# Patient Record
Sex: FEMALE | Race: WHITE | NOT HISPANIC OR LATINO | Employment: UNEMPLOYED | URBAN - METROPOLITAN AREA
[De-identification: names, ages, dates, MRNs, and addresses within clinical notes are randomized per-mention and may not be internally consistent; named-entity substitution may affect disease eponyms.]

---

## 2018-09-15 ENCOUNTER — HOSPITAL ENCOUNTER (OUTPATIENT)
Dept: URGENT CARE | Facility: CLINIC | Age: 63
Setting detail: SPECIMEN
Discharge: HOME | End: 2018-09-15
Attending: FAMILY MEDICINE | Admitting: FAMILY MEDICINE

## 2018-09-15 LAB
BACTERIA SPEC AEROBE CULT: NORMAL
GRAM STN SPEC: NORMAL
Lab: NORMAL
MICRO REPORT STATUS: NORMAL
SPECIMEN SOURCE: NORMAL

## 2019-01-31 ENCOUNTER — HOSPITAL ENCOUNTER (OUTPATIENT)
Dept: FAMILY MEDICINE CLINIC | Facility: CLINIC | Age: 64
Setting detail: SPECIMEN
Discharge: HOME | End: 2019-01-31
Attending: FAMILY MEDICINE | Admitting: FAMILY MEDICINE

## 2019-01-31 LAB
ALBUMIN SERPL-MCNC: 3.6 G/DL (ref 3.5–4.8)
ALBUMIN/GLOB SERPL: 1.3 {RATIO} (ref 1–1.7)
ALP SERPL-CCNC: 57 IU/L (ref 32–91)
ALT SERPL-CCNC: 24 IU/L (ref 14–54)
ANION GAP SERPL CALC-SCNC: 18.3 MMOL/L (ref 10–20)
AST SERPL-CCNC: 31 IU/L (ref 15–41)
BILIRUB SERPL-MCNC: 0.3 MG/DL (ref 0.3–1.2)
BUN SERPL-MCNC: 22 MG/DL (ref 8–20)
BUN/CREAT SERPL: 20 (ref 5.4–26.2)
CALCIUM SERPL-MCNC: 9.3 MG/DL (ref 8.9–10.3)
CHLORIDE SERPL-SCNC: 94 MMOL/L (ref 101–111)
CHOLEST SERPL-MCNC: 112 MG/DL
CHOLEST/HDLC SERPL: 3 {RATIO}
CONV CO2: 25 MMOL/L (ref 22–32)
CONV LDL CHOLESTEROL DIRECT: 35 MG/DL (ref 0–100)
CONV TOTAL PROTEIN: 6.4 G/DL (ref 6.1–7.9)
CREAT UR-MCNC: 1.1 MG/DL (ref 0.4–1)
GLOBULIN UR ELPH-MCNC: 2.8 G/DL (ref 2.5–3.8)
GLUCOSE SERPL-MCNC: 240 MG/DL (ref 65–99)
HDLC SERPL-MCNC: 37 MG/DL
LDLC/HDLC SERPL: 0.9 {RATIO}
LIPID INTERPRETATION: ABNORMAL
POTASSIUM SERPL-SCNC: 4.3 MMOL/L (ref 3.6–5.1)
SODIUM SERPL-SCNC: 133 MMOL/L (ref 136–144)
TRIGL SERPL-MCNC: 268 MG/DL
VLDLC SERPL CALC-MCNC: 39.5 MG/DL

## 2019-05-02 ENCOUNTER — HOSPITAL ENCOUNTER (OUTPATIENT)
Dept: FAMILY MEDICINE CLINIC | Facility: CLINIC | Age: 64
Setting detail: SPECIMEN
Discharge: HOME | End: 2019-05-02
Attending: FAMILY MEDICINE | Admitting: FAMILY MEDICINE

## 2019-05-03 ENCOUNTER — HOSPITAL ENCOUNTER (OUTPATIENT)
Dept: FAMILY MEDICINE CLINIC | Facility: CLINIC | Age: 64
Setting detail: SPECIMEN
Discharge: HOME | End: 2019-05-03
Attending: FAMILY MEDICINE | Admitting: FAMILY MEDICINE

## 2019-05-24 ENCOUNTER — CONVERSION ENCOUNTER (OUTPATIENT)
Dept: OTHER | Facility: HOSPITAL | Age: 64
End: 2019-05-24

## 2019-06-04 VITALS
HEIGHT: 65 IN | OXYGEN SATURATION: 97 % | HEART RATE: 91 BPM | WEIGHT: 142 LBS | SYSTOLIC BLOOD PRESSURE: 108 MMHG | DIASTOLIC BLOOD PRESSURE: 60 MMHG | BODY MASS INDEX: 23.66 KG/M2

## 2019-06-06 NOTE — PROGRESS NOTES
Referring Provider:  Kade  Primary Provider:  Raf Mason MD    CC:  diabetes Type 2 follow-up.    History of Present Illness:  Patient is a 63-year-old female who presents today as a new patient to establish care.  She has hx of diabetes mellitus II, hypertension, hyperlipidemia, insomnia, arthritis, and COPD.  Patient had recent labs showing uncontrolled diabetes and was started   on Jardiance and given endo referral. She does not check blood sugars.   The patient denies polyuria, polydipsia, polyphagia, nocturia, hypoglycemia requiring assistance, self managed hypoglycemia, nocturnal hypoglycemia, hypoglycemic unawareness, weight   loss, weight gain and blurred vision. She refuses injectable medication.      The patient notes hypertension.  The patient denies headache, visual disturbance, epistaxis, dizziness, syncope, fatigue, tremor, diaphoresis, dyspnea, palpitations, vomiting, abdominal pain, tinnitus, nocturia and chest pain.  Stable.    Stable insomnia.  Stable COPD without inhalers.           Risk Factors:     Smoked Tobacco Use:  Former smoker     Cigarettes:  Yes -- 4ppd pack(s) per day,      Year quit:  1992        Years Since Last Quit:  27  Smokeless Tobacco Use:  Never  Passive smoke exposure:  yes  Drug use:  no  Caffeine use:  0 drinks per day  Alcohol use:  yes     Type:  OCC  Seatbelt use:  100 %  Sun Exposure:  rarely      Review of Systems     General       Denies fever, chills, sweats and weakness.    Eyes       Denies vision loss - both eyes, blurring, eye pain, discharge and Decreased night vision.    ENT       Denies ear discharge, earache, decreased hearing, nasal congestion, nosebleeds, difficulty swallowing, hoarseness, sore throat, Post-nasal drainage and sneezing.    CV       Denies chest pain or discomfort, lightheadedness, shortness of breath with exertion, palpitations, swelling of hands or feet, difficulty breathing while lying down, fainting and leg cramps with  exertion.    Resp       Denies cough, shortness of breath, coughing up blood, chest discomfort, wheezing, excessive sputum and excessive snoring.    GI       Denies nausea, vomiting, abdominal pain, hemorrhoids, diarrhea, change in bowel habits, constipation and dark tarry stools.           Denies urinary frequency, urinary urgency, kidney pain, painful urination and night time urination.    Derm       Denies suspicious lesions.    Neuro       Denies headaches.    Psych       Denies anxiety, thoughts of suicide, depression and thoughts of violence.      Vital Signs:    Patient Profile:    63 Years Old Female  Height:     65 inches (165.10 cm)  Weight:     142 pounds  BMI:        23.63     O2 Sat:     97 %  Pulse rate: 91 / minute  BP Sittin / 60  (left arm)    Cuff size:  regular      Problems: Active problems were reviewed with the patient during this visit.  Medications: Medications were reviewed with the patient during this visit.  Allergies: Allergies were reviewed with the patient during this visit.        Vitals Entered By: Sondra Denis [facility] (May 24, 2019 1:14 PM)      Vital Signs:    Patient Profile:    63 Years Old Female  Height:     65 inches (165.10 cm)  Weight:     142 pounds  (Measured Weight:   lbs.  oz.)  BMI:        23.63  Pulse rate: 91 / minute  O2 Sat:     97 %    BP sittin / 60  (left arm)  Cuff size:  regular   Vitals Entered By: Sondra Denis [facility] (May 24, 2019 1:18 PM)    Medications:  Medications were reviewed with the patient during this visit.    Allergies:   * ETHER (Critical)  * WASP STINGS (Critical)    Allergies were reviewed with the patient during this visit.      Physical Exam    General:      well developed, well nourished, in no acute distress.    Head:      normocephalic and atraumatic.    Eyes:      PERRL/EOM intact, conjunctiva and sclera clear with out nystagmus.    Ears:      TM's intact and clear with normal canals with grossly normal hearing.    Nose:       no deformity, discharge, inflammation, or lesions.    Mouth:      no deformity or lesions with good dentition.    Neck:      no masses, thyromegaly, or abnormal cervical nodes.    Lungs:      clear bilaterally to auscultation.    Heart:      non-displaced PMI, chest non-tender; regular rate and rhythm, S1, S2 without murmurs, rubs, or gallops  Abdomen:       normal bowel sounds; no hepatosplenomegaly no ventral,umbilical hernias or masses noted.    Msk:      no deformity or scoliosis noted of thoracic or lumbar spine.    Pulses:      pulses normal in all 4 extremities.    Extremities:      no clubbing, cyanosis, edema, or deformity noted with normal full range of motion of joints of all four extremities   Neurologic:      no focal deficits, cranial nerves II-XII grossly intact with normal sensation, reflexes, coordination, muscle strength and tone.    Psych:      alert and cooperative; normal mood and affect; normal attention span and concentration.      Diabetes Management Exam:      Foot Exam (with socks and/or shoes not present):        Pulses:           pulses normal in all 4 extremities.        Blood Pressure:  Today's BP: 108/60 mm Hg    Labwork:   Most Recent Lab Results:   LDL: 38 mg/dL 05/03/2019  HbA1c: : 9.9 % 05/03/2019          Impression & Recommendations:    Problem # 1:  Preventive Health Care (VOU85-D48.8)  Colonoscopy-2 years ago  Mammogram- order today  DEXA- order today  Hysterectomy  Pneumovax completed several years ago.  Prevnar- today  Shingrix discussed    Problem # 2:  Diabetes mellitus, type II, uncontrolled (ICD-250.02) (QIP46-I54.65)  Patient had labs completed recently with elevated A1C. She has referral to endo and will call to reschedule.  She was started on Jardiance. She refuses to check blood sugars. I will send [employer] system for blood sugar monitoring.  Follow up in 2 weeks.    Her updated medication list for this problem includes:     Jardiance 25 Mg Oral Tablet (Empagliflozin)  ..... One by mouth daily     Janumet Xr  Mg Oral Tablet Extended Release 24 Hour (Sitagliptin-metformin hcl) ..... 2 tablets at evening meal     Glimepiride 4 Mg Oral Tablet (Glimepiride) ..... Take 1 tablet by mouth daily     Lisinopril 20 Mg Oral Tablet (Lisinopril) ..... Take 1 tablet by mouth daily      Problem # 3:  Hypertension (ZOF08-J30)  Stable  Her updated medication list for this problem includes:     Lisinopril 20 Mg Oral Tablet (Lisinopril) ..... Take 1 tablet by mouth daily      Problem # 4:  Hyperlipidemia (ICD-272.4) (CWV64-D07.5)  Recent lipid panel.    Her updated medication list for this problem includes:     Rosuvastatin Calcium 20 Mg Oral Tablet (Rosuvastatin calcium) ..... Take 1 tablet by mouth daily      Problem # 5:  COPD (PLV06-Z52.9)  Hx of smoking.  Patient is stable off of medication.   The following medications were removed from the medication list:     Stiolto Respimat 2.5-2.5 Mcg/act Inhalation Aerosol Solution (Tiotropium bromide-olodaterol)    Her updated medication list for this problem includes:     Proair Hfa 108 (90 Base) Mcg/act Inhalation Aerosol Solution (Albuterol sulfate) ..... 2 puffs q 4h      Problem # 6:  Insomnia, chronic (ICD-307.42) (IIW06-P69.04)  Stable.     Problem # 7:  Arthritis, generalized (ICD-716.99) (IYI86-L49.9)  Patient on Meloxicam.     Medications Added to Medication List This Visit:  1)  Meloxicam 15 Mg Oral Tablet (Meloxicam) .... Take 1 tablet by mouth every day  2)  Methocarbamol 500 Mg Oral Tablet (Methocarbamol) .... Take 1 po tid  3)  Freestyle Maki 14 Day Sensor (Continuous blood gluc sensor) .... Apply one sensor every 14 days e11.9  4)  Freestyle Maki Browns Device (Continuous blood gluc ) .... Use to check bs at least once a day e11.9  5)  Jardiance 25 Mg Oral Tablet (Empagliflozin) .... One by mouth daily    Other Orders:  Immun. Admin. (50527)  Prevnar-13 (CPT-84698)  BILATERAL SCREENING MAMMOGRAM (CPT-35299)  DEXA SCAN  (CPT-49759)      Patient Instructions:  1)  Follow up in 2 weeks.           Prescriptions:  MELOXICAM 15 MG ORAL TABLET (MELOXICAM) take 1 tablet by mouth every day  #30[Tablet] x 0      Entered and Authorized by:  Wendy Monsivais      Electronically signed by:   Wendy Monsivais on 05/24/2019      Method used:    Electronically to               [facility] (retail)              [address] [place] [address], [place]  [address]              Ph: [phone]              Fax: [phone]      Note to Pharmacy: Route: ORAL;       RxID:   [id number]  METHOCARBAMOL 500 MG ORAL TABLET (METHOCARBAMOL) Take 1 PO TID  #90[Tablet] x 0      Entered and Authorized by:  Wendy Monsivais      Electronically signed by:   Wendy Monsivais on 05/24/2019      Method used:    Electronically to               [facility] (retail)              [address] [place] [address], [place]  [address]              Ph: [phone]              Fax: [phone]      Note to Pharmacy: Route: ORAL;       RxID:   [id number]  FREESTYLE MELANIA 14 DAY SENSOR (CONTINUOUS BLOOD GLUC SENSOR) apply one sensor every 14 days E11.9  #2[Unspecified] x 2      Entered and Authorized by:  Wendy Monsivais      Electronically signed by:   Wendy Monsivais on 05/24/2019      Method used:    Electronically to               [facility] (retail)              [address] [place] [address], [place]  [address]              Ph: [phone]              Fax: [phone]      RxID:   [id number]  FREESTYLE MELANIA READER DEVICE (CONTINUOUS BLOOD GLUC ) use to check bs at least once a day E11.9  #1[Device] x 0      Entered and Authorized by:  Wedny Monsivais      Electronically signed by:   Wendy Monsivais on 05/24/2019      Method used:    Electronically to               [facility] (retail)              [address] [place] [address], [place]  [address]              Ph: [phone]               Fax: [phone]      RxID:   [id number]  JARDIANCE 25 MG ORAL TABLET (EMPAGLIFLOZIN) one by mouth daily  #30[Tablet] x 0      Entered and Authorized by:  Wendy Monsivais      Electronically signed by:   Wendy Monsivais on 05/24/2019      Method used:    Electronically to               [facility] (retail)              [address] [place] [address]              Ph: [phone]              Fax: [phone]      Note to Pharmacy: Route: ORAL;       RxID:   [id number]                Medication Administration    Orders Added:  1)  Immun. Admin. [29785]  2)  Prevnar-13 [CPT-30214]  3)  BILATERAL SCREENING MAMMOGRAM [CPT-06778]  4)  DEXA SCAN [CPT-52130]  5)  Ofc Vst, Est Level IV [83442]      The patient was counseled by the physician on immunizations due, and on the risks and benefits of immunizations.    Vaccines Administered/Entered:  Vaccination Group: PneumoPCV  Series: 1  Vaccination: Prevnar 13 Intramuscular Suspension  Administered Date: 5/24/2019 1:54 PM  [facility] Eligibility: 317  Dose/Route/Site : 0.5 mL / ID / Left Deltoid  Mfr/Lot#/Exp.Date: [employer] / [id number] / 10/31/2020  NDC/CVX: [id number] / 133  VIS Date : 11/5/2015  Administered by : Sondra Denis CMA   Comments :         Electronically signed by Wendy Monsivais on 05/27/2019 at 3:24 PM  ________________________________________________________________________       Disclaimer: Converted Note message may not contain all data elements that existed in the legacy source system. Please see [employer] Legacy System for the original note details.

## 2019-06-19 RX ORDER — MELOXICAM 15 MG/1
TABLET ORAL
Qty: 30 TABLET | Refills: 0 | Status: SHIPPED | OUTPATIENT
Start: 2019-06-19 | End: 2019-07-17 | Stop reason: SDUPTHER

## 2019-07-01 ENCOUNTER — OFFICE VISIT (OUTPATIENT)
Dept: PAIN MEDICINE | Facility: CLINIC | Age: 64
End: 2019-07-01

## 2019-07-01 VITALS
RESPIRATION RATE: 16 BRPM | WEIGHT: 145 LBS | HEIGHT: 65 IN | BODY MASS INDEX: 24.16 KG/M2 | OXYGEN SATURATION: 99 % | DIASTOLIC BLOOD PRESSURE: 68 MMHG | TEMPERATURE: 98 F | SYSTOLIC BLOOD PRESSURE: 114 MMHG | HEART RATE: 80 BPM

## 2019-07-01 DIAGNOSIS — M54.2 CERVICALGIA: Primary | ICD-10-CM

## 2019-07-01 DIAGNOSIS — M25.511 CHRONIC RIGHT SHOULDER PAIN: ICD-10-CM

## 2019-07-01 DIAGNOSIS — F19.90 CURRENT DRUG USE: Primary | ICD-10-CM

## 2019-07-01 DIAGNOSIS — M67.911 ROTATOR CUFF DISORDER, RIGHT: ICD-10-CM

## 2019-07-01 DIAGNOSIS — M79.621 ARM PAIN, SUPERIOR, RIGHT: ICD-10-CM

## 2019-07-01 DIAGNOSIS — G89.29 CHRONIC RIGHT SHOULDER PAIN: ICD-10-CM

## 2019-07-01 PROCEDURE — 99204 OFFICE O/P NEW MOD 45 MIN: CPT | Performed by: PHYSICAL MEDICINE & REHABILITATION

## 2019-07-01 PROCEDURE — G0463 HOSPITAL OUTPT CLINIC VISIT: HCPCS | Performed by: PHYSICAL MEDICINE & REHABILITATION

## 2019-07-01 RX ORDER — MELOXICAM 15 MG/1
TABLET ORAL EVERY 24 HOURS
COMMUNITY
Start: 2019-05-24 | End: 2019-07-17 | Stop reason: SDUPTHER

## 2019-07-01 RX ORDER — TRAMADOL HYDROCHLORIDE 50 MG/1
50 TABLET ORAL EVERY 6 HOURS PRN
Qty: 28 TABLET | Refills: 0 | Status: SHIPPED | OUTPATIENT
Start: 2019-07-01 | End: 2019-07-23 | Stop reason: SDUPTHER

## 2019-07-01 RX ORDER — GABAPENTIN 100 MG/1
100 CAPSULE ORAL
Refills: 3 | COMMUNITY
Start: 2019-06-09 | End: 2019-08-08 | Stop reason: SDUPTHER

## 2019-07-01 RX ORDER — GLIMEPIRIDE 4 MG/1
TABLET ORAL
Refills: 1 | COMMUNITY
Start: 2019-04-19 | End: 2019-10-20 | Stop reason: SDUPTHER

## 2019-07-01 RX ORDER — ZOLPIDEM TARTRATE 10 MG/1
TABLET ORAL
Refills: 0 | COMMUNITY
Start: 2019-06-13 | End: 2019-07-10 | Stop reason: SDUPTHER

## 2019-07-01 RX ORDER — EMPAGLIFLOZIN 25 MG/1
1 TABLET, FILM COATED ORAL DAILY
Refills: 0 | COMMUNITY
Start: 2019-05-24 | End: 2019-07-11 | Stop reason: SDUPTHER

## 2019-07-01 RX ORDER — ROSUVASTATIN CALCIUM 20 MG/1
TABLET, COATED ORAL
Refills: 3 | COMMUNITY
Start: 2019-04-26 | End: 2019-08-29 | Stop reason: SDUPTHER

## 2019-07-01 RX ORDER — METHOCARBAMOL 500 MG/1
500 TABLET, FILM COATED ORAL 3 TIMES DAILY
Refills: 0 | COMMUNITY
Start: 2019-05-28 | End: 2019-07-03 | Stop reason: SDUPTHER

## 2019-07-01 RX ORDER — FLASH GLUCOSE SCANNING READER
EACH MISCELLANEOUS
Refills: 0 | COMMUNITY
Start: 2019-05-24 | End: 2019-09-11 | Stop reason: SDUPTHER

## 2019-07-01 RX ORDER — FLASH GLUCOSE SENSOR
KIT MISCELLANEOUS
COMMUNITY
Start: 2019-05-24 | End: 2020-07-06

## 2019-07-01 RX ORDER — ESTRADIOL 1 MG/1
1 TABLET ORAL DAILY
Refills: 3 | COMMUNITY
Start: 2019-06-12 | End: 2019-08-19 | Stop reason: SDUPTHER

## 2019-07-01 RX ORDER — SITAGLIPTIN AND METFORMIN HYDROCHLORIDE 1000; 50 MG/1; MG/1
TABLET, FILM COATED, EXTENDED RELEASE ORAL
Refills: 1 | COMMUNITY
Start: 2019-04-22 | End: 2019-10-20 | Stop reason: SDUPTHER

## 2019-07-01 RX ORDER — LISINOPRIL 20 MG/1
TABLET ORAL
Refills: 1 | COMMUNITY
Start: 2019-05-07 | End: 2019-08-09 | Stop reason: SDUPTHER

## 2019-07-01 NOTE — PROGRESS NOTES
Subjective   Divya Lomeli is a 63 y.o. female.     Right shoulder pain, began around 2017, aggravated with overhead reaching, 9/10 at worst, 0/10 at best, intermittent, varies, aching, stabbing, worse with lifting interferes with ADLs, work. Had PT with HEP. Subluxates, improves with reduction. X-ray C-spine with DDD and DJD. Notes reviewed, as above, also DM2, COPD. Has used Tramadol with some relief.         The following portions of the patient's history were reviewed and updated as appropriate: allergies, current medications, past family history, past medical history, past social history, past surgical history and problem list.    Review of Systems   Constitutional: Negative for chills, fatigue and fever.   HENT: Negative for hearing loss and trouble swallowing.    Eyes: Negative for visual disturbance.   Respiratory: Negative for shortness of breath.    Cardiovascular: Negative for chest pain.   Gastrointestinal: Negative for abdominal pain, constipation, diarrhea, nausea and vomiting.   Genitourinary: Negative for urinary incontinence.   Musculoskeletal: Positive for arthralgias and neck pain. Negative for back pain, joint swelling and myalgias.   Neurological: Negative for dizziness, weakness, numbness and headache.       Objective   Physical Exam   Constitutional: She is oriented to person, place, and time. She appears well-developed and well-nourished.   HENT:   Head: Normocephalic and atraumatic.   Eyes: EOM are normal. Pupils are equal, round, and reactive to light.   Neck: Normal range of motion.   Cardiovascular: Normal rate, regular rhythm, normal heart sounds and intact distal pulses.   Pulmonary/Chest: Breath sounds normal.   Abdominal: Soft. Bowel sounds are normal. She exhibits no distension. There is no tenderness.   Musculoskeletal:   R: shoulder limited ROM   Neurological: She is alert and oriented to person, place, and time. She has normal strength and normal reflexes. She displays normal  reflexes. No sensory deficit.   Psychiatric: She has a normal mood and affect. Her behavior is normal. Thought content normal.         Assessment/Plan   Divya was seen today for other and pain.    Diagnoses and all orders for this visit:    Cervicalgia    Chronic right shoulder pain    Rotator cuff disorder, right    Arm pain, superior, right        Discussed risks and benefits of opioid treatment for chonic pain with patient, including expectations related to prescription requests, alternative modalities to opioids for managing pain, her treatment plan, risks of dependency and addiction, and safe storage practices for prescribed opioids, as well as proper and improper disposal of all medications.  Will obtain UDS today, pain contract today.  Inspect report reviewed, prior notes reviewed, consistent with patient's stated history.  Treatment plan will consist of continuing current medication as long as it remains effective and is necessary, while evaluating patient at each visit and determining if the medication can be lowered or discontinued, while also using nonopioid therapies to reduce reliance on opioids.  Begin Tramadol 50mg QID prn.  Order RxAlt #1 cream.  Schedule R subacromial injection.  RTC for injection.

## 2019-07-03 RX ORDER — METHOCARBAMOL 500 MG/1
TABLET, FILM COATED ORAL
Qty: 90 TABLET | Refills: 0 | Status: SHIPPED | OUTPATIENT
Start: 2019-07-03 | End: 2020-04-14

## 2019-07-10 RX ORDER — ZOLPIDEM TARTRATE 10 MG/1
TABLET ORAL
Qty: 30 TABLET | Refills: 0 | Status: SHIPPED | OUTPATIENT
Start: 2019-07-10 | End: 2019-08-08 | Stop reason: SDUPTHER

## 2019-07-11 RX ORDER — EMPAGLIFLOZIN 25 MG/1
TABLET, FILM COATED ORAL
Qty: 30 TABLET | Refills: 0 | Status: SHIPPED | OUTPATIENT
Start: 2019-07-11 | End: 2019-08-09 | Stop reason: SDUPTHER

## 2019-07-17 RX ORDER — MELOXICAM 15 MG/1
TABLET ORAL
Qty: 30 TABLET | Refills: 0 | Status: SHIPPED | OUTPATIENT
Start: 2019-07-17 | End: 2019-12-19 | Stop reason: SDUPTHER

## 2019-07-19 ENCOUNTER — OFFICE VISIT (OUTPATIENT)
Dept: FAMILY MEDICINE CLINIC | Facility: CLINIC | Age: 64
End: 2019-07-19

## 2019-07-19 VITALS
SYSTOLIC BLOOD PRESSURE: 116 MMHG | HEIGHT: 65 IN | WEIGHT: 143 LBS | BODY MASS INDEX: 23.82 KG/M2 | DIASTOLIC BLOOD PRESSURE: 74 MMHG | HEART RATE: 83 BPM | OXYGEN SATURATION: 100 %

## 2019-07-19 DIAGNOSIS — Z78.0 POSTMENOPAUSAL: ICD-10-CM

## 2019-07-19 DIAGNOSIS — Z12.31 SCREENING MAMMOGRAM, ENCOUNTER FOR: Primary | ICD-10-CM

## 2019-07-19 PROCEDURE — G0402 INITIAL PREVENTIVE EXAM: HCPCS | Performed by: NURSE PRACTITIONER

## 2019-07-19 NOTE — PROGRESS NOTES
The ABCs of the Annual Wellness Visit  Subsequent Medicare Wellness Visit    Chief Complaint   Patient presents with   • Medicare Wellness-subsequent       Subjective   History of Present Illness:  Divya Lomeli is a 63 y.o. female who presents for a Subsequent Medicare Wellness Visit.    HEALTH RISK ASSESSMENT    Recent Hospitalizations:  No hospitalization(s) within the last year.    Current Medical Providers:  Patient Care Team:  Wendy Monsivais, NP as PCP - General      Smoking Status:  Social History     Tobacco Use   Smoking Status Former Smoker       Alcohol Consumption:  Social History     Substance and Sexual Activity   Alcohol Use Yes    Comment: occ   twice weekly alcohol    Depression Screen:   PHQ-2/PHQ-9 Depression Screening 7/19/2019   Little interest or pleasure in doing things 0   Feeling down, depressed, or hopeless 0   Trouble falling or staying asleep, or sleeping too much -   Feeling tired or having little energy -   Poor appetite or overeating -   Feeling bad about yourself - or that you are a failure or have let yourself or your family down -   Trouble concentrating on things, such as reading the newspaper or watching television -   Moving or speaking so slowly that other people could have noticed. Or the opposite - being so fidgety or restless that you have been moving around a lot more than usual -   Thoughts that you would be better off dead, or of hurting yourself in some way -   Total Score 0       Fall Risk Screen:  STEADI Fall Risk Assessment has not been completed.    Health Habits and Functional and Cognitive Screening:  Functional & Cognitive Status 7/19/2019   Do you have difficulty preparing food and eating? No   Do you have difficulty bathing yourself, getting dressed or grooming yourself? No   Do you have difficulty using the toilet? No   Do you have difficulty moving around from place to place? No   Do you have trouble with steps or getting out of a bed or a chair? No   Current  Diet Well Balanced Diet   Dental Exam Up to date   Eye Exam Up to date   Exercise (times per week) 3 times per week   Current Exercise Activities Include Walking   Do you need help using the phone?  No   Are you deaf or do you have serious difficulty hearing?  No   Do you need help with transportation? No   Do you need help shopping? No   Do you need help preparing meals?  No   Do you need help with housework?  No   Do you need help with laundry? No   Do you need help taking your medications? No   Do you need help managing money? No   Do you ever drive or ride in a car without wearing a seat belt? No   Have you felt unusual stress, anger or loneliness in the last month? No   Who do you live with? Other   If you need help, do you have trouble finding someone available to you? No   Have you been bothered in the last four weeks by sexual problems? No   Do you have difficulty concentrating, remembering or making decisions? No         Does the patient have evidence of cognitive impairment? No    Asprin use counseling:Start ASA 81 mg daily     Age-appropriate Screening Schedule:  Refer to the list below for future screening recommendations based on patient's age, sex and/or medical conditions. Orders for these recommended tests are listed in the plan section. The patient has been provided with a written plan.    Health Maintenance   Topic Date Due   • URINE MICROALBUMIN  1955   • TDAP/TD VACCINES (1 - Tdap) 08/19/1972   • ZOSTER VACCINE (1 of 2) 08/19/2003   • DIABETIC FOOT EXAM  06/19/2019   • DIABETIC EYE EXAM  06/19/2019   • COLONOSCOPY  06/19/2019   • HEMOGLOBIN A1C  07/31/2019   • INFLUENZA VACCINE  08/01/2019   • LIPID PANEL  01/31/2020   • PNEUMOCOCCAL VACCINES (65+ LOW/MEDIUM RISK) (2 of 2 - PPSV23) 05/24/2020          The following portions of the patient's history were reviewed and updated as appropriate: allergies, current medications, past family history, past medical history, past social history, past  surgical history and problem list.    Outpatient Medications Prior to Visit   Medication Sig Dispense Refill   • Continuous Blood Gluc  (FREESTYLE MELANIA 14 DAY READER) device USE TO CHECK BLOOD SUGAR AT LEAST ONCE DAILY  0   • Continuous Blood Gluc  (FREESTYLE MELAINA READER) device FREESTYLE MELANIA READER GINA     • estradiol (ESTRACE) 1 MG tablet Take 1 mg by mouth Daily.  3   • gabapentin (NEURONTIN) 100 MG capsule Take 100 mg by mouth every night at bedtime.  3   • glimepiride (AMARYL) 4 MG tablet   1   • JANUMET XR  MG tablet   1   • JARDIANCE 25 MG tablet TAKE 1 TABLET BY MOUTH ONCE DAILY 30 tablet 0   • lisinopril (PRINIVIL,ZESTRIL) 20 MG tablet   1   • meloxicam (MOBIC) 15 MG tablet TAKE 1 TABLET BY MOUTH ONCE DAILY 30 tablet 0   • methocarbamol (ROBAXIN) 500 MG tablet TAKE 1 TABLET BY MOUTH THREE TIMES DAILY 90 tablet 0   • rosuvastatin (CRESTOR) 20 MG tablet   3   • traMADol (ULTRAM) 50 MG tablet Take 1 tablet by mouth Every 6 (Six) Hours As Needed for Moderate Pain . 28 tablet 0   • zolpidem (AMBIEN) 10 MG tablet TAKE 1 TABLET BY MOUTH ONCE DAILY IF  NEEDED  FOR  SLEEP 30 tablet 0     No facility-administered medications prior to visit.        There is no problem list on file for this patient.      Advanced Care Planning:  Patient does not have an advance directive - not interested in additional information  Did advise patient.    Review of Systems    Compared to one year ago, the patient feels her physical health is the same.  Compared to one year ago, the patient feels her mental health is the same.    Reviewed chart for potential of high risk medication in the elderly: yes  Reviewed chart for potential of harmful drug interactions in the elderly:yes  I have discussed medications, Ambien, Tramadol, gabapentin, and methocarbamol as high risk medications and patient reports she has tolerated these for a long time and unable to go without  I have discussed risk for hypoglycemia with  "glimepiride, monitor blood sugars closely  Patient on estradiol, risk for CVA discussed. Regular mammograms recommended.  Discussed risk for GI bleed with Meloxicam.    Objective         Vitals:    07/19/19 1137   BP: 116/74   BP Location: Left arm   Patient Position: Sitting   Cuff Size: Adult   Pulse: 83   SpO2: 100%   Weight: 64.9 kg (143 lb)   Height: 165.1 cm (65\")       Body mass index is 23.8 kg/m².  Discussed the patient's BMI with her. The BMI is in the acceptable range.    Physical Exam          Assessment/Plan   Medicare Risks and Personalized Health Plan  CMS Preventative Services Quick Reference  Advance Directive Discussion  Alcohol Misuse- patient only drinks alcohol twice weekly but do not advise with her other medications  Breast Cancer/Mammogram Screening- mammogram ordered  Chronic Pain - she is followed by pain management  Colon Cancer Screening- patient reports recent screening  Dementia/Memory - screening completed  Depression/Dysphoria- screening completed  Immunizations Discussed/Encouraged (specific immunizations; adacel Tdap, Hepatitis A Vaccine/Series, Influenza and Shingrix )  Lung Cancer Risk- advised low dose CT screening  Osteoprorosis Risk- order dexa scan    The above risks/problems have been discussed with the patient.  Pertinent information has been shared with the patient in the After Visit Summary.  Follow up plans and orders are seen below in the Assessment/Plan Section.    Diagnoses and all orders for this visit:    1. Screening mammogram, encounter for (Primary)  -     Mammo Screening Digital Tomosynthesis Bilateral With CAD    2. Postmenopausal  -     DEXA Bone Density Axial      Follow Up:  Return in about 6 months (around 1/19/2020).      An After Visit Summary and PPPS were given to the patient.             "

## 2019-07-23 ENCOUNTER — HOSPITAL ENCOUNTER (OUTPATIENT)
Dept: PAIN MEDICINE | Facility: HOSPITAL | Age: 64
Discharge: HOME | End: 2019-07-23
Admitting: PHYSICAL MEDICINE & REHABILITATION

## 2019-07-23 VITALS
HEIGHT: 65 IN | RESPIRATION RATE: 16 BRPM | DIASTOLIC BLOOD PRESSURE: 67 MMHG | TEMPERATURE: 98.3 F | WEIGHT: 143 LBS | HEART RATE: 70 BPM | BODY MASS INDEX: 23.82 KG/M2 | SYSTOLIC BLOOD PRESSURE: 102 MMHG | OXYGEN SATURATION: 99 %

## 2019-07-23 DIAGNOSIS — M75.51 SUBACROMIAL BURSITIS OF RIGHT SHOULDER JOINT: ICD-10-CM

## 2019-07-23 DIAGNOSIS — M54.2 CERVICALGIA: Primary | ICD-10-CM

## 2019-07-23 DIAGNOSIS — M25.511 CHRONIC RIGHT SHOULDER PAIN: ICD-10-CM

## 2019-07-23 DIAGNOSIS — G89.29 CHRONIC RIGHT SHOULDER PAIN: ICD-10-CM

## 2019-07-23 PROCEDURE — 25010000002 METHYLPREDNISOLONE PER 40 MG

## 2019-07-23 PROCEDURE — 20610 DRAIN/INJ JOINT/BURSA W/O US: CPT | Performed by: PHYSICAL MEDICINE & REHABILITATION

## 2019-07-23 RX ORDER — BUPIVACAINE HYDROCHLORIDE 5 MG/ML
INJECTION, SOLUTION EPIDURAL; INTRACAUDAL
Status: DISPENSED
Start: 2019-07-23 | End: 2019-07-24

## 2019-07-23 RX ORDER — TRAMADOL HYDROCHLORIDE 50 MG/1
50 TABLET ORAL EVERY 12 HOURS PRN
Qty: 60 TABLET | Refills: 2 | Status: SHIPPED | OUTPATIENT
Start: 2019-07-23 | End: 2019-10-23 | Stop reason: SDUPTHER

## 2019-07-23 RX ORDER — METHYLPREDNISOLONE ACETATE 40 MG/ML
INJECTION, SUSPENSION INTRA-ARTICULAR; INTRALESIONAL; INTRAMUSCULAR; SOFT TISSUE
Status: DISPENSED
Start: 2019-07-23 | End: 2019-07-24

## 2019-07-23 NOTE — H&P
Patient Care Team:  Wendy Monsivais NP as PCP - General    Chief complaint Right shoulder pain    Subjective     Right shoulder pain, began around 2017, aggravated with overhead reaching, 9/10 at worst, 0/10 at best, intermittent, varies, aching, stabbing, worse with lifting interferes with ADLs, work. Had PT with HEP. Subluxates, improves with reduction. X-ray C-spine with DDD and DJD. Notes reviewed, as above, also DM2, COPD. Has used Tramadol with some relief.        Review of Systems   Respiratory: Positive for shortness of breath.    Cardiovascular: Negative for chest pain.   Musculoskeletal: Positive for arthralgias and neck pain.        Past Medical History:   Diagnosis Date   • Anxiety    • COPD (chronic obstructive pulmonary disease) (CMS/[facility])    • Diabetes mellitus (CMS/[facility])     Type 2   • Hyperlipidemia    • Hypertension      Past Surgical History:   Procedure Laterality Date   • APPENDECTOMY  1958   • BACK SURGERY     • LUMBAR SPINE SURGERY  1959    L5 removed-Abstracted from [facility]   • TONSILLECTOMY     • TOTAL ABDOMINAL HYSTERECTOMY       Family History   Problem Relation Age of Onset   • Diabetes Mother    • Diabetes Father      Social History     Tobacco Use   • Smoking status: Former Smoker   Substance Use Topics   • Alcohol use: Yes     Comment: occ   • Drug use: Not on file       (Not in a hospital admission)  Allergies:  Ether and Other    Objective      Vital Signs  Temp:  [98.3 °F (36.8 °C)] 98.3 °F (36.8 °C)  Heart Rate:  [71] 71  Resp:  [16] 16  BP: (100)/(59) 100/59    Physical Exam   Cardiovascular: Normal rate, regular rhythm and normal heart sounds.   Pulmonary/Chest: Effort normal and breath sounds normal.       Results Review:   None      Assessment/Plan       * No active hospital problems. *      Assessment & Plan    I discussed the patients findings and my recommendations with patient     Inspect reviewed, in order. UDS in order 7/1/19.  Reduce to Tramadol 50mg BID prn.  Ordered  "RxAlt #1 cream.  Perform R subacromial injection.  RTC 3 months for f/u.        Shoulder Subacromial Injection    PREOPERATIVE DIAGNOSIS: Shoulder pain    POSTOPERATIVE DIAGNOSIS: Shoulder pain    PROCEDURE PERFORMED: RIGHT SUBACROMIAL SHOULDER INJECTION    The patient understands the risks and benefits of the procedure and wishes to proceed. The patient was seen in the preoperative area. Patient's consent was obtained and updated. Vitals were taken. Patient was then placed in a seated position for the injection. Site marked for a lateral approach and prepped with alcohol swabs x 4. A 25 gauge 1.5\"  needle was introduced into the joint space and 3mL of steroid solution containing 2mL 0.25% bupivacaine, and 1mL 40mg Depomedrol was injected after negative aspiration without resistance. The patient tolerated with no parth-procedural complications.  A sterile dressing was placed over the puncture site.        Steve Shields MD  07/23/19  1:41 PM    Time: Discharge 15 min  "

## 2019-07-23 NOTE — PATIENT INSTRUCTIONS
Shoulder Injection, Care After  Refer to this sheet in the next few weeks. These instructions provide you with information about caring for yourself after your procedure. Your health care provider may also give you more specific instructions. Your treatment has been planned according to current medical practices, but problems sometimes occur. Call your health care provider if you have any problems or questions after your procedure.  What can I expect after the procedure?  After the procedure, it is common to have:  · Soreness.  · Warmth.  · Swelling.    You may have more pain, swelling, and warmth than you did before the injection. This reaction may last for about one day.  Follow these instructions at home:  Bathing  · If you were given a bandage (dressing), keep it dry until your health care provider says it can be removed. Ask your health care provider when you can start showering or taking a bath.  Managing pain, stiffness, and swelling  · If directed, apply ice to the injection area:  ? Put ice in a plastic bag.  ? Place a towel between your skin and the bag.  ? Leave the ice on for 20 minutes, 2-3 times per day.  · Do not apply heat to your knee.  · Raise the injection area above the level of your heart while you are sitting or lying down.  Activity  · Avoid strenuous activities for as long as directed by your health care provider. Ask your health care provider when you can return to your normal activities.  General instructions  · Take medicines only as directed by your health care provider.  · Do not take aspirin or other over-the-counter medicines unless your health care provider says you can.  · Check your injection site every day for signs of infection. Watch for:  ? Redness, swelling, or pain.  ? Fluid, blood, or pus.  · Follow your health care provider’s instructions about dressing changes and removal.  Contact a health care provider if:  · You have symptoms at your injection site that last longer than  two days after your procedure.  · You have redness, swelling, or pain in your injection area.  · You have fluid, blood, or pus coming from your injection site.  · You have warmth in your injection area.  · You have a fever.  · Your pain is not controlled with medicine.  Get help right away if:  · Your knee turns very red.  · Your knee becomes very swollen.  · Your knee pain is severe.  This information is not intended to replace advice given to you by your health care provider. Make sure you discuss any questions you have with your health care provider.  Document Released: 01/08/2016 Document Revised: 08/23/2017 Document Reviewed: 10/28/2015  [employer] Interactive Patient Education © 2018 [employer]

## 2019-08-08 RX ORDER — ZOLPIDEM TARTRATE 10 MG/1
TABLET ORAL
Qty: 30 TABLET | Refills: 0 | Status: SHIPPED | OUTPATIENT
Start: 2019-08-08 | End: 2019-08-29 | Stop reason: SDUPTHER

## 2019-08-08 RX ORDER — GABAPENTIN 100 MG/1
CAPSULE ORAL
Qty: 30 CAPSULE | Refills: 3 | Status: SHIPPED | OUTPATIENT
Start: 2019-08-08 | End: 2020-01-06 | Stop reason: SDUPTHER

## 2019-08-09 RX ORDER — EMPAGLIFLOZIN 25 MG/1
TABLET, FILM COATED ORAL
Qty: 30 TABLET | Refills: 0 | Status: SHIPPED | OUTPATIENT
Start: 2019-08-09 | End: 2019-09-11 | Stop reason: SDUPTHER

## 2019-08-09 RX ORDER — LISINOPRIL 20 MG/1
TABLET ORAL
Qty: 90 TABLET | Refills: 1 | Status: SHIPPED | OUTPATIENT
Start: 2019-08-09 | End: 2020-01-16 | Stop reason: SDUPTHER

## 2019-08-19 RX ORDER — ESTRADIOL 1 MG/1
1 TABLET ORAL DAILY
Qty: 30 TABLET | Refills: 5 | Status: SHIPPED | OUTPATIENT
Start: 2019-08-19 | End: 2020-01-16 | Stop reason: SDUPTHER

## 2019-08-30 RX ORDER — ZOLPIDEM TARTRATE 10 MG/1
TABLET ORAL
Qty: 30 TABLET | Refills: 0 | Status: SHIPPED | OUTPATIENT
Start: 2019-08-30 | End: 2019-10-31 | Stop reason: SDUPTHER

## 2019-08-30 RX ORDER — ZOLPIDEM TARTRATE 10 MG/1
10 TABLET ORAL NIGHTLY PRN
Qty: 30 TABLET | Refills: 0 | Status: SHIPPED | OUTPATIENT
Start: 2019-08-30 | End: 2019-08-30 | Stop reason: SDUPTHER

## 2019-08-30 RX ORDER — ROSUVASTATIN CALCIUM 20 MG/1
20 TABLET, COATED ORAL DAILY
Qty: 90 TABLET | Refills: 0 | Status: SHIPPED | OUTPATIENT
Start: 2019-08-30 | End: 2019-11-24 | Stop reason: SDUPTHER

## 2019-09-03 ENCOUNTER — HOSPITAL ENCOUNTER (OUTPATIENT)
Dept: MAMMOGRAPHY | Facility: HOSPITAL | Age: 64
Discharge: HOME | End: 2019-09-03
Admitting: NURSE PRACTITIONER

## 2019-09-03 ENCOUNTER — HOSPITAL ENCOUNTER (OUTPATIENT)
Dept: BONE DENSITY | Facility: HOSPITAL | Age: 64
Discharge: HOME | End: 2019-09-03

## 2019-09-03 PROCEDURE — 77063 BREAST TOMOSYNTHESIS BI: CPT

## 2019-09-03 PROCEDURE — 77067 SCR MAMMO BI INCL CAD: CPT

## 2019-09-03 PROCEDURE — 77080 DXA BONE DENSITY AXIAL: CPT

## 2019-09-11 RX ORDER — FLASH GLUCOSE SCANNING READER
EACH MISCELLANEOUS
Qty: 1 DEVICE | Refills: 0 | Status: SHIPPED | OUTPATIENT
Start: 2019-09-11 | End: 2020-07-06

## 2019-09-11 RX ORDER — EMPAGLIFLOZIN 25 MG/1
TABLET, FILM COATED ORAL
Qty: 30 TABLET | Refills: 0 | Status: SHIPPED | OUTPATIENT
Start: 2019-09-11 | End: 2019-10-13 | Stop reason: SDUPTHER

## 2019-09-18 ENCOUNTER — TELEPHONE (OUTPATIENT)
Dept: PAIN MEDICINE | Facility: CLINIC | Age: 64
End: 2019-09-18

## 2019-09-18 NOTE — TELEPHONE ENCOUNTER
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  Her call back # for any questions is [phone]

## 2019-10-14 RX ORDER — EMPAGLIFLOZIN 25 MG/1
TABLET, FILM COATED ORAL
Qty: 30 TABLET | Refills: 0 | Status: SHIPPED | OUTPATIENT
Start: 2019-10-14 | End: 2019-11-10 | Stop reason: SDUPTHER

## 2019-10-14 RX ORDER — GLIMEPIRIDE 4 MG/1
TABLET ORAL
Qty: 90 TABLET | Refills: 1 | OUTPATIENT
Start: 2019-10-14

## 2019-10-16 ENCOUNTER — TELEPHONE (OUTPATIENT)
Dept: FAMILY MEDICINE CLINIC | Facility: CLINIC | Age: 64
End: 2019-10-16

## 2019-10-16 NOTE — TELEPHONE ENCOUNTER
I cannot give functional disability rating. You would need to be certified for that.  If these are permanent restrictions then she should see [facility] med.

## 2019-10-16 NOTE — TELEPHONE ENCOUNTER
Patient states she is not seeing occupational med. She is not asking for disability or time off just restrictions to prevent her from working in a certain area where she was previously hurt.

## 2019-10-16 NOTE — TELEPHONE ENCOUNTER
Patient said that it isn't joint related.  She has the form ready, she just needs someone to sign it.  Should she still see occupational med or ortho?

## 2019-10-20 RX ORDER — SITAGLIPTIN AND METFORMIN HYDROCHLORIDE 1000; 50 MG/1; MG/1
TABLET, FILM COATED, EXTENDED RELEASE ORAL
Qty: 180 TABLET | Refills: 1 | Status: SHIPPED | OUTPATIENT
Start: 2019-10-20 | End: 2020-01-16 | Stop reason: SDUPTHER

## 2019-10-20 RX ORDER — GLIMEPIRIDE 4 MG/1
TABLET ORAL
Qty: 90 TABLET | Refills: 1 | Status: SHIPPED | OUTPATIENT
Start: 2019-10-20 | End: 2019-10-24 | Stop reason: SDUPTHER

## 2019-10-22 ENCOUNTER — APPOINTMENT (OUTPATIENT)
Dept: PAIN MEDICINE | Facility: CLINIC | Age: 64
End: 2019-10-22

## 2019-10-23 ENCOUNTER — OFFICE VISIT (OUTPATIENT)
Dept: PAIN MEDICINE | Facility: CLINIC | Age: 64
End: 2019-10-23

## 2019-10-23 VITALS
SYSTOLIC BLOOD PRESSURE: 106 MMHG | BODY MASS INDEX: 22.5 KG/M2 | WEIGHT: 140 LBS | DIASTOLIC BLOOD PRESSURE: 69 MMHG | RESPIRATION RATE: 16 BRPM | TEMPERATURE: 98.3 F | HEIGHT: 66 IN | OXYGEN SATURATION: 96 % | HEART RATE: 80 BPM

## 2019-10-23 DIAGNOSIS — M47.812 CERVICAL SPONDYLOSIS WITHOUT MYELOPATHY: ICD-10-CM

## 2019-10-23 DIAGNOSIS — G89.29 CHRONIC RIGHT SHOULDER PAIN: Primary | ICD-10-CM

## 2019-10-23 DIAGNOSIS — M25.50 POLYARTHRALGIA: ICD-10-CM

## 2019-10-23 DIAGNOSIS — M25.511 CHRONIC RIGHT SHOULDER PAIN: Primary | ICD-10-CM

## 2019-10-23 DIAGNOSIS — G89.4 CHRONIC PAIN SYNDROME: ICD-10-CM

## 2019-10-23 DIAGNOSIS — Z79.899 HIGH RISK MEDICATION USE: ICD-10-CM

## 2019-10-23 PROCEDURE — G0463 HOSPITAL OUTPT CLINIC VISIT: HCPCS | Performed by: ANESTHESIOLOGY

## 2019-10-23 PROCEDURE — 99214 OFFICE O/P EST MOD 30 MIN: CPT | Performed by: ANESTHESIOLOGY

## 2019-10-23 RX ORDER — TRAMADOL HYDROCHLORIDE 50 MG/1
50 TABLET ORAL EVERY 12 HOURS PRN
Qty: 14 TABLET | Refills: 0 | Status: SHIPPED | OUTPATIENT
Start: 2019-10-23 | End: 2019-10-23 | Stop reason: SDUPTHER

## 2019-10-23 RX ORDER — CYCLOBENZAPRINE HCL 10 MG
TABLET ORAL
COMMUNITY
End: 2020-01-16

## 2019-10-23 RX ORDER — TRAMADOL HYDROCHLORIDE 50 MG/1
50 TABLET ORAL EVERY 12 HOURS PRN
Qty: 60 TABLET | Refills: 2 | Status: SHIPPED | OUTPATIENT
Start: 2019-10-23 | End: 2020-01-21 | Stop reason: SDUPTHER

## 2019-10-23 NOTE — PROGRESS NOTES
Subjective    CC right shoulder, arm pain  Divya Lomeli is a 64 y.o. female with chronic neck pain, polyarthralgia, right humerus/biceps, shoulder pain here for follow-up.  New to me.  Sees .  Right subacromial bursa injection last visit reports good relief.  Continued chronic right arm/humeral pain.  Chronic polyarthralgia neck pain from DDD spondylosis.    Pain interfere with daily activity and with sleep.  Good relief functional benefit with tramadol utilizing as needed.    Pain Assessment   Location of Pain: Right arm, joint  Description of Pain: Dull/Aching, Throbbing, Stabbing  Previous Pain Rating :5  Current Pain Ratin  Aggravating Factors: Activity  Alleviating Factors: Rest, Medication    The following portions of the patient's history were reviewed and updated as appropriate: allergies, current medications, past family history, past medical history, past social history, past surgical history and problem list.     has a past medical history of Anxiety, Arm pain, COPD (chronic obstructive pulmonary disease) (CMS/[facility]), Diabetes mellitus (CMS/[facility]), Hyperlipidemia, and Hypertension.     has a past surgical history that includes Back surgery; Appendectomy (); Tonsillectomy; Lumbar spine surgery (); Total abdominal hysterectomy; and Oophorectomy.  Social History     Tobacco Use   • Smoking status: Former Smoker   • Smokeless tobacco: Never Used   Substance Use Topics   • Alcohol use: Yes     Comment: occ       Review of Systems   Constitutional: Negative for chills, fatigue and fever.   HENT: Negative for swollen glands and trouble swallowing.    Respiratory: Negative.  Negative for shortness of breath.    Cardiovascular: Negative for chest pain, palpitations and leg swelling.   Gastrointestinal: Negative for nausea and vomiting.   Musculoskeletal: Positive for arthralgias. Negative for back pain, gait problem, joint swelling, myalgias, neck pain and neck stiffness.   Skin: Negative for  "color change, dry skin, rash and skin lesions.   Neurological: Negative for dizziness, weakness, light-headedness and headache.   Hematological: Negative for adenopathy. Does not bruise/bleed easily.   Psychiatric/Behavioral: Negative for behavioral problems, suicidal ideas and depressed mood.   All other systems reviewed and are negative.      Objective   Physical Exam   Constitutional: She is oriented to person, place, and time. She appears well-developed and well-nourished. No distress.   Eyes: Conjunctivae are normal. Pupils are equal, round, and reactive to light.   Neck: Normal range of motion and full passive range of motion without pain. No Kernig's sign noted.   Cardiovascular: Normal heart sounds and intact distal pulses.   Pulmonary/Chest: Effort normal and breath sounds normal.   Musculoskeletal:        Right shoulder: She exhibits tenderness.   Back: Paraspinal tenderness, negative leg raise.  Pain with extension/side rotation.  Lumbar loading positive, pain on extension of low back past 5 degrees.  TTP on the lumbar facets noted.  Right biceps tenderness, no swelling or erythema     Vascular Status -  Her right foot exhibits no edema. Her left foot exhibits no edema.  Lymphadenopathy:     She has no cervical adenopathy.   Neurological: She is alert and oriented to person, place, and time. She has normal strength and normal reflexes. No sensory deficit. Coordination and gait normal.   Skin: Skin is warm and dry. Capillary refill takes less than 2 seconds.   Psychiatric: She has a normal mood and affect. Her behavior is normal.   Vitals reviewed.    /69   Pulse 80   Temp 98.3 °F (36.8 °C)   Resp 16   Ht 166.4 cm (65.5\")   Wt 63.5 kg (140 lb)   SpO2 96%   BMI 22.94 kg/m²     Global pain scale and PHQ 9 on chart  Opioid risk tool low risk    Assessment/Plan   Divya was seen today for arm pain, joint pain and follow-up.    Diagnoses and all orders for this visit:    Chronic right shoulder " pain    Chronic pain syndrome    Cervical spondylosis without myelopathy    Polyarthralgia    High risk medication use    Other orders  -     Discontinue: traMADol (ULTRAM) 50 MG tablet; Take 1 tablet by mouth Every 12 (Twelve) Hours As Needed for Moderate Pain .  -     traMADol (ULTRAM) 50 MG tablet; Take 1 tablet by mouth Every 12 (Twelve) Hours As Needed for Moderate Pain .      Summary  Divya Lomeli is a 64 y.o. female with chronic neck pain, polyarthralgia, right humerus/biceps, shoulder pain here for follow-up.  New to me.  Sees .  Continued chronic polyarthralgia, myofascial pain and neck pain from DDD spondylosis.  Functional benefit with hydrocodone as needed.  Good relief with right subacromial bursa injection.  Repeat as needed    Continue tramadol 50 mg twice daily as needed for severe pain.  UDS and inspect reviewed.  Discussed risk of tolerance, dependence, respiratory depression, coma and death associated with use of oral opioids for treatment of chronic nonmalignant pain.     RTC 3 months

## 2019-10-24 RX ORDER — GLIMEPIRIDE 4 MG/1
4 TABLET ORAL DAILY
Qty: 90 TABLET | Refills: 1 | Status: SHIPPED | OUTPATIENT
Start: 2019-10-24 | End: 2020-01-16 | Stop reason: SDUPTHER

## 2019-10-31 RX ORDER — ZOLPIDEM TARTRATE 10 MG/1
10 TABLET ORAL NIGHTLY PRN
Qty: 30 TABLET | Refills: 0 | Status: SHIPPED | OUTPATIENT
Start: 2019-10-31 | End: 2019-11-27 | Stop reason: SDUPTHER

## 2019-11-13 RX ORDER — EMPAGLIFLOZIN 25 MG/1
TABLET, FILM COATED ORAL
Qty: 90 TABLET | Refills: 0 | Status: SHIPPED | OUTPATIENT
Start: 2019-11-13 | End: 2020-01-16 | Stop reason: SDUPTHER

## 2019-11-25 RX ORDER — ROSUVASTATIN CALCIUM 20 MG/1
TABLET, COATED ORAL
Qty: 90 TABLET | Refills: 0 | Status: SHIPPED | OUTPATIENT
Start: 2019-11-25 | End: 2020-01-16 | Stop reason: SDUPTHER

## 2019-12-02 RX ORDER — ZOLPIDEM TARTRATE 10 MG/1
10 TABLET ORAL NIGHTLY PRN
Qty: 30 TABLET | Refills: 0 | Status: SHIPPED | OUTPATIENT
Start: 2019-12-02 | End: 2019-12-31

## 2019-12-19 RX ORDER — MELOXICAM 15 MG/1
15 TABLET ORAL DAILY
Qty: 30 TABLET | Refills: 0 | Status: SHIPPED | OUTPATIENT
Start: 2019-12-19 | End: 2020-01-16 | Stop reason: SDUPTHER

## 2019-12-31 DIAGNOSIS — G47.09 OTHER INSOMNIA: Primary | ICD-10-CM

## 2019-12-31 RX ORDER — ZOLPIDEM TARTRATE 10 MG/1
10 TABLET ORAL NIGHTLY PRN
Qty: 30 TABLET | Refills: 0 | Status: SHIPPED | OUTPATIENT
Start: 2019-12-31 | End: 2020-01-16

## 2020-01-06 ENCOUNTER — OFFICE VISIT (OUTPATIENT)
Dept: FAMILY MEDICINE CLINIC | Facility: CLINIC | Age: 65
End: 2020-01-06

## 2020-01-06 VITALS
DIASTOLIC BLOOD PRESSURE: 68 MMHG | OXYGEN SATURATION: 99 % | SYSTOLIC BLOOD PRESSURE: 101 MMHG | HEART RATE: 81 BPM | BODY MASS INDEX: 22.73 KG/M2 | WEIGHT: 141.4 LBS | HEIGHT: 66 IN

## 2020-01-06 DIAGNOSIS — M51.26 LUMBAGO DUE TO DISPLACEMENT OF INTERVERTEBRAL DISC: ICD-10-CM

## 2020-01-06 DIAGNOSIS — E11.65 TYPE 2 DIABETES MELLITUS WITH HYPERGLYCEMIA, WITHOUT LONG-TERM CURRENT USE OF INSULIN (HCC): ICD-10-CM

## 2020-01-06 DIAGNOSIS — M79.601 RIGHT ARM PAIN: Primary | ICD-10-CM

## 2020-01-06 DIAGNOSIS — G47.09 OTHER INSOMNIA: ICD-10-CM

## 2020-01-06 DIAGNOSIS — I10 ESSENTIAL HYPERTENSION: ICD-10-CM

## 2020-01-06 LAB — HBA1C MFR BLD: 7.1 % (ref 3.5–5.6)

## 2020-01-06 PROCEDURE — 99214 OFFICE O/P EST MOD 30 MIN: CPT | Performed by: NURSE PRACTITIONER

## 2020-01-06 PROCEDURE — 80053 COMPREHEN METABOLIC PANEL: CPT | Performed by: NURSE PRACTITIONER

## 2020-01-06 PROCEDURE — 83036 HEMOGLOBIN GLYCOSYLATED A1C: CPT | Performed by: NURSE PRACTITIONER

## 2020-01-06 PROCEDURE — 80061 LIPID PANEL: CPT | Performed by: NURSE PRACTITIONER

## 2020-01-06 PROCEDURE — 36415 COLL VENOUS BLD VENIPUNCTURE: CPT | Performed by: NURSE PRACTITIONER

## 2020-01-06 PROCEDURE — 85027 COMPLETE CBC AUTOMATED: CPT | Performed by: NURSE PRACTITIONER

## 2020-01-06 RX ORDER — GABAPENTIN 100 MG/1
200 CAPSULE ORAL
Qty: 60 CAPSULE | Refills: 2 | Status: SHIPPED | OUTPATIENT
Start: 2020-01-06 | End: 2020-01-16 | Stop reason: SDUPTHER

## 2020-01-06 NOTE — PROGRESS NOTES
"  Divya Lomeli is a 64 y.o. female.     Chief Complaint   Patient presents with   • Arm Injury     Needs a letter for return to work for arm       History of Present Illness   Pt here to discuss a chronic right arm/shoulder pain following a fall on stairs 2 years ago, went back to work at [employer] and pulled another muscle in the arm and has been off for 3mo, she occasionally has a random pain in the arm that moves around from the elbow to the neck, the pain is stable and she uses a \"super blue\" pain cream that helps. She would like to go back to work and needs work note to work in \"[employer]\" area only    Has severe insomnia, sometimes takes 1 and 1/2 ambien, tried melatonin in past and still not sleeping, she is also on CBD w/o THC at bedtime as well for neuropathy d/t previous back surgery. Has tramadol and robaxin that she does not use on a regular basis.      Would also like bruising of Left eye checked, she accidentally ran into a door frame, denies headache and vision changes.     Subjective     Visit Vitals  /68 (BP Location: Left arm, Patient Position: Sitting, Cuff Size: Adult)   Pulse 81   Ht 166.4 cm (65.51\")   Wt 64.1 kg (141 lb 6.4 oz)   SpO2 99%   BMI 23.16 kg/m²       The following portions of the patient's history were reviewed and updated as appropriate: allergies, current medications, past family history, past medical history, past social history, past surgical history and problem list.    Review of Systems   Constitutional: Negative for chills, fatigue and fever.   HENT: Negative for dental problem, ear pain, sinus pressure and sore throat.    Eyes: Negative for visual disturbance.        Ecchymosis of the left orbit d/t bumping head on door.    Respiratory: Negative for cough, shortness of breath and wheezing.    Gastrointestinal: Negative for abdominal pain, blood in stool, constipation, diarrhea, nausea, vomiting and GERD.   Genitourinary: Negative for difficulty urinating, frequency, urgency " and urinary incontinence.   Musculoskeletal: Positive for arthralgias and back pain. Negative for gait problem, joint swelling, myalgias and neck pain.        Right arm pain intermittent   Skin: Negative for dry skin, pallor and rash.   Neurological: Negative for dizziness, seizures, speech difficulty and weakness.   Hematological: Negative for adenopathy.   Psychiatric/Behavioral: Negative for sleep disturbance, depressed mood and stress. The patient is not nervous/anxious.        Objective     Physical Exam   Constitutional: She is oriented to person, place, and time. She appears well-developed and well-nourished. No distress.   HENT:   Head: Normocephalic.   Eyes: Pupils are equal, round, and reactive to light. Conjunctivae are normal.   Left upper orbital/lid ecchymosis and mild edema with small hematoma at the mid brow   Neck: Normal range of motion. Neck supple. No JVD present. No thyromegaly present.   Cardiovascular: Normal rate, regular rhythm and normal heart sounds.   No murmur heard.  Pulmonary/Chest: Effort normal and breath sounds normal.   Abdominal: Soft. Bowel sounds are normal. She exhibits no distension. There is no tenderness.   Musculoskeletal: Normal range of motion. She exhibits no edema or tenderness.   Mild nelson rom of the right arm, no tenderness at this time.    Neurological: She is alert and oriented to person, place, and time. No sensory deficit.   Skin: Skin is warm and dry. No rash noted. She is not diaphoretic. No erythema.   Psychiatric: She has a normal mood and affect. Her behavior is normal. Judgment normal.   Nursing note and vitals reviewed.        Assessment/Plan   Divya was seen today for arm injury.    Diagnoses and all orders for this visit:    Right arm pain  Letter given to patient today to only work in specific area requested at [employer] due to right arm injury and pain, trial gabapentin at bedtime    Other insomnia  Trial melatonin at bedtime, stop using Ambien    Type 2  diabetes mellitus with hyperglycemia, without long-term current use of insulin (CMS/[facility])  -     CBC (No Diff)  -     Hemoglobin A1c  -     Lipid Panel  -     Comprehensive Metabolic Panel    Lumbago due to displacement of intervertebral disc  Trial gabapentin    Essential hypertension  BP is stable, continue lisinopril    Other orders  -     gabapentin (NEURONTIN) 100 MG capsule; Take 2 capsules by mouth every night at bedtime.        Labs today, pt ate 6 hours ago, d/w pt on next visit in 2 weeks               Glucose   Date Value Ref Range Status   01/31/2019 240 (H) 65 - 99 mg/dL Final     BUN   Date Value Ref Range Status   01/31/2019 22 (H) 8 - 20 mg/dL Final     Creatinine   Date Value Ref Range Status   01/31/2019 1.1 (H) 0.4 - 1.0 mg/dl Final     Sodium   Date Value Ref Range Status   01/31/2019 133 (L) 136 - 144 mmol/L Final     Potassium   Date Value Ref Range Status   01/31/2019 4.3 3.6 - 5.1 mmol/L Final     Chloride   Date Value Ref Range Status   01/31/2019 94 (L) 101 - 111 mmol/L Final     CO2   Date Value Ref Range Status   01/31/2019 25 22 - 32 mmol/L Final     Calcium   Date Value Ref Range Status   01/31/2019 9.3 8.9 - 10.3 mg/dL Final     Total Protein   Date Value Ref Range Status   01/31/2019 6.4 6.1 - 7.9 g/dL Final     Albumin   Date Value Ref Range Status   01/31/2019 3.6 3.5 - 4.8 g/dL Final     ALT (SGPT)   Date Value Ref Range Status   01/31/2019 24 14 - 54 IU/L Final     AST (SGOT)   Date Value Ref Range Status   01/31/2019 31 15 - 41 IU/L Final     Alkaline Phosphatase   Date Value Ref Range Status   01/31/2019 57 32 - 91 IU/L Final     Total Bilirubin   Date Value Ref Range Status   01/31/2019 0.3 0.3 - 1.2 mg/dL Final     A/G Ratio   Date Value Ref Range Status   01/31/2019 1.3 1.0 - 1.7 Final     BUN/Creatinine Ratio   Date Value Ref Range Status   01/31/2019 20.0 5.4 - 26.2 Final     Anion Gap   Date Value Ref Range Status   01/31/2019 18.3 10 - 20 Final

## 2020-01-06 NOTE — PATIENT INSTRUCTIONS
Gabapentin 100mg for 1 week, then increase to 200mg  Start melatonin again 10mg  Stop ambien, only use for severe insomnia  Ok to return to work.

## 2020-01-07 ENCOUNTER — TELEPHONE (OUTPATIENT)
Dept: FAMILY MEDICINE CLINIC | Facility: CLINIC | Age: 65
End: 2020-01-07

## 2020-01-07 LAB
ALBUMIN SERPL-MCNC: 4.3 G/DL (ref 3.5–5.2)
ALBUMIN/GLOB SERPL: 1.3 G/DL
ALP SERPL-CCNC: 50 U/L (ref 39–117)
ALT SERPL W P-5'-P-CCNC: 17 U/L (ref 1–33)
ANION GAP SERPL CALCULATED.3IONS-SCNC: 15.3 MMOL/L (ref 5–15)
AST SERPL-CCNC: 19 U/L (ref 1–32)
BILIRUB SERPL-MCNC: <0.2 MG/DL (ref 0.2–1.2)
BUN BLD-MCNC: 24 MG/DL (ref 8–23)
BUN/CREAT SERPL: 27 (ref 7–25)
CALCIUM SPEC-SCNC: 10 MG/DL (ref 8.6–10.5)
CHLORIDE SERPL-SCNC: 103 MMOL/L (ref 98–107)
CHOLEST SERPL-MCNC: 126 MG/DL (ref 0–200)
CO2 SERPL-SCNC: 22.7 MMOL/L (ref 22–29)
CREAT BLD-MCNC: 0.89 MG/DL (ref 0.57–1)
DEPRECATED RDW RBC AUTO: 39.8 FL (ref 37–54)
ERYTHROCYTE [DISTWIDTH] IN BLOOD BY AUTOMATED COUNT: 12.9 % (ref 12.3–15.4)
GFR SERPL CREATININE-BSD FRML MDRD: 63 ML/MIN/1.73
GLOBULIN UR ELPH-MCNC: 3.4 GM/DL
GLUCOSE BLD-MCNC: 142 MG/DL (ref 65–99)
HCT VFR BLD AUTO: 39 % (ref 34–46.6)
HDLC SERPL-MCNC: 48 MG/DL (ref 40–60)
HGB BLD-MCNC: 12.9 G/DL (ref 12–15.9)
LDLC SERPL CALC-MCNC: 26 MG/DL (ref 0–100)
LDLC/HDLC SERPL: 0.54 {RATIO}
MCH RBC QN AUTO: 28.7 PG (ref 26.6–33)
MCHC RBC AUTO-ENTMCNC: 33.1 G/DL (ref 31.5–35.7)
MCV RBC AUTO: 86.7 FL (ref 79–97)
PLATELET # BLD AUTO: 284 10*3/MM3 (ref 140–450)
PMV BLD AUTO: 12.5 FL (ref 6–12)
POTASSIUM BLD-SCNC: 4.4 MMOL/L (ref 3.5–5.2)
PROT SERPL-MCNC: 7.7 G/DL (ref 6–8.5)
RBC # BLD AUTO: 4.5 10*6/MM3 (ref 3.77–5.28)
SODIUM BLD-SCNC: 141 MMOL/L (ref 136–145)
TRIGL SERPL-MCNC: 261 MG/DL (ref 0–150)
VLDLC SERPL-MCNC: 52.2 MG/DL (ref 5–40)
WBC NRBC COR # BLD: 9.67 10*3/MM3 (ref 3.4–10.8)

## 2020-01-07 NOTE — TELEPHONE ENCOUNTER
Patient called and asked that I fax the return to work note from yesterday to her employer. Fax [phone]. Attn: arielle Lund.

## 2020-01-16 ENCOUNTER — TELEPHONE (OUTPATIENT)
Dept: FAMILY MEDICINE CLINIC | Facility: CLINIC | Age: 65
End: 2020-01-16

## 2020-01-16 ENCOUNTER — OFFICE VISIT (OUTPATIENT)
Dept: FAMILY MEDICINE CLINIC | Facility: CLINIC | Age: 65
End: 2020-01-16

## 2020-01-16 VITALS
DIASTOLIC BLOOD PRESSURE: 64 MMHG | HEIGHT: 66 IN | OXYGEN SATURATION: 98 % | HEART RATE: 75 BPM | SYSTOLIC BLOOD PRESSURE: 93 MMHG | BODY MASS INDEX: 23.14 KG/M2 | WEIGHT: 144 LBS

## 2020-01-16 DIAGNOSIS — M51.26 LUMBAGO DUE TO DISPLACEMENT OF INTERVERTEBRAL DISC: ICD-10-CM

## 2020-01-16 DIAGNOSIS — E11.9 TYPE 2 DIABETES MELLITUS WITHOUT COMPLICATION, WITHOUT LONG-TERM CURRENT USE OF INSULIN (HCC): ICD-10-CM

## 2020-01-16 DIAGNOSIS — M79.601 RIGHT ARM PAIN: ICD-10-CM

## 2020-01-16 DIAGNOSIS — G47.09 OTHER INSOMNIA: Primary | ICD-10-CM

## 2020-01-16 DIAGNOSIS — M54.2 CERVICALGIA: ICD-10-CM

## 2020-01-16 PROCEDURE — 99214 OFFICE O/P EST MOD 30 MIN: CPT | Performed by: NURSE PRACTITIONER

## 2020-01-16 RX ORDER — ROSUVASTATIN CALCIUM 20 MG/1
20 TABLET, COATED ORAL NIGHTLY
Qty: 90 TABLET | Refills: 1 | Status: SHIPPED | OUTPATIENT
Start: 2020-01-16 | End: 2020-07-06 | Stop reason: SDUPTHER

## 2020-01-16 RX ORDER — GLIMEPIRIDE 4 MG/1
4 TABLET ORAL DAILY
Qty: 90 TABLET | Refills: 1 | Status: SHIPPED | OUTPATIENT
Start: 2020-01-16 | End: 2020-07-06 | Stop reason: SDUPTHER

## 2020-01-16 RX ORDER — MELOXICAM 15 MG/1
15 TABLET ORAL DAILY
Qty: 90 TABLET | Refills: 1 | Status: SHIPPED | OUTPATIENT
Start: 2020-01-16 | End: 2020-04-14

## 2020-01-16 RX ORDER — LISINOPRIL 20 MG/1
20 TABLET ORAL DAILY
Qty: 90 TABLET | Refills: 1 | Status: SHIPPED | OUTPATIENT
Start: 2020-01-16 | End: 2020-07-06 | Stop reason: SDUPTHER

## 2020-01-16 RX ORDER — TEMAZEPAM 7.5 MG/1
7.5 CAPSULE ORAL NIGHTLY PRN
Qty: 30 CAPSULE | Refills: 2 | Status: SHIPPED | OUTPATIENT
Start: 2020-01-16 | End: 2020-01-17

## 2020-01-16 RX ORDER — ESTRADIOL 1 MG/1
0.5 TABLET ORAL DAILY
Qty: 45 TABLET | Refills: 1 | Status: SHIPPED | OUTPATIENT
Start: 2020-01-16 | End: 2020-04-15

## 2020-01-16 RX ORDER — GABAPENTIN 100 MG/1
200 CAPSULE ORAL
Qty: 180 CAPSULE | Refills: 1 | Status: SHIPPED | OUTPATIENT
Start: 2020-01-16 | End: 2020-04-15

## 2020-01-16 NOTE — PROGRESS NOTES
"  Divya Lomeli is a 64 y.o. female.     Chief Complaint   Patient presents with   • Insomnia     2 week f/u from medication.   • Arm Injury     Needs letter faxed, [employer].   • Results     f/u from lab work       History of Present Illness     1. Tried melatonin last 2 weeks, noneffective. Last night took 2 gabapentin, mobic, melatonin and tylenol pm, 2 hours later took 1/2 an ambien, and finally slept however she wakes up frequently sleepwalking when she takes Ambien.    2. Arm pain improving with gabapentin, nearly resolved. Waxed the kitchen cabinets and then a bathroom and now has muscular pain. She is doing activity, and would like to go back to work, letter written last time, [employer] needs to know if permanent or temporary restriction.     3.  She would also like to discuss weaning off of estradiol, She currently takes 1 mg daily is postmenopausal has no hot flashes or mood swings as she did initially.        Subjective     Visit Vitals  BP 93/64 (BP Location: Left arm, Patient Position: Sitting, Cuff Size: Adult)   Pulse 75   Ht 166.4 cm (65.51\")   Wt 65.3 kg (144 lb)   SpO2 98%   BMI 23.59 kg/m²       The following portions of the patient's history were reviewed and updated as appropriate: allergies, current medications, past family history, past medical history, past social history, past surgical history and problem list.    Review of Systems   Constitutional: Negative for chills, fatigue and fever.   HENT: Negative for dental problem, ear pain, sinus pressure and sore throat.    Eyes: Negative for visual disturbance.        Ecchymosis of the left orbit resolved   Respiratory: Negative for cough, shortness of breath and wheezing.    Gastrointestinal: Negative for abdominal pain, blood in stool, constipation, diarrhea, nausea, vomiting and GERD.   Genitourinary: Negative for difficulty urinating, frequency, urgency and urinary incontinence.   Musculoskeletal: Positive for arthralgias and back pain. Negative " for gait problem, joint swelling, myalgias and neck pain.        Right arm pain intermittent   Skin: Negative for dry skin, pallor and rash.   Neurological: Negative for dizziness, seizures, speech difficulty and weakness.   Hematological: Negative for adenopathy.   Psychiatric/Behavioral: Negative for sleep disturbance, depressed mood and stress. The patient is not nervous/anxious.        Objective     Physical Exam   Constitutional: She is oriented to person, place, and time. She appears well-developed and well-nourished. No distress.   HENT:   Head: Normocephalic.   Eyes: Pupils are equal, round, and reactive to light. Conjunctivae are normal.   Neck: Normal range of motion. Neck supple. No JVD present. No thyromegaly present.   Cardiovascular: Normal rate, regular rhythm and normal heart sounds.   No murmur heard.  Pulmonary/Chest: Effort normal and breath sounds normal.   Abdominal: Soft. Bowel sounds are normal. She exhibits no distension. There is no tenderness.   Musculoskeletal: Normal range of motion. She exhibits no edema or tenderness.   Mild nelson rom of the right arm, no tenderness at this time.    Neurological: She is alert and oriented to person, place, and time. No sensory deficit.   Skin: Skin is warm and dry. No rash noted. She is not diaphoretic. No erythema.   Psychiatric: She has a normal mood and affect. Her behavior is normal. Judgment normal.   Nursing note and vitals reviewed.        Assessment/Plan   Divya was seen today for insomnia, arm injury and results.    Diagnoses and all orders for this visit:    Other insomnia  -     temazepam (RESTORIL) 7.5 MG capsule; Take 1 capsule by mouth At Night As Needed for Sleep.    Right arm pain    Lumbago due to displacement of intervertebral disc    Type 2 diabetes mellitus without complication, without long-term current use of insulin (CMS/[facility])    Cervicalgia    Other orders  -     estradiol (ESTRACE) 1 MG tablet; Take 0.5 tablets by mouth Daily for  90 days.  -     glimepiride (AMARYL) 4 MG tablet; Take 1 tablet by mouth Daily.  -     SITagliptin-metFORMIN HCl ER (JANUMET XR)  MG tablet; Take 2 tablets every day with a meal  -     Empagliflozin (JARDIANCE) 25 MG tablet; Take 25 mg by mouth Daily.  -     lisinopril (PRINIVIL,ZESTRIL) 20 MG tablet; Take 1 tablet by mouth Daily.  -     meloxicam (MOBIC) 15 MG tablet; Take 1 tablet by mouth Daily.  -     rosuvastatin (CRESTOR) 20 MG tablet; Take 1 tablet by mouth Every Night.  -     gabapentin (NEURONTIN) 100 MG capsule; Take 2 capsules by mouth every night at bedtime for 90 days.        Reviewed labs and all stable, a1c down to 7.1 from 9.4  Stop ambien d/t sleep walking, trial restoril prn only, continue melatonin. Call if tolerates restoril, and will rf. Work note given to return as this is temporary condition and will plan to re-evaluate R arm pain, shoulder again in 6mo.   Decrease estradiol 1/2 tab and hope to wean off in future.   Refill all other medications above  6mo for all other conditions with labs 1 week prior, or earlier as needed.          Glucose   Date Value Ref Range Status   01/06/2020 142 (H) 65 - 99 mg/dL Final     BUN   Date Value Ref Range Status   01/06/2020 24 (H) 8 - 23 mg/dL Final     Creatinine   Date Value Ref Range Status   01/06/2020 0.89 0.57 - 1.00 mg/dL Final     Sodium   Date Value Ref Range Status   01/06/2020 141 136 - 145 mmol/L Final     Potassium   Date Value Ref Range Status   01/06/2020 4.4 3.5 - 5.2 mmol/L Final     Chloride   Date Value Ref Range Status   01/06/2020 103 98 - 107 mmol/L Final     CO2   Date Value Ref Range Status   01/06/2020 22.7 22.0 - 29.0 mmol/L Final     Calcium   Date Value Ref Range Status   01/06/2020 10.0 8.6 - 10.5 mg/dL Final     Total Protein   Date Value Ref Range Status   01/06/2020 7.7 6.0 - 8.5 g/dL Final     Albumin   Date Value Ref Range Status   01/06/2020 4.30 3.50 - 5.20 g/dL Final     ALT (SGPT)   Date Value Ref Range Status    01/06/2020 17 1 - 33 U/L Final     AST (SGOT)   Date Value Ref Range Status   01/06/2020 19 1 - 32 U/L Final     Alkaline Phosphatase   Date Value Ref Range Status   01/06/2020 50 39 - 117 U/L Final     Total Bilirubin   Date Value Ref Range Status   01/06/2020 <0.2 (L) 0.2 - 1.2 mg/dL Final     eGFR Non  Amer   Date Value Ref Range Status   01/06/2020 63 >60 mL/min/1.73 Final     A/G Ratio   Date Value Ref Range Status   01/31/2019 1.3 1.0 - 1.7 Final     BUN/Creatinine Ratio   Date Value Ref Range Status   01/06/2020 27.0 (H) 7.0 - 25.0 Final     Anion Gap   Date Value Ref Range Status   01/06/2020 15.3 (H) 5.0 - 15.0 mmol/L Final

## 2020-01-17 ENCOUNTER — TELEPHONE (OUTPATIENT)
Dept: FAMILY MEDICINE CLINIC | Facility: CLINIC | Age: 65
End: 2020-01-17

## 2020-01-17 DIAGNOSIS — G47.09 OTHER INSOMNIA: Primary | ICD-10-CM

## 2020-01-17 RX ORDER — TEMAZEPAM 15 MG/1
15 CAPSULE ORAL NIGHTLY PRN
Qty: 30 CAPSULE | Refills: 0 | Status: SHIPPED | OUTPATIENT
Start: 2020-01-17 | End: 2020-02-17

## 2020-01-17 NOTE — TELEPHONE ENCOUNTER
Ok, but I also got a message from malena to disregard the request to change medication...that she got it with good rx? I don't mind to change it if needed...can you also check with malena and let me know. Thanks.

## 2020-01-17 NOTE — TELEPHONE ENCOUNTER
Patient said that her pharmacy told her that the restoril 7.5 mg is $30 but the 15 mg is $9.  Patient asked if you could prescribe the 15 mg instead and she can split it in half?  Thanks.

## 2020-01-21 ENCOUNTER — OFFICE VISIT (OUTPATIENT)
Dept: PAIN MEDICINE | Facility: CLINIC | Age: 65
End: 2020-01-21

## 2020-01-21 VITALS
RESPIRATION RATE: 16 BRPM | DIASTOLIC BLOOD PRESSURE: 70 MMHG | HEART RATE: 74 BPM | OXYGEN SATURATION: 96 % | HEIGHT: 65 IN | SYSTOLIC BLOOD PRESSURE: 107 MMHG | WEIGHT: 143 LBS | TEMPERATURE: 98.4 F | BODY MASS INDEX: 23.82 KG/M2

## 2020-01-21 DIAGNOSIS — G89.29 CHRONIC RIGHT SHOULDER PAIN: ICD-10-CM

## 2020-01-21 DIAGNOSIS — M54.2 CERVICALGIA: Primary | ICD-10-CM

## 2020-01-21 DIAGNOSIS — M25.511 CHRONIC RIGHT SHOULDER PAIN: ICD-10-CM

## 2020-01-21 DIAGNOSIS — M75.51 SUBACROMIAL BURSITIS OF RIGHT SHOULDER JOINT: ICD-10-CM

## 2020-01-21 DIAGNOSIS — M19.90 GENERALIZED ARTHRITIS: ICD-10-CM

## 2020-01-21 PROCEDURE — 99214 OFFICE O/P EST MOD 30 MIN: CPT | Performed by: PHYSICAL MEDICINE & REHABILITATION

## 2020-01-21 PROCEDURE — G0463 HOSPITAL OUTPT CLINIC VISIT: HCPCS | Performed by: PHYSICAL MEDICINE & REHABILITATION

## 2020-01-21 RX ORDER — TRAMADOL HYDROCHLORIDE 50 MG/1
50 TABLET ORAL 4 TIMES DAILY PRN
Qty: 120 TABLET | Refills: 2 | Status: SHIPPED | OUTPATIENT
Start: 2020-01-21 | End: 2020-08-25 | Stop reason: SDUPTHER

## 2020-01-21 RX ORDER — TIZANIDINE 4 MG/1
4 TABLET ORAL NIGHTLY PRN
Qty: 30 TABLET | Refills: 2 | Status: SHIPPED | OUTPATIENT
Start: 2020-01-21 | End: 2020-04-27

## 2020-01-21 NOTE — PROGRESS NOTES
Subjective   Divya Lomeli is a 64 y.o. female.     Right shoulder pain, began around 2017, aggravated with overhead reaching, 9/10 at worst, 0/10 at best, intermittent, varies, aching, stabbing, worse with lifting interferes with ADLs, work. Had PT with HEP. Subluxates, improves with reduction. X-ray C-spine with DDD and DJD. Notes reviewed, as above, also DM2, COPD. Has used Tramadol with some relief, insufficient at BID prn. Trouble getting to sleep.       The following portions of the patient's history were reviewed and updated as appropriate: allergies, current medications, past family history, past medical history, past social history, past surgical history and problem list.    Review of Systems   Constitutional: Negative for chills, fatigue and fever.   HENT: Negative for hearing loss and trouble swallowing.    Eyes: Negative for visual disturbance.   Respiratory: Negative for shortness of breath.    Cardiovascular: Negative for chest pain.   Gastrointestinal: Negative for abdominal pain, constipation, diarrhea, nausea and vomiting.   Genitourinary: Negative for urinary incontinence.   Musculoskeletal: Positive for arthralgias and neck pain. Negative for back pain, joint swelling and myalgias.   Neurological: Negative for dizziness, weakness, numbness and headache.       Objective   Physical Exam   Constitutional: She is oriented to person, place, and time. She appears well-developed and well-nourished.   HENT:   Head: Normocephalic and atraumatic.   Eyes: Pupils are equal, round, and reactive to light. EOM are normal.   Neck: Normal range of motion.   Cardiovascular: Normal rate, regular rhythm, normal heart sounds and intact distal pulses.   Pulmonary/Chest: Breath sounds normal.   Abdominal: Soft. Bowel sounds are normal. She exhibits no distension. There is no tenderness.   Musculoskeletal:   R: shoulder limited ROM   Neurological: She is alert and oriented to person, place, and time. She has normal  strength and normal reflexes. She displays normal reflexes. No sensory deficit.   Psychiatric: She has a normal mood and affect. Her behavior is normal. Thought content normal.         Assessment/Plan   Divya was seen today for back pain.    Diagnoses and all orders for this visit:    Cervicalgia    Chronic right shoulder pain    Subacromial bursitis of right shoulder joint    Generalized arthritis        Inspect reviewed, in order. UDS in order 7/1/19.  Increase to Tramadol 50mg QID prn.  Begin Tizanidine 4mg qHS prn instead of Flexeril.  Ordered RxAlt #1 cream.  Performed R subacromial injection with excellent relief.  RTC 3 months for f/u.

## 2020-01-24 ENCOUNTER — TELEPHONE (OUTPATIENT)
Dept: PAIN MEDICINE | Facility: HOSPITAL | Age: 65
End: 2020-01-24

## 2020-02-07 ENCOUNTER — TELEPHONE (OUTPATIENT)
Dept: FAMILY MEDICINE CLINIC | Facility: CLINIC | Age: 65
End: 2020-02-07

## 2020-02-07 NOTE — TELEPHONE ENCOUNTER
Patient requested a letter stating her limitations for work.  She would like for it to say that she can push and pull 30 lbs, lift 25 lbs and is ready to return to work.  Would you like for me to prepare the letter?  Thank you.

## 2020-02-07 NOTE — TELEPHONE ENCOUNTER
Patient called in today  inquiring to t fact was this faxed yet.  Not sure it this has been or not but patient would like to be advised.  She said she called into work today Friday Feb 7 th and they said they didn't have it.  Thanks bm

## 2020-02-15 DIAGNOSIS — G47.09 OTHER INSOMNIA: ICD-10-CM

## 2020-02-17 RX ORDER — TEMAZEPAM 15 MG/1
15 CAPSULE ORAL NIGHTLY PRN
Qty: 30 CAPSULE | Refills: 0 | Status: SHIPPED | OUTPATIENT
Start: 2020-02-17 | End: 2020-03-20

## 2020-03-19 DIAGNOSIS — G47.09 OTHER INSOMNIA: ICD-10-CM

## 2020-03-20 RX ORDER — TEMAZEPAM 15 MG/1
CAPSULE ORAL
Qty: 30 CAPSULE | Refills: 2 | Status: SHIPPED | OUTPATIENT
Start: 2020-03-20 | End: 2020-06-22

## 2020-04-03 ENCOUNTER — TELEPHONE (OUTPATIENT)
Dept: FAMILY MEDICINE CLINIC | Facility: CLINIC | Age: 65
End: 2020-04-03

## 2020-04-03 NOTE — TELEPHONE ENCOUNTER
Pt canceled coming into office for labs really does not want to come in right now.  Changing office visit to phone visit but still wanted medication, wants to know if she can still get meds since lab was canceled. I told her you can talk with her and go over all this at her phone appointment and advise about the labs. Thanks bm

## 2020-04-06 NOTE — TELEPHONE ENCOUNTER
Yes, I will continue to refill her medications.  I am not sure which she is talking about or needs currently.  Please let me know if she needs a medication refill prior to her telephone visit appointment.  Thank you

## 2020-04-14 ENCOUNTER — OFFICE VISIT (OUTPATIENT)
Dept: FAMILY MEDICINE CLINIC | Facility: CLINIC | Age: 65
End: 2020-04-14

## 2020-04-14 VITALS — WEIGHT: 143 LBS | HEIGHT: 65 IN | BODY MASS INDEX: 23.82 KG/M2

## 2020-04-14 DIAGNOSIS — I10 ESSENTIAL HYPERTENSION: ICD-10-CM

## 2020-04-14 DIAGNOSIS — M79.601 RIGHT ARM PAIN: ICD-10-CM

## 2020-04-14 DIAGNOSIS — G47.09 OTHER INSOMNIA: Primary | ICD-10-CM

## 2020-04-14 DIAGNOSIS — E11.9 TYPE 2 DIABETES MELLITUS WITHOUT COMPLICATION, WITHOUT LONG-TERM CURRENT USE OF INSULIN (HCC): ICD-10-CM

## 2020-04-14 PROCEDURE — 99214 OFFICE O/P EST MOD 30 MIN: CPT | Performed by: NURSE PRACTITIONER

## 2020-04-14 NOTE — PROGRESS NOTES
Chief Complaint   Patient presents with   • Follow-up     3 MONTH F/U     You have chosen to receive care through a telephone visit. Do you consent to use a telephone visit for your medical care today? Yes    HPI     1. Insomnia, still taking melatonin also on temazepam and sleep improved but also taking gabapentin and tizanidine now for arm pain. Goes to bed at [place], wakes up around 10-11 am, does stairs, walks, tries to exercise during the daytime as able, weight #140.      2. Arm pain, following with Dr. Shields, resolved with 1 gabapentin nightly, and now on tizanidine at bedtime as well. She went back to work and cleared from temporary restrictions, but now off work due to pandemic.     3.  Diabetes mellitus type II, feels stable on meds, denies any signs/symptoms of hyper/hypoglycemia, blurry vision, polydipsia, polyuria, nocturia, and unintentional weight loss      Past Medical History:   Diagnosis Date   • Anxiety    • Arm pain    • COPD (chronic obstructive pulmonary disease) (CMS/[facility])    • Diabetes mellitus (CMS/[facility])    • Hyperlipidemia    • Hypertension      Past Surgical History:   Procedure Laterality Date   • APPENDECTOMY  1958   • BACK SURGERY     • LUMBAR SPINE SURGERY  1959    L5 removed-Abstracted from [facility]   • OOPHORECTOMY     • TONSILLECTOMY     • TOTAL ABDOMINAL HYSTERECTOMY       Family History   Problem Relation Age of Onset   • Diabetes Mother    • Diabetes Father      Social History     Tobacco Use   • Smoking status: Former Smoker   • Smokeless tobacco: Never Used   Substance Use Topics   • Alcohol use: Yes     Comment: occ         Current Outpatient Medications:   •  Continuous Blood Gluc  (FREESTYLE MELANIA 14 DAY READER) device, USE TO CHECK BLOOD SUGAR AT LEAST ONCE DAILY, Disp: 1 Device, Rfl: 0  •  Continuous Blood Gluc  (FREESTYLE MELANIA READER) device, FREESTYLE MELANIA READER GINA, Disp: , Rfl:   •  Empagliflozin (JARDIANCE) 25 MG tablet, Take 25 mg by mouth Daily., Disp: 90  tablet, Rfl: 1  •  estradiol (ESTRACE) 1 MG tablet, Take 0.5 tablets by mouth Daily for 90 days., Disp: 45 tablet, Rfl: 1  •  gabapentin (NEURONTIN) 100 MG capsule, Take 2 capsules by mouth every night at bedtime for 90 days., Disp: 180 capsule, Rfl: 1  •  glimepiride (AMARYL) 4 MG tablet, Take 1 tablet by mouth Daily., Disp: 90 tablet, Rfl: 1  •  lisinopril (PRINIVIL,ZESTRIL) 20 MG tablet, Take 1 tablet by mouth Daily., Disp: 90 tablet, Rfl: 1  •  Multiple Vitamins-Minerals (PRESERVISION AREDS 2 PO), , Disp: , Rfl:   •  rosuvastatin (CRESTOR) 20 MG tablet, Take 1 tablet by mouth Every Night., Disp: 90 tablet, Rfl: 1  •  SITagliptin-metFORMIN HCl ER (JANUMET XR)  MG tablet, Take 2 tablets every day with a meal, Disp: 180 tablet, Rfl: 1  •  temazepam (RESTORIL) 15 MG capsule, TAKE 1 CAPSULE BY MOUTH ONCE DAILY AS NEEDED FOR SLEEP, Disp: 30 capsule, Rfl: 2  •  tiZANidine (ZANAFLEX) 4 MG tablet, Take 1 tablet by mouth At Night As Needed for Muscle Spasms., Disp: 30 tablet, Rfl: 2  •  traMADol (ULTRAM) 50 MG tablet, Take 1 tablet by mouth 4 (Four) Times a Day As Needed for Moderate Pain ., Disp: 120 tablet, Rfl: 2      The following portions of the patient's history were reviewed and updated as appropriate: allergies, current medications, past family history, past medical history, past social history, past surgical history and problem list.    Review of Systems   Constitutional: Negative for chills, fatigue and fever.   HENT: Negative for dental problem, ear pain, sinus pressure and sore throat.    Eyes: Negative for visual disturbance.   Respiratory: Negative for cough, shortness of breath and wheezing.    Cardiovascular: Negative for chest pain, palpitations and leg swelling.   Gastrointestinal: Negative for abdominal pain, blood in stool, constipation, diarrhea, nausea, vomiting and GERD.   Genitourinary: Negative for difficulty urinating, frequency, urgency and urinary incontinence.   Musculoskeletal:  "Positive for arthralgias. Negative for back pain, gait problem, joint swelling, myalgias and neck pain.   Skin: Negative for dry skin, pallor and rash.   Neurological: Negative for dizziness, seizures, speech difficulty and weakness.   Hematological: Negative for adenopathy.   Psychiatric/Behavioral: Positive for sleep disturbance. Negative for depressed mood and stress. The patient is not nervous/anxious.         Vitals:    04/14/20 1039   Weight: 64.9 kg (143 lb)   Height: 165.1 cm (65\")     Body mass index is 23.8 kg/m².     Physical Exam   Constitutional: She is oriented to person, place, and time. No distress.   Exam otherwise deferred through video or audio visit   Pulmonary/Chest: Effort normal.   Neurological: She is alert and oriented to person, place, and time.   Psychiatric: She has a normal mood and affect. Her behavior is normal. Judgment and thought content normal.        No visits with results within 7 Day(s) from this visit.   Latest known visit with results is:   Office Visit on 01/06/2020   Component Date Value Ref Range Status   • WBC 01/06/2020 9.67  3.40 - 10.80 10*3/mm3 Final   • RBC 01/06/2020 4.50  3.77 - 5.28 10*6/mm3 Final   • Hemoglobin 01/06/2020 12.9  12.0 - 15.9 g/dL Final   • Hematocrit 01/06/2020 39.0  34.0 - 46.6 % Final   • MCV 01/06/2020 86.7  79.0 - 97.0 fL Final   • MCH 01/06/2020 28.7  26.6 - 33.0 pg Final   • MCHC 01/06/2020 33.1  31.5 - 35.7 g/dL Final   • RDW 01/06/2020 12.9  12.3 - 15.4 % Final   • RDW-SD 01/06/2020 39.8  37.0 - 54.0 fl Final   • MPV 01/06/2020 12.5* 6.0 - 12.0 fL Final   • Platelets 01/06/2020 284  140 - 450 10*3/mm3 Final   • Hemoglobin A1C 01/06/2020 7.1* 3.5 - 5.6 % Final   • Total Cholesterol 01/06/2020 126  0 - 200 mg/dL Final   • Triglycerides 01/06/2020 261* 0 - 150 mg/dL Final   • HDL Cholesterol 01/06/2020 48  40 - 60 mg/dL Final   • LDL Cholesterol  01/06/2020 26  0 - 100 mg/dL Final   • VLDL Cholesterol 01/06/2020 52.2* 5 - 40 mg/dL Final   • " LDL/HDL Ratio 01/06/2020 0.54   Final   • Glucose 01/06/2020 142* 65 - 99 mg/dL Final   • BUN 01/06/2020 24* 8 - 23 mg/dL Final   • Creatinine 01/06/2020 0.89  0.57 - 1.00 mg/dL Final   • Sodium 01/06/2020 141  136 - 145 mmol/L Final   • Potassium 01/06/2020 4.4  3.5 - 5.2 mmol/L Final   • Chloride 01/06/2020 103  98 - 107 mmol/L Final   • CO2 01/06/2020 22.7  22.0 - 29.0 mmol/L Final   • Calcium 01/06/2020 10.0  8.6 - 10.5 mg/dL Final   • Total Protein 01/06/2020 7.7  6.0 - 8.5 g/dL Final   • Albumin 01/06/2020 4.30  3.50 - 5.20 g/dL Final   • ALT (SGPT) 01/06/2020 17  1 - 33 U/L Final   • AST (SGOT) 01/06/2020 19  1 - 32 U/L Final   • Alkaline Phosphatase 01/06/2020 50  39 - 117 U/L Final   • Total Bilirubin 01/06/2020 <0.2* 0.2 - 1.2 mg/dL Final   • eGFR Non African Amer 01/06/2020 63  >60 mL/min/1.73 Final   • Globulin 01/06/2020 3.4  gm/dL Final   • A/G Ratio 01/06/2020 1.3  g/dL Final   • BUN/Creatinine Ratio 01/06/2020 27.0* 7.0 - 25.0 Final   • Anion Gap 01/06/2020 15.3* 5.0 - 15.0 mmol/L Final       Diagnoses and all orders for this visit:    1. Other insomnia (Primary)    2. Right arm pain    3. Type 2 diabetes mellitus without complication, without long-term current use of insulin (CMS/[facility])  -     Comprehensive Metabolic Panel; Future  -     CBC (No Diff); Future  -     Hemoglobin A1c; Future  -     Lipid Panel; Future    4. Essential hypertension  -     TSH; Future    -Patient is on multiple medications at bedtime for sleep, discussed with patient.gabapentin and attempt to eliminate melatonin and/or tizanidine as able.  -Patient does not need medication refills at this time   -keep follow-ups with Dr. Shields as directed  -Diabetes/hypertension stable currently  -We will plan to see patient back in the office in 3 months with diabetic panel 1 week prior to the visit   -Call or return to office earlier if needed    This was an audio enabled telemedicine encounter.

## 2020-04-27 RX ORDER — TIZANIDINE 4 MG/1
TABLET ORAL
Qty: 30 TABLET | Refills: 0 | Status: SHIPPED | OUTPATIENT
Start: 2020-04-27 | End: 2020-05-19

## 2020-05-07 ENCOUNTER — TELEPHONE (OUTPATIENT)
Dept: FAMILY MEDICINE CLINIC | Facility: CLINIC | Age: 65
End: 2020-05-07

## 2020-05-07 NOTE — TELEPHONE ENCOUNTER
Patient reports that [employer] needs a letter stating she can't go back to work d/t [employer] orders that elderly people with pre-existing conditions should continue to quarantine.  Ok to write letter?

## 2020-05-19 RX ORDER — TIZANIDINE 4 MG/1
TABLET ORAL
Qty: 30 TABLET | Refills: 0 | Status: SHIPPED | OUTPATIENT
Start: 2020-05-19 | End: 2020-06-18

## 2020-06-21 DIAGNOSIS — G47.09 OTHER INSOMNIA: ICD-10-CM

## 2020-06-22 RX ORDER — TEMAZEPAM 15 MG/1
CAPSULE ORAL
Qty: 30 CAPSULE | Refills: 0 | Status: SHIPPED | OUTPATIENT
Start: 2020-06-22 | End: 2020-07-06 | Stop reason: SDUPTHER

## 2020-06-22 RX ORDER — TIZANIDINE 4 MG/1
TABLET ORAL
Qty: 30 TABLET | Refills: 0 | OUTPATIENT
Start: 2020-06-22 | End: 2020-07-22

## 2020-06-24 ENCOUNTER — TELEPHONE (OUTPATIENT)
Dept: PAIN MEDICINE | Facility: CLINIC | Age: 65
End: 2020-06-24

## 2020-06-24 RX ORDER — TIZANIDINE 4 MG/1
4 TABLET ORAL NIGHTLY PRN
Qty: 30 TABLET | Refills: 11 | Status: SHIPPED | OUTPATIENT
Start: 2020-06-24 | End: 2021-09-27

## 2020-06-29 ENCOUNTER — LAB (OUTPATIENT)
Dept: FAMILY MEDICINE CLINIC | Facility: CLINIC | Age: 65
End: 2020-06-29

## 2020-06-29 DIAGNOSIS — I10 ESSENTIAL HYPERTENSION: ICD-10-CM

## 2020-06-29 DIAGNOSIS — E11.9 TYPE 2 DIABETES MELLITUS WITHOUT COMPLICATION, WITHOUT LONG-TERM CURRENT USE OF INSULIN (HCC): ICD-10-CM

## 2020-06-29 PROCEDURE — 84443 ASSAY THYROID STIM HORMONE: CPT | Performed by: NURSE PRACTITIONER

## 2020-06-29 PROCEDURE — 80053 COMPREHEN METABOLIC PANEL: CPT | Performed by: NURSE PRACTITIONER

## 2020-06-29 PROCEDURE — 36415 COLL VENOUS BLD VENIPUNCTURE: CPT | Performed by: NURSE PRACTITIONER

## 2020-06-29 PROCEDURE — 85027 COMPLETE CBC AUTOMATED: CPT | Performed by: NURSE PRACTITIONER

## 2020-06-29 PROCEDURE — 80061 LIPID PANEL: CPT | Performed by: NURSE PRACTITIONER

## 2020-06-29 PROCEDURE — 83036 HEMOGLOBIN GLYCOSYLATED A1C: CPT | Performed by: NURSE PRACTITIONER

## 2020-06-30 LAB
ALBUMIN SERPL-MCNC: 4.1 G/DL (ref 3.5–5.2)
ALBUMIN/GLOB SERPL: 1.5 G/DL
ALP SERPL-CCNC: 49 U/L (ref 39–117)
ALT SERPL W P-5'-P-CCNC: 14 U/L (ref 1–33)
ANION GAP SERPL CALCULATED.3IONS-SCNC: 10.8 MMOL/L (ref 5–15)
AST SERPL-CCNC: 14 U/L (ref 1–32)
BILIRUB SERPL-MCNC: 0.2 MG/DL (ref 0.2–1.2)
BUN SERPL-MCNC: 18 MG/DL (ref 8–23)
BUN/CREAT SERPL: 20.7 (ref 7–25)
CALCIUM SPEC-SCNC: 9.7 MG/DL (ref 8.6–10.5)
CHLORIDE SERPL-SCNC: 104 MMOL/L (ref 98–107)
CHOLEST SERPL-MCNC: 98 MG/DL (ref 0–200)
CO2 SERPL-SCNC: 25.2 MMOL/L (ref 22–29)
CREAT SERPL-MCNC: 0.87 MG/DL (ref 0.57–1)
DEPRECATED RDW RBC AUTO: 42 FL (ref 37–54)
ERYTHROCYTE [DISTWIDTH] IN BLOOD BY AUTOMATED COUNT: 12.7 % (ref 12.3–15.4)
GFR SERPL CREATININE-BSD FRML MDRD: 65 ML/MIN/1.73
GLOBULIN UR ELPH-MCNC: 2.8 GM/DL
GLUCOSE SERPL-MCNC: 121 MG/DL (ref 65–99)
HBA1C MFR BLD: 7.6 % (ref 3.5–5.6)
HCT VFR BLD AUTO: 36.1 % (ref 34–46.6)
HDLC SERPL-MCNC: 39 MG/DL (ref 40–60)
HGB BLD-MCNC: 11.9 G/DL (ref 12–15.9)
LDLC SERPL CALC-MCNC: 17 MG/DL (ref 0–100)
LDLC/HDLC SERPL: 0.43 {RATIO}
MCH RBC QN AUTO: 29.8 PG (ref 26.6–33)
MCHC RBC AUTO-ENTMCNC: 33 G/DL (ref 31.5–35.7)
MCV RBC AUTO: 90.3 FL (ref 79–97)
PLATELET # BLD AUTO: 259 10*3/MM3 (ref 140–450)
PMV BLD AUTO: 12.5 FL (ref 6–12)
POTASSIUM SERPL-SCNC: 4.5 MMOL/L (ref 3.5–5.2)
PROT SERPL-MCNC: 6.9 G/DL (ref 6–8.5)
RBC # BLD AUTO: 4 10*6/MM3 (ref 3.77–5.28)
SODIUM SERPL-SCNC: 140 MMOL/L (ref 136–145)
TRIGL SERPL-MCNC: 212 MG/DL (ref 0–150)
TSH SERPL DL<=0.05 MIU/L-ACNC: 2.64 UIU/ML (ref 0.27–4.2)
VLDLC SERPL-MCNC: 42.4 MG/DL
WBC # BLD AUTO: 8.19 10*3/MM3 (ref 3.4–10.8)

## 2020-07-06 ENCOUNTER — OFFICE VISIT (OUTPATIENT)
Dept: FAMILY MEDICINE CLINIC | Facility: CLINIC | Age: 65
End: 2020-07-06

## 2020-07-06 VITALS
HEART RATE: 77 BPM | SYSTOLIC BLOOD PRESSURE: 112 MMHG | WEIGHT: 139 LBS | TEMPERATURE: 96.8 F | BODY MASS INDEX: 23.16 KG/M2 | OXYGEN SATURATION: 97 % | DIASTOLIC BLOOD PRESSURE: 72 MMHG | HEIGHT: 65 IN

## 2020-07-06 DIAGNOSIS — I10 ESSENTIAL HYPERTENSION: Primary | ICD-10-CM

## 2020-07-06 DIAGNOSIS — D50.9 IRON DEFICIENCY ANEMIA, UNSPECIFIED IRON DEFICIENCY ANEMIA TYPE: ICD-10-CM

## 2020-07-06 DIAGNOSIS — Z00.00 PREVENTATIVE HEALTH CARE: ICD-10-CM

## 2020-07-06 DIAGNOSIS — E11.9 TYPE 2 DIABETES MELLITUS WITHOUT COMPLICATION, WITHOUT LONG-TERM CURRENT USE OF INSULIN (HCC): ICD-10-CM

## 2020-07-06 DIAGNOSIS — G47.09 OTHER INSOMNIA: ICD-10-CM

## 2020-07-06 DIAGNOSIS — M79.601 RIGHT ARM PAIN: ICD-10-CM

## 2020-07-06 DIAGNOSIS — E78.2 MIXED HYPERLIPIDEMIA: ICD-10-CM

## 2020-07-06 PROCEDURE — 99214 OFFICE O/P EST MOD 30 MIN: CPT | Performed by: NURSE PRACTITIONER

## 2020-07-06 RX ORDER — ESTRADIOL 1 MG/1
0.5 TABLET ORAL DAILY
Qty: 90 TABLET | Refills: 1 | Status: SHIPPED | OUTPATIENT
Start: 2020-07-06 | End: 2021-07-28

## 2020-07-06 RX ORDER — ROSUVASTATIN CALCIUM 20 MG/1
20 TABLET, COATED ORAL NIGHTLY
Qty: 90 TABLET | Refills: 1 | Status: SHIPPED | OUTPATIENT
Start: 2020-07-06 | End: 2021-01-19 | Stop reason: SDUPTHER

## 2020-07-06 RX ORDER — TEMAZEPAM 15 MG/1
15 CAPSULE ORAL NIGHTLY PRN
Qty: 30 CAPSULE | Refills: 2 | Status: SHIPPED | OUTPATIENT
Start: 2020-07-06 | End: 2020-10-19 | Stop reason: SDUPTHER

## 2020-07-06 RX ORDER — GLIMEPIRIDE 4 MG/1
4 TABLET ORAL DAILY
Qty: 90 TABLET | Refills: 1 | Status: SHIPPED | OUTPATIENT
Start: 2020-07-06 | End: 2021-01-19 | Stop reason: SDUPTHER

## 2020-07-06 RX ORDER — LISINOPRIL 20 MG/1
20 TABLET ORAL DAILY
Qty: 90 TABLET | Refills: 1 | Status: SHIPPED | OUTPATIENT
Start: 2020-07-06 | End: 2021-01-19 | Stop reason: SDUPTHER

## 2020-07-06 RX ORDER — ESTRADIOL 1 MG/1
0.5 TABLET ORAL DAILY
COMMUNITY
Start: 2020-04-19 | End: 2020-07-06 | Stop reason: SDUPTHER

## 2020-07-06 NOTE — PROGRESS NOTES
Chief Complaint   Patient presents with   • Annual Exam   • Results       HPI     1. Insomnia, still taking melatonin and temazepam, sleep improved/stable. Chronic problem. The current episode started more than 1 year ago. Pertinent negatives include no abdominal pain, arthralgias, chills, coughing, fever, joint swelling, myalgias, nausea, neck pain, rash, sore throat, vomiting or weakness, pt reports daytime fatigue improving. Nothing aggravates the symptoms.     2. Chronic R arm pain, following with Dr. Shields, on tramadol prn, off gabapentin, and now on tizanidine only at bedtime. She went back to work in march and cleared from temporary restrictions in [employer] dept, but now off work due to pandemic. would to return to [facility] only d/t arm pain.      3.  Diabetes mellitus type II, feels stable on meds, denies any signs/symptoms of hyper/hypoglycemia, blurry vision, polydipsia, polyuria, nocturia, and unintentional weight loss, hgab1c up to 7.6 from 7.1. Has been eating more sweets, cookies, ice cream.     4. Hyperlipidemia, The patient denies muscle aches, constipation, diarrhea, GI upset, fatigue, chest pain/pressure, exercise intolerance, dyspnea, palpitations, syncope and pedal edema.      5. HTN, stable on meds and takes as directed, denies chest pain, headache, shortness of air, palpitations and swelling of extremities.       The following portions of the patient's history were reviewed and updated as appropriate: allergies, current medications, past family history, past medical history, past social history, past surgical history and problem list.    Past Medical History:   Diagnosis Date   • Anxiety    • Arm pain    • COPD (chronic obstructive pulmonary disease) (CMS/[facility])    • Diabetes mellitus (CMS/[facility])    • Hyperlipidemia    • Hypertension      Past Surgical History:   Procedure Laterality Date   • APPENDECTOMY  1958   • BACK SURGERY     • LUMBAR SPINE SURGERY  1959    L5 removed-[facility]   •  "OOPHORECTOMY     • TONSILLECTOMY     • TOTAL ABDOMINAL HYSTERECTOMY       Family History   Problem Relation Age of Onset   • Diabetes Mother    • Diabetes Father      Social History     Tobacco Use   • Smoking status: Former Smoker   • Smokeless tobacco: Never Used   Substance Use Topics   • Alcohol use: Yes     Comment: occ         Current Outpatient Medications:   •  Empagliflozin (Jardiance) 25 MG tablet, Take 25 mg by mouth Daily., Disp: 90 tablet, Rfl: 1  •  estradiol (ESTRACE) 1 MG tablet, Take 0.5 tablets by mouth Daily., Disp: 90 tablet, Rfl: 1  •  glimepiride (AMARYL) 4 MG tablet, Take 1 tablet by mouth Daily., Disp: 90 tablet, Rfl: 1  •  lisinopril (PRINIVIL,ZESTRIL) 20 MG tablet, Take 1 tablet by mouth Daily., Disp: 90 tablet, Rfl: 1  •  Multiple Vitamins-Minerals (PRESERVISION AREDS 2 PO), , Disp: , Rfl:   •  rosuvastatin (CRESTOR) 20 MG tablet, Take 1 tablet by mouth Every Night., Disp: 90 tablet, Rfl: 1  •  SITagliptin-metFORMIN HCl ER (Janumet XR)  MG tablet, Take 2 tablets every day with a meal, Disp: 180 tablet, Rfl: 1  •  temazepam (RESTORIL) 15 MG capsule, Take 1 capsule by mouth At Night As Needed for Sleep., Disp: 30 capsule, Rfl: 2  •  tiZANidine (ZANAFLEX) 4 MG tablet, Take 1 tablet by mouth At Night As Needed for Muscle Spasms., Disp: 30 tablet, Rfl: 11  •  traMADol (ULTRAM) 50 MG tablet, Take 1 tablet by mouth 4 (Four) Times a Day As Needed for Moderate Pain ., Disp: 120 tablet, Rfl: 2      Review of Systems       A full 12 point review of systems has been obtained as mentioned in HPI, otherwise negative      Vitals:    07/06/20 1356   BP: 112/72   BP Location: Left arm   Patient Position: Sitting   Cuff Size: Adult   Pulse: 77   Temp: 96.8 °F (36 °C)   TempSrc: Skin   SpO2: 97%   Weight: 67.6 kg (149 lb)   Height: 165.1 cm (65\")     Body mass index is 24.79 kg/m².    Physical Exam   Constitutional: She is oriented to person, place, and time. She appears well-developed and " well-nourished. No distress.   Eyes: Pupils are equal, round, and reactive to light.   Neck: Normal range of motion. Neck supple. No JVD present. No thyromegaly present.   Cardiovascular: Normal rate, regular rhythm, normal heart sounds and intact distal pulses.   No murmur heard.  Pulmonary/Chest: Effort normal and breath sounds normal. No respiratory distress.   Abdominal: Soft. Bowel sounds are normal. She exhibits no distension. There is no tenderness.   Musculoskeletal: Normal range of motion. She exhibits tenderness (chronic R>L arm pain).   Neurological: She is alert and oriented to person, place, and time. No sensory deficit.   Skin: Skin is warm and dry. She is not diaphoretic.   Psychiatric: She has a normal mood and affect. Her behavior is normal. Judgment and thought content normal.   Nursing note and vitals reviewed.      No visits with results within 7 Day(s) from this visit.   Latest known visit with results is:   Lab on 06/29/2020   Component Date Value Ref Range Status   • TSH 06/29/2020 2.640  0.270 - 4.200 uIU/mL Final   • Total Cholesterol 06/29/2020 98  0 - 200 mg/dL Final   • Triglycerides 06/29/2020 212* 0 - 150 mg/dL Final   • HDL Cholesterol 06/29/2020 39* 40 - 60 mg/dL Final   • LDL Cholesterol  06/29/2020 17  0 - 100 mg/dL Final   • VLDL Cholesterol 06/29/2020 42.4  mg/dL Final   • LDL/HDL Ratio 06/29/2020 0.43   Final   • Hemoglobin A1C 06/29/2020 7.6* 3.5 - 5.6 % Final   • WBC 06/29/2020 8.19  3.40 - 10.80 10*3/mm3 Final   • RBC 06/29/2020 4.00  3.77 - 5.28 10*6/mm3 Final   • Hemoglobin 06/29/2020 11.9* 12.0 - 15.9 g/dL Final   • Hematocrit 06/29/2020 36.1  34.0 - 46.6 % Final   • MCV 06/29/2020 90.3  79.0 - 97.0 fL Final   • MCH 06/29/2020 29.8  26.6 - 33.0 pg Final   • MCHC 06/29/2020 33.0  31.5 - 35.7 g/dL Final   • RDW 06/29/2020 12.7  12.3 - 15.4 % Final   • RDW-SD 06/29/2020 42.0  37.0 - 54.0 fl Final   • MPV 06/29/2020 12.5* 6.0 - 12.0 fL Final   • Platelets 06/29/2020 259  140 -  450 10*3/mm3 Final   • Glucose 06/29/2020 121* 65 - 99 mg/dL Final   • BUN 06/29/2020 18  8 - 23 mg/dL Final   • Creatinine 06/29/2020 0.87  0.57 - 1.00 mg/dL Final   • Sodium 06/29/2020 140  136 - 145 mmol/L Final   • Potassium 06/29/2020 4.5  3.5 - 5.2 mmol/L Final   • Chloride 06/29/2020 104  98 - 107 mmol/L Final   • CO2 06/29/2020 25.2  22.0 - 29.0 mmol/L Final   • Calcium 06/29/2020 9.7  8.6 - 10.5 mg/dL Final   • Total Protein 06/29/2020 6.9  6.0 - 8.5 g/dL Final   • Albumin 06/29/2020 4.10  3.50 - 5.20 g/dL Final   • ALT (SGPT) 06/29/2020 14  1 - 33 U/L Final   • AST (SGOT) 06/29/2020 14  1 - 32 U/L Final   • Alkaline Phosphatase 06/29/2020 49  39 - 117 U/L Final   • Total Bilirubin 06/29/2020 0.2  0.2 - 1.2 mg/dL Final   • eGFR Non African Amer 06/29/2020 65  >60 mL/min/1.73 Final   • Globulin 06/29/2020 2.8  gm/dL Final   • A/G Ratio 06/29/2020 1.5  g/dL Final   • BUN/Creatinine Ratio 06/29/2020 20.7  7.0 - 25.0 Final   • Anion Gap 06/29/2020 10.8  5.0 - 15.0 mmol/L Final       Diagnoses and all orders for this visit:    1. Essential hypertension (Primary)    2. Other insomnia  -     temazepam (RESTORIL) 15 MG capsule; Take 1 capsule by mouth At Night As Needed for Sleep.  Dispense: 30 capsule; Refill: 2    3. Type 2 diabetes mellitus without complication, without long-term current use of insulin (CMS/[facility])    4. Mixed hyperlipidemia    5. Right arm pain    6. Iron deficiency anemia, unspecified iron deficiency anemia type    7. Preventative health care    Other orders  -     Empagliflozin (Jardiance) 25 MG tablet; Take 25 mg by mouth Daily.  Dispense: 90 tablet; Refill: 1  -     estradiol (ESTRACE) 1 MG tablet; Take 0.5 tablets by mouth Daily.  Dispense: 90 tablet; Refill: 1  -     glimepiride (AMARYL) 4 MG tablet; Take 1 tablet by mouth Daily.  Dispense: 90 tablet; Refill: 1  -     lisinopril (PRINIVIL,ZESTRIL) 20 MG tablet; Take 1 tablet by mouth Daily.  Dispense: 90 tablet; Refill: 1  -     rosuvastatin  (CRESTOR) 20 MG tablet; Take 1 tablet by mouth Every Night.  Dispense: 90 tablet; Refill: 1  -     SITagliptin-metFORMIN HCl ER (Janumet XR)  MG tablet; Take 2 tablets every day with a meal  Dispense: 180 tablet; Refill: 1        1.  Diabetes worse, hga1c 7.6, d/w pt diet, rf cont meds, will add med if no improvement in 3mo prn  2.  Conditions mostly stable, rf meds as above  3.  Continue and refill temazepam, sleep is stable-we will need to see every 3 Months for refills  4.  Return to work and recommend her stay in [employer] due to inability to lift arms/shoulders all day d/t previous injury and pain, provided letter  5.  Slight anemia, increase iron in diet.   6. Reviewed labs, discussed healthy heart diet, limiting fatty, fried, greasy foods and increasing exercise habits, limit salt and increase water      Mammogram due: 9/2021   Colonoscopy 2017   rec DM eye/foot exam  Flu vaccine: update later this year  Pt reports shingles and Pneumonia vaccine completed at [facility]-patient to provide documentation to update chart  tdap up to date    Return to office in  3mo or earlier as needed, repeat DM panel, hep c ab, and microalbumin urine 1 week prior.

## 2020-07-22 ENCOUNTER — OFFICE VISIT (OUTPATIENT)
Dept: FAMILY MEDICINE CLINIC | Facility: CLINIC | Age: 65
End: 2020-07-22

## 2020-07-22 VITALS
BODY MASS INDEX: 23.22 KG/M2 | HEART RATE: 79 BPM | SYSTOLIC BLOOD PRESSURE: 107 MMHG | WEIGHT: 139.4 LBS | DIASTOLIC BLOOD PRESSURE: 67 MMHG | TEMPERATURE: 96.6 F | OXYGEN SATURATION: 98 % | HEIGHT: 65 IN

## 2020-07-22 DIAGNOSIS — R60.0 LOCALIZED EDEMA: ICD-10-CM

## 2020-07-22 DIAGNOSIS — R25.1 INVOLUNTARY TREMBLING: Primary | ICD-10-CM

## 2020-07-22 PROCEDURE — 99213 OFFICE O/P EST LOW 20 MIN: CPT | Performed by: NURSE PRACTITIONER

## 2020-07-22 RX ORDER — CARBIDOPA AND LEVODOPA 25; 100 MG/1; MG/1
1 TABLET, EXTENDED RELEASE ORAL 2 TIMES DAILY
Qty: 60 TABLET | Refills: 0 | Status: SHIPPED | OUTPATIENT
Start: 2020-07-22 | End: 2020-08-19 | Stop reason: SDUPTHER

## 2020-07-22 NOTE — PROGRESS NOTES
"  Divya Lomeli is a 64 y.o. female.     Chief Complaint   Patient presents with   • Edema     bilateral feet   • Shaking     patient believes it might have been related to a prescription sleeping pill that she started taking around the same time, but she doesn't remember the name of the pill.  She also was told that it could be Parkinson's.   • Med Refill     patient takes tramadol.  does she need UDS?       Shaking   This is a chronic problem. The current episode started more than 1 year ago. The problem occurs intermittently. The problem has been gradually worsening. Associated symptoms include arthralgias. Pertinent negatives include no abdominal pain, chest pain, chills, coughing, fatigue, fever, headaches, joint swelling, myalgias, nausea, neck pain, rash, sore throat, urinary symptoms, visual change, vomiting or weakness.   pt reports this is involuntary, also having memory decline, was on melatonin 3mo ago when s/s started, difficulty with eating, food falling off fork, has to use a spoon.     Subjective     Visit Vitals  /67 (BP Location: Left arm, Patient Position: Sitting, Cuff Size: Adult)   Pulse 79   Temp 96.6 °F (35.9 °C) (Skin)   Ht 165.1 cm (65\")   Wt 63.2 kg (139 lb 6.4 oz)   SpO2 98%   BMI 23.20 kg/m²       The following portions of the patient's history were reviewed and updated as appropriate: allergies, current medications, past family history, past medical history, past social history, past surgical history and problem list.    Review of Systems   Constitutional: Negative for chills, fatigue and fever.   HENT: Negative for dental problem, ear pain, sinus pressure and sore throat.    Eyes: Negative for visual disturbance.   Respiratory: Negative for cough, shortness of breath and wheezing.    Cardiovascular: Positive for leg swelling. Negative for chest pain and palpitations.   Gastrointestinal: Negative for abdominal pain, blood in stool, constipation, diarrhea, nausea, vomiting and " GERD.   Genitourinary: Negative for difficulty urinating, frequency, urgency and urinary incontinence.   Musculoskeletal: Positive for arthralgias. Negative for back pain, gait problem, joint swelling, myalgias and neck pain.   Skin: Negative for dry skin, pallor and rash.   Neurological: Positive for tremors. Negative for dizziness, seizures, speech difficulty and weakness.   Hematological: Negative for adenopathy.   Psychiatric/Behavioral: Negative for sleep disturbance, depressed mood and stress. The patient is not nervous/anxious.        Objective     Physical Exam   Constitutional: She is oriented to person, place, and time. She appears well-developed and well-nourished. No distress.   HENT:   Head: Normocephalic.   Eyes: Pupils are equal, round, and reactive to light. Conjunctivae are normal.   Neck: Normal range of motion. Neck supple. No JVD present. No thyromegaly present.   Cardiovascular: Normal rate, regular rhythm and normal heart sounds.   No murmur heard.  Pulmonary/Chest: Effort normal and breath sounds normal.   Abdominal: Soft. Bowel sounds are normal. She exhibits no distension. There is no tenderness.   Musculoskeletal: Normal range of motion. She exhibits edema (trace pitting BLE). She exhibits no tenderness.   Neurological: She is alert and oriented to person, place, and time. No sensory deficit.   Involuntary tremor, mild, BUE   Skin: Skin is warm and dry. No rash noted. She is not diaphoretic. No erythema.   Psychiatric: She has a normal mood and affect. Her behavior is normal. Judgment normal.         Assessment/Plan   Divya was seen today for edema, shaking and med refill.    Diagnoses and all orders for this visit:    Involuntary trembling    Localized edema    Other orders  -     carbidopa-levodopa ER (SINEMET CR)  MG per tablet; Take 1 tablet by mouth 2 (Two) Times a Day.    Trial sinemet, will adjust prn  Consider neuro referral  Keep sodium to <2000mg, elevate BLE, consider  diuretic prn if wonb  rtc in 4 weeks or prn   UDS at next appt, pt on tramadol per pain mgmt, and temaz for sleep               Glucose   Date Value Ref Range Status   06/29/2020 121 (H) 65 - 99 mg/dL Final     BUN   Date Value Ref Range Status   06/29/2020 18 8 - 23 mg/dL Final     Creatinine   Date Value Ref Range Status   06/29/2020 0.87 0.57 - 1.00 mg/dL Final     Sodium   Date Value Ref Range Status   06/29/2020 140 136 - 145 mmol/L Final     Potassium   Date Value Ref Range Status   06/29/2020 4.5 3.5 - 5.2 mmol/L Final     Chloride   Date Value Ref Range Status   06/29/2020 104 98 - 107 mmol/L Final     CO2   Date Value Ref Range Status   06/29/2020 25.2 22.0 - 29.0 mmol/L Final     Calcium   Date Value Ref Range Status   06/29/2020 9.7 8.6 - 10.5 mg/dL Final     Total Protein   Date Value Ref Range Status   06/29/2020 6.9 6.0 - 8.5 g/dL Final     Albumin   Date Value Ref Range Status   06/29/2020 4.10 3.50 - 5.20 g/dL Final     ALT (SGPT)   Date Value Ref Range Status   06/29/2020 14 1 - 33 U/L Final     AST (SGOT)   Date Value Ref Range Status   06/29/2020 14 1 - 32 U/L Final     Alkaline Phosphatase   Date Value Ref Range Status   06/29/2020 49 39 - 117 U/L Final     Total Bilirubin   Date Value Ref Range Status   06/29/2020 0.2 0.2 - 1.2 mg/dL Final     eGFR Non  Amer   Date Value Ref Range Status   06/29/2020 65 >60 mL/min/1.73 Final     A/G Ratio   Date Value Ref Range Status   01/31/2019 1.3 1.0 - 1.7 Final     BUN/Creatinine Ratio   Date Value Ref Range Status   06/29/2020 20.7 7.0 - 25.0 Final     Anion Gap   Date Value Ref Range Status   06/29/2020 10.8 5.0 - 15.0 mmol/L Final

## 2020-07-28 ENCOUNTER — TELEPHONE (OUTPATIENT)
Dept: FAMILY MEDICINE CLINIC | Facility: CLINIC | Age: 65
End: 2020-07-28

## 2020-07-28 RX ORDER — HYDROCHLOROTHIAZIDE 12.5 MG/1
12.5 TABLET ORAL DAILY PRN
Qty: 30 TABLET | Refills: 2 | Status: SHIPPED | OUTPATIENT
Start: 2020-07-28 | End: 2020-11-23

## 2020-07-28 NOTE — TELEPHONE ENCOUNTER
"Pt reports that she is still experiencing edema in both feet.  She says that it feels \"like they're going to bust.\"  She said drinking more water has not helped and asked if there was anything else you can recommend?  "

## 2020-08-18 DIAGNOSIS — M19.90 GENERALIZED ARTHRITIS: ICD-10-CM

## 2020-08-18 RX ORDER — TRAMADOL HYDROCHLORIDE 50 MG/1
TABLET ORAL
Qty: 120 TABLET | Refills: 0 | OUTPATIENT
Start: 2020-08-18

## 2020-08-19 ENCOUNTER — OFFICE VISIT (OUTPATIENT)
Dept: FAMILY MEDICINE CLINIC | Facility: CLINIC | Age: 65
End: 2020-08-19

## 2020-08-19 VITALS
TEMPERATURE: 96.8 F | WEIGHT: 139 LBS | HEART RATE: 80 BPM | BODY MASS INDEX: 23.16 KG/M2 | SYSTOLIC BLOOD PRESSURE: 97 MMHG | HEIGHT: 65 IN | DIASTOLIC BLOOD PRESSURE: 59 MMHG | OXYGEN SATURATION: 99 %

## 2020-08-19 DIAGNOSIS — R25.1 INVOLUNTARY TREMBLING: Primary | ICD-10-CM

## 2020-08-19 DIAGNOSIS — Z00.00 PREVENTATIVE HEALTH CARE: ICD-10-CM

## 2020-08-19 DIAGNOSIS — R60.0 LOCALIZED EDEMA: ICD-10-CM

## 2020-08-19 DIAGNOSIS — E11.9 TYPE 2 DIABETES MELLITUS WITHOUT COMPLICATION, WITHOUT LONG-TERM CURRENT USE OF INSULIN (HCC): ICD-10-CM

## 2020-08-19 DIAGNOSIS — E78.2 MIXED HYPERLIPIDEMIA: ICD-10-CM

## 2020-08-19 DIAGNOSIS — I10 ESSENTIAL HYPERTENSION: ICD-10-CM

## 2020-08-19 PROCEDURE — 99213 OFFICE O/P EST LOW 20 MIN: CPT | Performed by: NURSE PRACTITIONER

## 2020-08-19 RX ORDER — CARBIDOPA AND LEVODOPA 25; 100 MG/1; MG/1
1 TABLET, EXTENDED RELEASE ORAL 2 TIMES DAILY
Qty: 180 TABLET | Refills: 0 | Status: SHIPPED | OUTPATIENT
Start: 2020-08-19 | End: 2020-10-19 | Stop reason: SDUPTHER

## 2020-08-19 NOTE — PROGRESS NOTES
Chief Complaint   Patient presents with   • Tremors   • Follow-up       HPI     Involuntary tremor, improved on sinemet, activities of daily life and eating are now much easier.  Patient would like to continue same dose, needs refill    Lower extremity edema resolved, patient used Hctz for 5 days, has also been limiting sodium and has only used HCTZ a few other times since last visit.       The following portions of the patient's history were reviewed and updated as appropriate: allergies, current medications, past family history, past medical history, past social history, past surgical history and problem list.    Past Medical History:   Diagnosis Date   • Anxiety    • Arm pain    • COPD (chronic obstructive pulmonary disease) (CMS/[facility])    • Diabetes mellitus (CMS/[facility])    • Hyperlipidemia    • Hypertension      Past Surgical History:   Procedure Laterality Date   • APPENDECTOMY  1958   • BACK SURGERY     • LUMBAR SPINE SURGERY  1959    L5 removed-Abstracted from [facility]   • OOPHORECTOMY     • TONSILLECTOMY     • TOTAL ABDOMINAL HYSTERECTOMY       Family History   Problem Relation Age of Onset   • Diabetes Mother    • Diabetes Father      Social History     Tobacco Use   • Smoking status: Former Smoker   • Smokeless tobacco: Never Used   Substance Use Topics   • Alcohol use: Yes     Comment: occ         Current Outpatient Medications:   •  Acetaminophen (TYLENOL GO TABS EXTRA STRENGTH PO), Take  by mouth., Disp: , Rfl:   •  carbidopa-levodopa ER (SINEMET CR)  MG per tablet, Take 1 tablet by mouth 2 (Two) Times a Day., Disp: 180 tablet, Rfl: 0  •  CBD (cannabidiol) oral oil, Take  by mouth., Disp: , Rfl:   •  Empagliflozin (Jardiance) 25 MG tablet, Take 25 mg by mouth Daily., Disp: 90 tablet, Rfl: 1  •  estradiol (ESTRACE) 1 MG tablet, Take 0.5 tablets by mouth Daily., Disp: 90 tablet, Rfl: 1  •  glimepiride (AMARYL) 4 MG tablet, Take 1 tablet by mouth Daily., Disp: 90 tablet, Rfl: 1  •  hydroCHLOROthiazide  "(HYDRODIURIL) 12.5 MG tablet, Take 1 tablet by mouth Daily As Needed (swelling)., Disp: 30 tablet, Rfl: 2  •  lisinopril (PRINIVIL,ZESTRIL) 20 MG tablet, Take 1 tablet by mouth Daily., Disp: 90 tablet, Rfl: 1  •  Multiple Vitamins-Minerals (PRESERVISION AREDS 2 PO), , Disp: , Rfl:   •  rosuvastatin (CRESTOR) 20 MG tablet, Take 1 tablet by mouth Every Night., Disp: 90 tablet, Rfl: 1  •  SITagliptin-metFORMIN HCl ER (Janumet XR)  MG tablet, Take 2 tablets every day with a meal, Disp: 180 tablet, Rfl: 1  •  temazepam (RESTORIL) 15 MG capsule, Take 1 capsule by mouth At Night As Needed for Sleep., Disp: 30 capsule, Rfl: 2  •  tiZANidine (ZANAFLEX) 4 MG tablet, Take 1 tablet by mouth At Night As Needed for Muscle Spasms., Disp: 30 tablet, Rfl: 11  •  traMADol (ULTRAM) 50 MG tablet, Take 1 tablet by mouth 4 (Four) Times a Day As Needed for Moderate Pain ., Disp: 120 tablet, Rfl: 2      Review of Systems       Obtained as mentioned in HPI, otherwise negative.       Vitals:    08/19/20 1148   BP: 97/59   BP Location: Left arm   Patient Position: Sitting   Cuff Size: Adult   Pulse: 80   Temp: 96.8 °F (36 °C)   TempSrc: Skin   SpO2: 99%   Weight: 63 kg (139 lb)   Height: 165.1 cm (65\")     Body mass index is 23.13 kg/m².    Physical Exam   Constitutional: She is oriented to person, place, and time. She appears well-developed and well-nourished. No distress.   Eyes: Pupils are equal, round, and reactive to light.   Neck: Normal range of motion. Neck supple. No JVD present. No thyromegaly present.   Cardiovascular: Normal rate, regular rhythm, normal heart sounds and intact distal pulses.   No murmur heard.  Pulmonary/Chest: Effort normal and breath sounds normal. No respiratory distress.   Abdominal: Soft. Bowel sounds are normal. She exhibits no distension. There is no tenderness.   Musculoskeletal: Normal range of motion. She exhibits no tenderness.   Neurological: She is alert and oriented to person, place, and time. " No sensory deficit.   No tremor noted   Skin: Skin is warm and dry. She is not diaphoretic.   Psychiatric: She has a normal mood and affect. Her behavior is normal. Judgment and thought content normal.   Nursing note and vitals reviewed.      No visits with results within 7 Day(s) from this visit.   Latest known visit with results is:   Lab on 06/29/2020   Component Date Value Ref Range Status   • TSH 06/29/2020 2.640  0.270 - 4.200 uIU/mL Final   • Total Cholesterol 06/29/2020 98  0 - 200 mg/dL Final   • Triglycerides 06/29/2020 212* 0 - 150 mg/dL Final   • HDL Cholesterol 06/29/2020 39* 40 - 60 mg/dL Final   • LDL Cholesterol  06/29/2020 17  0 - 100 mg/dL Final   • VLDL Cholesterol 06/29/2020 42.4  mg/dL Final   • LDL/HDL Ratio 06/29/2020 0.43   Final   • Hemoglobin A1C 06/29/2020 7.6* 3.5 - 5.6 % Final   • WBC 06/29/2020 8.19  3.40 - 10.80 10*3/mm3 Final   • RBC 06/29/2020 4.00  3.77 - 5.28 10*6/mm3 Final   • Hemoglobin 06/29/2020 11.9* 12.0 - 15.9 g/dL Final   • Hematocrit 06/29/2020 36.1  34.0 - 46.6 % Final   • MCV 06/29/2020 90.3  79.0 - 97.0 fL Final   • MCH 06/29/2020 29.8  26.6 - 33.0 pg Final   • MCHC 06/29/2020 33.0  31.5 - 35.7 g/dL Final   • RDW 06/29/2020 12.7  12.3 - 15.4 % Final   • RDW-SD 06/29/2020 42.0  37.0 - 54.0 fl Final   • MPV 06/29/2020 12.5* 6.0 - 12.0 fL Final   • Platelets 06/29/2020 259  140 - 450 10*3/mm3 Final   • Glucose 06/29/2020 121* 65 - 99 mg/dL Final   • BUN 06/29/2020 18  8 - 23 mg/dL Final   • Creatinine 06/29/2020 0.87  0.57 - 1.00 mg/dL Final   • Sodium 06/29/2020 140  136 - 145 mmol/L Final   • Potassium 06/29/2020 4.5  3.5 - 5.2 mmol/L Final   • Chloride 06/29/2020 104  98 - 107 mmol/L Final   • CO2 06/29/2020 25.2  22.0 - 29.0 mmol/L Final   • Calcium 06/29/2020 9.7  8.6 - 10.5 mg/dL Final   • Total Protein 06/29/2020 6.9  6.0 - 8.5 g/dL Final   • Albumin 06/29/2020 4.10  3.50 - 5.20 g/dL Final   • ALT (SGPT) 06/29/2020 14  1 - 33 U/L Final   • AST (SGOT) 06/29/2020 14   1 - 32 U/L Final   • Alkaline Phosphatase 06/29/2020 49  39 - 117 U/L Final   • Total Bilirubin 06/29/2020 0.2  0.2 - 1.2 mg/dL Final   • eGFR Non African Amer 06/29/2020 65  >60 mL/min/1.73 Final   • Globulin 06/29/2020 2.8  gm/dL Final   • A/G Ratio 06/29/2020 1.5  g/dL Final   • BUN/Creatinine Ratio 06/29/2020 20.7  7.0 - 25.0 Final   • Anion Gap 06/29/2020 10.8  5.0 - 15.0 mmol/L Final       Diagnoses and all orders for this visit:    1. Involuntary trembling (Primary)    2. Localized edema    3. Mixed hyperlipidemia  -     Lipid Panel; Future  -     Comprehensive Metabolic Panel; Future  -     CBC (No Diff); Future    4. Type 2 diabetes mellitus without complication, without long-term current use of insulin (CMS/HCC)  -     Hemoglobin A1c; Future  -     MicroAlbumin, Urine, Random - Urine, Clean Catch; Future    5. Preventative health care  -     Hepatitis C Antibody; Future    6. Essential hypertension  -     TSH; Future    Other orders  -     carbidopa-levodopa ER (SINEMET CR)  MG per tablet; Take 1 tablet by mouth 2 (Two) Times a Day.  Dispense: 180 tablet; Refill: 0         Tremor improved, rf/continue Sinemet.   Hypertension panel, hemoglobin A1c, hepatitis C antibody, urine microalbumin 1 week prior to appt in October  Flu shot when available-August/September  Pt to check pneumonia vaccine dates with previous PCP  Tdap, shingles utd 2019 at [facility]  Return to office at scheduled appointment in October or call earlier PRN

## 2020-08-20 DIAGNOSIS — M19.90 GENERALIZED ARTHRITIS: ICD-10-CM

## 2020-08-20 RX ORDER — TRAMADOL HYDROCHLORIDE 50 MG/1
TABLET ORAL
Qty: 120 TABLET | Refills: 0 | OUTPATIENT
Start: 2020-08-20

## 2020-08-21 RX ORDER — CARBIDOPA AND LEVODOPA 25; 100 MG/1; MG/1
TABLET, EXTENDED RELEASE ORAL
Qty: 60 TABLET | Refills: 0 | OUTPATIENT
Start: 2020-08-21

## 2020-08-25 ENCOUNTER — OFFICE VISIT (OUTPATIENT)
Dept: PAIN MEDICINE | Facility: CLINIC | Age: 65
End: 2020-08-25

## 2020-08-25 ENCOUNTER — TELEPHONE (OUTPATIENT)
Dept: FAMILY MEDICINE CLINIC | Facility: CLINIC | Age: 65
End: 2020-08-25

## 2020-08-25 VITALS
TEMPERATURE: 96.8 F | OXYGEN SATURATION: 98 % | DIASTOLIC BLOOD PRESSURE: 67 MMHG | RESPIRATION RATE: 16 BRPM | SYSTOLIC BLOOD PRESSURE: 105 MMHG | HEIGHT: 65 IN | BODY MASS INDEX: 23.16 KG/M2 | HEART RATE: 80 BPM | WEIGHT: 139 LBS

## 2020-08-25 DIAGNOSIS — G89.29 CHRONIC RIGHT SHOULDER PAIN: ICD-10-CM

## 2020-08-25 DIAGNOSIS — M25.511 CHRONIC RIGHT SHOULDER PAIN: ICD-10-CM

## 2020-08-25 DIAGNOSIS — M19.90 GENERALIZED ARTHRITIS: ICD-10-CM

## 2020-08-25 DIAGNOSIS — M54.2 CERVICALGIA: Primary | ICD-10-CM

## 2020-08-25 DIAGNOSIS — M75.51 SUBACROMIAL BURSITIS OF RIGHT SHOULDER JOINT: ICD-10-CM

## 2020-08-25 PROCEDURE — G0463 HOSPITAL OUTPT CLINIC VISIT: HCPCS | Performed by: PHYSICAL MEDICINE & REHABILITATION

## 2020-08-25 PROCEDURE — 99213 OFFICE O/P EST LOW 20 MIN: CPT | Performed by: PHYSICAL MEDICINE & REHABILITATION

## 2020-08-25 RX ORDER — TRAMADOL HYDROCHLORIDE 50 MG/1
50 TABLET ORAL 4 TIMES DAILY PRN
Qty: 120 TABLET | Refills: 3 | Status: SHIPPED | OUTPATIENT
Start: 2020-08-25 | End: 2020-12-22 | Stop reason: SDUPTHER

## 2020-08-25 NOTE — PROGRESS NOTES
Subjective   Divya Lomeli is a 65 y.o. female.     Right shoulder pain, began around 2017, aggravated with overhead reaching, 9/10 at worst, 0/10 at best, intermittent, varies, aching, stabbing, worse with lifting interferes with ADLs, work. Had PT with HEP. Subluxates, improves with reduction. X-ray C-spine with DDD and DJD. Notes reviewed, as above, also DM2, COPD. Has used Tramadol with some relief, insufficient at BID prn. Trouble getting to sleep.       The following portions of the patient's history were reviewed and updated as appropriate: allergies, current medications, past family history, past medical history, past social history, past surgical history and problem list.    Review of Systems   Constitutional: Negative for chills, fatigue and fever.   HENT: Negative for hearing loss and trouble swallowing.    Eyes: Negative for visual disturbance.   Respiratory: Negative for shortness of breath.    Cardiovascular: Negative for chest pain.   Gastrointestinal: Negative for abdominal pain, constipation, diarrhea, nausea and vomiting.   Genitourinary: Negative for urinary incontinence.   Musculoskeletal: Positive for arthralgias and neck pain. Negative for back pain, joint swelling and myalgias.   Neurological: Negative for dizziness, weakness, numbness and headache.       Objective   Physical Exam   Constitutional: She is oriented to person, place, and time. She appears well-developed and well-nourished.   HENT:   Head: Normocephalic and atraumatic.   Eyes: Pupils are equal, round, and reactive to light. EOM are normal.   Neck: Normal range of motion.   Cardiovascular: Normal rate, regular rhythm, normal heart sounds and intact distal pulses.   Pulmonary/Chest: Breath sounds normal.   Abdominal: Soft. Bowel sounds are normal. She exhibits no distension. There is no tenderness.   Musculoskeletal:   R: shoulder limited ROM   Neurological: She is alert and oriented to person, place, and time. She has normal  strength and normal reflexes. She displays normal reflexes. No sensory deficit.   Psychiatric: She has a normal mood and affect. Her behavior is normal. Thought content normal.         Assessment/Plan   Divya was seen today for back pain.    Diagnoses and all orders for this visit:    Cervicalgia    Chronic right shoulder pain    Generalized arthritis    Subacromial bursitis of right shoulder joint        Inspect reviewed, in order. UDS in order 7/1/19.  Increased to Tramadol 50mg QID prn.  Began Tizanidine 4mg qHS prn instead of Flexeril.  Ordered RxAlt #1 cream.  Performed R subacromial injection with excellent relief.  RTC 4 months for f/u.

## 2020-10-12 ENCOUNTER — CLINICAL SUPPORT (OUTPATIENT)
Dept: FAMILY MEDICINE CLINIC | Facility: CLINIC | Age: 65
End: 2020-10-12

## 2020-10-12 DIAGNOSIS — I10 ESSENTIAL HYPERTENSION: ICD-10-CM

## 2020-10-12 DIAGNOSIS — E78.2 MIXED HYPERLIPIDEMIA: ICD-10-CM

## 2020-10-12 DIAGNOSIS — Z00.00 PREVENTATIVE HEALTH CARE: ICD-10-CM

## 2020-10-12 DIAGNOSIS — E11.9 TYPE 2 DIABETES MELLITUS WITHOUT COMPLICATION, WITHOUT LONG-TERM CURRENT USE OF INSULIN (HCC): ICD-10-CM

## 2020-10-12 PROCEDURE — 83036 HEMOGLOBIN GLYCOSYLATED A1C: CPT | Performed by: NURSE PRACTITIONER

## 2020-10-12 PROCEDURE — 80061 LIPID PANEL: CPT | Performed by: NURSE PRACTITIONER

## 2020-10-12 PROCEDURE — 84443 ASSAY THYROID STIM HORMONE: CPT | Performed by: NURSE PRACTITIONER

## 2020-10-12 PROCEDURE — 36415 COLL VENOUS BLD VENIPUNCTURE: CPT | Performed by: NURSE PRACTITIONER

## 2020-10-12 PROCEDURE — 85027 COMPLETE CBC AUTOMATED: CPT | Performed by: NURSE PRACTITIONER

## 2020-10-12 PROCEDURE — 86803 HEPATITIS C AB TEST: CPT | Performed by: NURSE PRACTITIONER

## 2020-10-12 PROCEDURE — 80053 COMPREHEN METABOLIC PANEL: CPT | Performed by: NURSE PRACTITIONER

## 2020-10-13 LAB
ALBUMIN SERPL-MCNC: 4.5 G/DL (ref 3.5–5.2)
ALBUMIN/GLOB SERPL: 1.6 G/DL
ALP SERPL-CCNC: 63 U/L (ref 39–117)
ALT SERPL W P-5'-P-CCNC: 17 U/L (ref 1–33)
ANION GAP SERPL CALCULATED.3IONS-SCNC: 12.8 MMOL/L (ref 5–15)
AST SERPL-CCNC: 18 U/L (ref 1–32)
BILIRUB SERPL-MCNC: 0.2 MG/DL (ref 0–1.2)
BUN SERPL-MCNC: 34 MG/DL (ref 8–23)
BUN/CREAT SERPL: 37 (ref 7–25)
CALCIUM SPEC-SCNC: 9.4 MG/DL (ref 8.6–10.5)
CHLORIDE SERPL-SCNC: 98 MMOL/L (ref 98–107)
CHOLEST SERPL-MCNC: 123 MG/DL (ref 0–200)
CO2 SERPL-SCNC: 25.2 MMOL/L (ref 22–29)
CREAT SERPL-MCNC: 0.92 MG/DL (ref 0.57–1)
DEPRECATED RDW RBC AUTO: 42.5 FL (ref 37–54)
ERYTHROCYTE [DISTWIDTH] IN BLOOD BY AUTOMATED COUNT: 12.9 % (ref 12.3–15.4)
GFR SERPL CREATININE-BSD FRML MDRD: 61 ML/MIN/1.73
GLOBULIN UR ELPH-MCNC: 2.8 GM/DL
GLUCOSE SERPL-MCNC: 128 MG/DL (ref 65–99)
HBA1C MFR BLD: 8.4 % (ref 3.5–5.6)
HCT VFR BLD AUTO: 40.2 % (ref 34–46.6)
HCV AB SER DONR QL: NORMAL
HDLC SERPL-MCNC: 41 MG/DL (ref 40–60)
HGB BLD-MCNC: 12.7 G/DL (ref 12–15.9)
LDLC SERPL CALC-MCNC: 16 MG/DL (ref 0–100)
LDLC/HDLC SERPL: -0.46 {RATIO}
MCH RBC QN AUTO: 28.6 PG (ref 26.6–33)
MCHC RBC AUTO-ENTMCNC: 31.6 G/DL (ref 31.5–35.7)
MCV RBC AUTO: 90.5 FL (ref 79–97)
PLATELET # BLD AUTO: 294 10*3/MM3 (ref 140–450)
PMV BLD AUTO: 12.4 FL (ref 6–12)
POTASSIUM SERPL-SCNC: 4.1 MMOL/L (ref 3.5–5.2)
PROT SERPL-MCNC: 7.3 G/DL (ref 6–8.5)
RBC # BLD AUTO: 4.44 10*6/MM3 (ref 3.77–5.28)
SODIUM SERPL-SCNC: 136 MMOL/L (ref 136–145)
TRIGL SERPL-MCNC: 505 MG/DL (ref 0–150)
TSH SERPL DL<=0.05 MIU/L-ACNC: 5.11 UIU/ML (ref 0.27–4.2)
VLDLC SERPL-MCNC: 66 MG/DL (ref 5–40)
WBC # BLD AUTO: 9.61 10*3/MM3 (ref 3.4–10.8)

## 2020-10-19 ENCOUNTER — OFFICE VISIT (OUTPATIENT)
Dept: FAMILY MEDICINE CLINIC | Facility: CLINIC | Age: 65
End: 2020-10-19

## 2020-10-19 VITALS
TEMPERATURE: 97.3 F | HEART RATE: 84 BPM | HEIGHT: 65 IN | OXYGEN SATURATION: 97 % | SYSTOLIC BLOOD PRESSURE: 102 MMHG | WEIGHT: 137.6 LBS | DIASTOLIC BLOOD PRESSURE: 67 MMHG | BODY MASS INDEX: 22.92 KG/M2

## 2020-10-19 DIAGNOSIS — R25.1 INVOLUNTARY TREMBLING: ICD-10-CM

## 2020-10-19 DIAGNOSIS — I10 ESSENTIAL HYPERTENSION: ICD-10-CM

## 2020-10-19 DIAGNOSIS — E78.2 MIXED HYPERLIPIDEMIA: ICD-10-CM

## 2020-10-19 DIAGNOSIS — G47.09 OTHER INSOMNIA: ICD-10-CM

## 2020-10-19 DIAGNOSIS — E11.65 TYPE 2 DIABETES MELLITUS WITH HYPERGLYCEMIA, WITHOUT LONG-TERM CURRENT USE OF INSULIN (HCC): Primary | ICD-10-CM

## 2020-10-19 DIAGNOSIS — R79.89 ABNORMAL TSH: ICD-10-CM

## 2020-10-19 DIAGNOSIS — Z00.00 PREVENTATIVE HEALTH CARE: ICD-10-CM

## 2020-10-19 DIAGNOSIS — F41.9 ANXIETY: ICD-10-CM

## 2020-10-19 PROCEDURE — G0009 ADMIN PNEUMOCOCCAL VACCINE: HCPCS | Performed by: NURSE PRACTITIONER

## 2020-10-19 PROCEDURE — 90732 PPSV23 VACC 2 YRS+ SUBQ/IM: CPT | Performed by: NURSE PRACTITIONER

## 2020-10-19 PROCEDURE — 99214 OFFICE O/P EST MOD 30 MIN: CPT | Performed by: NURSE PRACTITIONER

## 2020-10-19 PROCEDURE — 82043 UR ALBUMIN QUANTITATIVE: CPT | Performed by: NURSE PRACTITIONER

## 2020-10-19 RX ORDER — OMEGA-3S/DHA/EPA/FISH OIL/D3 300MG-1000
1 CAPSULE ORAL DAILY
Qty: 90 CAPSULE | Refills: 3 | Status: SHIPPED | OUTPATIENT
Start: 2020-10-19

## 2020-10-19 RX ORDER — TEMAZEPAM 15 MG/1
15 CAPSULE ORAL NIGHTLY PRN
Qty: 30 CAPSULE | Refills: 2 | Status: SHIPPED | OUTPATIENT
Start: 2020-10-19 | End: 2021-01-19 | Stop reason: SDUPTHER

## 2020-10-19 RX ORDER — HYDROXYZINE HYDROCHLORIDE 10 MG/1
TABLET, FILM COATED ORAL
Qty: 40 TABLET | Refills: 0 | Status: SHIPPED | OUTPATIENT
Start: 2020-10-19 | End: 2021-01-07

## 2020-10-19 RX ORDER — CARBIDOPA AND LEVODOPA 25; 100 MG/1; MG/1
1 TABLET, EXTENDED RELEASE ORAL 2 TIMES DAILY
Qty: 180 TABLET | Refills: 1 | Status: SHIPPED | OUTPATIENT
Start: 2020-10-19 | End: 2021-05-17

## 2020-10-19 NOTE — PROGRESS NOTES
Chief Complaint   Patient presents with   • Tremors     tremors medication isn't helping much.  at first it helped, but now it is not.  she said it is worse when she is upset.   • Hypertension   • Results     blood work from last week.   • Nail Problem     has white substance in nails of hands and one foot.   • Stress     reports that some time ago, she was having too much stress that her body wasn't producing enough of a chemical to help process it.  she can't remember the name of the medication but she would like to be put on something similar.  would like something to take prn when the stress is a little higher than normal.   • Immunizations     says she needs pneumo booster       HPI     Diabetes mellitus type II, feels stable on meds, denies any signs/symptoms of hyper/hypoglycemia, blurry vision, polydipsia, polyuria, nocturia, and unintentional weight loss    HTN, stable on meds and takes as directed, denies chest pain, headache, shortness of air, palpitations and swelling of extremities.     Hyperlipidemia, The patient denies muscle aches, constipation, diarrhea, GI upset, fatigue, chest pain/pressure, exercise intolerance, dyspnea, palpitations, syncope and pedal edema.      Anxiety, associated with stress as mentioned in CC, intermittent and does occur daily, she reports yrs ago was admitted to behavior unit and on lithium for ~1 year, no meds since. She reports tremor is worse when anxiety is high. Patient denies significant weight loss/gain, insomnia, hypersomnia, psychomotor agitation, psychomotor retardation, fatigue (loss of energy), feelings of worthlessness (guilt), impaired concentration (indecisiveness), thoughts of death or suicide.             The following portions of the patient's history were reviewed and updated as appropriate: allergies, current medications, past family history, past medical history, past social history, past surgical history and problem list.    Past Medical History:    Diagnosis Date   • Anxiety    • Arm pain    • COPD (chronic obstructive pulmonary disease) (CMS/HCC)    • Diabetes mellitus (CMS/HCC)    • Hyperlipidemia    • Hypertension      Past Surgical History:   Procedure Laterality Date   • APPENDECTOMY  1958   • BACK SURGERY     • LUMBAR SPINE SURGERY  1959    L5 removed-Abstracted from [facility]   • OOPHORECTOMY     • TONSILLECTOMY     • TOTAL ABDOMINAL HYSTERECTOMY       Family History   Problem Relation Age of Onset   • Diabetes Mother    • Diabetes Father      Social History     Tobacco Use   • Smoking status: Former Smoker   • Smokeless tobacco: Never Used   Substance Use Topics   • Alcohol use: Yes     Comment: occ         Current Outpatient Medications:   •  Acetaminophen (TYLENOL GO TABS EXTRA STRENGTH PO), Take  by mouth., Disp: , Rfl:   •  carbidopa-levodopa ER (SINEMET CR)  MG per tablet, Take 1 tablet by mouth 2 (Two) Times a Day., Disp: 180 tablet, Rfl: 1  •  CBD (cannabidiol) oral oil, Take  by mouth. Taking sugared gummies., Disp: , Rfl:   •  Empagliflozin (Jardiance) 25 MG tablet, Take 25 mg by mouth Daily., Disp: 90 tablet, Rfl: 1  •  estradiol (ESTRACE) 1 MG tablet, Take 0.5 tablets by mouth Daily., Disp: 90 tablet, Rfl: 1  •  glimepiride (AMARYL) 4 MG tablet, Take 1 tablet by mouth Daily., Disp: 90 tablet, Rfl: 1  •  hydroCHLOROthiazide (HYDRODIURIL) 12.5 MG tablet, Take 1 tablet by mouth Daily As Needed (swelling)., Disp: 30 tablet, Rfl: 2  •  lisinopril (PRINIVIL,ZESTRIL) 20 MG tablet, Take 1 tablet by mouth Daily., Disp: 90 tablet, Rfl: 1  •  Multiple Vitamins-Minerals (PRESERVISION AREDS 2 PO), , Disp: , Rfl:   •  rosuvastatin (CRESTOR) 20 MG tablet, Take 1 tablet by mouth Every Night., Disp: 90 tablet, Rfl: 1  •  SITagliptin-metFORMIN HCl ER (Janumet XR)  MG tablet, Take 2 tablets every day with a meal, Disp: 180 tablet, Rfl: 1  •  temazepam (RESTORIL) 15 MG capsule, Take 1 capsule by mouth At Night As Needed for Sleep., Disp: 30 capsule,  "Rfl: 2  •  tiZANidine (ZANAFLEX) 4 MG tablet, Take 1 tablet by mouth At Night As Needed for Muscle Spasms., Disp: 30 tablet, Rfl: 11  •  traMADol (ULTRAM) 50 MG tablet, Take 1 tablet by mouth 4 (Four) Times a Day As Needed for Moderate Pain ., Disp: 120 tablet, Rfl: 3  •  hydrOXYzine (ATARAX) 10 MG tablet, Take 1 or 2 by mouth every 8 hours as needed for anxiety prn, Disp: 40 tablet, Rfl: 0  •  Omega-3 Fatty Acids (Fish Oil Burp-Less) 1200 MG capsule, Take 1 capsule by mouth Daily., Disp: 90 capsule, Rfl: 3  No current facility-administered medications for this visit.       Review of Systems       Obtained as mentioned in HPI, otherwise negative.       Vitals:    10/19/20 1410   BP: 102/67   BP Location: Left arm   Patient Position: Sitting   Cuff Size: Adult   Pulse: 84   Temp: 97.3 °F (36.3 °C)   TempSrc: Skin   SpO2: 97%   Weight: 62.4 kg (137 lb 9.6 oz)   Height: 165.1 cm (65\")     Body mass index is 22.9 kg/m².    Physical Exam  Vitals signs and nursing note reviewed.   Constitutional:       General: She is not in acute distress.     Appearance: She is well-developed. She is not diaphoretic.   Eyes:      Pupils: Pupils are equal, round, and reactive to light.   Neck:      Musculoskeletal: Normal range of motion and neck supple.      Thyroid: No thyromegaly.      Vascular: No JVD.   Cardiovascular:      Rate and Rhythm: Normal rate and regular rhythm.      Heart sounds: Normal heart sounds. No murmur.   Pulmonary:      Effort: Pulmonary effort is normal. No respiratory distress.      Breath sounds: Normal breath sounds.   Abdominal:      General: Bowel sounds are normal. There is no distension.      Palpations: Abdomen is soft.      Tenderness: There is no abdominal tenderness.   Musculoskeletal: Normal range of motion.         General: Tenderness (R shoulder mod nelson rom and ttp) present.   Skin:     General: Skin is warm and dry.   Neurological:      Mental Status: She is alert and oriented to person, place, " and time.      Sensory: No sensory deficit.   Psychiatric:         Attention and Perception: Attention normal.         Mood and Affect: Mood is anxious and depressed.         Speech: Speech normal.         Behavior: Behavior normal.         Thought Content: Thought content normal.         Cognition and Memory: Cognition normal.         Judgment: Judgment normal.         No visits with results within 7 Day(s) from this visit.   Latest known visit with results is:   Clinical Support on 10/12/2020   Component Date Value Ref Range Status   • Hemoglobin A1C 10/12/2020 8.4* 3.5 - 5.6 % Final   • Hepatitis C Ab 10/12/2020 Non-Reactive  Non-Reactive Final   • Total Cholesterol 10/12/2020 123  0 - 200 mg/dL Final   • Triglycerides 10/12/2020 505* 0 - 150 mg/dL Final   • HDL Cholesterol 10/12/2020 41  40 - 60 mg/dL Final   • LDL Cholesterol  10/12/2020 16  0 - 100 mg/dL Final   • VLDL Cholesterol 10/12/2020 66* 5 - 40 mg/dL Final   • LDL/HDL Ratio 10/12/2020 -0.46   Final   • Glucose 10/12/2020 128* 65 - 99 mg/dL Final   • BUN 10/12/2020 34* 8 - 23 mg/dL Final   • Creatinine 10/12/2020 0.92  0.57 - 1.00 mg/dL Final   • Sodium 10/12/2020 136  136 - 145 mmol/L Final   • Potassium 10/12/2020 4.1  3.5 - 5.2 mmol/L Final   • Chloride 10/12/2020 98  98 - 107 mmol/L Final   • CO2 10/12/2020 25.2  22.0 - 29.0 mmol/L Final   • Calcium 10/12/2020 9.4  8.6 - 10.5 mg/dL Final   • Total Protein 10/12/2020 7.3  6.0 - 8.5 g/dL Final   • Albumin 10/12/2020 4.50  3.50 - 5.20 g/dL Final   • ALT (SGPT) 10/12/2020 17  1 - 33 U/L Final   • AST (SGOT) 10/12/2020 18  1 - 32 U/L Final   • Alkaline Phosphatase 10/12/2020 63  39 - 117 U/L Final   • Total Bilirubin 10/12/2020 0.2  0.0 - 1.2 mg/dL Final   • eGFR Non African Amer 10/12/2020 61  >60 mL/min/1.73 Final   • Globulin 10/12/2020 2.8  gm/dL Final   • A/G Ratio 10/12/2020 1.6  g/dL Final   • BUN/Creatinine Ratio 10/12/2020 37.0* 7.0 - 25.0 Final   • Anion Gap 10/12/2020 12.8  5.0 - 15.0 mmol/L  Final   • WBC 10/12/2020 9.61  3.40 - 10.80 10*3/mm3 Final   • RBC 10/12/2020 4.44  3.77 - 5.28 10*6/mm3 Final   • Hemoglobin 10/12/2020 12.7  12.0 - 15.9 g/dL Final   • Hematocrit 10/12/2020 40.2  34.0 - 46.6 % Final   • MCV 10/12/2020 90.5  79.0 - 97.0 fL Final   • MCH 10/12/2020 28.6  26.6 - 33.0 pg Final   • MCHC 10/12/2020 31.6  31.5 - 35.7 g/dL Final   • RDW 10/12/2020 12.9  12.3 - 15.4 % Final   • RDW-SD 10/12/2020 42.5  37.0 - 54.0 fl Final   • MPV 10/12/2020 12.4* 6.0 - 12.0 fL Final   • Platelets 10/12/2020 294  140 - 450 10*3/mm3 Final   • TSH 10/12/2020 5.110* 0.270 - 4.200 uIU/mL Final       Diagnoses and all orders for this visit:    1. Type 2 diabetes mellitus with hyperglycemia, without long-term current use of insulin (CMS/[facility]) (Primary)  Comments:  hgba1c 8.4, up from 7.6. d/w pt strict low carb and DM diet. if no improvement in 3mo consider ozempic, cont janumet, jardiance, glimep  Orders:  -     Hemoglobin A1c; Future    2. Essential hypertension  Comments:  stable, cont meds  Orders:  -     Comprehensive Metabolic Panel; Future  -     CBC (No Diff); Future  -     TSH; Future    3. Mixed hyperlipidemia  Comments:  trigs very high and fasting. start fish oil, long discuss on HHD and strict diet mods.   Orders:  -     Lipid Panel; Future    4. Anxiety  Comments:  trial hydroxyzine prn, rec meditation, stress relieving measures.     5. Other insomnia  Comments:  stable, but worse with high anxiety, cont rf temazepam  Orders:  -     temazepam (RESTORIL) 15 MG capsule; Take 1 capsule by mouth At Night As Needed for Sleep.  Dispense: 30 capsule; Refill: 2    6. Preventative health care  Comments:  pneumonia 23 today.   reviewed labs, repeat in 3mo.   Orders:  -     pneumococcal polysaccharide 23-valent (PNEUMOVAX-23) vaccine 0.5 mL    7. Abnormal TSH  Comments:  normal in past, will monitor for now, repeat TSH in 3mo.     8. Involuntary trembling  Comments:  no change to sinemet, likely worse d/t  anxiety.     Other orders  -     hydrOXYzine (ATARAX) 10 MG tablet; Take 1 or 2 by mouth every 8 hours as needed for anxiety prn  Dispense: 40 tablet; Refill: 0  -     Omega-3 Fatty Acids (Fish Oil Burp-Less) 1200 MG capsule; Take 1 capsule by mouth Daily.  Dispense: 90 capsule; Refill: 3  -     carbidopa-levodopa ER (SINEMET CR)  MG per tablet; Take 1 tablet by mouth 2 (Two) Times a Day.  Dispense: 180 tablet; Refill: 1      Return in about 3 months (around 1/19/2021) for labs entered, to be collected 1 wk prior to appt. f/u on HTN, lipids.

## 2020-10-20 ENCOUNTER — TELEPHONE (OUTPATIENT)
Dept: FAMILY MEDICINE CLINIC | Facility: CLINIC | Age: 65
End: 2020-10-20

## 2020-10-20 LAB — ALBUMIN UR-MCNC: <1.2 MG/DL

## 2020-10-20 NOTE — TELEPHONE ENCOUNTER
Called patient to speak further about the pneumo.  She said the swelling spread down to her elbow.  She asked if it's okay that she's taking the benadryl?  She also asked if you think she might've been allergic to the vaccine?  Thanks.

## 2020-10-20 NOTE — TELEPHONE ENCOUNTER
Patient called stating that her arm is swollen and has been extremely painful since getting her pneumo vac, especially at injection site.  She states that it itched all through the night and she finally took some Benadryl to help her sleep after she took all of her other sleep medication with no result.  Wants to know if could possibly be allergic reaction?  Please advise.  cc

## 2020-10-20 NOTE — TELEPHONE ENCOUNTER
It's probably not an allergic reaction, sometimes the body just attacks outside foreign substances such as vaccines.  The swelling will most likely go down within 24 to 48 hours. she can try hydrocortisone over the counter topical as needed, and continue Benadryl as needed.  Worse case scenario, if she starts to have any difficulty breathing she should go to the ER.

## 2020-10-23 ENCOUNTER — OFFICE VISIT (OUTPATIENT)
Dept: FAMILY MEDICINE CLINIC | Facility: CLINIC | Age: 65
End: 2020-10-23

## 2020-10-23 VITALS
SYSTOLIC BLOOD PRESSURE: 89 MMHG | OXYGEN SATURATION: 97 % | BODY MASS INDEX: 23.03 KG/M2 | WEIGHT: 138.2 LBS | HEIGHT: 65 IN | DIASTOLIC BLOOD PRESSURE: 63 MMHG | HEART RATE: 88 BPM | TEMPERATURE: 97.1 F

## 2020-10-23 DIAGNOSIS — Z00.00 PREVENTATIVE HEALTH CARE: Primary | ICD-10-CM

## 2020-10-23 DIAGNOSIS — Z12.31 ENCOUNTER FOR SCREENING MAMMOGRAM FOR MALIGNANT NEOPLASM OF BREAST: ICD-10-CM

## 2020-10-23 PROCEDURE — G0439 PPPS, SUBSEQ VISIT: HCPCS | Performed by: NURSE PRACTITIONER

## 2020-10-23 RX ORDER — DIPHENHYDRAMINE HCL 25 MG
25 CAPSULE ORAL EVERY 6 HOURS PRN
COMMUNITY
End: 2022-10-07

## 2020-10-23 NOTE — ASSESSMENT & PLAN NOTE
1.  Pneumonia up-to-date  2.  Patient refuses flu vaccine at this time due to localized reaction from pneumonia vaccine  3.  Mammogram ordered  4.  Patient reports colonoscopy completed within the last 10 years at [facility], will request results  5.  Diabetic foot exam completed  6.  Labs up-to-date

## 2020-10-23 NOTE — PROGRESS NOTES
The ABCs of the Annual Wellness Visit  Subsequent Medicare Wellness Visit    Chief Complaint   Patient presents with   • Medicare Wellness-subsequent       Subjective   History of Present Illness:  Divya Lomeli is a 65 y.o. female who presents for a Subsequent Medicare Wellness Visit.    HEALTH RISK ASSESSMENT    Recent Hospitalizations:  No hospitalization(s) within the last year.    Current Medical Providers:  Patient Care Team:  Linda Singh APRN as PCP - General (Nurse Practitioner)    Smoking Status:  Social History     Tobacco Use   Smoking Status Former Smoker   Smokeless Tobacco Never Used       Alcohol Consumption:  Social History     Substance and Sexual Activity   Alcohol Use Yes    Comment: occ       Depression Screen:   PHQ-2/PHQ-9 Depression Screening 10/23/2020   Little interest or pleasure in doing things 0   Feeling down, depressed, or hopeless 0   Trouble falling or staying asleep, or sleeping too much 3   Feeling tired or having little energy 1   Poor appetite or overeating 0   Feeling bad about yourself - or that you are a failure or have let yourself or your family down 0   Trouble concentrating on things, such as reading the newspaper or watching television 0   Moving or speaking so slowly that other people could have noticed. Or the opposite - being so fidgety or restless that you have been moving around a lot more than usual 0   Thoughts that you would be better off dead, or of hurting yourself in some way 0   Total Score 4   If you checked off any problems, how difficult have these problems made it for you to do your work, take care of things at home, or get along with other people? Not difficult at all       Fall Risk Screen:  STEADI Fall Risk Assessment has not been completed.    Health Habits and Functional and Cognitive Screening:  Functional & Cognitive Status 10/23/2020   Do you have difficulty preparing food and eating? No   Do you have difficulty bathing yourself, getting  dressed or grooming yourself? No   Do you have difficulty using the toilet? No   Do you have difficulty moving around from place to place? No   Do you have trouble with steps or getting out of a bed or a chair? No   Current Diet -   Dental Exam -   Eye Exam -   Exercise (times per week) -   Current Exercise Activities Include -   Do you need help using the phone?  No   Are you deaf or do you have serious difficulty hearing?  No   Do you need help with transportation? No   Do you need help shopping? No   Do you need help preparing meals?  No   Do you need help with housework?  No   Do you need help with laundry? No   Do you need help taking your medications? No   Do you need help managing money? No   Do you ever drive or ride in a car without wearing a seat belt? No   Have you felt unusual stress, anger or loneliness in the last month? Yes   Who do you live with? Child   If you need help, do you have trouble finding someone available to you? No   Have you been bothered in the last four weeks by sexual problems? No   Do you have difficulty concentrating, remembering or making decisions? Yes         Does the patient have evidence of cognitive impairment? No    Asprin use counseling:Taking ASA appropriately as indicated    Age-appropriate Screening Schedule:  Refer to the list below for future screening recommendations based on patient's age, sex and/or medical conditions. Orders for these recommended tests are listed in the plan section. The patient has been provided with a written plan.    Health Maintenance   Topic Date Due   • COLONOSCOPY  1955   • INFLUENZA VACCINE  10/19/2021 (Originally 8/1/2020)   • HEMOGLOBIN A1C  04/12/2021   • DIABETIC EYE EXAM  05/04/2021   • MAMMOGRAM  09/03/2021   • LIPID PANEL  10/12/2021   • URINE MICROALBUMIN  10/19/2021   • DIABETIC FOOT EXAM  10/23/2021   • TDAP/TD VACCINES (2 - Td) 09/05/2029   • ZOSTER VACCINE  Completed          The following portions of the patient's history  were reviewed and updated as appropriate: allergies, current medications, past family history, past medical history, past social history, past surgical history and problem list.    Outpatient Medications Prior to Visit   Medication Sig Dispense Refill   • Acetaminophen (TYLENOL GO TABS EXTRA STRENGTH PO) Take  by mouth.     • carbidopa-levodopa ER (SINEMET CR)  MG per tablet Take 1 tablet by mouth 2 (Two) Times a Day. 180 tablet 1   • CBD (cannabidiol) oral oil Take  by mouth. Taking sugared gummies.     • diphenhydrAMINE (Benadryl Allergy) 25 mg capsule Take 25 mg by mouth Every 6 (Six) Hours As Needed for Itching.     • Empagliflozin (Jardiance) 25 MG tablet Take 25 mg by mouth Daily. 90 tablet 1   • estradiol (ESTRACE) 1 MG tablet Take 0.5 tablets by mouth Daily. 90 tablet 1   • glimepiride (AMARYL) 4 MG tablet Take 1 tablet by mouth Daily. 90 tablet 1   • hydroCHLOROthiazide (HYDRODIURIL) 12.5 MG tablet Take 1 tablet by mouth Daily As Needed (swelling). 30 tablet 2   • hydrOXYzine (ATARAX) 10 MG tablet Take 1 or 2 by mouth every 8 hours as needed for anxiety prn 40 tablet 0   • lisinopril (PRINIVIL,ZESTRIL) 20 MG tablet Take 1 tablet by mouth Daily. 90 tablet 1   • Multiple Vitamins-Minerals (PRESERVISION AREDS 2 PO)      • Omega-3 Fatty Acids (Fish Oil Burp-Less) 1200 MG capsule Take 1 capsule by mouth Daily. 90 capsule 3   • rosuvastatin (CRESTOR) 20 MG tablet Take 1 tablet by mouth Every Night. 90 tablet 1   • SITagliptin-metFORMIN HCl ER (Janumet XR)  MG tablet Take 2 tablets every day with a meal 180 tablet 1   • temazepam (RESTORIL) 15 MG capsule Take 1 capsule by mouth At Night As Needed for Sleep. 30 capsule 2   • tiZANidine (ZANAFLEX) 4 MG tablet Take 1 tablet by mouth At Night As Needed for Muscle Spasms. 30 tablet 11   • traMADol (ULTRAM) 50 MG tablet Take 1 tablet by mouth 4 (Four) Times a Day As Needed for Moderate Pain . 120 tablet 3     No facility-administered medications prior to  "visit.        Patient Active Problem List   Diagnosis   • Cervicalgia   • Chronic right shoulder pain   • Subacromial bursitis of right shoulder joint   • Generalized arthritis   • Preventative health care   • Encounter for screening mammogram for malignant neoplasm of breast       Advanced Care Planning:  ACP discussion was held with the patient during this visit. Patient does not have an advance directive, information provided.    Review of Systems   Constitutional: Negative for fever and unexpected weight change.   HENT: Negative for trouble swallowing.    Respiratory: Negative for cough, shortness of breath and wheezing.    Cardiovascular: Negative for chest pain and leg swelling.   Gastrointestinal: Positive for constipation. Negative for abdominal pain, diarrhea, nausea and vomiting.   Genitourinary: Negative for difficulty urinating, dysuria, frequency and urgency.   Musculoskeletal: Negative for arthralgias.   Skin: Negative for rash and wound.   Neurological: Negative for dizziness and headaches.   Psychiatric/Behavioral: The patient is not nervous/anxious.        Compared to one year ago, the patient feels her physical health is the same.  Compared to one year ago, the patient feels her mental health is better.    Reviewed chart for potential of high risk medication in the elderly: yes  Reviewed chart for potential of harmful drug interactions in the elderly:yes    Objective         Vitals:    10/23/20 1502   BP: (!) 89/63   BP Location: Left arm   Patient Position: Sitting   Cuff Size: Adult   Pulse: 88   Temp: 97.1 °F (36.2 °C)   TempSrc: Skin   SpO2: 97%   Weight: 62.7 kg (138 lb 3.2 oz)   Height: 165.1 cm (65\")   PainSc:   8   PainLoc: Arm       Body mass index is 23 kg/m².  Discussed the patient's BMI with her. The BMI is in the acceptable range.    Physical Exam  Constitutional:       Appearance: Normal appearance.   HENT:      Head: Normocephalic.   Neck:      Musculoskeletal: Neck supple. "   Cardiovascular:      Rate and Rhythm: Normal rate and regular rhythm.   Pulmonary:      Effort: Pulmonary effort is normal.      Breath sounds: Normal breath sounds.   Abdominal:      General: Abdomen is flat. Bowel sounds are normal.      Palpations: Abdomen is soft.   Musculoskeletal: Normal range of motion.      Right lower leg: No edema.      Left lower leg: No edema.   Feet:      Right foot:      Skin integrity: Dry skin present. No ulcer, skin breakdown, erythema or callus.      Left foot:      Skin integrity: Dry skin present. No ulcer, skin breakdown, erythema or callus.      Comments: Diabetic Foot Exam Performed  Skin:     General: Skin is warm and dry.   Neurological:      Mental Status: She is alert and oriented to person, place, and time.      Gait: Gait is intact.   Psychiatric:         Attention and Perception: Attention normal.         Mood and Affect: Mood normal.         Speech: Speech normal.         Lab Results   Component Value Date    TRIG 505 (H) 10/12/2020    HDL 41 10/12/2020    LDL 16 10/12/2020    VLDL 66 (H) 10/12/2020    HGBA1C 8.4 (H) 10/12/2020        Assessment/Plan   Medicare Risks and Personalized Health Plan  CMS Preventative Services Quick Reference  Advance Directive Discussion  Breast Cancer/Mammogram Screening  Cardiovascular risk  Colon Cancer Screening  Diabetic Lab Screening   Fall Risk  Immunizations Discussed/Encouraged (specific immunizations; adacel Tdap, Influenza, Pneumococcal 23 and Shingrix )    The above risks/problems have been discussed with the patient.  Pertinent information has been shared with the patient in the After Visit Summary.  Follow up plans and orders are seen below in the Assessment/Plan Section.    Diagnoses and all orders for this visit:    1. Preventative health care (Primary)  Assessment & Plan:  1.  Pneumonia up-to-date  2.  Patient refuses flu vaccine at this time due to localized reaction from pneumonia vaccine  3.  Mammogram ordered  4.   Patient reports colonoscopy completed within the last 10 years at [facility], will request results  5.  Diabetic foot exam completed  6.  Labs up-to-date      2. Encounter for screening mammogram for malignant neoplasm of breast  -     Mammo Screening Digital Tomosynthesis Bilateral With CAD; Future     Follow Up:  Return for Next scheduled follow up.      An After Visit Summary and PPPS were given to the patient.

## 2020-10-23 NOTE — PATIENT INSTRUCTIONS
Preventive Care 65 Years and Older, Female  Preventive care refers to lifestyle choices and visits with your health care provider that can promote health and wellness. This includes:  · A yearly physical exam. This is also called an annual well check.  · Regular dental and eye exams.  · Immunizations.  · Screening for certain conditions.  · Healthy lifestyle choices, such as diet and exercise.  What can I expect for my preventive care visit?  Physical exam  Your health care provider will check:  · Height and weight. These may be used to calculate body mass index (BMI), which is a measurement that tells if you are at a healthy weight.  · Heart rate and blood pressure.  · Your skin for abnormal spots.  Counseling  Your health care provider may ask you questions about:  · Alcohol, tobacco, and drug use.  · Emotional well-being.  · Home and relationship well-being.  · Sexual activity.  · Eating habits.  · History of falls.  · Memory and ability to understand (cognition).  · Work and work environment.  · Pregnancy and menstrual history.  What immunizations do I need?    Influenza (flu) vaccine  · This is recommended every year.  Tetanus, diphtheria, and pertussis (Tdap) vaccine  · You may need a Td booster every 10 years.  Varicella (chickenpox) vaccine  · You may need this vaccine if you have not already been vaccinated.  Zoster (shingles) vaccine  · You may need this after age 60.  Pneumococcal conjugate (PCV13) vaccine  · One dose is recommended after age 65.  Pneumococcal polysaccharide (PPSV23) vaccine  · One dose is recommended after age 65.  Measles, mumps, and rubella (MMR) vaccine  · You may need at least one dose of MMR if you were born in 1957 or later. You may also need a second dose.  Meningococcal conjugate (MenACWY) vaccine  · You may need this if you have certain conditions.  Hepatitis A vaccine  · You may need this if you have certain conditions or if you travel or work in places where you may be exposed  to hepatitis A.  Hepatitis B vaccine  · You may need this if you have certain conditions or if you travel or work in places where you may be exposed to hepatitis B.  Haemophilus influenzae type b (Hib) vaccine  · You may need this if you have certain conditions.  You may receive vaccines as individual doses or as more than one vaccine together in one shot (combination vaccines). Talk with your health care provider about the risks and benefits of combination vaccines.  What tests do I need?  Blood tests  · Lipid and cholesterol levels. These may be checked every 5 years, or more frequently depending on your overall health.  · Hepatitis C test.  · Hepatitis B test.  Screening  · Lung cancer screening. You may have this screening every year starting at age 55 if you have a 30-pack-year history of smoking and currently smoke or have quit within the past 15 years.  · Colorectal cancer screening. All adults should have this screening starting at age 50 and continuing until age 75. Your health care provider may recommend screening at age 45 if you are at increased risk. You will have tests every 1-10 years, depending on your results and the type of screening test.  · Diabetes screening. This is done by checking your blood sugar (glucose) after you have not eaten for a while (fasting). You may have this done every 1-3 years.  · Mammogram. This may be done every 1-2 years. Talk with your health care provider about how often you should have regular mammograms.  · BRCA-related cancer screening. This may be done if you have a family history of breast, ovarian, tubal, or peritoneal cancers.  Other tests  · Sexually transmitted disease (STD) testing.  · Bone density scan. This is done to screen for osteoporosis. You may have this done starting at age 65.  Follow these instructions at home:  Eating and drinking  · Eat a diet that includes fresh fruits and vegetables, whole grains, lean protein, and low-fat dairy products. Limit  your intake of foods with high amounts of sugar, saturated fats, and salt.  · Take vitamin and mineral supplements as recommended by your health care provider.  · Do not drink alcohol if your health care provider tells you not to drink.  · If you drink alcohol:  ? Limit how much you have to 0-1 drink a day.  ? Be aware of how much alcohol is in your drink. In the [place] one drink equals one 12 oz bottle of beer (355 mL), one 5 oz glass of wine (148 mL), or one 1½ oz glass of hard liquor (44 mL).  Lifestyle  · Take daily care of your teeth and gums.  · Stay active. Exercise for at least 30 minutes on 5 or more days each week.  · Do not use any products that contain nicotine or tobacco, such as cigarettes, e-cigarettes, and chewing tobacco. If you need help quitting, ask your health care provider.  · If you are sexually active, practice safe sex. Use a condom or other form of protection in order to prevent STIs (sexually transmitted infections).  · Talk with your health care provider about taking a low-dose aspirin or statin.  What's next?  · Go to your health care provider once a year for a well check visit.  · Ask your health care provider how often you should have your eyes and teeth checked.  · Stay up to date on all vaccines.  This information is not intended to replace advice given to you by your health care provider. Make sure you discuss any questions you have with your health care provider.  Document Released: 01/13/2017 Document Revised: 12/12/2019 Document Reviewed: 12/12/2019  [employer] Patient Education © 2020 [employer]

## 2020-11-05 ENCOUNTER — CLINICAL SUPPORT (OUTPATIENT)
Dept: FAMILY MEDICINE CLINIC | Facility: CLINIC | Age: 65
End: 2020-11-05

## 2020-11-05 DIAGNOSIS — Z23 NEED FOR INFLUENZA VACCINATION: Primary | ICD-10-CM

## 2020-11-05 PROCEDURE — G0008 ADMIN INFLUENZA VIRUS VAC: HCPCS | Performed by: NURSE PRACTITIONER

## 2020-11-05 PROCEDURE — 90694 VACC AIIV4 NO PRSRV 0.5ML IM: CPT | Performed by: NURSE PRACTITIONER

## 2020-11-17 ENCOUNTER — TELEPHONE (OUTPATIENT)
Dept: FAMILY MEDICINE CLINIC | Facility: CLINIC | Age: 65
End: 2020-11-17

## 2020-11-17 RX ORDER — FLUCONAZOLE 150 MG/1
150 TABLET ORAL ONCE
Qty: 1 TABLET | Refills: 0 | Status: SHIPPED | OUTPATIENT
Start: 2020-11-17 | End: 2020-11-17

## 2020-11-17 NOTE — TELEPHONE ENCOUNTER
"Pt reports that she is having symptoms of a yeast infection; no discharge, on a \"terrible itch.\"  She is using an otc cream that only provides relief upon initial application, but the itch returns as the cream fades.  She asked if you could prescribe diflucan.  "

## 2020-11-17 NOTE — TELEPHONE ENCOUNTER
Sent 1 Diflucan pill to her pharmacy.  If symptoms do not improve it could likely be hormonal and vaginal atrophy, patient is already on Estrace, with her Pap up-to-date?  She may need a vaginal hormone cream

## 2020-11-20 ENCOUNTER — TELEPHONE (OUTPATIENT)
Dept: FAMILY MEDICINE CLINIC | Facility: CLINIC | Age: 65
End: 2020-11-20

## 2020-11-20 NOTE — TELEPHONE ENCOUNTER
Pt reports that she is back to work and she wanted to let you know.  She said that the paperwork you completed for her enabled her to be transferred to a different dept with restrictions, so she asked that you update the paperwork when it is due.

## 2020-11-23 RX ORDER — HYDROCHLOROTHIAZIDE 12.5 MG/1
TABLET ORAL
Qty: 30 TABLET | Refills: 2 | Status: SHIPPED | OUTPATIENT
Start: 2020-11-23 | End: 2021-01-19 | Stop reason: SDUPTHER

## 2020-12-22 ENCOUNTER — OFFICE VISIT (OUTPATIENT)
Dept: PAIN MEDICINE | Facility: CLINIC | Age: 65
End: 2020-12-22

## 2020-12-22 VITALS
RESPIRATION RATE: 16 BRPM | HEART RATE: 81 BPM | OXYGEN SATURATION: 95 % | SYSTOLIC BLOOD PRESSURE: 99 MMHG | TEMPERATURE: 96.8 F | DIASTOLIC BLOOD PRESSURE: 63 MMHG | HEIGHT: 65 IN | BODY MASS INDEX: 22.99 KG/M2 | WEIGHT: 138 LBS

## 2020-12-22 DIAGNOSIS — M19.90 GENERALIZED ARTHRITIS: ICD-10-CM

## 2020-12-22 DIAGNOSIS — M75.51 SUBACROMIAL BURSITIS OF RIGHT SHOULDER JOINT: ICD-10-CM

## 2020-12-22 DIAGNOSIS — M25.511 CHRONIC RIGHT SHOULDER PAIN: ICD-10-CM

## 2020-12-22 DIAGNOSIS — M54.2 CERVICALGIA: Primary | ICD-10-CM

## 2020-12-22 DIAGNOSIS — G89.29 CHRONIC RIGHT SHOULDER PAIN: ICD-10-CM

## 2020-12-22 PROCEDURE — 99213 OFFICE O/P EST LOW 20 MIN: CPT | Performed by: PHYSICAL MEDICINE & REHABILITATION

## 2020-12-22 RX ORDER — TRAMADOL HYDROCHLORIDE 50 MG/1
50 TABLET ORAL 3 TIMES DAILY PRN
Qty: 90 TABLET | Refills: 3 | Status: SHIPPED | OUTPATIENT
Start: 2020-12-22 | End: 2023-01-27 | Stop reason: SDUPTHER

## 2020-12-22 NOTE — PROGRESS NOTES
Subjective   Divya Lomeli is a 65 y.o. female.     Right shoulder pain, began around 2017, aggravated with overhead reaching, 9/10 at worst, 0/10 at best, intermittent, varies, aching, stabbing, worse with lifting interferes with ADLs, work. Had PT with HEP. Subluxates, improves with reduction. X-ray C-spine with DDD and DJD. Notes reviewed, as above, also DM2, COPD. Has used Tramadol with some relief, insufficient at BID prn. Trouble getting to sleep.       The following portions of the patient's history were reviewed and updated as appropriate: allergies, current medications, past family history, past medical history, past social history, past surgical history and problem list.    Review of Systems   Constitutional: Negative for chills, fatigue and fever.   HENT: Negative for hearing loss and trouble swallowing.    Eyes: Negative for visual disturbance.   Respiratory: Negative for shortness of breath.    Cardiovascular: Negative for chest pain.   Gastrointestinal: Negative for abdominal pain, constipation, diarrhea, nausea and vomiting.   Genitourinary: Negative for urinary incontinence.   Musculoskeletal: Positive for arthralgias and neck pain. Negative for back pain, joint swelling and myalgias.   Neurological: Negative for dizziness, weakness, numbness and headache.       Objective   Physical Exam   Constitutional: She is oriented to person, place, and time. She appears well-developed and well-nourished.   HENT:   Head: Normocephalic and atraumatic.   Eyes: Pupils are equal, round, and reactive to light.   Neck: Normal range of motion.   Cardiovascular: Normal rate, regular rhythm and normal heart sounds.   Pulmonary/Chest: Breath sounds normal.   Abdominal: Soft. Bowel sounds are normal. She exhibits no distension. There is no abdominal tenderness.   Musculoskeletal:      Comments: R: shoulder limited ROM   Neurological: She is alert and oriented to person, place, and time. She has normal reflexes. She  "displays normal reflexes. No sensory deficit.   Psychiatric: Her behavior is normal. Thought content normal.         Assessment/Plan   Diagnoses and all orders for this visit:    1. Cervicalgia (Primary)    2. Chronic right shoulder pain    3. Generalized arthritis    4. Subacromial bursitis of right shoulder joint        Inspect reviewed, in order. UDS in order 7/1/19.  Reduce to to Tramadol 50mg TID prn.  Began Tizanidine 4mg qHS prn instead of Flexeril.  Ordered RxAlt #1 cream, insurance would not approve, cont OTC \"Blue Stuff\" cream.  Performed R subacromial injection with excellent relief, still working.  RTC 4 months for f/u.           "

## 2021-01-07 RX ORDER — HYDROXYZINE HYDROCHLORIDE 10 MG/1
TABLET, FILM COATED ORAL
Qty: 40 TABLET | Refills: 0 | Status: SHIPPED | OUTPATIENT
Start: 2021-01-07

## 2021-01-12 ENCOUNTER — LAB (OUTPATIENT)
Dept: FAMILY MEDICINE CLINIC | Facility: CLINIC | Age: 66
End: 2021-01-12

## 2021-01-12 DIAGNOSIS — I10 ESSENTIAL HYPERTENSION: ICD-10-CM

## 2021-01-12 DIAGNOSIS — E11.65 TYPE 2 DIABETES MELLITUS WITH HYPERGLYCEMIA, WITHOUT LONG-TERM CURRENT USE OF INSULIN (HCC): ICD-10-CM

## 2021-01-12 DIAGNOSIS — E78.2 MIXED HYPERLIPIDEMIA: ICD-10-CM

## 2021-01-12 LAB — HBA1C MFR BLD: 8.3 % (ref 3.5–5.6)

## 2021-01-12 PROCEDURE — 36415 COLL VENOUS BLD VENIPUNCTURE: CPT | Performed by: NURSE PRACTITIONER

## 2021-01-12 PROCEDURE — 85027 COMPLETE CBC AUTOMATED: CPT | Performed by: NURSE PRACTITIONER

## 2021-01-12 PROCEDURE — 80061 LIPID PANEL: CPT | Performed by: NURSE PRACTITIONER

## 2021-01-12 PROCEDURE — 80053 COMPREHEN METABOLIC PANEL: CPT | Performed by: NURSE PRACTITIONER

## 2021-01-12 PROCEDURE — 83036 HEMOGLOBIN GLYCOSYLATED A1C: CPT | Performed by: NURSE PRACTITIONER

## 2021-01-12 PROCEDURE — 84443 ASSAY THYROID STIM HORMONE: CPT | Performed by: NURSE PRACTITIONER

## 2021-01-13 LAB
ALBUMIN SERPL-MCNC: 4.6 G/DL (ref 3.5–5.2)
ALBUMIN/GLOB SERPL: 1.8 G/DL
ALP SERPL-CCNC: 61 U/L (ref 39–117)
ALT SERPL W P-5'-P-CCNC: 17 U/L (ref 1–33)
ANION GAP SERPL CALCULATED.3IONS-SCNC: 12 MMOL/L (ref 5–15)
AST SERPL-CCNC: 25 U/L (ref 1–32)
BILIRUB SERPL-MCNC: <0.2 MG/DL (ref 0–1.2)
BUN SERPL-MCNC: 33 MG/DL (ref 8–23)
BUN/CREAT SERPL: 30.8 (ref 7–25)
CALCIUM SPEC-SCNC: 9.4 MG/DL (ref 8.6–10.5)
CHLORIDE SERPL-SCNC: 100 MMOL/L (ref 98–107)
CHOLEST SERPL-MCNC: 119 MG/DL (ref 0–200)
CO2 SERPL-SCNC: 25 MMOL/L (ref 22–29)
CREAT SERPL-MCNC: 1.07 MG/DL (ref 0.57–1)
DEPRECATED RDW RBC AUTO: 39.6 FL (ref 37–54)
ERYTHROCYTE [DISTWIDTH] IN BLOOD BY AUTOMATED COUNT: 12.3 % (ref 12.3–15.4)
GFR SERPL CREATININE-BSD FRML MDRD: 51 ML/MIN/1.73
GLOBULIN UR ELPH-MCNC: 2.5 GM/DL
GLUCOSE SERPL-MCNC: 102 MG/DL (ref 65–99)
HCT VFR BLD AUTO: 38.6 % (ref 34–46.6)
HDLC SERPL-MCNC: 42 MG/DL (ref 40–60)
HGB BLD-MCNC: 12.8 G/DL (ref 12–15.9)
LDLC SERPL CALC-MCNC: 25 MG/DL (ref 0–100)
LDLC/HDLC SERPL: 0.07 {RATIO}
MCH RBC QN AUTO: 29.8 PG (ref 26.6–33)
MCHC RBC AUTO-ENTMCNC: 33.2 G/DL (ref 31.5–35.7)
MCV RBC AUTO: 90 FL (ref 79–97)
PLATELET # BLD AUTO: 247 10*3/MM3 (ref 140–450)
PMV BLD AUTO: 12.6 FL (ref 6–12)
POTASSIUM SERPL-SCNC: 4 MMOL/L (ref 3.5–5.2)
PROT SERPL-MCNC: 7.1 G/DL (ref 6–8.5)
RBC # BLD AUTO: 4.29 10*6/MM3 (ref 3.77–5.28)
SODIUM SERPL-SCNC: 137 MMOL/L (ref 136–145)
TRIGL SERPL-MCNC: 371 MG/DL (ref 0–150)
TSH SERPL DL<=0.05 MIU/L-ACNC: 3.81 UIU/ML (ref 0.27–4.2)
VLDLC SERPL-MCNC: 52 MG/DL (ref 5–40)
WBC # BLD AUTO: 7.57 10*3/MM3 (ref 3.4–10.8)

## 2021-01-18 ENCOUNTER — TELEPHONE (OUTPATIENT)
Dept: FAMILY MEDICINE CLINIC | Facility: CLINIC | Age: 66
End: 2021-01-18

## 2021-01-19 ENCOUNTER — OFFICE VISIT (OUTPATIENT)
Dept: FAMILY MEDICINE CLINIC | Facility: CLINIC | Age: 66
End: 2021-01-19

## 2021-01-19 VITALS — BODY MASS INDEX: 22.99 KG/M2 | WEIGHT: 138 LBS | HEIGHT: 65 IN

## 2021-01-19 DIAGNOSIS — I10 ESSENTIAL HYPERTENSION: Primary | ICD-10-CM

## 2021-01-19 DIAGNOSIS — G47.09 OTHER INSOMNIA: ICD-10-CM

## 2021-01-19 DIAGNOSIS — E11.65 TYPE 2 DIABETES MELLITUS WITH HYPERGLYCEMIA, WITHOUT LONG-TERM CURRENT USE OF INSULIN (HCC): ICD-10-CM

## 2021-01-19 DIAGNOSIS — E78.2 MIXED HYPERLIPIDEMIA: ICD-10-CM

## 2021-01-19 DIAGNOSIS — U07.1 COVID-19 VIRUS INFECTION: ICD-10-CM

## 2021-01-19 DIAGNOSIS — J01.40 ACUTE NON-RECURRENT PANSINUSITIS: ICD-10-CM

## 2021-01-19 PROCEDURE — 99443 PR PHYS/QHP TELEPHONE EVALUATION 21-30 MIN: CPT | Performed by: NURSE PRACTITIONER

## 2021-01-19 RX ORDER — TEMAZEPAM 15 MG/1
15 CAPSULE ORAL NIGHTLY PRN
Qty: 30 CAPSULE | Refills: 2 | Status: SHIPPED | OUTPATIENT
Start: 2021-01-19 | End: 2021-05-11

## 2021-01-19 RX ORDER — AMOXICILLIN 875 MG/1
875 TABLET, COATED ORAL 2 TIMES DAILY
Qty: 20 TABLET | Refills: 0 | Status: SHIPPED | OUTPATIENT
Start: 2021-01-19 | End: 2021-01-29

## 2021-01-19 RX ORDER — SITAGLIPTIN AND METFORMIN HYDROCHLORIDE 1000; 50 MG/1; MG/1
TABLET, FILM COATED, EXTENDED RELEASE ORAL
Qty: 180 TABLET | Refills: 1 | Status: SHIPPED | OUTPATIENT
Start: 2021-01-19

## 2021-01-19 RX ORDER — HYDROCHLOROTHIAZIDE 12.5 MG/1
12.5 TABLET ORAL DAILY
Qty: 90 TABLET | Refills: 1 | Status: SHIPPED | OUTPATIENT
Start: 2021-01-19 | End: 2022-10-07

## 2021-01-19 RX ORDER — GLIMEPIRIDE 4 MG/1
4 TABLET ORAL DAILY
Qty: 90 TABLET | Refills: 1 | Status: SHIPPED | OUTPATIENT
Start: 2021-01-19 | End: 2021-07-28

## 2021-01-19 RX ORDER — ROSUVASTATIN CALCIUM 20 MG/1
20 TABLET, COATED ORAL NIGHTLY
Qty: 90 TABLET | Refills: 1 | Status: SHIPPED | OUTPATIENT
Start: 2021-01-19 | End: 2022-10-07

## 2021-01-19 RX ORDER — LISINOPRIL 20 MG/1
20 TABLET ORAL DAILY
Qty: 90 TABLET | Refills: 1 | Status: SHIPPED | OUTPATIENT
Start: 2021-01-19 | End: 2022-10-07

## 2021-01-19 RX ORDER — METHYLPREDNISOLONE 4 MG/1
TABLET ORAL
Qty: 21 TABLET | Refills: 0 | Status: SHIPPED | OUTPATIENT
Start: 2021-01-19 | End: 2021-01-25 | Stop reason: SDUPTHER

## 2021-01-19 RX ORDER — EMPAGLIFLOZIN 25 MG/1
1 TABLET, FILM COATED ORAL DAILY
Qty: 90 TABLET | Refills: 1 | Status: SHIPPED | OUTPATIENT
Start: 2021-01-19 | End: 2021-06-01

## 2021-01-19 NOTE — PROGRESS NOTES
Chief Complaint   Patient presents with   • Covid-19 Home Monitoring Phone Call   • Nasal Congestion   • Fatigue       You have chosen to receive care through a telephone visit. Do you consent to use a telephonevisit for your medical care today? Yes      HPI     Patient reports sinus congestion and thick mucus drainage starting Tuesday 1/12 last week, tested for COVID on thurs 1/14, results positive on sat 1/16.  Now c/o continued nasal congestion, yellow nasal drainage, productive cough with yellow thick phlegm, shortness of air with exertion, severe fatigue, loss of smell/taste, denies diarrhea, nausea/vomiting.    HTN, feels stable on meds and takes as directed, although patient does not check BP at home.  Denies chest pain, headache, shortness of air, palpitations and swelling of extremities.     Hyperlipidemia, The patient denies muscle aches, constipation, diarrhea, GI upset, fatigue, chest pain/pressure, exercise intolerance, dyspnea, palpitations, syncope and pedal edema.  pt has improved her diet, eating more turkey burger etc.     Diabetes mellitus type II, feels stable on meds, denies any signs/symptoms of hyper/hypoglycemia, blurry vision, polydipsia, polyuria, nocturia, and unintentional weight loss. Wants to review labs.     Patient was in the office on 1/12 for lab draw, shortly after tested positive for Covid19    Past Medical History:   Diagnosis Date   • Anxiety    • Arm pain    • COPD (chronic obstructive pulmonary disease) (CMS/HCC)    • Diabetes mellitus (CMS/HCC)    • Diabetic nephropathy (CMS/HCC)    • Hyperlipidemia    • Hypertension      Past Surgical History:   Procedure Laterality Date   • APPENDECTOMY  1958   • BACK SURGERY     • LUMBAR SPINE SURGERY  1959    L5 removed-[facility]   • OOPHORECTOMY     • TONSILLECTOMY     • TOTAL ABDOMINAL HYSTERECTOMY       Family History   Problem Relation Age of Onset   • Diabetes Mother    • Diabetes Father      Social History     Tobacco Use    • Smoking status: Former Smoker   • Smokeless tobacco: Never Used   Substance Use Topics   • Alcohol use: Yes     Comment: occ         Current Outpatient Medications:   •  Acetaminophen (TYLENOL GO TABS EXTRA STRENGTH PO), Take  by mouth., Disp: , Rfl:   •  carbidopa-levodopa ER (SINEMET CR)  MG per tablet, Take 1 tablet by mouth 2 (Two) Times a Day., Disp: 180 tablet, Rfl: 1  •  diphenhydrAMINE (Benadryl Allergy) 25 mg capsule, Take 25 mg by mouth Every 6 (Six) Hours As Needed for Itching., Disp: , Rfl:   •  Empagliflozin (Jardiance) 25 MG tablet, Take 25 mg by mouth Daily., Disp: 90 tablet, Rfl: 1  •  estradiol (ESTRACE) 1 MG tablet, Take 0.5 tablets by mouth Daily., Disp: 90 tablet, Rfl: 1  •  glimepiride (AMARYL) 4 MG tablet, Take 1 tablet by mouth Daily., Disp: 90 tablet, Rfl: 1  •  hydroCHLOROthiazide (HYDRODIURIL) 12.5 MG tablet, Take 1 tablet by mouth Daily., Disp: 90 tablet, Rfl: 1  •  hydrOXYzine (ATARAX) 10 MG tablet, TAKE 1 TO 2 TABLETS BY MOUTH EVERY 8 HOURS AS NEEDED FOR ANXIETY, Disp: 40 tablet, Rfl: 0  •  lisinopril (PRINIVIL,ZESTRIL) 20 MG tablet, Take 1 tablet by mouth Daily., Disp: 90 tablet, Rfl: 1  •  Multiple Vitamins-Minerals (PRESERVISION AREDS 2 PO), , Disp: , Rfl:   •  Omega-3 Fatty Acids (Fish Oil Burp-Less) 1200 MG capsule, Take 1 capsule by mouth Daily., Disp: 90 capsule, Rfl: 3  •  rosuvastatin (CRESTOR) 20 MG tablet, Take 1 tablet by mouth Every Night., Disp: 90 tablet, Rfl: 1  •  SITagliptin-metFORMIN HCl ER (Janumet XR)  MG tablet, Take 2 tablets every day with a meal, Disp: 180 tablet, Rfl: 1  •  temazepam (RESTORIL) 15 MG capsule, Take 1 capsule by mouth At Night As Needed for Sleep., Disp: 30 capsule, Rfl: 2  •  tiZANidine (ZANAFLEX) 4 MG tablet, Take 1 tablet by mouth At Night As Needed for Muscle Spasms., Disp: 30 tablet, Rfl: 11  •  traMADol (ULTRAM) 50 MG tablet, Take 1 tablet by mouth 3 (Three) Times a Day As Needed for Moderate Pain ., Disp: 90 tablet, Rfl:  "3  •  amoxicillin (AMOXIL) 875 MG tablet, Take 1 tablet by mouth 2 (Two) Times a Day for 10 days., Disp: 20 tablet, Rfl: 0  •  methylPREDNISolone (MEDROL) 4 MG dose pack, Take as directed on package instructions., Disp: 21 tablet, Rfl: 0      The following portions of the patient's history were reviewed and updated as appropriate: allergies, current medications, past family history, past medical history, past social history, past surgical history and problem list.    Review of Systems      Obtained as mentioned in HPI, otherwise negative.       Vitals:    01/19/21 1317   Weight: 62.6 kg (138 lb)   Height: 165.1 cm (65\")     Body mass index is 22.96 kg/m².    Physical Exam  Constitutional:       General: She is not in acute distress.     Comments: Exam otherwise deferred through audio visit   HENT:      Nose: Congestion (sounds nasally congested via telephone visit) present.   Pulmonary:      Effort: Pulmonary effort is normal. No respiratory distress.   Neurological:      Mental Status: She is alert and oriented to person, place, and time.   Psychiatric:         Behavior: Behavior normal.         Thought Content: Thought content normal.         Judgment: Judgment normal.         No visits with results within 7 Day(s) from this visit.   Latest known visit with results is:   Lab on 01/12/2021   Component Date Value Ref Range Status   • Glucose 01/12/2021 102* 65 - 99 mg/dL Final   • BUN 01/12/2021 33* 8 - 23 mg/dL Final   • Creatinine 01/12/2021 1.07* 0.57 - 1.00 mg/dL Final   • Sodium 01/12/2021 137  136 - 145 mmol/L Final   • Potassium 01/12/2021 4.0  3.5 - 5.2 mmol/L Final   • Chloride 01/12/2021 100  98 - 107 mmol/L Final   • CO2 01/12/2021 25.0  22.0 - 29.0 mmol/L Final   • Calcium 01/12/2021 9.4  8.6 - 10.5 mg/dL Final   • Total Protein 01/12/2021 7.1  6.0 - 8.5 g/dL Final   • Albumin 01/12/2021 4.60  3.50 - 5.20 g/dL Final   • ALT (SGPT) 01/12/2021 17  1 - 33 U/L Final   • AST (SGOT) 01/12/2021 25  1 - 32 U/L " Final   • Alkaline Phosphatase 01/12/2021 61  39 - 117 U/L Final   • Total Bilirubin 01/12/2021 <0.2  0.0 - 1.2 mg/dL Final   • eGFR Non African Amer 01/12/2021 51* >60 mL/min/1.73 Final   • Globulin 01/12/2021 2.5  gm/dL Final   • A/G Ratio 01/12/2021 1.8  g/dL Final   • BUN/Creatinine Ratio 01/12/2021 30.8* 7.0 - 25.0 Final   • Anion Gap 01/12/2021 12.0  5.0 - 15.0 mmol/L Final   • WBC 01/12/2021 7.57  3.40 - 10.80 10*3/mm3 Final   • RBC 01/12/2021 4.29  3.77 - 5.28 10*6/mm3 Final   • Hemoglobin 01/12/2021 12.8  12.0 - 15.9 g/dL Final   • Hematocrit 01/12/2021 38.6  34.0 - 46.6 % Final   • MCV 01/12/2021 90.0  79.0 - 97.0 fL Final   • MCH 01/12/2021 29.8  26.6 - 33.0 pg Final   • MCHC 01/12/2021 33.2  31.5 - 35.7 g/dL Final   • RDW 01/12/2021 12.3  12.3 - 15.4 % Final   • RDW-SD 01/12/2021 39.6  37.0 - 54.0 fl Final   • MPV 01/12/2021 12.6* 6.0 - 12.0 fL Final   • Platelets 01/12/2021 247  140 - 450 10*3/mm3 Final   • Hemoglobin A1C 01/12/2021 8.3* 3.5 - 5.6 % Final   • Total Cholesterol 01/12/2021 119  0 - 200 mg/dL Final   • Triglycerides 01/12/2021 371* 0 - 150 mg/dL Final   • HDL Cholesterol 01/12/2021 42  40 - 60 mg/dL Final   • LDL Cholesterol  01/12/2021 25  0 - 100 mg/dL Final   • VLDL Cholesterol 01/12/2021 52* 5 - 40 mg/dL Final   • LDL/HDL Ratio 01/12/2021 0.07   Final   • TSH 01/12/2021 3.810  0.270 - 4.200 uIU/mL Final       Diagnoses and all orders for this visit:    1. Essential hypertension (Primary)  Comments:  Stable, refill lisinopril and HCTZ  Orders:  -     lisinopril (PRINIVIL,ZESTRIL) 20 MG tablet; Take 1 tablet by mouth Daily.  Dispense: 90 tablet; Refill: 1  -     hydroCHLOROthiazide (HYDRODIURIL) 12.5 MG tablet; Take 1 tablet by mouth Daily.  Dispense: 90 tablet; Refill: 1    2. Other insomnia  Comments:  stable, cont rf temazepam  Orders:  -     temazepam (RESTORIL) 15 MG capsule; Take 1 capsule by mouth At Night As Needed for Sleep.  Dispense: 30 capsule; Refill: 2    3. Type 2 diabetes  mellitus with hyperglycemia, without long-term current use of insulin (CMS/[facility])  Comments:  Reviewed labs, A1c is 8.3.  Continue Janumet separate dosing a.m./p.m.  Continue Jardiance and glimepiride. work on DM diet. repeat labs 3mo  Orders:  -     SITagliptin-metFORMIN HCl ER (Janumet XR)  MG tablet; Take 2 tablets every day with a meal  Dispense: 180 tablet; Refill: 1  -     glimepiride (AMARYL) 4 MG tablet; Take 1 tablet by mouth Daily.  Dispense: 90 tablet; Refill: 1  -     Empagliflozin (Jardiance) 25 MG tablet; Take 25 mg by mouth Daily.  Dispense: 90 tablet; Refill: 1    4. COVID-19 virus infection  Comments:  Treat symptomatically. discussed quarantine instructions    5. Acute non-recurrent pansinusitis  Comments:  Start amoxicillin Medrol Dosepak, also recommend Mucinex DM OTC, push fluids notify me if worse or no better in 3 to 5 days.   Orders:  -     amoxicillin (AMOXIL) 875 MG tablet; Take 1 tablet by mouth 2 (Two) Times a Day for 10 days.  Dispense: 20 tablet; Refill: 0  -     methylPREDNISolone (MEDROL) 4 MG dose pack; Take as directed on package instructions.  Dispense: 21 tablet; Refill: 0    6. Mixed hyperlipidemia  Comments:  Reviewed labs, triglycerides are improved-still elevated, continue/refill rosuvastatin.  Praised efforts of improvements in healthy heart diet. Repeat labs 3mo  Orders:  -     rosuvastatin (CRESTOR) 20 MG tablet; Take 1 tablet by mouth Every Night.  Dispense: 90 tablet; Refill: 1      Return in about 3 months (around 4/19/2021) for DM panel labs 1 week prior to appt fasting. .      This was a video enabled telemedicine encounter.  Total time of discussion was 25 minutes

## 2021-01-25 DIAGNOSIS — J01.40 ACUTE NON-RECURRENT PANSINUSITIS: ICD-10-CM

## 2021-01-25 RX ORDER — METHYLPREDNISOLONE 4 MG/1
TABLET ORAL
Qty: 21 TABLET | Refills: 0 | Status: SHIPPED | OUTPATIENT
Start: 2021-01-25 | End: 2021-05-04

## 2021-01-25 NOTE — TELEPHONE ENCOUNTER
Caller: Divya Lomeli    Relationship: Self    Best call back number: [phone]     Medication needed:   Requested Prescriptions     Pending Prescriptions Disp Refills   • methylPREDNISolone (MEDROL) 4 MG dose pack 21 tablet 0     Sig: Take as directed on package instructions.       When do you need the refill by: ASAP    What details did the patient provide when requesting the medication: PT STATES SHE IS FEELING A LITTLE BETTER, BUT STILL DOES NOT FEEL GOOD. WOULD LIKE TO REQUEST A REFILL OF THIS MEDICATION.    Does the patient have less than a 3 day supply:  [x] Yes  [] No    What is the patient's preferred pharmacy: [facility] [address] [place] [address] ROAD - [phone] [facility] [phone] FX

## 2021-02-01 ENCOUNTER — TELEPHONE (OUTPATIENT)
Dept: FAMILY MEDICINE CLINIC | Facility: CLINIC | Age: 66
End: 2021-02-01

## 2021-02-01 DIAGNOSIS — E11.65 TYPE 2 DIABETES MELLITUS WITH HYPERGLYCEMIA, WITHOUT LONG-TERM CURRENT USE OF INSULIN (HCC): Primary | ICD-10-CM

## 2021-02-01 DIAGNOSIS — I10 ESSENTIAL HYPERTENSION: ICD-10-CM

## 2021-02-01 NOTE — TELEPHONE ENCOUNTER
PATIENT IS CALLING IN SHE STATES THAT SHE IS JUST GETTING OVER COVID AND SHE IS WANTING TO KNOW IF SHE SHOULD STILL GO AHEAD AND GET VACCINATED.    SHE IS GETTING MIXED ANSWERS FROM DIFFERENT PEOPLE AND WOULD LIKE TO GET SOME CLARIFICATION ON THIS.    PLEASE ADVISE    CALLBACK NUMBER  [phone]

## 2021-02-01 NOTE — TELEPHONE ENCOUNTER
PATIENT DID MAKE FU APPT FOR April AND WANTS TO GET LABS FOR THEN CAN YOU PUT ORDERS IN AND LET HER KNOW WHEN SHE CAN SCHEDULE.

## 2021-02-01 NOTE — TELEPHONE ENCOUNTER
Left vm for patient letting her know that she can get covid vaccine, but she does have antibodies for up to 90 days.  Also let her know she can schedule her labs appt with the hub.

## 2021-02-04 ENCOUNTER — TELEPHONE (OUTPATIENT)
Dept: FAMILY MEDICINE CLINIC | Facility: CLINIC | Age: 66
End: 2021-02-04

## 2021-02-04 NOTE — TELEPHONE ENCOUNTER
Okay, if employer is okay with her being off until February 15, and that is fine and okay to write the letter.  I do not want to complete any short-term or other paperwork for this when she has essentially recovered.

## 2021-02-04 NOTE — TELEPHONE ENCOUNTER
If patient does not have any further Covid symptoms such as fever, cough, shortness of breath and is not using ibuprofen or Tylenol to treat symptoms, fatigue is not something that can keep her at home.  She can start vitamin D and zinc and/or multivitamin. She should return to work Monday 2/8. Thanks

## 2021-02-04 NOTE — TELEPHONE ENCOUNTER
Caller: Divya Lomeli A    Relationship to patient: Self    Best call back number: [phone]    Patient is needing: PATIENT SAID THAT HER EMPLOYER FAXED A LETTER FOR WHEN THE PATIENT CAN RETURN TO WORK. PATIENT SAID THAT SHE IS STILL FEELING FATIGUED AND CANNOT RETURN TO WORK UNTIL 2/15/21 AND WOULD LIKE [employer] STATE IT ON THE LETTER.

## 2021-02-04 NOTE — TELEPHONE ENCOUNTER
Spoke with patient and she stated there is no way she will be able to return to work that soon. She said her fatigue and energy is so bad she has trouble even getting through the store. She said she has to constantly stop and rest and take several naps a day. She stated that she is taking a multivitamin. She also stated that she spoke with [employer] her employer and they said they understand with COVID comes the fatigue and they are okay with her needing the time off as long as her doctor is okay with that.

## 2021-02-08 ENCOUNTER — LAB (OUTPATIENT)
Dept: FAMILY MEDICINE CLINIC | Facility: CLINIC | Age: 66
End: 2021-02-08

## 2021-02-08 DIAGNOSIS — E11.65 TYPE 2 DIABETES MELLITUS WITH HYPERGLYCEMIA, WITHOUT LONG-TERM CURRENT USE OF INSULIN (HCC): Primary | ICD-10-CM

## 2021-02-08 PROCEDURE — 82043 UR ALBUMIN QUANTITATIVE: CPT | Performed by: NURSE PRACTITIONER

## 2021-02-09 LAB — ALBUMIN UR-MCNC: 4.1 MG/DL

## 2021-02-24 ENCOUNTER — HOSPITAL ENCOUNTER (OUTPATIENT)
Dept: MAMMOGRAPHY | Facility: HOSPITAL | Age: 66
Discharge: HOME | End: 2021-02-24
Admitting: NURSE PRACTITIONER

## 2021-02-24 DIAGNOSIS — Z12.31 ENCOUNTER FOR SCREENING MAMMOGRAM FOR MALIGNANT NEOPLASM OF BREAST: ICD-10-CM

## 2021-02-24 PROCEDURE — 77063 BREAST TOMOSYNTHESIS BI: CPT

## 2021-02-24 PROCEDURE — 77067 SCR MAMMO BI INCL CAD: CPT

## 2021-02-25 ENCOUNTER — APPOINTMENT (OUTPATIENT)
Dept: MAMMOGRAPHY | Facility: HOSPITAL | Age: 66
End: 2021-02-25

## 2021-03-25 DIAGNOSIS — I10 ESSENTIAL HYPERTENSION: ICD-10-CM

## 2021-03-26 RX ORDER — HYDROCHLOROTHIAZIDE 12.5 MG/1
TABLET ORAL
Qty: 90 TABLET | Refills: 0 | OUTPATIENT
Start: 2021-03-26

## 2021-04-16 ENCOUNTER — LAB (OUTPATIENT)
Dept: LAB | Facility: HOSPITAL | Age: 66
End: 2021-04-16

## 2021-04-16 DIAGNOSIS — I10 ESSENTIAL HYPERTENSION: ICD-10-CM

## 2021-04-16 DIAGNOSIS — E11.65 TYPE 2 DIABETES MELLITUS WITH HYPERGLYCEMIA, WITHOUT LONG-TERM CURRENT USE OF INSULIN (HCC): ICD-10-CM

## 2021-04-16 LAB
ALBUMIN SERPL-MCNC: 4.2 G/DL (ref 3.5–5.2)
ALBUMIN/GLOB SERPL: 1.8 G/DL
ALP SERPL-CCNC: 51 U/L (ref 39–117)
ALT SERPL W P-5'-P-CCNC: 19 U/L (ref 1–33)
ANION GAP SERPL CALCULATED.3IONS-SCNC: 10.1 MMOL/L (ref 5–15)
AST SERPL-CCNC: 18 U/L (ref 1–32)
BILIRUB SERPL-MCNC: 0.2 MG/DL (ref 0–1.2)
BUN SERPL-MCNC: 35 MG/DL (ref 8–23)
BUN/CREAT SERPL: 36.5 (ref 7–25)
CALCIUM SPEC-SCNC: 9.8 MG/DL (ref 8.6–10.5)
CHLORIDE SERPL-SCNC: 101 MMOL/L (ref 98–107)
CHOLEST SERPL-MCNC: 117 MG/DL (ref 0–200)
CO2 SERPL-SCNC: 26.9 MMOL/L (ref 22–29)
CREAT SERPL-MCNC: 0.96 MG/DL (ref 0.57–1)
DEPRECATED RDW RBC AUTO: 42.2 FL (ref 37–54)
ERYTHROCYTE [DISTWIDTH] IN BLOOD BY AUTOMATED COUNT: 12.6 % (ref 12.3–15.4)
GFR SERPL CREATININE-BSD FRML MDRD: 58 ML/MIN/1.73
GLOBULIN UR ELPH-MCNC: 2.4 GM/DL
GLUCOSE SERPL-MCNC: 133 MG/DL (ref 65–99)
HBA1C MFR BLD: 8.5 % (ref 3.5–5.6)
HCT VFR BLD AUTO: 38.4 % (ref 34–46.6)
HDLC SERPL-MCNC: 51 MG/DL (ref 40–60)
HGB BLD-MCNC: 12.4 G/DL (ref 12–15.9)
LDLC SERPL CALC-MCNC: 38 MG/DL (ref 0–100)
LDLC/HDLC SERPL: 0.63 {RATIO}
MCH RBC QN AUTO: 29.5 PG (ref 26.6–33)
MCHC RBC AUTO-ENTMCNC: 32.3 G/DL (ref 31.5–35.7)
MCV RBC AUTO: 91.4 FL (ref 79–97)
PLATELET # BLD AUTO: 318 10*3/MM3 (ref 140–450)
PMV BLD AUTO: 11.8 FL (ref 6–12)
POTASSIUM SERPL-SCNC: 4.5 MMOL/L (ref 3.5–5.2)
PROT SERPL-MCNC: 6.6 G/DL (ref 6–8.5)
RBC # BLD AUTO: 4.2 10*6/MM3 (ref 3.77–5.28)
SODIUM SERPL-SCNC: 138 MMOL/L (ref 136–145)
TRIGL SERPL-MCNC: 169 MG/DL (ref 0–150)
TSH SERPL DL<=0.05 MIU/L-ACNC: 2.55 UIU/ML (ref 0.27–4.2)
VLDLC SERPL-MCNC: 28 MG/DL (ref 5–40)
WBC # BLD AUTO: 8.66 10*3/MM3 (ref 3.4–10.8)

## 2021-04-16 PROCEDURE — 80053 COMPREHEN METABOLIC PANEL: CPT

## 2021-04-16 PROCEDURE — 80061 LIPID PANEL: CPT

## 2021-04-16 PROCEDURE — 83036 HEMOGLOBIN GLYCOSYLATED A1C: CPT

## 2021-04-16 PROCEDURE — 85027 COMPLETE CBC AUTOMATED: CPT

## 2021-04-16 PROCEDURE — 84443 ASSAY THYROID STIM HORMONE: CPT

## 2021-04-20 ENCOUNTER — APPOINTMENT (OUTPATIENT)
Dept: PAIN MEDICINE | Facility: CLINIC | Age: 66
End: 2021-04-20

## 2021-04-22 DIAGNOSIS — I10 ESSENTIAL HYPERTENSION: ICD-10-CM

## 2021-04-22 RX ORDER — HYDROCHLOROTHIAZIDE 12.5 MG/1
TABLET ORAL
Qty: 90 TABLET | Refills: 0 | OUTPATIENT
Start: 2021-04-22

## 2021-05-04 ENCOUNTER — OFFICE VISIT (OUTPATIENT)
Dept: FAMILY MEDICINE CLINIC | Facility: CLINIC | Age: 66
End: 2021-05-04

## 2021-05-04 VITALS
WEIGHT: 138.4 LBS | BODY MASS INDEX: 23.06 KG/M2 | HEIGHT: 65 IN | OXYGEN SATURATION: 98 % | TEMPERATURE: 97.4 F | HEART RATE: 80 BPM | SYSTOLIC BLOOD PRESSURE: 98 MMHG | DIASTOLIC BLOOD PRESSURE: 50 MMHG

## 2021-05-04 DIAGNOSIS — G47.09 OTHER INSOMNIA: ICD-10-CM

## 2021-05-04 DIAGNOSIS — M51.26 LUMBAGO DUE TO DISPLACEMENT OF INTERVERTEBRAL DISC: ICD-10-CM

## 2021-05-04 DIAGNOSIS — F41.9 ANXIETY: ICD-10-CM

## 2021-05-04 DIAGNOSIS — E11.65 TYPE 2 DIABETES MELLITUS WITH HYPERGLYCEMIA, WITHOUT LONG-TERM CURRENT USE OF INSULIN (HCC): ICD-10-CM

## 2021-05-04 DIAGNOSIS — E78.2 MIXED HYPERLIPIDEMIA: ICD-10-CM

## 2021-05-04 DIAGNOSIS — M79.601 RIGHT ARM PAIN: ICD-10-CM

## 2021-05-04 DIAGNOSIS — I10 ESSENTIAL HYPERTENSION: Primary | ICD-10-CM

## 2021-05-04 PROBLEM — E78.5 HYPERLIPIDEMIA: Status: ACTIVE | Noted: 2018-11-09

## 2021-05-04 PROBLEM — L03.119 CELLULITIS OF LOWER EXTREMITY: Status: ACTIVE | Noted: 2018-09-15

## 2021-05-04 PROBLEM — R03.0 ELEVATED BLOOD PRESSURE READING WITHOUT DIAGNOSIS OF HYPERTENSION: Status: ACTIVE | Noted: 2018-09-15

## 2021-05-04 PROBLEM — J01.00 SINUSITIS, ACUTE MAXILLARY: Status: ACTIVE | Noted: 2018-06-01

## 2021-05-04 PROBLEM — L30.9 DERMATITIS: Status: ACTIVE | Noted: 2018-09-22

## 2021-05-04 PROBLEM — Z78.0 POSTMENOPAUSAL: Status: ACTIVE | Noted: 2019-05-24

## 2021-05-04 PROBLEM — Z83.3 FAMILY HISTORY OF DIABETES MELLITUS: Status: ACTIVE | Noted: 2021-05-04

## 2021-05-04 PROBLEM — Z23 ENCOUNTER FOR IMMUNIZATION: Status: ACTIVE | Noted: 2019-05-24

## 2021-05-04 PROBLEM — J44.9 CHRONIC OBSTRUCTIVE PULMONARY DISEASE (HCC): Status: ACTIVE | Noted: 2018-11-09

## 2021-05-04 PROBLEM — G47.00 INSOMNIA: Status: ACTIVE | Noted: 2019-05-24

## 2021-05-04 PROCEDURE — 99214 OFFICE O/P EST MOD 30 MIN: CPT | Performed by: NURSE PRACTITIONER

## 2021-05-04 RX ORDER — SUVOREXANT 10 MG/1
1 TABLET, FILM COATED ORAL NIGHTLY PRN
Qty: 3 TABLET | Refills: 0 | COMMUNITY
Start: 2021-05-04 | End: 2021-05-11 | Stop reason: SDUPTHER

## 2021-05-04 RX ORDER — SEMAGLUTIDE 1.34 MG/ML
INJECTION, SOLUTION SUBCUTANEOUS
Qty: 3 PEN | Refills: 3 | Status: SHIPPED | OUTPATIENT
Start: 2021-05-04

## 2021-05-04 RX ORDER — NALOXONE HYDROCHLORIDE 4 MG/.1ML
SPRAY NASAL
COMMUNITY
Start: 2021-03-27

## 2021-05-04 NOTE — PROGRESS NOTES
"Chief Complaint  Diabetes, Hypertension, Hyperlipidemia, Results (labs from 4/16), Follow-up (6 mo), and Frequently Waking Up (pt reports that she'll sleep for an hr, then wake up and not be able to go back to sleep.  pt stopped taking the medication about a month ago.  pt reports that she feels about the same as when she does take it except when she does take it, it make her more drowsy when it IS time to wake up and get ready.)    Subjective          Divya Lomeli presents to [facility] for   History of Present Illness    Insomnia, This is a chronic problem, started more than 1 year ago, as mentioned in CC. treatments tried: OTC sleep aids ineffective; melatonin, tylenol pm. Pt reports waking up at 11pm, falling back asleep at 4am, then sleepy for work, she reports \"the temazepam started kicking in at 4 AM\" for the last 1 month she has not taking anything for insomnia, she is not sleeping at all    Hyperlipidemia, The patient denies muscle aches, constipation, diarrhea, GI upset, fatigue, chest pain/pressure, exercise intolerance, dyspnea, palpitations, syncope and pedal edema.      HTN, stable on meds and takes as directed, denies chest pain, headache, shortness of air, palpitations and swelling of extremities.     Anxiety/stress, psychosocial w/ ill sig other, patient denies significant weight loss/gain, hypersomnia, psychomotor agitation, psychomotor retardation, fatigue (loss of energy), feelings of worthlessness (guilt), impaired concentration (indecisiveness), thoughts of death or suicide.  she does have hydroxyzine left for prn use.     Diabetes mellitus type II, on janumet, jardiance and glimep. She has a poor diet, denies any signs/symptoms of hyper/hypoglycemia, blurry vision, polydipsia, polyuria, nocturia, and unintentional weight loss.      Chronic pain, pt follows with pain mgmt.     Here to review labs        The following portions of the patient's history were " reviewed and updated as appropriate: allergies, current medications, past family history, past medical history, past social history, past surgical history and problem list.    Past Medical History:   Diagnosis Date   • Anxiety    • Arm pain    • COPD (chronic obstructive pulmonary disease) (CMS/[facility])    • Diabetes mellitus (CMS/[facility])    • Diabetic nephropathy (CMS/[facility])    • Hyperlipidemia    • Hypertension      Past Surgical History:   Procedure Laterality Date   • APPENDECTOMY     • BACK SURGERY     • LUMBAR SPINE SURGERY      L5 removed-Abstracted from [facility]   • OOPHORECTOMY     • TONSILLECTOMY     • TOTAL ABDOMINAL HYSTERECTOMY       Family History   Problem Relation Age of Onset   • Diabetes Mother    • Diabetes Father      Social History     Tobacco Use   • Smoking status: Former Smoker     Packs/day: 4.50     Years: 21.00     Pack years: 94.50     Types: Cigarettes     Start date:      Quit date:      Years since quittin.3   • Smokeless tobacco: Never Used   Substance Use Topics   • Alcohol use: Yes     Comment: occ       Current Outpatient Medications:   •  Acetaminophen (TYLENOL GO TABS EXTRA STRENGTH PO), Take  by mouth., Disp: , Rfl:   •  carbidopa-levodopa ER (SINEMET CR)  MG per tablet, Take 1 tablet by mouth 2 (Two) Times a Day., Disp: 180 tablet, Rfl: 1  •  diphenhydrAMINE (Benadryl Allergy) 25 mg capsule, Take 25 mg by mouth Every 6 (Six) Hours As Needed for Itching., Disp: , Rfl:   •  Empagliflozin (Jardiance) 25 MG tablet, Take 25 mg by mouth Daily., Disp: 90 tablet, Rfl: 1  •  estradiol (ESTRACE) 1 MG tablet, Take 0.5 tablets by mouth Daily., Disp: 90 tablet, Rfl: 1  •  glimepiride (AMARYL) 4 MG tablet, Take 1 tablet by mouth Daily., Disp: 90 tablet, Rfl: 1  •  hydroCHLOROthiazide (HYDRODIURIL) 12.5 MG tablet, Take 1 tablet by mouth Daily., Disp: 90 tablet, Rfl: 1  •  hydrOXYzine (ATARAX) 10 MG tablet, TAKE 1 TO 2 TABLETS BY MOUTH EVERY 8 HOURS AS NEEDED FOR ANXIETY, Disp:  "40 tablet, Rfl: 0  •  lisinopril (PRINIVIL,ZESTRIL) 20 MG tablet, Take 1 tablet by mouth Daily., Disp: 90 tablet, Rfl: 1  •  Multiple Vitamins-Minerals (PRESERVISION AREDS 2 PO), , Disp: , Rfl:   •  Narcan 4 MG/0.1ML nasal spray, , Disp: , Rfl:   •  Omega-3 Fatty Acids (Fish Oil Burp-Less) 1200 MG capsule, Take 1 capsule by mouth Daily., Disp: 90 capsule, Rfl: 3  •  rosuvastatin (CRESTOR) 20 MG tablet, Take 1 tablet by mouth Every Night., Disp: 90 tablet, Rfl: 1  •  SITagliptin-metFORMIN HCl ER (Janumet XR)  MG tablet, Take 2 tablets every day with a meal, Disp: 180 tablet, Rfl: 1  •  tiZANidine (ZANAFLEX) 4 MG tablet, Take 1 tablet by mouth At Night As Needed for Muscle Spasms., Disp: 30 tablet, Rfl: 11  •  traMADol (ULTRAM) 50 MG tablet, Take 1 tablet by mouth 3 (Three) Times a Day As Needed for Moderate Pain ., Disp: 90 tablet, Rfl: 3  •  Semaglutide,0.25 or 0.5MG/DOS, (Ozempic, 0.25 or 0.5 MG/DOSE,) 2 MG/1.5ML solution pen-injector, Inject 0.5 mg Subcutaneously weekly on tuesdays, Disp: 3 pen, Rfl: 3  •  Suvorexant (Belsomra) 10 MG tablet, Take 1 tablet by mouth At Night As Needed (insomnia)., Disp: 3 tablet, Rfl: 0  •  temazepam (RESTORIL) 15 MG capsule, Take 1 capsule by mouth At Night As Needed for Sleep., Disp: 30 capsule, Rfl: 2    Objective   Vital Signs:   BP 98/50 (BP Location: Left arm, Patient Position: Sitting, Cuff Size: Adult)   Pulse 80   Temp 97.4 °F (36.3 °C) (Infrared)   Ht 165.1 cm (65\")   Wt 62.8 kg (138 lb 6.4 oz)   SpO2 98%   BMI 23.03 kg/m²       Physical Exam  Vitals and nursing note reviewed.   Constitutional:       General: She is not in acute distress.     Appearance: She is well-developed. She is not diaphoretic.   Eyes:      Pupils: Pupils are equal, round, and reactive to light.   Neck:      Thyroid: No thyromegaly.      Vascular: No JVD.   Cardiovascular:      Rate and Rhythm: Normal rate and regular rhythm.      Heart sounds: Normal heart sounds. No murmur heard. "     Pulmonary:      Effort: Pulmonary effort is normal. No respiratory distress.      Breath sounds: Normal breath sounds.   Abdominal:      General: Bowel sounds are normal. There is no distension.      Palpations: Abdomen is soft.      Tenderness: There is no abdominal tenderness.   Musculoskeletal:         General: Tenderness (chronic L-C spine ttp, good rom. RUE good rom, chronic shoulder pain) present. Normal range of motion.      Cervical back: Normal range of motion and neck supple.   Skin:     General: Skin is warm and dry.   Neurological:      Mental Status: She is alert and oriented to person, place, and time.      Sensory: No sensory deficit.   Psychiatric:         Behavior: Behavior normal.         Thought Content: Thought content normal.         Judgment: Judgment normal.          Result Review :     No visits with results within 7 Day(s) from this visit.   Latest known visit with results is:   Lab on 04/16/2021   Component Date Value Ref Range Status   • TSH 04/16/2021 2.550  0.270 - 4.200 uIU/mL Final   • WBC 04/16/2021 8.66  3.40 - 10.80 10*3/mm3 Final   • RBC 04/16/2021 4.20  3.77 - 5.28 10*6/mm3 Final   • Hemoglobin 04/16/2021 12.4  12.0 - 15.9 g/dL Final   • Hematocrit 04/16/2021 38.4  34.0 - 46.6 % Final   • MCV 04/16/2021 91.4  79.0 - 97.0 fL Final   • MCH 04/16/2021 29.5  26.6 - 33.0 pg Final   • MCHC 04/16/2021 32.3  31.5 - 35.7 g/dL Final   • RDW 04/16/2021 12.6  12.3 - 15.4 % Final   • RDW-SD 04/16/2021 42.2  37.0 - 54.0 fl Final   • MPV 04/16/2021 11.8  6.0 - 12.0 fL Final   • Platelets 04/16/2021 318  140 - 450 10*3/mm3 Final   • Glucose 04/16/2021 133* 65 - 99 mg/dL Final   • BUN 04/16/2021 35* 8 - 23 mg/dL Final   • Creatinine 04/16/2021 0.96  0.57 - 1.00 mg/dL Final   • Sodium 04/16/2021 138  136 - 145 mmol/L Final   • Potassium 04/16/2021 4.5  3.5 - 5.2 mmol/L Final   • Chloride 04/16/2021 101  98 - 107 mmol/L Final   • CO2 04/16/2021 26.9  22.0 - 29.0 mmol/L Final   • Calcium  04/16/2021 9.8  8.6 - 10.5 mg/dL Final   • Total Protein 04/16/2021 6.6  6.0 - 8.5 g/dL Final   • Albumin 04/16/2021 4.20  3.50 - 5.20 g/dL Final   • ALT (SGPT) 04/16/2021 19  1 - 33 U/L Final   • AST (SGOT) 04/16/2021 18  1 - 32 U/L Final   • Alkaline Phosphatase 04/16/2021 51  39 - 117 U/L Final   • Total Bilirubin 04/16/2021 0.2  0.0 - 1.2 mg/dL Final   • eGFR Non African Amer 04/16/2021 58* >60 mL/min/1.73 Final   • Globulin 04/16/2021 2.4  gm/dL Final   • A/G Ratio 04/16/2021 1.8  g/dL Final   • BUN/Creatinine Ratio 04/16/2021 36.5* 7.0 - 25.0 Final   • Anion Gap 04/16/2021 10.1  5.0 - 15.0 mmol/L Final   • Hemoglobin A1C 04/16/2021 8.5* 3.5 - 5.6 % Final   • Total Cholesterol 04/16/2021 117  0 - 200 mg/dL Final   • Triglycerides 04/16/2021 169* 0 - 150 mg/dL Final   • HDL Cholesterol 04/16/2021 51  40 - 60 mg/dL Final   • LDL Cholesterol  04/16/2021 38  0 - 100 mg/dL Final   • VLDL Cholesterol 04/16/2021 28  5 - 40 mg/dL Final   • LDL/HDL Ratio 04/16/2021 0.63   Final                       Assessment and Plan    Diagnoses and all orders for this visit:    1. Essential hypertension (Primary)  Comments:  bp low, start cutting lis in 1/2 for 10mg daily.       2. Type 2 diabetes mellitus with hyperglycemia, without long-term current use of insulin (CMS/[facility])  Comments:  A1c worse at 8.5, poor control, add ozempic-1st dose in office today. cont janumet, jardiance, glimep. repeat 3mo. rec diet control.     3. Other insomnia  Comments:  trial belsomra 10mg nightly, notify in 3 days if wishes for rx due to having s/e now with temazepam.   Orders:  -     Suvorexant (Belsomra) 10 MG tablet; Take 1 tablet by mouth At Night As Needed (insomnia).  Dispense: 3 tablet; Refill: 0    4. Mixed hyperlipidemia  Comments:  trigs much improved on fish oil, cont along with crestor.     5. Right arm pain  Comments:  pt to make FMLA appt if needs further paperwork for employer    6. Lumbago due to displacement of intervertebral  disc  Comments:  cont with pain mgmt as directed, do not take tramadol with belsomra or other meds.     7. Anxiety  Comments:  worse situationally, likely cause inc insomnia    Other orders  -     Semaglutide,0.25 or 0.5MG/DOS, (Ozempic, 0.25 or 0.5 MG/DOSE,) 2 MG/1.5ML solution pen-injector; Inject 0.5 mg Subcutaneously weekly on tuesdays  Dispense: 3 pen; Refill: 3      I spent 35 minutes caring for Divya on this date of service. This time includes time spent by me in the following activities:preparing for the visit, reviewing tests, performing a medically appropriate examination and/or evaluation , counseling and educating the patient/family/caregiver, ordering medications, tests, or procedures and documenting information in the medical record    Follow Up   Return in about 3 months (around 8/4/2021) for DM, HTN, insomnia. recheck DM panel in 3mo. .  Patient was given instructions and counseling regarding her condition or for health maintenance advice. Please see specific information pulled into the AVS if appropriate.

## 2021-05-10 ENCOUNTER — TELEPHONE (OUTPATIENT)
Dept: FAMILY MEDICINE CLINIC | Facility: CLINIC | Age: 66
End: 2021-05-10

## 2021-05-10 DIAGNOSIS — G47.09 OTHER INSOMNIA: ICD-10-CM

## 2021-05-10 RX ORDER — SUVOREXANT 10 MG/1
1 TABLET, FILM COATED ORAL NIGHTLY PRN
Qty: 30 TABLET | Refills: 0 | Status: CANCELLED | OUTPATIENT
Start: 2021-05-10

## 2021-05-10 NOTE — TELEPHONE ENCOUNTER
PATIENT STATES SHE WAS TO CALL Friday AND LEAVE A MESSAGE, BUT GOT TOO BUSY. SHE STATES SHE WAS PUT ON Suvorexant (Belsomra) 10 MG tablet AND IT IS WORKING REALLY WELL. SHE WOULD LIKE TO TRY IT FOR ANOTHER MONTH. PLEASE SEND A SCRIPT INTO [facility] ON [address].    PLEASE ADVISE: [phone]

## 2021-05-11 DIAGNOSIS — G47.09 OTHER INSOMNIA: ICD-10-CM

## 2021-05-11 RX ORDER — SUVOREXANT 10 MG/1
1 TABLET, FILM COATED ORAL NIGHTLY PRN
Qty: 30 TABLET | Refills: 0 | Status: SHIPPED | OUTPATIENT
Start: 2021-05-11 | End: 2021-06-10

## 2021-05-17 RX ORDER — CARBIDOPA AND LEVODOPA 25; 100 MG/1; MG/1
TABLET, EXTENDED RELEASE ORAL
Qty: 180 TABLET | Refills: 0 | Status: SHIPPED | OUTPATIENT
Start: 2021-05-17 | End: 2021-09-27

## 2021-05-30 DIAGNOSIS — E11.65 TYPE 2 DIABETES MELLITUS WITH HYPERGLYCEMIA, WITHOUT LONG-TERM CURRENT USE OF INSULIN (HCC): ICD-10-CM

## 2021-06-01 RX ORDER — EMPAGLIFLOZIN 25 MG/1
TABLET, FILM COATED ORAL
Qty: 90 TABLET | Refills: 0 | Status: SHIPPED | OUTPATIENT
Start: 2021-06-01

## 2021-06-09 DIAGNOSIS — G47.09 OTHER INSOMNIA: ICD-10-CM

## 2021-06-10 RX ORDER — SUVOREXANT 10 MG/1
TABLET, FILM COATED ORAL
Qty: 30 TABLET | Refills: 0 | Status: SHIPPED | OUTPATIENT
Start: 2021-06-10 | End: 2021-08-10

## 2021-07-21 ENCOUNTER — TELEPHONE (OUTPATIENT)
Dept: FAMILY MEDICINE CLINIC | Facility: CLINIC | Age: 66
End: 2021-07-21

## 2021-07-28 DIAGNOSIS — E11.65 TYPE 2 DIABETES MELLITUS WITH HYPERGLYCEMIA, WITHOUT LONG-TERM CURRENT USE OF INSULIN (HCC): ICD-10-CM

## 2021-07-28 RX ORDER — GLIMEPIRIDE 4 MG/1
TABLET ORAL
Qty: 90 TABLET | Refills: 0 | Status: SHIPPED | OUTPATIENT
Start: 2021-07-28

## 2021-07-28 RX ORDER — ESTRADIOL 1 MG/1
TABLET ORAL
Qty: 45 TABLET | Refills: 0 | Status: SHIPPED | OUTPATIENT
Start: 2021-07-28

## 2021-08-09 DIAGNOSIS — G47.09 OTHER INSOMNIA: ICD-10-CM

## 2021-08-10 RX ORDER — SUVOREXANT 10 MG/1
TABLET, FILM COATED ORAL
Qty: 30 TABLET | Refills: 0 | Status: SHIPPED | OUTPATIENT
Start: 2021-08-10 | End: 2022-10-07

## 2021-08-16 ENCOUNTER — TELEPHONE (OUTPATIENT)
Dept: FAMILY MEDICINE CLINIC | Facility: CLINIC | Age: 66
End: 2021-08-16

## 2021-08-16 DIAGNOSIS — E11.65 TYPE 2 DIABETES MELLITUS WITH HYPERGLYCEMIA, WITHOUT LONG-TERM CURRENT USE OF INSULIN (HCC): ICD-10-CM

## 2021-08-16 NOTE — TELEPHONE ENCOUNTER
Pt was contacted today to schedule a medicare wellness visit.  She informed the office that she is seeing a new provider.

## 2021-08-17 RX ORDER — EMPAGLIFLOZIN 25 MG/1
TABLET, FILM COATED ORAL
Qty: 90 TABLET | Refills: 0 | OUTPATIENT
Start: 2021-08-17

## 2021-08-20 NOTE — TELEPHONE ENCOUNTER
As noted in previous cancelled OV for 8/4/21 and labs from 7/28/21, patient cancelled both appointments stating that she was switching providers.

## 2021-08-25 RX ORDER — CARBIDOPA AND LEVODOPA 25; 100 MG/1; MG/1
TABLET, EXTENDED RELEASE ORAL
Qty: 180 TABLET | Refills: 0 | OUTPATIENT
Start: 2021-08-25

## 2021-08-30 RX ORDER — HYDROXYZINE HYDROCHLORIDE 10 MG/1
TABLET, FILM COATED ORAL
Qty: 40 TABLET | Refills: 0 | OUTPATIENT
Start: 2021-08-30

## 2021-09-23 DIAGNOSIS — G47.09 OTHER INSOMNIA: ICD-10-CM

## 2021-09-24 RX ORDER — SUVOREXANT 10 MG/1
TABLET, FILM COATED ORAL
Qty: 30 TABLET | Refills: 0 | OUTPATIENT
Start: 2021-09-24

## 2021-09-27 ENCOUNTER — HOSPITAL ENCOUNTER (OUTPATIENT)
Facility: HOSPITAL | Age: 66
Setting detail: OBSERVATION
Discharge: HOME | End: 2021-09-29
Attending: FAMILY MEDICINE | Admitting: UROLOGY

## 2021-09-27 ENCOUNTER — APPOINTMENT (OUTPATIENT)
Dept: CT IMAGING | Facility: HOSPITAL | Age: 66
End: 2021-09-27

## 2021-09-27 DIAGNOSIS — R33.8 ACUTE URINARY RETENTION: ICD-10-CM

## 2021-09-27 DIAGNOSIS — R31.0 GROSS HEMATURIA: Primary | ICD-10-CM

## 2021-09-27 LAB
ANION GAP SERPL CALCULATED.3IONS-SCNC: 13 MMOL/L (ref 5–15)
APTT PPP: 26.6 SECONDS (ref 24–31)
BACTERIA UR QL AUTO: ABNORMAL /HPF
BASOPHILS # BLD AUTO: 0.1 10*3/MM3 (ref 0–0.2)
BASOPHILS NFR BLD AUTO: 1.2 % (ref 0–1.5)
BILIRUB UR QL STRIP: NEGATIVE
BUN SERPL-MCNC: 21 MG/DL (ref 8–23)
BUN/CREAT SERPL: 18.4 (ref 7–25)
CALCIUM SPEC-SCNC: 9 MG/DL (ref 8.6–10.5)
CHLORIDE SERPL-SCNC: 102 MMOL/L (ref 98–107)
CLARITY UR: ABNORMAL
CO2 SERPL-SCNC: 22 MMOL/L (ref 22–29)
COLOR UR: ABNORMAL
CREAT SERPL-MCNC: 1.14 MG/DL (ref 0.57–1)
DEPRECATED RDW RBC AUTO: 44.2 FL (ref 37–54)
EOSINOPHIL # BLD AUTO: 0.6 10*3/MM3 (ref 0–0.4)
EOSINOPHIL NFR BLD AUTO: 5.8 % (ref 0.3–6.2)
ERYTHROCYTE [DISTWIDTH] IN BLOOD BY AUTOMATED COUNT: 14.1 % (ref 12.3–15.4)
GFR SERPL CREATININE-BSD FRML MDRD: 47 ML/MIN/1.73
GLUCOSE BLDC GLUCOMTR-MCNC: 73 MG/DL (ref 70–105)
GLUCOSE SERPL-MCNC: 86 MG/DL (ref 65–99)
GLUCOSE UR STRIP-MCNC: ABNORMAL MG/DL
HCT VFR BLD AUTO: 36.1 % (ref 34–46.6)
HGB BLD-MCNC: 11.9 G/DL (ref 12–15.9)
HGB UR QL STRIP.AUTO: ABNORMAL
HYALINE CASTS UR QL AUTO: ABNORMAL /LPF
INR PPP: 0.97 (ref 0.93–1.1)
KETONES UR QL STRIP: NEGATIVE
LEUKOCYTE ESTERASE UR QL STRIP.AUTO: NEGATIVE
LYMPHOCYTES # BLD AUTO: 3.2 10*3/MM3 (ref 0.7–3.1)
LYMPHOCYTES NFR BLD AUTO: 32.1 % (ref 19.6–45.3)
MCH RBC QN AUTO: 30 PG (ref 26.6–33)
MCHC RBC AUTO-ENTMCNC: 33.1 G/DL (ref 31.5–35.7)
MCV RBC AUTO: 90.8 FL (ref 79–97)
MONOCYTES # BLD AUTO: 0.8 10*3/MM3 (ref 0.1–0.9)
MONOCYTES NFR BLD AUTO: 8.6 % (ref 5–12)
NEUTROPHILS NFR BLD AUTO: 5.1 10*3/MM3 (ref 1.7–7)
NEUTROPHILS NFR BLD AUTO: 52.3 % (ref 42.7–76)
NITRITE UR QL STRIP: NEGATIVE
NRBC BLD AUTO-RTO: 0 /100 WBC (ref 0–0.2)
PH UR STRIP.AUTO: 5.5 [PH] (ref 5–8)
PLATELET # BLD AUTO: 231 10*3/MM3 (ref 140–450)
PMV BLD AUTO: 9.6 FL (ref 6–12)
POTASSIUM SERPL-SCNC: 4.6 MMOL/L (ref 3.5–5.2)
PROT UR QL STRIP: ABNORMAL
PROTHROMBIN TIME: 10.8 SECONDS (ref 9.6–11.7)
RBC # BLD AUTO: 3.98 10*6/MM3 (ref 3.77–5.28)
RBC # UR: ABNORMAL /HPF
REF LAB TEST METHOD: ABNORMAL
SODIUM SERPL-SCNC: 137 MMOL/L (ref 136–145)
SP GR UR STRIP: 1.03 (ref 1–1.03)
SQUAMOUS #/AREA URNS HPF: ABNORMAL /HPF
UROBILINOGEN UR QL STRIP: ABNORMAL
WBC # BLD AUTO: 9.8 10*3/MM3 (ref 3.4–10.8)
WBC UR QL AUTO: ABNORMAL /HPF

## 2021-09-27 PROCEDURE — 99284 EMERGENCY DEPT VISIT MOD MDM: CPT

## 2021-09-27 PROCEDURE — 85730 THROMBOPLASTIN TIME PARTIAL: CPT | Performed by: NURSE PRACTITIONER

## 2021-09-27 PROCEDURE — 81001 URINALYSIS AUTO W/SCOPE: CPT | Performed by: NURSE PRACTITIONER

## 2021-09-27 PROCEDURE — 85025 COMPLETE CBC W/AUTO DIFF WBC: CPT | Performed by: NURSE PRACTITIONER

## 2021-09-27 PROCEDURE — G0378 HOSPITAL OBSERVATION PER HR: HCPCS

## 2021-09-27 PROCEDURE — 74176 CT ABD & PELVIS W/O CONTRAST: CPT

## 2021-09-27 PROCEDURE — 82962 GLUCOSE BLOOD TEST: CPT

## 2021-09-27 PROCEDURE — 80048 BASIC METABOLIC PNL TOTAL CA: CPT | Performed by: NURSE PRACTITIONER

## 2021-09-27 PROCEDURE — 85610 PROTHROMBIN TIME: CPT | Performed by: NURSE PRACTITIONER

## 2021-09-27 RX ORDER — SODIUM CHLORIDE 0.9 % (FLUSH) 0.9 %
10 SYRINGE (ML) INJECTION AS NEEDED
Status: DISCONTINUED | OUTPATIENT
Start: 2021-09-27 | End: 2021-09-29 | Stop reason: HOSPADM

## 2021-09-27 RX ADMIN — SODIUM CHLORIDE 1000 ML: 9 INJECTION, SOLUTION INTRAVENOUS at 18:00

## 2021-09-28 ENCOUNTER — ANESTHESIA EVENT (OUTPATIENT)
Dept: PERIOP | Facility: HOSPITAL | Age: 66
End: 2021-09-28

## 2021-09-28 ENCOUNTER — ANESTHESIA (OUTPATIENT)
Dept: PERIOP | Facility: HOSPITAL | Age: 66
End: 2021-09-28

## 2021-09-28 LAB
GLUCOSE BLDC GLUCOMTR-MCNC: 130 MG/DL (ref 70–105)
GLUCOSE BLDC GLUCOMTR-MCNC: 216 MG/DL (ref 70–105)
GLUCOSE BLDC GLUCOMTR-MCNC: 280 MG/DL (ref 70–105)
GLUCOSE BLDC GLUCOMTR-MCNC: 95 MG/DL (ref 70–105)
GLUCOSE BLDC GLUCOMTR-MCNC: 97 MG/DL (ref 70–105)
SARS-COV-2 RNA PNL SPEC NAA+PROBE: NOT DETECTED

## 2021-09-28 PROCEDURE — 82962 GLUCOSE BLOOD TEST: CPT

## 2021-09-28 PROCEDURE — 25010000003 CEFAZOLIN PER 500 MG: Performed by: UROLOGY

## 2021-09-28 PROCEDURE — G0378 HOSPITAL OBSERVATION PER HR: HCPCS

## 2021-09-28 PROCEDURE — C1758 CATHETER, URETERAL: HCPCS | Performed by: UROLOGY

## 2021-09-28 PROCEDURE — C9803 HOPD COVID-19 SPEC COLLECT: HCPCS

## 2021-09-28 PROCEDURE — 25010000002 FENTANYL CITRATE (PF) 100 MCG/2ML SOLUTION: Performed by: NURSE ANESTHETIST, CERTIFIED REGISTERED

## 2021-09-28 PROCEDURE — 25010000002 PROPOFOL 10 MG/ML EMULSION: Performed by: NURSE ANESTHETIST, CERTIFIED REGISTERED

## 2021-09-28 PROCEDURE — 25010000002 ONDANSETRON PER 1 MG: Performed by: NURSE ANESTHETIST, CERTIFIED REGISTERED

## 2021-09-28 PROCEDURE — 87635 SARS-COV-2 COVID-19 AMP PRB: CPT | Performed by: FAMILY MEDICINE

## 2021-09-28 PROCEDURE — 25010000002 DEXAMETHASONE PER 1 MG: Performed by: NURSE ANESTHETIST, CERTIFIED REGISTERED

## 2021-09-28 PROCEDURE — C2617 STENT, NON-COR, TEM W/O DEL: HCPCS | Performed by: UROLOGY

## 2021-09-28 PROCEDURE — C1769 GUIDE WIRE: HCPCS | Performed by: UROLOGY

## 2021-09-28 PROCEDURE — 88307 TISSUE EXAM BY PATHOLOGIST: CPT | Performed by: UROLOGY

## 2021-09-28 DEVICE — URETERAL STENT
Type: IMPLANTABLE DEVICE | Site: URETER | Status: FUNCTIONAL
Brand: PERCUFLEX™ PLUS

## 2021-09-28 RX ORDER — ONDANSETRON 2 MG/ML
INJECTION INTRAMUSCULAR; INTRAVENOUS AS NEEDED
Status: DISCONTINUED | OUTPATIENT
Start: 2021-09-28 | End: 2021-09-28 | Stop reason: SURG

## 2021-09-28 RX ORDER — HYDROMORPHONE HCL 110MG/55ML
0.5 PATIENT CONTROLLED ANALGESIA SYRINGE INTRAVENOUS
Status: DISCONTINUED | OUTPATIENT
Start: 2021-09-28 | End: 2021-09-28 | Stop reason: HOSPADM

## 2021-09-28 RX ORDER — HYDROCODONE BITARTRATE AND ACETAMINOPHEN 5; 325 MG/1; MG/1
1 TABLET ORAL EVERY 4 HOURS PRN
Status: DISCONTINUED | OUTPATIENT
Start: 2021-09-28 | End: 2021-09-29 | Stop reason: HOSPADM

## 2021-09-28 RX ORDER — ONDANSETRON 2 MG/ML
4 INJECTION INTRAMUSCULAR; INTRAVENOUS EVERY 6 HOURS PRN
Status: DISCONTINUED | OUTPATIENT
Start: 2021-09-28 | End: 2021-09-29 | Stop reason: HOSPADM

## 2021-09-28 RX ORDER — DEXAMETHASONE SODIUM PHOSPHATE 4 MG/ML
INJECTION, SOLUTION INTRA-ARTICULAR; INTRALESIONAL; INTRAMUSCULAR; INTRAVENOUS; SOFT TISSUE AS NEEDED
Status: DISCONTINUED | OUTPATIENT
Start: 2021-09-28 | End: 2021-09-28 | Stop reason: SURG

## 2021-09-28 RX ORDER — MAGNESIUM HYDROXIDE 1200 MG/15ML
LIQUID ORAL AS NEEDED
Status: DISCONTINUED | OUTPATIENT
Start: 2021-09-28 | End: 2021-09-28 | Stop reason: HOSPADM

## 2021-09-28 RX ORDER — LISINOPRIL 20 MG/1
20 TABLET ORAL DAILY
Status: DISCONTINUED | OUTPATIENT
Start: 2021-09-28 | End: 2021-09-28

## 2021-09-28 RX ORDER — DIPHENHYDRAMINE HCL 25 MG
25 CAPSULE ORAL EVERY 6 HOURS PRN
Status: DISCONTINUED | OUTPATIENT
Start: 2021-09-28 | End: 2021-09-29 | Stop reason: HOSPADM

## 2021-09-28 RX ORDER — ONDANSETRON 2 MG/ML
4 INJECTION INTRAMUSCULAR; INTRAVENOUS ONCE AS NEEDED
Status: DISCONTINUED | OUTPATIENT
Start: 2021-09-28 | End: 2021-09-28 | Stop reason: HOSPADM

## 2021-09-28 RX ORDER — INSULIN LISPRO 100 [IU]/ML
0-9 INJECTION, SOLUTION INTRAVENOUS; SUBCUTANEOUS
Status: DISCONTINUED | OUTPATIENT
Start: 2021-09-28 | End: 2021-09-29 | Stop reason: HOSPADM

## 2021-09-28 RX ORDER — HYDRALAZINE HYDROCHLORIDE 20 MG/ML
5 INJECTION INTRAMUSCULAR; INTRAVENOUS
Status: DISCONTINUED | OUTPATIENT
Start: 2021-09-28 | End: 2021-09-28 | Stop reason: HOSPADM

## 2021-09-28 RX ORDER — FENTANYL CITRATE 50 UG/ML
50 INJECTION, SOLUTION INTRAMUSCULAR; INTRAVENOUS
Status: DISCONTINUED | OUTPATIENT
Start: 2021-09-28 | End: 2021-09-28 | Stop reason: HOSPADM

## 2021-09-28 RX ORDER — PROPOFOL 10 MG/ML
VIAL (ML) INTRAVENOUS AS NEEDED
Status: DISCONTINUED | OUTPATIENT
Start: 2021-09-28 | End: 2021-09-28 | Stop reason: SURG

## 2021-09-28 RX ORDER — FAMOTIDINE 20 MG/1
40 TABLET, FILM COATED ORAL DAILY
Status: DISCONTINUED | OUTPATIENT
Start: 2021-09-28 | End: 2021-09-28

## 2021-09-28 RX ORDER — METFORMIN HYDROCHLORIDE 500 MG/1
1000 TABLET, EXTENDED RELEASE ORAL 2 TIMES DAILY WITH MEALS
Status: DISCONTINUED | OUTPATIENT
Start: 2021-09-28 | End: 2021-09-29 | Stop reason: HOSPADM

## 2021-09-28 RX ORDER — INSULIN LISPRO 100 [IU]/ML
0-9 INJECTION, SOLUTION INTRAVENOUS; SUBCUTANEOUS AS NEEDED
Status: DISCONTINUED | OUTPATIENT
Start: 2021-09-28 | End: 2021-09-29 | Stop reason: HOSPADM

## 2021-09-28 RX ORDER — HYDROXYZINE HYDROCHLORIDE 25 MG/1
25 TABLET, FILM COATED ORAL 4 TIMES DAILY PRN
Status: DISCONTINUED | OUTPATIENT
Start: 2021-09-28 | End: 2021-09-29 | Stop reason: HOSPADM

## 2021-09-28 RX ORDER — TRAZODONE HYDROCHLORIDE 100 MG/1
100 TABLET ORAL NIGHTLY
Status: DISCONTINUED | OUTPATIENT
Start: 2021-09-28 | End: 2021-09-29 | Stop reason: HOSPADM

## 2021-09-28 RX ORDER — ESTRADIOL 1 MG/1
0.5 TABLET ORAL DAILY
Status: DISCONTINUED | OUTPATIENT
Start: 2021-09-28 | End: 2021-09-29 | Stop reason: HOSPADM

## 2021-09-28 RX ORDER — DEXTROSE MONOHYDRATE 25 G/50ML
25 INJECTION, SOLUTION INTRAVENOUS
Status: DISCONTINUED | OUTPATIENT
Start: 2021-09-28 | End: 2021-09-29 | Stop reason: HOSPADM

## 2021-09-28 RX ORDER — SODIUM CHLORIDE, SODIUM LACTATE, POTASSIUM CHLORIDE, AND CALCIUM CHLORIDE .6; .31; .03; .02 G/100ML; G/100ML; G/100ML; G/100ML
20 INJECTION, SOLUTION INTRAVENOUS CONTINUOUS
Status: DISCONTINUED | OUTPATIENT
Start: 2021-09-28 | End: 2021-09-29 | Stop reason: HOSPADM

## 2021-09-28 RX ORDER — CALCIUM CARBONATE 200(500)MG
2 TABLET,CHEWABLE ORAL 2 TIMES DAILY PRN
Status: DISCONTINUED | OUTPATIENT
Start: 2021-09-28 | End: 2021-09-29 | Stop reason: HOSPADM

## 2021-09-28 RX ORDER — OLANZAPINE 10 MG/2ML
1 INJECTION, POWDER, LYOPHILIZED, FOR SOLUTION INTRAMUSCULAR AS NEEDED
Status: DISCONTINUED | OUTPATIENT
Start: 2021-09-28 | End: 2021-09-29 | Stop reason: HOSPADM

## 2021-09-28 RX ORDER — SODIUM CHLORIDE, SODIUM LACTATE, POTASSIUM CHLORIDE, CALCIUM CHLORIDE 600; 310; 30; 20 MG/100ML; MG/100ML; MG/100ML; MG/100ML
INJECTION, SOLUTION INTRAVENOUS CONTINUOUS PRN
Status: DISCONTINUED | OUTPATIENT
Start: 2021-09-28 | End: 2021-09-28 | Stop reason: SURG

## 2021-09-28 RX ORDER — ROSUVASTATIN CALCIUM 10 MG/1
20 TABLET, COATED ORAL NIGHTLY
Status: DISCONTINUED | OUTPATIENT
Start: 2021-09-28 | End: 2021-09-29 | Stop reason: HOSPADM

## 2021-09-28 RX ORDER — PROMETHAZINE HYDROCHLORIDE 25 MG/1
25 TABLET ORAL ONCE AS NEEDED
Status: DISCONTINUED | OUTPATIENT
Start: 2021-09-28 | End: 2021-09-28 | Stop reason: HOSPADM

## 2021-09-28 RX ORDER — FENTANYL CITRATE 50 UG/ML
INJECTION, SOLUTION INTRAMUSCULAR; INTRAVENOUS AS NEEDED
Status: DISCONTINUED | OUTPATIENT
Start: 2021-09-28 | End: 2021-09-28 | Stop reason: SURG

## 2021-09-28 RX ORDER — EPHEDRINE SULFATE 50 MG/ML
INJECTION INTRAVENOUS AS NEEDED
Status: DISCONTINUED | OUTPATIENT
Start: 2021-09-28 | End: 2021-09-28 | Stop reason: SURG

## 2021-09-28 RX ORDER — SODIUM CHLORIDE 0.9 % (FLUSH) 0.9 %
3 SYRINGE (ML) INJECTION EVERY 12 HOURS SCHEDULED
Status: DISCONTINUED | OUTPATIENT
Start: 2021-09-28 | End: 2021-09-29 | Stop reason: HOSPADM

## 2021-09-28 RX ORDER — PROMETHAZINE HYDROCHLORIDE 25 MG/1
25 SUPPOSITORY RECTAL ONCE AS NEEDED
Status: DISCONTINUED | OUTPATIENT
Start: 2021-09-28 | End: 2021-09-28 | Stop reason: HOSPADM

## 2021-09-28 RX ORDER — NALOXONE HCL 0.4 MG/ML
0.4 VIAL (ML) INJECTION AS NEEDED
Status: DISCONTINUED | OUTPATIENT
Start: 2021-09-28 | End: 2021-09-28 | Stop reason: HOSPADM

## 2021-09-28 RX ORDER — LISINOPRIL 5 MG/1
10 TABLET ORAL
Status: DISCONTINUED | OUTPATIENT
Start: 2021-09-29 | End: 2021-09-29 | Stop reason: HOSPADM

## 2021-09-28 RX ORDER — LIDOCAINE HYDROCHLORIDE 20 MG/ML
INJECTION, SOLUTION EPIDURAL; INFILTRATION; INTRACAUDAL; PERINEURAL AS NEEDED
Status: DISCONTINUED | OUTPATIENT
Start: 2021-09-28 | End: 2021-09-28 | Stop reason: SURG

## 2021-09-28 RX ORDER — LABETALOL HYDROCHLORIDE 5 MG/ML
5 INJECTION, SOLUTION INTRAVENOUS
Status: DISCONTINUED | OUTPATIENT
Start: 2021-09-28 | End: 2021-09-28 | Stop reason: HOSPADM

## 2021-09-28 RX ORDER — NICOTINE POLACRILEX 4 MG
15 LOZENGE BUCCAL
Status: DISCONTINUED | OUTPATIENT
Start: 2021-09-28 | End: 2021-09-29 | Stop reason: HOSPADM

## 2021-09-28 RX ORDER — SODIUM CHLORIDE 0.9 % (FLUSH) 0.9 %
3-10 SYRINGE (ML) INJECTION AS NEEDED
Status: DISCONTINUED | OUTPATIENT
Start: 2021-09-28 | End: 2021-09-29 | Stop reason: HOSPADM

## 2021-09-28 RX ADMIN — LIDOCAINE HYDROCHLORIDE 50 MG: 20 INJECTION, SOLUTION EPIDURAL; INFILTRATION; INTRACAUDAL; PERINEURAL at 12:05

## 2021-09-28 RX ADMIN — SODIUM CHLORIDE, POTASSIUM CHLORIDE, SODIUM LACTATE AND CALCIUM CHLORIDE 20 ML/HR: 600; 310; 30; 20 INJECTION, SOLUTION INTRAVENOUS at 10:36

## 2021-09-28 RX ADMIN — Medication 3 ML: at 20:49

## 2021-09-28 RX ADMIN — ROSUVASTATIN CALCIUM 20 MG: 10 TABLET, FILM COATED ORAL at 20:49

## 2021-09-28 RX ADMIN — ONDANSETRON 4 MG: 2 INJECTION INTRAMUSCULAR; INTRAVENOUS at 12:24

## 2021-09-28 RX ADMIN — EPHEDRINE SULFATE 10 MG: 50 INJECTION INTRAVENOUS at 12:18

## 2021-09-28 RX ADMIN — TRAZODONE HYDROCHLORIDE 100 MG: 100 TABLET ORAL at 20:48

## 2021-09-28 RX ADMIN — ESTRADIOL 0.5 MG: 1 TABLET ORAL at 14:56

## 2021-09-28 RX ADMIN — SODIUM CHLORIDE, SODIUM LACTATE, POTASSIUM CHLORIDE, AND CALCIUM CHLORIDE: .6; .31; .03; .02 INJECTION, SOLUTION INTRAVENOUS at 11:56

## 2021-09-28 RX ADMIN — HYDROXYZINE HYDROCHLORIDE 25 MG: 25 TABLET, FILM COATED ORAL at 20:48

## 2021-09-28 RX ADMIN — HYDROCODONE BITARTRATE AND ACETAMINOPHEN 1 TABLET: 5; 325 TABLET ORAL at 15:42

## 2021-09-28 RX ADMIN — FENTANYL CITRATE 50 MCG: 50 INJECTION, SOLUTION INTRAMUSCULAR; INTRAVENOUS at 12:05

## 2021-09-28 RX ADMIN — PROPOFOL 200 MG: 10 INJECTION, EMULSION INTRAVENOUS at 12:05

## 2021-09-28 RX ADMIN — HYDROCODONE BITARTRATE AND ACETAMINOPHEN 1 TABLET: 5; 325 TABLET ORAL at 20:48

## 2021-09-28 RX ADMIN — ESTRADIOL 0.5 MG: 1 TABLET ORAL at 14:58

## 2021-09-28 RX ADMIN — Medication 3 ML: at 15:00

## 2021-09-28 RX ADMIN — CEFAZOLIN 1 G: 1 INJECTION, POWDER, FOR SOLUTION INTRAMUSCULAR; INTRAVENOUS at 15:41

## 2021-09-28 RX ADMIN — DEXAMETHASONE SODIUM PHOSPHATE 4 MG: 4 INJECTION, SOLUTION INTRAMUSCULAR; INTRAVENOUS at 12:08

## 2021-09-28 RX ADMIN — CEFAZOLIN SODIUM 2 G: 1 INJECTION, POWDER, FOR SOLUTION INTRAMUSCULAR; INTRAVENOUS at 11:56

## 2021-09-28 RX ADMIN — SODIUM CHLORIDE, POTASSIUM CHLORIDE, SODIUM LACTATE AND CALCIUM CHLORIDE 20 ML/HR: 600; 310; 30; 20 INJECTION, SOLUTION INTRAVENOUS at 20:54

## 2021-09-28 NOTE — ANESTHESIA PREPROCEDURE EVALUATION
Anesthesia Evaluation     Patient summary reviewed and Nursing notes reviewed   no history of anesthetic complications:  NPO Solid Status: > 8 hours  NPO Liquid Status: > 8 hours           Airway   Dental      Pulmonary    (+) a smoker Former, COPD, recent URI,   Cardiovascular     (+) hypertension, hyperlipidemia,       Neuro/Psych  (+) psychiatric history Anxiety,     GI/Hepatic/Renal/Endo    (+)  GI bleeding , renal disease CRI, diabetes mellitus,     Musculoskeletal     (+) neck pain,   Abdominal    Substance History      OB/GYN          Other   arthritis,      ROS/Med Hx Other: Hematuria, nephropathy, MJ abuse, arm pain, insomnia, dermatitis, Luetscher's syndrome, cellulitis, gastroenteritis    PSH  BACK SURGERY APPENDECTOMY  TONSILLECTOMY LUMBAR SPINE SURGERY  TOTAL ABDOMINAL HYSTERECTOMY OOPHORECTOMY                  Anesthesia Plan    ASA 3     general   (Patient identified; pre-operative vital signs, all relevant labs/studies, complete medical/surgical/anesthetic history, full medication list, full allergy list, and NPO status obtained/reviewed; physical assessment performed; anesthetic options, side effects, potential complications, risks, and benefits discussed; questions answered; written anesthesia consent obtained; patient cleared for procedure; anesthesia machine and equipment checked and functioning)  intravenous induction     Anesthetic plan, all risks, benefits, and alternatives have been provided, discussed and informed consent has been obtained with: patient.    Plan discussed with CRNA and CAA.

## 2021-09-28 NOTE — ANESTHESIA PROCEDURE NOTES
Airway  Date/Time: 9/28/2021 12:07 PM    General Information and Staff    Patient location during procedure: OR  Anesthesiologist: Felipe Cox MD  CRNA: Lucille Ladd CRNA    Indications and Patient Condition  Indications for airway management: airway protection    Preoxygenated: yes  MILS maintained throughout  Mask difficulty assessment: 0 - not attempted    Final Airway Details  Final airway type: supraglottic airway      Successful airway: classic  Size 4    Number of attempts at approach: 1  Assessment: lips, teeth, and gum same as pre-op and atraumatic intubation

## 2021-09-28 NOTE — ANESTHESIA POSTPROCEDURE EVALUATION
Patient: Divya Lomeli    Procedure Summary     Date: 09/28/21 Room / Location: [facility] OR 01 / [facility] MAIN OR    Anesthesia Start: 1156 Anesthesia Stop: 1237    Procedures:       CYSTOSCOPY WITH CLOT EVACUATION (N/A )      CYSTOSCOPY TRANSURETHRAL RESECTION OF BLADDER TUMOR (N/A )      STENT PLACEMENT (N/A ) Diagnosis:       Gross hematuria      (Gross hematuria [R31.0])    Surgeons: Jay Torres MD Provider: Felipe Cox MD    Anesthesia Type: general ASA Status: 3          Anesthesia Type: general    Vitals  Vitals Value Taken Time   /53 09/28/21 1240   Temp     Pulse 73 09/28/21 1240   Resp     SpO2 99 % 09/28/21 1240   Vitals shown include unvalidated device data.        Post Anesthesia Care and Evaluation    Patient location during evaluation: bedside  Patient participation: complete - patient participated  Level of consciousness: awake and alert  Pain score: 1  Pain management: adequate  Airway patency: patent  Anesthetic complications: No anesthetic complications  PONV Status: none  Cardiovascular status: acceptable  Respiratory status: acceptable  Hydration status: acceptable  Post Neuraxial Block status: Motor and sensory function returned to baseline

## 2021-09-29 VITALS
HEART RATE: 72 BPM | SYSTOLIC BLOOD PRESSURE: 105 MMHG | RESPIRATION RATE: 16 BRPM | OXYGEN SATURATION: 97 % | BODY MASS INDEX: 23.77 KG/M2 | TEMPERATURE: 97.8 F | HEIGHT: 66 IN | WEIGHT: 147.93 LBS | DIASTOLIC BLOOD PRESSURE: 64 MMHG

## 2021-09-29 LAB
ANION GAP SERPL CALCULATED.3IONS-SCNC: 13 MMOL/L (ref 5–15)
BASOPHILS # BLD AUTO: 0.1 10*3/MM3 (ref 0–0.2)
BASOPHILS NFR BLD AUTO: 0.6 % (ref 0–1.5)
BUN SERPL-MCNC: 25 MG/DL (ref 8–23)
BUN/CREAT SERPL: 18.8 (ref 7–25)
CALCIUM SPEC-SCNC: 9.2 MG/DL (ref 8.6–10.5)
CHLORIDE SERPL-SCNC: 101 MMOL/L (ref 98–107)
CO2 SERPL-SCNC: 23 MMOL/L (ref 22–29)
CREAT SERPL-MCNC: 1.33 MG/DL (ref 0.57–1)
DEPRECATED RDW RBC AUTO: 43.8 FL (ref 37–54)
EOSINOPHIL # BLD AUTO: 0.1 10*3/MM3 (ref 0–0.4)
EOSINOPHIL NFR BLD AUTO: 0.5 % (ref 0.3–6.2)
ERYTHROCYTE [DISTWIDTH] IN BLOOD BY AUTOMATED COUNT: 14 % (ref 12.3–15.4)
GFR SERPL CREATININE-BSD FRML MDRD: 40 ML/MIN/1.73
GLUCOSE BLDC GLUCOMTR-MCNC: 124 MG/DL (ref 70–105)
GLUCOSE BLDC GLUCOMTR-MCNC: 189 MG/DL (ref 70–105)
GLUCOSE SERPL-MCNC: 121 MG/DL (ref 65–99)
HCT VFR BLD AUTO: 37.7 % (ref 34–46.6)
HGB BLD-MCNC: 12.3 G/DL (ref 12–15.9)
LAB AP CASE REPORT: NORMAL
LAB AP SYNOPTIC CHECKLIST: NORMAL
LYMPHOCYTES # BLD AUTO: 1.7 10*3/MM3 (ref 0.7–3.1)
LYMPHOCYTES NFR BLD AUTO: 15.4 % (ref 19.6–45.3)
MCH RBC QN AUTO: 29.5 PG (ref 26.6–33)
MCHC RBC AUTO-ENTMCNC: 32.7 G/DL (ref 31.5–35.7)
MCV RBC AUTO: 90.2 FL (ref 79–97)
MONOCYTES # BLD AUTO: 0.7 10*3/MM3 (ref 0.1–0.9)
MONOCYTES NFR BLD AUTO: 6.2 % (ref 5–12)
NEUTROPHILS NFR BLD AUTO: 77.3 % (ref 42.7–76)
NEUTROPHILS NFR BLD AUTO: 8.4 10*3/MM3 (ref 1.7–7)
NRBC BLD AUTO-RTO: 0.1 /100 WBC (ref 0–0.2)
PATH REPORT.FINAL DX SPEC: NORMAL
PATH REPORT.GROSS SPEC: NORMAL
PLATELET # BLD AUTO: 229 10*3/MM3 (ref 140–450)
PMV BLD AUTO: 9.7 FL (ref 6–12)
POTASSIUM SERPL-SCNC: 4.9 MMOL/L (ref 3.5–5.2)
RBC # BLD AUTO: 4.18 10*6/MM3 (ref 3.77–5.28)
SODIUM SERPL-SCNC: 137 MMOL/L (ref 136–145)
WBC # BLD AUTO: 10.9 10*3/MM3 (ref 3.4–10.8)

## 2021-09-29 PROCEDURE — 85025 COMPLETE CBC W/AUTO DIFF WBC: CPT | Performed by: FAMILY MEDICINE

## 2021-09-29 PROCEDURE — 82962 GLUCOSE BLOOD TEST: CPT

## 2021-09-29 PROCEDURE — G0378 HOSPITAL OBSERVATION PER HR: HCPCS

## 2021-09-29 PROCEDURE — 80048 BASIC METABOLIC PNL TOTAL CA: CPT | Performed by: FAMILY MEDICINE

## 2021-09-29 PROCEDURE — 25010000003 CEFAZOLIN PER 500 MG: Performed by: UROLOGY

## 2021-09-29 RX ORDER — CEPHALEXIN 500 MG/1
500 CAPSULE ORAL 3 TIMES DAILY
Qty: 21 CAPSULE | Refills: 0 | Status: SHIPPED | OUTPATIENT
Start: 2021-09-29 | End: 2021-10-06

## 2021-09-29 RX ORDER — HYDROCODONE BITARTRATE AND ACETAMINOPHEN 7.5; 325 MG/1; MG/1
1 TABLET ORAL EVERY 6 HOURS PRN
Qty: 10 TABLET | Refills: 0 | Status: SHIPPED | OUTPATIENT
Start: 2021-09-29 | End: 2022-10-07

## 2021-09-29 RX ADMIN — CEFAZOLIN 1 G: 1 INJECTION, POWDER, FOR SOLUTION INTRAMUSCULAR; INTRAVENOUS at 00:36

## 2021-09-29 RX ADMIN — Medication 3 ML: at 08:31

## 2021-09-29 RX ADMIN — ESTRADIOL 0.5 MG: 1 TABLET ORAL at 08:29

## 2021-09-29 RX ADMIN — LISINOPRIL 10 MG: 5 TABLET ORAL at 08:30

## 2021-09-29 RX ADMIN — HYDROXYZINE HYDROCHLORIDE 25 MG: 25 TABLET, FILM COATED ORAL at 08:31

## 2021-09-29 RX ADMIN — CEFAZOLIN 1 G: 1 INJECTION, POWDER, FOR SOLUTION INTRAMUSCULAR; INTRAVENOUS at 08:31

## 2021-09-29 RX ADMIN — HYDROCODONE BITARTRATE AND ACETAMINOPHEN 1 TABLET: 5; 325 TABLET ORAL at 08:31

## 2021-09-29 RX ADMIN — HYDROCODONE BITARTRATE AND ACETAMINOPHEN 1 TABLET: 5; 325 TABLET ORAL at 13:32

## 2021-09-29 RX ADMIN — METFORMIN HYDROCHLORIDE 1000 MG: 500 TABLET, EXTENDED RELEASE ORAL at 08:27

## 2021-09-29 RX ADMIN — HYDROCODONE BITARTRATE AND ACETAMINOPHEN 1 TABLET: 5; 325 TABLET ORAL at 01:13

## 2021-10-05 DIAGNOSIS — G47.09 OTHER INSOMNIA: ICD-10-CM

## 2021-10-05 RX ORDER — SUVOREXANT 10 MG/1
TABLET, FILM COATED ORAL
Qty: 30 TABLET | Refills: 0 | OUTPATIENT
Start: 2021-10-05

## 2021-10-07 DIAGNOSIS — G47.09 OTHER INSOMNIA: ICD-10-CM

## 2021-10-07 RX ORDER — SUVOREXANT 10 MG/1
TABLET, FILM COATED ORAL
Qty: 30 TABLET | Refills: 0 | OUTPATIENT
Start: 2021-10-07

## 2021-10-08 DIAGNOSIS — G47.09 OTHER INSOMNIA: ICD-10-CM

## 2021-10-08 RX ORDER — SUVOREXANT 10 MG/1
TABLET, FILM COATED ORAL
Qty: 30 TABLET | Refills: 0 | OUTPATIENT
Start: 2021-10-08

## 2021-10-23 DIAGNOSIS — E11.65 TYPE 2 DIABETES MELLITUS WITH HYPERGLYCEMIA, WITHOUT LONG-TERM CURRENT USE OF INSULIN (HCC): ICD-10-CM

## 2021-10-25 DIAGNOSIS — G47.09 OTHER INSOMNIA: ICD-10-CM

## 2021-10-25 DIAGNOSIS — E11.65 TYPE 2 DIABETES MELLITUS WITH HYPERGLYCEMIA, WITHOUT LONG-TERM CURRENT USE OF INSULIN (HCC): ICD-10-CM

## 2021-10-25 RX ORDER — SUVOREXANT 10 MG/1
TABLET, FILM COATED ORAL
Qty: 30 TABLET | Refills: 0 | OUTPATIENT
Start: 2021-10-25

## 2021-10-25 RX ORDER — GLIMEPIRIDE 4 MG/1
TABLET ORAL
Qty: 90 TABLET | Refills: 0 | OUTPATIENT
Start: 2021-10-25

## 2021-11-01 DIAGNOSIS — E11.65 TYPE 2 DIABETES MELLITUS WITH HYPERGLYCEMIA, WITHOUT LONG-TERM CURRENT USE OF INSULIN (HCC): ICD-10-CM

## 2021-11-02 RX ORDER — ESTRADIOL 1 MG/1
TABLET ORAL
Qty: 45 TABLET | Refills: 0 | OUTPATIENT
Start: 2021-11-02

## 2021-11-02 RX ORDER — GLIMEPIRIDE 4 MG/1
TABLET ORAL
Qty: 90 TABLET | Refills: 0 | OUTPATIENT
Start: 2021-11-02

## 2022-03-25 ENCOUNTER — TRANSCRIBE ORDERS (OUTPATIENT)
Dept: ADMINISTRATIVE | Facility: HOSPITAL | Age: 67
End: 2022-03-25

## 2022-03-25 DIAGNOSIS — Z12.39 OTHER SCREENING BREAST EXAMINATION: Primary | ICD-10-CM

## 2022-03-31 ENCOUNTER — TRANSCRIBE ORDERS (OUTPATIENT)
Dept: ADMINISTRATIVE | Facility: HOSPITAL | Age: 67
End: 2022-03-31

## 2022-03-31 DIAGNOSIS — N95.0 POSTMENOPAUSAL BLEEDING: ICD-10-CM

## 2022-03-31 DIAGNOSIS — Z12.39 OTHER SCREENING BREAST EXAMINATION: Primary | ICD-10-CM

## 2022-04-11 ENCOUNTER — TRANSCRIBE ORDERS (OUTPATIENT)
Dept: ADMINISTRATIVE | Facility: HOSPITAL | Age: 67
End: 2022-04-11

## 2022-04-11 ENCOUNTER — HOSPITAL ENCOUNTER (OUTPATIENT)
Dept: CARDIOLOGY | Facility: HOSPITAL | Age: 67
Discharge: HOME | End: 2022-04-11

## 2022-04-11 ENCOUNTER — LAB (OUTPATIENT)
Dept: LAB | Facility: HOSPITAL | Age: 67
End: 2022-04-11

## 2022-04-11 DIAGNOSIS — D49.4 BLADDER TUMOR: ICD-10-CM

## 2022-04-11 DIAGNOSIS — D49.4 BLADDER TUMOR: Primary | ICD-10-CM

## 2022-04-11 LAB
ANION GAP SERPL CALCULATED.3IONS-SCNC: 12.9 MMOL/L (ref 5–15)
BASOPHILS # BLD AUTO: 0.06 10*3/MM3 (ref 0–0.2)
BASOPHILS NFR BLD AUTO: 0.7 % (ref 0–1.5)
BUN SERPL-MCNC: 16 MG/DL (ref 8–23)
BUN/CREAT SERPL: 17 (ref 7–25)
CALCIUM SPEC-SCNC: 9.9 MG/DL (ref 8.6–10.5)
CHLORIDE SERPL-SCNC: 104 MMOL/L (ref 98–107)
CO2 SERPL-SCNC: 26.1 MMOL/L (ref 22–29)
CREAT SERPL-MCNC: 0.94 MG/DL (ref 0.57–1)
DEPRECATED RDW RBC AUTO: 40.2 FL (ref 37–54)
EGFRCR SERPLBLD CKD-EPI 2021: 66.2 ML/MIN/1.73
EOSINOPHIL # BLD AUTO: 1.07 10*3/MM3 (ref 0–0.4)
EOSINOPHIL NFR BLD AUTO: 12.3 % (ref 0.3–6.2)
ERYTHROCYTE [DISTWIDTH] IN BLOOD BY AUTOMATED COUNT: 12.4 % (ref 12.3–15.4)
GLUCOSE SERPL-MCNC: 107 MG/DL (ref 65–99)
HCT VFR BLD AUTO: 37.2 % (ref 34–46.6)
HGB BLD-MCNC: 12 G/DL (ref 12–15.9)
IMM GRANULOCYTES # BLD AUTO: 0.03 10*3/MM3 (ref 0–0.05)
IMM GRANULOCYTES NFR BLD AUTO: 0.3 % (ref 0–0.5)
LYMPHOCYTES # BLD AUTO: 2.75 10*3/MM3 (ref 0.7–3.1)
LYMPHOCYTES NFR BLD AUTO: 31.6 % (ref 19.6–45.3)
MCH RBC QN AUTO: 29.1 PG (ref 26.6–33)
MCHC RBC AUTO-ENTMCNC: 32.3 G/DL (ref 31.5–35.7)
MCV RBC AUTO: 90.1 FL (ref 79–97)
MONOCYTES # BLD AUTO: 0.66 10*3/MM3 (ref 0.1–0.9)
MONOCYTES NFR BLD AUTO: 7.6 % (ref 5–12)
NEUTROPHILS NFR BLD AUTO: 4.13 10*3/MM3 (ref 1.7–7)
NEUTROPHILS NFR BLD AUTO: 47.5 % (ref 42.7–76)
NRBC BLD AUTO-RTO: 0 /100 WBC (ref 0–0.2)
PLATELET # BLD AUTO: 291 10*3/MM3 (ref 140–450)
PMV BLD AUTO: 12.1 FL (ref 6–12)
POTASSIUM SERPL-SCNC: 4.9 MMOL/L (ref 3.5–5.2)
QT INTERVAL: 360 MS
RBC # BLD AUTO: 4.13 10*6/MM3 (ref 3.77–5.28)
SODIUM SERPL-SCNC: 143 MMOL/L (ref 136–145)
WBC NRBC COR # BLD: 8.7 10*3/MM3 (ref 3.4–10.8)

## 2022-04-11 PROCEDURE — 85025 COMPLETE CBC W/AUTO DIFF WBC: CPT

## 2022-04-11 PROCEDURE — 80048 BASIC METABOLIC PNL TOTAL CA: CPT

## 2022-04-11 PROCEDURE — 93005 ELECTROCARDIOGRAM TRACING: CPT | Performed by: UROLOGY

## 2022-04-11 PROCEDURE — 36415 COLL VENOUS BLD VENIPUNCTURE: CPT

## 2022-04-11 PROCEDURE — 93010 ELECTROCARDIOGRAM REPORT: CPT | Performed by: INTERNAL MEDICINE

## 2022-04-19 PROCEDURE — 88305 TISSUE EXAM BY PATHOLOGIST: CPT | Performed by: UROLOGY

## 2022-04-20 ENCOUNTER — LAB REQUISITION (OUTPATIENT)
Dept: LAB | Facility: HOSPITAL | Age: 67
End: 2022-04-20

## 2022-04-20 DIAGNOSIS — N32.9 BLADDER DISORDER, UNSPECIFIED: ICD-10-CM

## 2022-04-21 LAB
LAB AP CASE REPORT: NORMAL
PATH REPORT.FINAL DX SPEC: NORMAL
PATH REPORT.GROSS SPEC: NORMAL

## 2022-04-27 ENCOUNTER — HOSPITAL ENCOUNTER (OUTPATIENT)
Dept: BONE DENSITY | Facility: HOSPITAL | Age: 67
Discharge: HOME | End: 2022-04-27

## 2022-04-27 ENCOUNTER — HOSPITAL ENCOUNTER (OUTPATIENT)
Dept: MAMMOGRAPHY | Facility: HOSPITAL | Age: 67
Discharge: HOME | End: 2022-04-27

## 2022-04-27 DIAGNOSIS — N95.0 POSTMENOPAUSAL BLEEDING: ICD-10-CM

## 2022-04-27 DIAGNOSIS — Z12.39 OTHER SCREENING BREAST EXAMINATION: ICD-10-CM

## 2022-04-27 PROCEDURE — 77067 SCR MAMMO BI INCL CAD: CPT

## 2022-04-27 PROCEDURE — 77063 BREAST TOMOSYNTHESIS BI: CPT

## 2022-04-27 PROCEDURE — 77080 DXA BONE DENSITY AXIAL: CPT

## 2022-07-13 ENCOUNTER — OFFICE (OUTPATIENT)
Dept: URBAN - METROPOLITAN AREA PATHOLOGY 4 | Facility: PATHOLOGY | Age: 67
End: 2022-07-13
Payer: COMMERCIAL

## 2022-07-13 ENCOUNTER — ON CAMPUS - OUTPATIENT (OUTPATIENT)
Dept: URBAN - METROPOLITAN AREA HOSPITAL 2 | Facility: HOSPITAL | Age: 67
End: 2022-07-13

## 2022-07-13 ENCOUNTER — OFFICE (OUTPATIENT)
Dept: URBAN - METROPOLITAN AREA PATHOLOGY 4 | Facility: PATHOLOGY | Age: 67
End: 2022-07-13

## 2022-07-13 VITALS
DIASTOLIC BLOOD PRESSURE: 75 MMHG | RESPIRATION RATE: 17 BRPM | HEART RATE: 87 BPM | DIASTOLIC BLOOD PRESSURE: 74 MMHG | SYSTOLIC BLOOD PRESSURE: 130 MMHG | OXYGEN SATURATION: 98 % | HEART RATE: 90 BPM | RESPIRATION RATE: 16 BRPM | OXYGEN SATURATION: 97 % | SYSTOLIC BLOOD PRESSURE: 167 MMHG | SYSTOLIC BLOOD PRESSURE: 123 MMHG | OXYGEN SATURATION: 99 % | DIASTOLIC BLOOD PRESSURE: 65 MMHG | OXYGEN SATURATION: 100 % | HEART RATE: 91 BPM | SYSTOLIC BLOOD PRESSURE: 169 MMHG | HEIGHT: 65 IN | HEART RATE: 85 BPM | DIASTOLIC BLOOD PRESSURE: 69 MMHG | SYSTOLIC BLOOD PRESSURE: 122 MMHG | SYSTOLIC BLOOD PRESSURE: 147 MMHG | RESPIRATION RATE: 18 BRPM | SYSTOLIC BLOOD PRESSURE: 153 MMHG | DIASTOLIC BLOOD PRESSURE: 72 MMHG | DIASTOLIC BLOOD PRESSURE: 81 MMHG | HEART RATE: 86 BPM | TEMPERATURE: 97.7 F | DIASTOLIC BLOOD PRESSURE: 84 MMHG | DIASTOLIC BLOOD PRESSURE: 76 MMHG | SYSTOLIC BLOOD PRESSURE: 164 MMHG | SYSTOLIC BLOOD PRESSURE: 160 MMHG | OXYGEN SATURATION: 96 % | WEIGHT: 130 LBS

## 2022-07-13 DIAGNOSIS — R19.5 OTHER FECAL ABNORMALITIES: ICD-10-CM

## 2022-07-13 DIAGNOSIS — D12.2 BENIGN NEOPLASM OF ASCENDING COLON: ICD-10-CM

## 2022-07-13 DIAGNOSIS — K64.1 SECOND DEGREE HEMORRHOIDS: ICD-10-CM

## 2022-07-13 DIAGNOSIS — K62.1 RECTAL POLYP: ICD-10-CM

## 2022-07-13 DIAGNOSIS — R19.4 CHANGE IN BOWEL HABIT: ICD-10-CM

## 2022-07-13 DIAGNOSIS — D12.5 BENIGN NEOPLASM OF SIGMOID COLON: ICD-10-CM

## 2022-07-13 PROBLEM — K63.5 POLYP OF COLON: Status: ACTIVE | Noted: 2022-07-13

## 2022-07-13 LAB
GI HISTOLOGY: A. UNSPECIFIED: (no result)
GI HISTOLOGY: B. UNSPECIFIED: (no result)
GI HISTOLOGY: C. UNSPECIFIED: (no result)
GI HISTOLOGY: PDF REPORT: (no result)

## 2022-07-13 PROCEDURE — 88305 TISSUE EXAM BY PATHOLOGIST: CPT | Mod: 26 | Performed by: INTERNAL MEDICINE

## 2022-07-13 PROCEDURE — 45385 COLONOSCOPY W/LESION REMOVAL: CPT | Performed by: INTERNAL MEDICINE

## 2022-07-19 RX ORDER — SEMAGLUTIDE 1.34 MG/ML
INJECTION, SOLUTION SUBCUTANEOUS
Qty: 6 ML | Refills: 0 | OUTPATIENT
Start: 2022-07-19

## 2022-07-21 RX ORDER — SEMAGLUTIDE 1.34 MG/ML
INJECTION, SOLUTION SUBCUTANEOUS
Qty: 6 ML | Refills: 0 | OUTPATIENT
Start: 2022-07-21

## 2022-09-22 ENCOUNTER — LAB (OUTPATIENT)
Dept: LAB | Facility: HOSPITAL | Age: 67
End: 2022-09-22

## 2022-09-22 PROCEDURE — 87507 IADNA-DNA/RNA PROBE TQ 12-25: CPT

## 2022-09-23 ENCOUNTER — LAB (OUTPATIENT)
Dept: LAB | Facility: HOSPITAL | Age: 67
End: 2022-09-23

## 2022-09-23 ENCOUNTER — TRANSCRIBE ORDERS (OUTPATIENT)
Dept: ADMINISTRATIVE | Facility: HOSPITAL | Age: 67
End: 2022-09-23

## 2022-09-23 DIAGNOSIS — R10.30 LOWER ABDOMINAL PAIN: ICD-10-CM

## 2022-09-23 DIAGNOSIS — R10.30 LOWER ABDOMINAL PAIN: Primary | ICD-10-CM

## 2022-09-23 LAB
ADV 40+41 DNA STL QL NAA+NON-PROBE: NOT DETECTED
ALBUMIN SERPL-MCNC: 4.3 G/DL (ref 3.5–5.2)
ALBUMIN/GLOB SERPL: 2 G/DL
ALP SERPL-CCNC: 90 U/L (ref 39–117)
ALT SERPL W P-5'-P-CCNC: 35 U/L (ref 1–33)
ANION GAP SERPL CALCULATED.3IONS-SCNC: 14 MMOL/L (ref 5–15)
AST SERPL-CCNC: 31 U/L (ref 1–32)
ASTRO TYP 1-8 RNA STL QL NAA+NON-PROBE: NOT DETECTED
BASOPHILS # BLD AUTO: 0.08 10*3/MM3 (ref 0–0.2)
BASOPHILS NFR BLD AUTO: 1.2 % (ref 0–1.5)
BILIRUB SERPL-MCNC: <0.2 MG/DL (ref 0–1.2)
BUN SERPL-MCNC: 20 MG/DL (ref 8–23)
BUN/CREAT SERPL: 29.4 (ref 7–25)
C CAYETANENSIS DNA STL QL NAA+NON-PROBE: NOT DETECTED
C COLI+JEJ+UPSA DNA STL QL NAA+NON-PROBE: NOT DETECTED
CALCIUM SPEC-SCNC: 9 MG/DL (ref 8.6–10.5)
CHLORIDE SERPL-SCNC: 98 MMOL/L (ref 98–107)
CO2 SERPL-SCNC: 26 MMOL/L (ref 22–29)
CREAT SERPL-MCNC: 0.68 MG/DL (ref 0.57–1)
CRYPTOSP DNA STL QL NAA+NON-PROBE: NOT DETECTED
DEPRECATED RDW RBC AUTO: 43.3 FL (ref 37–54)
E HISTOLYT DNA STL QL NAA+NON-PROBE: NOT DETECTED
EAEC PAA PLAS AGGR+AATA ST NAA+NON-PRB: NOT DETECTED
EC STX1+STX2 GENES STL QL NAA+NON-PROBE: NOT DETECTED
EGFRCR SERPLBLD CKD-EPI 2021: 94.4 ML/MIN/1.73
EOSINOPHIL # BLD AUTO: 0.25 10*3/MM3 (ref 0–0.4)
EOSINOPHIL NFR BLD AUTO: 3.6 % (ref 0.3–6.2)
EPEC EAE GENE STL QL NAA+NON-PROBE: NOT DETECTED
ERYTHROCYTE [DISTWIDTH] IN BLOOD BY AUTOMATED COUNT: 13.9 % (ref 12.3–15.4)
ETEC LTA+ST1A+ST1B TOX ST NAA+NON-PROBE: NOT DETECTED
G LAMBLIA DNA STL QL NAA+NON-PROBE: NOT DETECTED
GLOBULIN UR ELPH-MCNC: 2.1 GM/DL
GLUCOSE SERPL-MCNC: 105 MG/DL (ref 65–99)
HCT VFR BLD AUTO: 35.2 % (ref 34–46.6)
HGB BLD-MCNC: 11.7 G/DL (ref 12–15.9)
IMM GRANULOCYTES # BLD AUTO: 0.03 10*3/MM3 (ref 0–0.05)
IMM GRANULOCYTES NFR BLD AUTO: 0.4 % (ref 0–0.5)
LYMPHOCYTES # BLD AUTO: 2.14 10*3/MM3 (ref 0.7–3.1)
LYMPHOCYTES NFR BLD AUTO: 31 % (ref 19.6–45.3)
MCH RBC QN AUTO: 28.6 PG (ref 26.6–33)
MCHC RBC AUTO-ENTMCNC: 33.2 G/DL (ref 31.5–35.7)
MCV RBC AUTO: 86.1 FL (ref 79–97)
MONOCYTES # BLD AUTO: 0.59 10*3/MM3 (ref 0.1–0.9)
MONOCYTES NFR BLD AUTO: 8.6 % (ref 5–12)
NEUTROPHILS NFR BLD AUTO: 3.81 10*3/MM3 (ref 1.7–7)
NEUTROPHILS NFR BLD AUTO: 55.2 % (ref 42.7–76)
NOROVIRUS GI+II RNA STL QL NAA+NON-PROBE: NOT DETECTED
NRBC BLD AUTO-RTO: 0 /100 WBC (ref 0–0.2)
P SHIGELLOIDES DNA STL QL NAA+NON-PROBE: NOT DETECTED
PLATELET # BLD AUTO: 304 10*3/MM3 (ref 140–450)
PMV BLD AUTO: 11.8 FL (ref 6–12)
POTASSIUM SERPL-SCNC: 4.2 MMOL/L (ref 3.5–5.2)
PROT SERPL-MCNC: 6.4 G/DL (ref 6–8.5)
RBC # BLD AUTO: 4.09 10*6/MM3 (ref 3.77–5.28)
RVA RNA STL QL NAA+NON-PROBE: NOT DETECTED
S ENT+BONG DNA STL QL NAA+NON-PROBE: NOT DETECTED
SAPO I+II+IV+V RNA STL QL NAA+NON-PROBE: NOT DETECTED
SHIGELLA SP+EIEC IPAH ST NAA+NON-PROBE: NOT DETECTED
SODIUM SERPL-SCNC: 138 MMOL/L (ref 136–145)
V CHOL+PARA+VUL DNA STL QL NAA+NON-PROBE: NOT DETECTED
V CHOLERAE DNA STL QL NAA+NON-PROBE: NOT DETECTED
WBC NRBC COR # BLD: 6.9 10*3/MM3 (ref 3.4–10.8)
Y ENTEROCOL DNA STL QL NAA+NON-PROBE: NOT DETECTED

## 2022-09-23 PROCEDURE — 80053 COMPREHEN METABOLIC PANEL: CPT

## 2022-09-23 PROCEDURE — 85025 COMPLETE CBC W/AUTO DIFF WBC: CPT

## 2022-09-23 PROCEDURE — 36415 COLL VENOUS BLD VENIPUNCTURE: CPT

## 2022-10-06 ENCOUNTER — APPOINTMENT (OUTPATIENT)
Dept: GENERAL RADIOLOGY | Facility: HOSPITAL | Age: 67
End: 2022-10-06

## 2022-10-06 ENCOUNTER — APPOINTMENT (OUTPATIENT)
Dept: CT IMAGING | Facility: HOSPITAL | Age: 67
End: 2022-10-06

## 2022-10-06 ENCOUNTER — HOSPITAL ENCOUNTER (OUTPATIENT)
Facility: HOSPITAL | Age: 67
Discharge: HOME | End: 2022-10-12
Attending: EMERGENCY MEDICINE | Admitting: HOSPITALIST

## 2022-10-06 DIAGNOSIS — K80.20 CALCULUS OF GALLBLADDER WITHOUT CHOLECYSTITIS WITHOUT OBSTRUCTION: ICD-10-CM

## 2022-10-06 DIAGNOSIS — E86.0 DEHYDRATION: ICD-10-CM

## 2022-10-06 DIAGNOSIS — R91.1 LUNG NODULE: ICD-10-CM

## 2022-10-06 DIAGNOSIS — R53.1 GENERALIZED WEAKNESS: Primary | ICD-10-CM

## 2022-10-06 DIAGNOSIS — E87.6 HYPOKALEMIA: ICD-10-CM

## 2022-10-06 LAB
ALBUMIN SERPL-MCNC: 3.6 G/DL (ref 3.5–5.2)
ALBUMIN/GLOB SERPL: 1.5 G/DL
ALP SERPL-CCNC: 80 U/L (ref 39–117)
ALT SERPL W P-5'-P-CCNC: 51 U/L (ref 1–33)
ANION GAP SERPL CALCULATED.3IONS-SCNC: 8 MMOL/L (ref 5–15)
AST SERPL-CCNC: 36 U/L (ref 1–32)
B PARAPERT DNA SPEC QL NAA+PROBE: NOT DETECTED
B PERT DNA SPEC QL NAA+PROBE: NOT DETECTED
BASOPHILS # BLD AUTO: 0 10*3/MM3 (ref 0–0.2)
BASOPHILS NFR BLD AUTO: 0.8 % (ref 0–1.5)
BILIRUB SERPL-MCNC: <0.2 MG/DL (ref 0–1.2)
BILIRUB UR QL STRIP: NEGATIVE
BUN SERPL-MCNC: 16 MG/DL (ref 8–23)
BUN/CREAT SERPL: 24.6 (ref 7–25)
C PNEUM DNA NPH QL NAA+NON-PROBE: NOT DETECTED
CALCIUM SPEC-SCNC: 8.6 MG/DL (ref 8.6–10.5)
CHLORIDE SERPL-SCNC: 99 MMOL/L (ref 98–107)
CK SERPL-CCNC: 49 U/L (ref 20–180)
CLARITY UR: CLEAR
CO2 SERPL-SCNC: 29 MMOL/L (ref 22–29)
COLOR UR: YELLOW
CREAT SERPL-MCNC: 0.65 MG/DL (ref 0.57–1)
DEPRECATED RDW RBC AUTO: 50.8 FL (ref 37–54)
EGFRCR SERPLBLD CKD-EPI 2021: 95.4 ML/MIN/1.73
EOSINOPHIL # BLD AUTO: 0.1 10*3/MM3 (ref 0–0.4)
EOSINOPHIL NFR BLD AUTO: 2.4 % (ref 0.3–6.2)
ERYTHROCYTE [DISTWIDTH] IN BLOOD BY AUTOMATED COUNT: 16 % (ref 12.3–15.4)
FLUAV SUBTYP SPEC NAA+PROBE: NOT DETECTED
FLUBV RNA ISLT QL NAA+PROBE: NOT DETECTED
GLOBULIN UR ELPH-MCNC: 2.4 GM/DL
GLUCOSE BLDC GLUCOMTR-MCNC: 230 MG/DL (ref 70–105)
GLUCOSE SERPL-MCNC: 122 MG/DL (ref 65–99)
GLUCOSE UR STRIP-MCNC: ABNORMAL MG/DL
HADV DNA SPEC NAA+PROBE: NOT DETECTED
HCOV 229E RNA SPEC QL NAA+PROBE: NOT DETECTED
HCOV HKU1 RNA SPEC QL NAA+PROBE: NOT DETECTED
HCOV NL63 RNA SPEC QL NAA+PROBE: NOT DETECTED
HCOV OC43 RNA SPEC QL NAA+PROBE: NOT DETECTED
HCT VFR BLD AUTO: 36.1 % (ref 34–46.6)
HGB BLD-MCNC: 11.6 G/DL (ref 12–15.9)
HGB UR QL STRIP.AUTO: NEGATIVE
HMPV RNA NPH QL NAA+NON-PROBE: NOT DETECTED
HOLD SPECIMEN: NORMAL
HOLD SPECIMEN: NORMAL
HPIV1 RNA ISLT QL NAA+PROBE: NOT DETECTED
HPIV2 RNA SPEC QL NAA+PROBE: NOT DETECTED
HPIV3 RNA NPH QL NAA+PROBE: NOT DETECTED
HPIV4 P GENE NPH QL NAA+PROBE: NOT DETECTED
KETONES UR QL STRIP: ABNORMAL
LEUKOCYTE ESTERASE UR QL STRIP.AUTO: NEGATIVE
LYMPHOCYTES # BLD AUTO: 0.5 10*3/MM3 (ref 0.7–3.1)
LYMPHOCYTES NFR BLD AUTO: 10.4 % (ref 19.6–45.3)
M PNEUMO IGG SER IA-ACNC: NOT DETECTED
MAGNESIUM SERPL-MCNC: 1.8 MG/DL (ref 1.6–2.4)
MCH RBC QN AUTO: 29.5 PG (ref 26.6–33)
MCHC RBC AUTO-ENTMCNC: 32.1 G/DL (ref 31.5–35.7)
MCV RBC AUTO: 92.1 FL (ref 79–97)
MONOCYTES # BLD AUTO: 0.4 10*3/MM3 (ref 0.1–0.9)
MONOCYTES NFR BLD AUTO: 8.7 % (ref 5–12)
NEUTROPHILS NFR BLD AUTO: 4 10*3/MM3 (ref 1.7–7)
NEUTROPHILS NFR BLD AUTO: 77.7 % (ref 42.7–76)
NITRITE UR QL STRIP: NEGATIVE
NRBC BLD AUTO-RTO: 0 /100 WBC (ref 0–0.2)
PH UR STRIP.AUTO: 5.5 [PH] (ref 5–8)
PLATELET # BLD AUTO: 241 10*3/MM3 (ref 140–450)
PMV BLD AUTO: 9.9 FL (ref 6–12)
POTASSIUM SERPL-SCNC: 3.3 MMOL/L (ref 3.5–5.2)
PROT SERPL-MCNC: 6 G/DL (ref 6–8.5)
PROT UR QL STRIP: ABNORMAL
RBC # BLD AUTO: 3.92 10*6/MM3 (ref 3.77–5.28)
RHINOVIRUS RNA SPEC NAA+PROBE: NOT DETECTED
RSV RNA NPH QL NAA+NON-PROBE: NOT DETECTED
SARS-COV-2 RNA NPH QL NAA+NON-PROBE: NOT DETECTED
SODIUM SERPL-SCNC: 136 MMOL/L (ref 136–145)
SP GR UR STRIP: 1.03 (ref 1–1.03)
TROPONIN T SERPL-MCNC: <0.01 NG/ML (ref 0–0.03)
TSH SERPL DL<=0.05 MIU/L-ACNC: 1.34 UIU/ML (ref 0.27–4.2)
UROBILINOGEN UR QL STRIP: ABNORMAL
WBC NRBC COR # BLD: 5.2 10*3/MM3 (ref 3.4–10.8)

## 2022-10-06 PROCEDURE — 80053 COMPREHEN METABOLIC PANEL: CPT | Performed by: PHYSICIAN ASSISTANT

## 2022-10-06 PROCEDURE — 85652 RBC SED RATE AUTOMATED: CPT | Performed by: HOSPITALIST

## 2022-10-06 PROCEDURE — P9612 CATHETERIZE FOR URINE SPEC: HCPCS

## 2022-10-06 PROCEDURE — 81003 URINALYSIS AUTO W/O SCOPE: CPT | Performed by: PHYSICIAN ASSISTANT

## 2022-10-06 PROCEDURE — 84443 ASSAY THYROID STIM HORMONE: CPT | Performed by: PHYSICIAN ASSISTANT

## 2022-10-06 PROCEDURE — 0202U NFCT DS 22 TRGT SARS-COV-2: CPT | Performed by: PHYSICIAN ASSISTANT

## 2022-10-06 PROCEDURE — 82962 GLUCOSE BLOOD TEST: CPT

## 2022-10-06 PROCEDURE — 93005 ELECTROCARDIOGRAM TRACING: CPT | Performed by: PHYSICIAN ASSISTANT

## 2022-10-06 PROCEDURE — 71045 X-RAY EXAM CHEST 1 VIEW: CPT

## 2022-10-06 PROCEDURE — 82550 ASSAY OF CK (CPK): CPT | Performed by: PHYSICIAN ASSISTANT

## 2022-10-06 PROCEDURE — 84484 ASSAY OF TROPONIN QUANT: CPT | Performed by: PHYSICIAN ASSISTANT

## 2022-10-06 PROCEDURE — 85025 COMPLETE CBC W/AUTO DIFF WBC: CPT | Performed by: PHYSICIAN ASSISTANT

## 2022-10-06 PROCEDURE — 70450 CT HEAD/BRAIN W/O DYE: CPT

## 2022-10-06 PROCEDURE — 83735 ASSAY OF MAGNESIUM: CPT | Performed by: PHYSICIAN ASSISTANT

## 2022-10-06 PROCEDURE — 86140 C-REACTIVE PROTEIN: CPT | Performed by: HOSPITALIST

## 2022-10-06 PROCEDURE — 74176 CT ABD & PELVIS W/O CONTRAST: CPT

## 2022-10-06 PROCEDURE — G0378 HOSPITAL OBSERVATION PER HR: HCPCS

## 2022-10-06 PROCEDURE — 99285 EMERGENCY DEPT VISIT HI MDM: CPT

## 2022-10-06 RX ORDER — SODIUM CHLORIDE 0.9 % (FLUSH) 0.9 %
10 SYRINGE (ML) INJECTION AS NEEDED
Status: DISCONTINUED | OUTPATIENT
Start: 2022-10-06 | End: 2022-10-12 | Stop reason: HOSPADM

## 2022-10-06 RX ORDER — SODIUM CHLORIDE 9 MG/ML
75 INJECTION, SOLUTION INTRAVENOUS CONTINUOUS
Status: DISCONTINUED | OUTPATIENT
Start: 2022-10-06 | End: 2022-10-12 | Stop reason: HOSPADM

## 2022-10-06 RX ORDER — POTASSIUM CHLORIDE 20 MEQ/1
40 TABLET, EXTENDED RELEASE ORAL ONCE
Status: COMPLETED | OUTPATIENT
Start: 2022-10-06 | End: 2022-10-07

## 2022-10-06 RX ADMIN — SODIUM CHLORIDE 1000 ML: 9 INJECTION, SOLUTION INTRAVENOUS at 21:00

## 2022-10-07 ENCOUNTER — INPATIENT HOSPITAL (OUTPATIENT)
Dept: URBAN - METROPOLITAN AREA HOSPITAL 84 | Facility: HOSPITAL | Age: 67
End: 2022-10-07

## 2022-10-07 DIAGNOSIS — R94.5 ABNORMAL RESULTS OF LIVER FUNCTION STUDIES: ICD-10-CM

## 2022-10-07 PROBLEM — R53.1 GENERALIZED WEAKNESS: Status: ACTIVE | Noted: 2022-10-07

## 2022-10-07 LAB
CRP SERPL-MCNC: 14.72 MG/DL (ref 0–0.5)
ERYTHROCYTE [SEDIMENTATION RATE] IN BLOOD: 20 MM/HR (ref 0–30)
GLUCOSE BLDC GLUCOMTR-MCNC: 136 MG/DL (ref 70–105)
GLUCOSE BLDC GLUCOMTR-MCNC: 173 MG/DL (ref 70–105)
GLUCOSE BLDC GLUCOMTR-MCNC: 199 MG/DL (ref 70–105)
HAV IGM SERPL QL IA: NORMAL
HBV CORE IGM SERPL QL IA: NORMAL
HBV SURFACE AG SERPL QL IA: NORMAL
HCV AB SER DONR QL: NORMAL

## 2022-10-07 PROCEDURE — 63710000001 INSULIN LISPRO (HUMAN) PER 5 UNITS: Performed by: HOSPITALIST

## 2022-10-07 PROCEDURE — 36415 COLL VENOUS BLD VENIPUNCTURE: CPT | Performed by: HOSPITALIST

## 2022-10-07 PROCEDURE — 96360 HYDRATION IV INFUSION INIT: CPT

## 2022-10-07 PROCEDURE — 84439 ASSAY OF FREE THYROXINE: CPT | Performed by: HOSPITALIST

## 2022-10-07 PROCEDURE — G0378 HOSPITAL OBSERVATION PER HR: HCPCS

## 2022-10-07 PROCEDURE — 96361 HYDRATE IV INFUSION ADD-ON: CPT

## 2022-10-07 PROCEDURE — 85025 COMPLETE CBC W/AUTO DIFF WBC: CPT | Performed by: HOSPITALIST

## 2022-10-07 PROCEDURE — 25010000002 HEPARIN (PORCINE) PER 1000 UNITS: Performed by: HOSPITALIST

## 2022-10-07 PROCEDURE — 96372 THER/PROPH/DIAG INJ SC/IM: CPT

## 2022-10-07 PROCEDURE — 82962 GLUCOSE BLOOD TEST: CPT

## 2022-10-07 PROCEDURE — 97165 OT EVAL LOW COMPLEX 30 MIN: CPT

## 2022-10-07 PROCEDURE — 99222 1ST HOSP IP/OBS MODERATE 55: CPT | Performed by: NURSE PRACTITIONER

## 2022-10-07 PROCEDURE — 97162 PT EVAL MOD COMPLEX 30 MIN: CPT | Performed by: PHYSICAL THERAPIST

## 2022-10-07 PROCEDURE — 80074 ACUTE HEPATITIS PANEL: CPT | Performed by: HOSPITALIST

## 2022-10-07 PROCEDURE — 83036 HEMOGLOBIN GLYCOSYLATED A1C: CPT | Performed by: HOSPITALIST

## 2022-10-07 PROCEDURE — 82607 VITAMIN B-12: CPT | Performed by: HOSPITALIST

## 2022-10-07 PROCEDURE — 80048 BASIC METABOLIC PNL TOTAL CA: CPT | Performed by: HOSPITALIST

## 2022-10-07 RX ORDER — OLANZAPINE 10 MG/2ML
1 INJECTION, POWDER, LYOPHILIZED, FOR SOLUTION INTRAMUSCULAR AS NEEDED
Status: DISCONTINUED | OUTPATIENT
Start: 2022-10-07 | End: 2022-10-12 | Stop reason: HOSPADM

## 2022-10-07 RX ORDER — ONDANSETRON 4 MG/1
4 TABLET, FILM COATED ORAL EVERY 6 HOURS PRN
Status: DISCONTINUED | OUTPATIENT
Start: 2022-10-07 | End: 2022-10-12 | Stop reason: HOSPADM

## 2022-10-07 RX ORDER — HYDROXYZINE HYDROCHLORIDE 10 MG/1
10 TABLET, FILM COATED ORAL 3 TIMES DAILY PRN
Status: DISCONTINUED | OUTPATIENT
Start: 2022-10-07 | End: 2022-10-12 | Stop reason: HOSPADM

## 2022-10-07 RX ORDER — ACETAMINOPHEN 160 MG/5ML
650 SOLUTION ORAL EVERY 4 HOURS PRN
Status: DISCONTINUED | OUTPATIENT
Start: 2022-10-07 | End: 2022-10-12 | Stop reason: HOSPADM

## 2022-10-07 RX ORDER — HEPARIN SODIUM 5000 [USP'U]/ML
5000 INJECTION, SOLUTION INTRAVENOUS; SUBCUTANEOUS EVERY 12 HOURS SCHEDULED
Status: DISCONTINUED | OUTPATIENT
Start: 2022-10-07 | End: 2022-10-08

## 2022-10-07 RX ORDER — SODIUM CHLORIDE 0.9 % (FLUSH) 0.9 %
10 SYRINGE (ML) INJECTION AS NEEDED
Status: DISCONTINUED | OUTPATIENT
Start: 2022-10-07 | End: 2022-10-12 | Stop reason: HOSPADM

## 2022-10-07 RX ORDER — ALUMINA, MAGNESIA, AND SIMETHICONE 2400; 2400; 240 MG/30ML; MG/30ML; MG/30ML
15 SUSPENSION ORAL EVERY 6 HOURS PRN
Status: DISCONTINUED | OUTPATIENT
Start: 2022-10-07 | End: 2022-10-12 | Stop reason: HOSPADM

## 2022-10-07 RX ORDER — TOPIRAMATE 50 MG/1
50 TABLET, FILM COATED ORAL 2 TIMES DAILY
COMMUNITY

## 2022-10-07 RX ORDER — SODIUM CHLORIDE 0.9 % (FLUSH) 0.9 %
10 SYRINGE (ML) INJECTION EVERY 12 HOURS SCHEDULED
Status: DISCONTINUED | OUTPATIENT
Start: 2022-10-07 | End: 2022-10-12 | Stop reason: HOSPADM

## 2022-10-07 RX ORDER — ONDANSETRON 2 MG/ML
4 INJECTION INTRAMUSCULAR; INTRAVENOUS EVERY 6 HOURS PRN
Status: DISCONTINUED | OUTPATIENT
Start: 2022-10-07 | End: 2022-10-12 | Stop reason: HOSPADM

## 2022-10-07 RX ORDER — PRIMIDONE 250 MG/1
250 TABLET ORAL EVERY 8 HOURS SCHEDULED
Status: DISCONTINUED | OUTPATIENT
Start: 2022-10-07 | End: 2022-10-12 | Stop reason: HOSPADM

## 2022-10-07 RX ORDER — CHOLECALCIFEROL (VITAMIN D3) 125 MCG
5 CAPSULE ORAL NIGHTLY PRN
Status: DISCONTINUED | OUTPATIENT
Start: 2022-10-07 | End: 2022-10-12 | Stop reason: HOSPADM

## 2022-10-07 RX ORDER — TRAMADOL HYDROCHLORIDE 50 MG/1
50 TABLET ORAL 3 TIMES DAILY PRN
Status: DISCONTINUED | OUTPATIENT
Start: 2022-10-07 | End: 2022-10-12 | Stop reason: HOSPADM

## 2022-10-07 RX ORDER — NICOTINE POLACRILEX 4 MG
15 LOZENGE BUCCAL
Status: DISCONTINUED | OUTPATIENT
Start: 2022-10-07 | End: 2022-10-12 | Stop reason: HOSPADM

## 2022-10-07 RX ORDER — ACETAMINOPHEN 650 MG/1
650 SUPPOSITORY RECTAL EVERY 4 HOURS PRN
Status: DISCONTINUED | OUTPATIENT
Start: 2022-10-07 | End: 2022-10-12 | Stop reason: HOSPADM

## 2022-10-07 RX ORDER — LACTULOSE 10 G/15ML
20 SOLUTION ORAL 2 TIMES DAILY
Status: DISCONTINUED | OUTPATIENT
Start: 2022-10-07 | End: 2022-10-08

## 2022-10-07 RX ORDER — INSULIN LISPRO 100 [IU]/ML
0-7 INJECTION, SOLUTION INTRAVENOUS; SUBCUTANEOUS
Status: DISCONTINUED | OUTPATIENT
Start: 2022-10-07 | End: 2022-10-12 | Stop reason: HOSPADM

## 2022-10-07 RX ORDER — TOPIRAMATE 25 MG/1
50 TABLET ORAL 2 TIMES DAILY
Status: DISCONTINUED | OUTPATIENT
Start: 2022-10-07 | End: 2022-10-12 | Stop reason: HOSPADM

## 2022-10-07 RX ORDER — ACETAMINOPHEN 325 MG/1
650 TABLET ORAL EVERY 4 HOURS PRN
Status: DISCONTINUED | OUTPATIENT
Start: 2022-10-07 | End: 2022-10-12 | Stop reason: HOSPADM

## 2022-10-07 RX ORDER — PRIMIDONE 250 MG/1
250 TABLET ORAL 3 TIMES DAILY
COMMUNITY

## 2022-10-07 RX ORDER — DEXTROSE MONOHYDRATE 25 G/50ML
25 INJECTION, SOLUTION INTRAVENOUS
Status: DISCONTINUED | OUTPATIENT
Start: 2022-10-07 | End: 2022-10-12 | Stop reason: HOSPADM

## 2022-10-07 RX ADMIN — HEPARIN SODIUM 5000 UNITS: 5000 INJECTION INTRAVENOUS; SUBCUTANEOUS at 08:51

## 2022-10-07 RX ADMIN — POTASSIUM CHLORIDE 40 MEQ: 1500 TABLET, EXTENDED RELEASE ORAL at 00:34

## 2022-10-07 RX ADMIN — HEPARIN SODIUM 5000 UNITS: 5000 INJECTION INTRAVENOUS; SUBCUTANEOUS at 21:25

## 2022-10-07 RX ADMIN — INSULIN LISPRO 2 UNITS: 100 INJECTION, SOLUTION INTRAVENOUS; SUBCUTANEOUS at 10:51

## 2022-10-07 RX ADMIN — PRIMIDONE 250 MG: 250 TABLET ORAL at 21:24

## 2022-10-07 RX ADMIN — LACTULOSE 20 G: 20 SOLUTION ORAL at 21:24

## 2022-10-07 RX ADMIN — TOPIRAMATE 50 MG: 25 TABLET, FILM COATED ORAL at 14:30

## 2022-10-07 RX ADMIN — SODIUM CHLORIDE 75 ML/HR: 9 INJECTION, SOLUTION INTRAVENOUS at 21:24

## 2022-10-07 RX ADMIN — Medication 10 ML: at 08:51

## 2022-10-07 RX ADMIN — SODIUM CHLORIDE 75 ML/HR: 9 INJECTION, SOLUTION INTRAVENOUS at 00:35

## 2022-10-07 RX ADMIN — TOPIRAMATE 50 MG: 25 TABLET, FILM COATED ORAL at 21:24

## 2022-10-07 RX ADMIN — Medication 10 ML: at 21:25

## 2022-10-07 RX ADMIN — PRIMIDONE 250 MG: 250 TABLET ORAL at 10:51

## 2022-10-07 NOTE — PLAN OF CARE
Goal Outcome Evaluation:  Plan of Care Reviewed With: patient        Progress: no change  Outcome Evaluation: Pt admitted from the ED for generalized weakness and diarhhea for a month. Though pt has a hx of constipation. GI consulted. Admission completted. Currently has NS@75mL/hr. Will continue to monitor.

## 2022-10-07 NOTE — SIGNIFICANT NOTE
10/07/22 1524   OTHER   Discipline occupational therapist   Rehab Time/Intention   Session Not Performed   (Pt out of room. OT will f/u another time.)   Recommendation   OT - Next Appointment 10/08/22

## 2022-10-07 NOTE — H&P
[facility] Medicine Services  History & Physical    Patient Name: Divya Lomeli  : 1955  MRN: [id number]  Primary Care Physician:  Trae aKye MD  Date of admission: 10/6/2022  Date and Time of Service: 10/07/22 at 04:36 EDT     Subjective      Chief Complaint: Diarrhea and generalized weakness    History of Present Illness: Divya Lomeli is a 67 y.o. female with a medical history notable for COPD, hypertension, type 2 diabetes, and tremors who presented to [facility] on 10/6/2022 complaining of diarrhea and generalized weakness.    Patient stated after having barbecue pulled pork sandwich in August, she developed diarrhea, which has been ongoing till now.  She denied bloody or bloody stools. Due to her diarrhea, she has decreased appetite, some mild weight loss, and debilitating weakness. Pt stated is difficult for her to walk independently now. Pt mentioned having traveling abdominal pain. Sometimes the pain can be in her lower abdomen and migrate then to her upper abdomen.     Review of Systems   Constitutional: Negative for chills and fever.   HENT: Negative.    Eyes: Negative.    Cardiovascular: Positive for near-syncope. Negative for chest pain, irregular heartbeat and orthopnea.   Respiratory: Negative for shortness of breath.    Skin: Negative.    Musculoskeletal: Negative.    Gastrointestinal: Positive for abdominal pain and diarrhea. Negative for nausea and vomiting.   Genitourinary: Negative for dysuria.   Neurological: Positive for loss of balance, tremors and weakness. Negative for dizziness and light-headedness.   Psychiatric/Behavioral: Negative.        Personal History     Past Medical History:   Diagnosis Date   • Anxiety    • Arm pain     rt upper arm muscle   • COPD (chronic obstructive pulmonary disease) (HCC)     20 to 30 years ago. Does not take meds.   • Diabetes mellitus (HCC)     Type 2   • Diabetic nephropathy (HCC)    • Hyperlipidemia    •  Hypertension        Past Surgical History:   Procedure Laterality Date   • APPENDECTOMY  1958   • BACK SURGERY     • CYSTOSCOPY WITH CLOT EVACUATION N/A 9/28/2021    Procedure: CYSTOSCOPY WITH CLOT EVACUATION;  Surgeon: Jay Torres MD;  Location: [facility] MAIN OR;  Service: Urology;  Laterality: N/A;   • INTERVENTIONAL RADIOLOGY PROCEDURE N/A 9/28/2021    Procedure: STENT PLACEMENT;  Surgeon: Jay Torres MD;  Location: [facility] MAIN OR;  Service: Urology;  Laterality: N/A;   • LUMBAR SPINE SURGERY  1959    L5 removed-Abstracted from [facility]   • OOPHORECTOMY     • TONSILLECTOMY     • TOTAL ABDOMINAL HYSTERECTOMY     • TRANSURETHRAL RESECTION OF BLADDER TUMOR N/A 9/28/2021    Procedure: CYSTOSCOPY TRANSURETHRAL RESECTION OF BLADDER TUMOR;  Surgeon: Jay Torres MD;  Location: [facility] MAIN OR;  Service: Urology;  Laterality: N/A;       Family History: family history includes Diabetes in her father and mother.     Social History:  reports that she quit smoking about 30 years ago. Her smoking use included cigarettes. She started smoking about 51 years ago. She has a 94.50 pack-year smoking history. She has never used smokeless tobacco. She reports current alcohol use. She reports current drug use. Drug: Marijuana.    Home Medications:  Prior to Admission Medications     Prescriptions Last Dose Informant Patient Reported? Taking?    Belsomra 10 MG tablet   No No    TAKE 1 TABLET BY MOUTH AT NIGHT AS NEEDED FOR INSOMNIA    Cobalamin Combinations (NEURIVA PLUS PO)  Self Yes No    Take 1 tablet/day by mouth.    diphenhydrAMINE (Benadryl Allergy) 25 mg capsule   Yes No    Take 25 mg by mouth Every 6 (Six) Hours As Needed for Itching.    estradiol (ESTRACE) 1 MG tablet   No No    Take 1/2 (one-half) tablet by mouth once daily    glimepiride (AMARYL) 4 MG tablet   No No    Take 1 tablet by mouth once daily    hydroCHLOROthiazide (HYDRODIURIL) 12.5 MG tablet   No No    Take 1 tablet by mouth Daily.     HYDROcodone-acetaminophen (Norco) 7.5-325 MG per tablet   No No    Take 1 tablet by mouth Every 6 (Six) Hours As Needed for Moderate Pain .    hydrOXYzine (ATARAX) 10 MG tablet   No No    TAKE 1 TO 2 TABLETS BY MOUTH EVERY 8 HOURS AS NEEDED FOR ANXIETY    Jardiance 25 MG tablet   No No    Take 1 tablet by mouth once daily    lisinopril (PRINIVIL,ZESTRIL) 20 MG tablet   No No    Take 1 tablet by mouth Daily.    Multiple Vitamins-Minerals (PRESERVISION AREDS 2 PO)   Yes No    Narcan 4 MG/0.1ML nasal spray   Yes No    Omega-3 Fatty Acids (Fish Oil Burp-Less) 1200 MG capsule   No No    Take 1 capsule by mouth Daily.    rosuvastatin (CRESTOR) 20 MG tablet   No No    Take 1 tablet by mouth Every Night.    Semaglutide,0.25 or 0.5MG/DOS, (Ozempic, 0.25 or 0.5 MG/DOSE,) 2 MG/1.5ML solution pen-injector   No No    Inject 0.5 mg Subcutaneously weekly on tuesdays    SITagliptin-metFORMIN HCl ER (Janumet XR)  MG tablet   No No    Take 2 tablets every day with a meal    traMADol (ULTRAM) 50 MG tablet   No No    Take 1 tablet by mouth 3 (Three) Times a Day As Needed for Moderate Pain .            Allergies:  Allergies   Allergen Reactions   • Ether Nausea And Vomiting   • Other Swelling     wasp       Objective      Vitals:   Temp:  [98.2 °F (36.8 °C)-99 °F (37.2 °C)] 98.2 °F (36.8 °C)  Heart Rate:  [84-98] 89  Resp:  [15-17] 17  BP: (116-155)/(40-80) 116/40    Physical Exam   General:  Appears in no acute distress, non-toxic appearing  HEENT:  Normocephalic. Normal conjunctiva, EOM intact bilaterally, pupils equal and reactive to light. No JVD. Thyroid not visibly enlarged. No mucosal pallor or cyanosis. No oral lesions.   Respiratory: Respirations regular and unlabored at rest. Bilateral breath sounds with good air entry in all fields. No crackles, rubs or wheezes auscultated  Cardiovascular: S1S2 Regular rate and rhythm. No murmur, rub or gallop. No pretibial pitting edema  Gastrointestinal: Abdomen soft, flat, non  tender. Bowel sounds present. No rebound or guarding.   Musculoskeletal: Moves all extremities voluntarily. No abnormal movements  Extremities: No digital clubbing or cyanosis  Skin: Color pink. Skin warm and dry to touch.   Neuro: AAO x3, comprehension intact, follows commands appropriately  Psych: Mood and affect normal, pleasant and cooperative    Result Review    Result Review:  I have personally reviewed the results from the time of this admission to 10/7/2022 07:30 EDT and agree with these findings:  [x]  Laboratory  []  Microbiology  [x]  Radiology  []  EKG/Telemetry   []  Cardiology/Vascular   []  Pathology  []  Old records  []  Other:    LAB RESULTS:      Lab 10/06/22  2101   WBC 5.20   HEMOGLOBIN 11.6*   HEMATOCRIT 36.1   PLATELETS 241   NEUTROS ABS 4.00   LYMPHS ABS 0.50*   MONOS ABS 0.40   EOS ABS 0.10   MCV 92.1         Lab 10/06/22  2234 10/06/22  2101   SODIUM 136  --    POTASSIUM 3.3*  --    CHLORIDE 99  --    CO2 29.0  --    ANION GAP 8.0  --    BUN 16  --    CREATININE 0.65  --    EGFR 95.4  --    GLUCOSE 122*  --    CALCIUM 8.6  --    MAGNESIUM  --  1.8   TSH  --  1.340         Lab 10/06/22  2234   TOTAL PROTEIN 6.0   ALBUMIN 3.60   GLOBULIN 2.4   ALT (SGPT) 51*   AST (SGOT) 36*   BILIRUBIN <0.2   ALK PHOS 80         Lab 10/06/22  2234   TROPONIN T <0.010                 Brief Urine Lab Results  (Last result in the past 365 days)      Color   Clarity   Blood   Leuk Est   Nitrite   Protein   CREAT   Urine HCG        10/06/22 2217 Yellow   Clear   Negative   Negative   Negative   Trace               Microbiology Results (last 10 days)     Procedure Component Value - Date/Time    Respiratory Panel PCR w/COVID-19(SARS-CoV-2) [facility] In-House, NP Swab in UTM/VTM, 3-4 HR TAT - Swab, Nasopharynx [[id number]]  (Normal) Collected: 10/06/22 2102    Lab Status: Final result Specimen: Swab from Nasopharynx Updated: 10/06/22 2152     ADENOVIRUS, PCR Not Detected     Coronavirus 229E Not  Detected     Coronavirus HKU1 Not Detected     Coronavirus NL63 Not Detected     Coronavirus OC43 Not Detected     COVID19 Not Detected     Human Metapneumovirus Not Detected     Human Rhinovirus/Enterovirus Not Detected     Influenza A PCR Not Detected     Influenza B PCR Not Detected     Parainfluenza Virus 1 Not Detected     Parainfluenza Virus 2 Not Detected     Parainfluenza Virus 3 Not Detected     Parainfluenza Virus 4 Not Detected     RSV, PCR Not Detected     Bordetella pertussis pcr Not Detected     Bordetella parapertussis PCR Not Detected     Chlamydophila pneumoniae PCR Not Detected     Mycoplasma pneumo by PCR Not Detected    Narrative:      In the setting of a positive respiratory panel with a viral infection PLUS a negative procalcitonin without other underlying concern for bacterial infection, consider observing off antibiotics or discontinuation of antibiotics and continue supportive care. If the respiratory panel is positive for atypical bacterial infection (Bordetella pertussis, Chlamydophila pneumoniae, or Mycoplasma pneumoniae), consider antibiotic de-escalation to target atypical bacterial infection.          CT ABDOMEN PELVIS WO CONTRAST-   Date of Exam: 10/6/2022 9:52 PM               FINDINGS:   Lower Chest: Bandlike and dependent opacities are compatible with   atelectasis. There is a 7 mm subpleural nodular opacity at the left   base.       No free air noted below the diaphragm       Organs: Limited noncontrast imaging liver, spleen, pancreas and adrenal   glands are unremarkable. There is perinephric stranding of the kidneys   bilaterally which are otherwise unremarkable in appearance Limited   noncontrast imaging       Stones are noted within the gallbladder.       GI/Bowel: Limited noncontrast images of the stomach and small bowel are   unremarkable in appearance. Colon is unremarkable in appearance. No   suspicious mesenteric adenopathy or fluid collection is noted.          Pelvis:  Patient is status post hysterectomy. Left ovary is visualized   and appears unremarkable. Right ovary is not visualized. Urinary bladder   is unremarkable       Peritoneum/Retroperitoneum: The aorta is normal in caliber. Underlying   atherosclerotic changes are noted. No suspicious retroperitoneal   adenopathy.       Bones/Soft Tissues: No destructive bone lesion. Multilevel degenerative   changes noted of the spine.       Impression:     Cholelithiasis   7 mm subpleural nodule left lung base. Recommend follow-up CT chest at   3-6 months to reevaluate,.          Assessment & Plan          Plan:   Diarrhea  Unclear etiology. Prior GI PCR negative. Pt without systemic signs of infection. Suspect possible IBS, but will assess for other infectious source. IBD possible but lower on the differential  - GI consult. Pt follows with Dr. Barnes  - obtain stool culture, ova and parasites, Giardia  - check ESR, CRP    Generalized weakness  - PT/OT    Hypokalemia  Mild. Likely due to decreased oral intake and GI loss from diarrhea  - electrolyte replacement protocol  - trend potassium    Elevated transaminases  Mild. CT abd/pelvis revealed normal liver. Pt with cholelithiasis but without cholecystitis  - check acute hepatitis panel  - trend liver enzymes    Lung nodule  Incidental finding of 7 mm subpleural nodular opacity at the left base.   - f/u CT chest in 3-6 months    Type 2 DM   Serum glucose within acceptable range  - hold home regimen of glimepiride, Janumet and jardiance  - sliding scale insulin protocol  - FSG BID  - check HbA1c    Tremors  - continue home regimen of primidone    DVT prophylaxis:  Medical DVT prophylaxis orders are present.    CODE STATUS:    Code Status (Patient has no pulse and is not breathing): CPR (Attempt to Resuscitate)  Medical Interventions (Patient has pulse or is breathing): Full Support    Admission Status:  I believe this patient meets inpatient observation status.    I discussed the  patient's findings and my recommendations with patient and nursing staff.      Signature: Electronically signed by Hortencia Herron MD, 10/07/22, 7:36 AM EDT.

## 2022-10-07 NOTE — CONSULTS
"GI CONSULT  NOTE:    Referring Provider:  Dr. Mallory    Chief complaint: Chronic diarrhea    Subjective . \"I was having diarrhea after eating pulled pork\"    History of present illness: Divya Lomeli is a 67 y.o. female who has a history of chronic constipation, COPD and diabetes.  She presents with progressive weakness with associated immobility.  She reports chronic constipation since childhood and typically has a bowel movement every 10 days.  In September, she went to a craft show and ate a pulled pork sandwich from a food truck and developed diarrhea for some time.  This has improved and she reports 3-4 soft formed bowel movements yesterday.  No melena or rectal bleeding.  She was having intermittent generalized abdominal pain but has only had 2 mild episodes in the last month.  No nausea or vomiting.  No unintentional weight loss.  She is eating and drinking okay.  She denies known history of liver or pancreatic disease and denies alcohol or drug abuse.  Her biggest complaint is progressive weakness with decreased mobility.  She reports recently taken off rosuvastatin secondary to muscle achiness and was seen by Dr. Reynolds for hip and back pain.  Her primidone dose was recently increased and she reports starting a new medication that starts with an E.      Endo History:  7/2022 Colonoscopy (Dr. Limon) -polyps (TA/HP), hemorrhoids    Past Medical History:  Past Medical History:   Diagnosis Date   • Anxiety    • Arm pain     rt upper arm muscle   • COPD (chronic obstructive pulmonary disease) (HCC)     20 to 30 years ago. Does not take meds.   • Diabetes mellitus (HCC)     Type 2   • Diabetic nephropathy (HCC)    • Hyperlipidemia    • Hypertension        Past Surgical History:  Past Surgical History:   Procedure Laterality Date   • APPENDECTOMY  1958   • BACK SURGERY     • CYSTOSCOPY WITH CLOT EVACUATION N/A 9/28/2021    Procedure: CYSTOSCOPY WITH CLOT EVACUATION;  Surgeon: Jay Torres MD;  Location: [facility]" [facility] MAIN OR;  Service: Urology;  Laterality: N/A;   • INTERVENTIONAL RADIOLOGY PROCEDURE N/A 2021    Procedure: STENT PLACEMENT;  Surgeon: Jay Torres MD;  Location: [facility] MAIN OR;  Service: Urology;  Laterality: N/A;   • LUMBAR SPINE SURGERY      L5 removed-Abstracted from [facility]   • OOPHORECTOMY     • TONSILLECTOMY     • TOTAL ABDOMINAL HYSTERECTOMY     • TRANSURETHRAL RESECTION OF BLADDER TUMOR N/A 2021    Procedure: CYSTOSCOPY TRANSURETHRAL RESECTION OF BLADDER TUMOR;  Surgeon: Jay Torres MD;  Location: [facility] MAIN OR;  Service: Urology;  Laterality: N/A;       Social History:  Social History     Tobacco Use   • Smoking status: Former Smoker     Packs/day: 4.50     Years: 21.00     Pack years: 94.50     Types: Cigarettes     Start date:      Quit date:      Years since quittin.7   • Smokeless tobacco: Never Used   • Tobacco comment: 2nd Hand Smoke - boyfriend smokes.   Vaping Use   • Vaping Use: Never used   Substance Use Topics   • Alcohol use: Yes     Comment: occ   • Drug use: Yes     Types: Marijuana     Comment: college        Family History:  Family History   Problem Relation Age of Onset   • Diabetes Mother    • Diabetes Father        Medications:  (Not in a hospital admission)      Scheduled Meds:heparin (porcine), 5,000 Units, Subcutaneous, Q12H  insulin lispro, 0-7 Units, Subcutaneous, BID AC  primidone, 250 mg, Oral, Q8H  sodium chloride, 10 mL, Intravenous, Q12H  topiramate, 50 mg, Oral, BID      Continuous Infusions:sodium chloride, 75 mL/hr, Last Rate: 75 mL/hr (10/07/22 0854)      PRN Meds:.•  acetaminophen **OR** acetaminophen **OR** acetaminophen  •  aluminum-magnesium hydroxide-simethicone  •  dextrose  •  dextrose  •  glucagon (human recombinant)  •  hydrOXYzine  •  melatonin  •  ondansetron **OR** ondansetron  •  sodium chloride  •  sodium chloride  •  traMADol    ALLERGIES:  Ether and Other    ROS:  The following systems were reviewed    Constitution:  No fevers, chills, no unintentional weight loss  Skin: no rash, no jaundice  Eyes:  No blurry vision, no eye pain  HENT:  No change in hearing or smell  Resp:  No dyspnea or cough  CV:  No chest pain or palpitations  :  No dysuria, hematuria  Musculoskeletal:  No leg cramps or arthralgias, progressive weakness  Neuro:  No tremor, no numbness  Psych:  No depression or confusion    Objective Resting in bed, no acute distress, no family present    Vital Signs:   Vitals:    10/07/22 0039 10/07/22 0125 10/07/22 0454 10/07/22 0800   BP: 132/63 131/57 116/40 118/52   BP Location:   Left arm Left arm   Patient Position:   Lying Lying   Pulse: 84 84 89 84   Resp: 15 16 17 20   Temp:    98 °F (36.7 °C)   TempSrc:    Oral   SpO2: 96% 97% 95% 93%   Weight:       Height:           Physical Exam:       General Appearance:    Awake and alert, in no acute distress   Head:    Normocephalic, without obvious abnormality, atraumatic   Throat:   No oral lesions, no thrush, oral mucosa moist   Lungs:     Respirations regular, even and unlabored   Chest Wall:    No abnormalities observed   Abdomen:     Soft, non-tender, nondistended   Rectal:     Deferred   Extremities:   Moves all extremities, no edema, no cyanosis       Skin:   No rash, no jaundice, normal palpation       Neurologic:   Cranial nerves 2 - 12 grossly intact       Results Review:   I reviewed the patient's labs and imaging.  CBC    Results from last 7 days   Lab Units 10/06/22  2101   WBC 10*3/mm3 5.20   HEMOGLOBIN g/dL 11.6*   PLATELETS 10*3/mm3 241     CMP   Results from last 7 days   Lab Units 10/06/22  2234 10/06/22  2101   SODIUM mmol/L 136  --    POTASSIUM mmol/L 3.3*  --    CHLORIDE mmol/L 99  --    CO2 mmol/L 29.0  --    BUN mg/dL 16  --    CREATININE mg/dL 0.65  --    GLUCOSE mg/dL 122*  --    ALBUMIN g/dL 3.60  --    BILIRUBIN mg/dL <0.2  --    ALK PHOS U/L 80  --    AST (SGOT) U/L 36*  --    ALT (SGPT) U/L 51*  --    MAGNESIUM mg/dL  --  1.8      Cr Clearance Estimated Creatinine Clearance: 86 mL/min (by C-G formula based on SCr of 0.65 mg/dL).  Coag     HbA1C   Lab Results   Component Value Date    HGBA1C 8.5 (H) 04/16/2021    HGBA1C 8.3 (H) 01/12/2021    HGBA1C 8.4 (H) 10/12/2020     Blood Glucose   Glucose   Date/Time Value Ref Range Status   10/07/2022 1245 199 (H) 70 - 105 mg/dL Final     Comment:     Serial Number: [id number]:  [id number]   10/07/2022 0849 173 (H) 70 - 105 mg/dL Final     Comment:     Serial Number: [id number]:  [id number]   10/06/2022 2052 230 (H) 70 - 105 mg/dL Final     Comment:     Serial Number: [id number]:  [id number]     Infection     UA    Results from last 7 days   Lab Units 10/06/22  2217   NITRITE UA  Negative     Radiology(recent) CT Abdomen Pelvis Without Contrast    Result Date: 10/6/2022  Cholelithiasis  7 mm subpleural nodule left lung base. Recommend follow-up CT chest at 3-6 months to reevaluate,.  Electronically Signed ByJocelynn Costa On:10/6/2022 10:59 PM This report was finalized on [id number] by  Slade Costa, .    CT Head Without Contrast    Result Date: 10/6/2022  No acute intracranial abnormality.  Electronically Signed ByJocelynn Costa On:10/6/2022 10:40 PM This report was finalized on [id number] by  Slade Costa, .    XR Chest 1 View    Result Date: 10/6/2022  IMPRESSION : No acute process.[  Electronically Signed ByJocelynn Costa On:10/6/2022 9:25 PM This report was finalized on [id number] by  Slade Costa, .         ASSESSMENT:  Acute diarrhea -resolved, ? foodborne  History of chronic constipation  Mildly elevated LFTs  Progressive weakness  Lung nodule  Cholelithiasis  COPD  DMII    PLAN:  Patient presents with acute onset diarrhea that started in September after eating pulled pork and food truck.  Stool PCR 9/23/22 unremarkable.  Diarrhea has resolved and she had 3-4 soft formed bowel movements yesterday and abdominal exam is completely benign.  CT  unremarkable except gallstones.  She is tolerating diet without weight loss.  Colonoscopy in July with polyps and hemorrhoids.  Suspect acute infectious or foodborne diarrhea that has resolved with underlying chronic constipation.  If swings back to constipation, would recommend daily bowel regimen.  She reports previously failing MiraLAX and Benefiber.  Mildly elevated AST and ALT, if persists would recommend full liver serology work-up.  CT without showed gallstones.  CK normal.  Further work-up for progressive weakness and lung nodule per primary.    I discussed the patients findings and my recommendations with the patient.    We appreciate the referral    Electronically signed by WYATT Boothe, 10/07/22, 1:39 PM EDT.

## 2022-10-07 NOTE — PLAN OF CARE
Goal Outcome Evaluation:  Plan of Care Reviewed With: patient           Outcome Evaluation: Patient is a 68 y/o F who was admitted 10/6/22 with c/o diarrhea, weakness, and a fall.  She has a h/o DM II, diabetic neuropathy, COPD, and HTN.  Imaging has revealed cholelithiasis and a lung nodule.  Pt at baseline is completley independent and recently took a new job where she wll be working part-time.  She also assists in taking care of her boyfriend whom has multiple medical issues.  During today's evaluation pt was mod A x1 to get EOB, min A x1 for transfers, and CGA with ambulation.  She was weak in her legs with sit to stand and required use of RW.  Based on findings PT recommends inpatient rehab at d/c due to her being unsafe to d/c home considering how weak she is currently.

## 2022-10-07 NOTE — ED PROVIDER NOTES
Subjective     Patient is a 67-year-old female comes in complaining of diarrhea for the last month and generalized weakness.  Patient states she has had progressively worse fatigue over the past month and states that she has needed help walking at home and had a fall earlier today.  Patient denies any blood or black tarry stools.  Patient states that she was trying to sit onto the couch when the cushion slipped and she fell onto her bottom and low back.  Patient states that she was on the floor for about 4 hours until her spouse woke up earlier today.  Patient states she could not get up off the floor on her own accord secondary to pain.  Patient states she has chronic low back pain as she had a vertebrae removed back in 1980.  Patient states she takes pain medicine for this with her spinal surgeon.  Patient states she takes oral medications for type 2 diabetes but states she has not been checking these recently.  Patient states she has had some other generalized body aches the last few days but attributed this to her flu shot that she had 2 days ago.  Patient denies any fever, cough, congestion, chest pain, shortness of breath or urinary symptoms.  Patient denies any new numbness or tingling or one-sided weakness.  Patient states she does have some tingling in her feet bilaterally that she attributes to diabetic neuropathy.  Patient also reports a generalized headache today as well.          Review of Systems   Constitutional: Positive for chills and fatigue. Negative for fever.        Generalized weakness   HENT: Negative for congestion, sore throat, tinnitus and trouble swallowing.    Eyes: Negative for photophobia, discharge and visual disturbance.   Respiratory: Negative for cough, shortness of breath and wheezing.    Cardiovascular: Negative for chest pain and leg swelling.   Gastrointestinal: Positive for diarrhea. Negative for abdominal pain, blood in stool, nausea and vomiting.   Genitourinary: Negative for  dysuria, flank pain and urgency.   Musculoskeletal: Negative for arthralgias and myalgias.   Skin: Negative for rash.   Neurological: Positive for weakness (generalized weakness) and headaches. Negative for dizziness, syncope, speech difficulty, light-headedness and numbness.   Psychiatric/Behavioral: Negative for confusion.       Past Medical History:   Diagnosis Date   • Anxiety    • Arm pain     rt upper arm muscle   • COPD (chronic obstructive pulmonary disease) ([facility])     20 to 30 years ago. Does not take meds.   • Diabetes mellitus ([facility])     Type 2   • Diabetic nephropathy ([facility])    • Hyperlipidemia    • Hypertension        Allergies   Allergen Reactions   • Ether Nausea And Vomiting   • Other Swelling     wasp       Past Surgical History:   Procedure Laterality Date   • APPENDECTOMY     • BACK SURGERY     • CYSTOSCOPY WITH CLOT EVACUATION N/A 2021    Procedure: CYSTOSCOPY WITH CLOT EVACUATION;  Surgeon: Jay Torres MD;  [facility];  Service: Urology;  Laterality: N/A;   • INTERVENTIONAL RADIOLOGY PROCEDURE N/A 2021    Procedure: STENT PLACEMENT;  Surgeon: Jay Torres MD;  Location: [facility] OR;  Service: Urology;  Laterality: N/A;   • LUMBAR SPINE SURGERY      L5 [facility]   • OOPHORECTOMY     • TONSILLECTOMY     • TOTAL ABDOMINAL HYSTERECTOMY     • TRANSURETHRAL RESECTION OF BLADDER TUMOR N/A 2021    Procedure: CYSTOSCOPY TRANSURETHRAL RESECTION OF BLADDER TUMOR;  Surgeon: Jay Torres MD;  Location: [facility];  Service: Urology;  Laterality: N/A;       Family History   Problem Relation Age of Onset   • Diabetes Mother    • Diabetes Father        Social History     Socioeconomic History   • Marital status:    Tobacco Use   • Smoking status: Former Smoker     Packs/day: 4.50     Years: 21.00     Pack years: 94.50     Types: Cigarettes     Start date:      Quit date:      Years since quittin.7   • Smokeless  tobacco: Never Used   • Tobacco comment: 2nd Hand Smoke - boyfriend smokes.   Vaping Use   • Vaping Use: Never used   Substance and Sexual Activity   • Alcohol use: Yes     Comment: occ   • Drug use: Yes     Types: Marijuana     Comment: college 1971   • Sexual activity: Defer           Objective   Physical Exam  Vitals and nursing note reviewed.   Constitutional:       General: She is not in acute distress.     Appearance: She is well-developed. She is not diaphoretic.   HENT:      Head: Normocephalic and atraumatic.      Right Ear: External ear normal.      Left Ear: External ear normal.      Nose: Nose normal.      Mouth/Throat:      Pharynx: No oropharyngeal exudate.   Eyes:      Extraocular Movements: Extraocular movements intact.      Conjunctiva/sclera: Conjunctivae normal.      Pupils: Pupils are equal, round, and reactive to light.   Cardiovascular:      Rate and Rhythm: Normal rate and regular rhythm.      Pulses: Normal pulses.      Heart sounds: Normal heart sounds.      Comments: S1, S2 audible.  Pulmonary:      Effort: Pulmonary effort is normal. No respiratory distress.      Breath sounds: Normal breath sounds. No wheezing, rhonchi or rales.      Comments: On room air.  Abdominal:      General: Bowel sounds are normal. There is no distension.      Palpations: Abdomen is soft.      Tenderness: There is no abdominal tenderness. There is no guarding or rebound.   Musculoskeletal:         General: No tenderness or deformity. Normal range of motion.      Cervical back: Normal range of motion.      Right lower leg: No edema.      Left lower leg: No edema.      Comments: Lumbar spine exam shows no midline tenderness, no step-offs, no erythema present, no paraspinal tenderness and has normal range of motion of torso and hips bilaterally. Negative straight leg raise bilaterally. Sensation bilateral lower extremities normal. No reported saddle anesthesia.     Skin:     General: Skin is warm.      Capillary  "Refill: Capillary refill takes less than 2 seconds.      Findings: No erythema or rash.   Neurological:      General: No focal deficit present.      Mental Status: She is alert and oriented to person, place, and time.      Cranial Nerves: No cranial nerve deficit.      Sensory: No sensory deficit.      Motor: No weakness.   Psychiatric:         Mood and Affect: Mood normal.         Behavior: Behavior normal.         Procedures           ED Course      /40 (BP Location: Left arm, Patient Position: Lying)   Pulse 89   Temp 98.2 °F (36.8 °C) (Oral)   Resp 17   Ht 165.1 cm (65\")   Wt 66.7 kg (147 lb)   SpO2 95%   BMI 24.46 kg/m²   Labs Reviewed   COMPREHENSIVE METABOLIC PANEL - Abnormal; Notable for the following components:       Result Value    Glucose 122 (*)     Potassium 3.3 (*)     ALT (SGPT) 51 (*)     AST (SGOT) 36 (*)     All other components within normal limits    Narrative:     GFR Normal >60  Chronic Kidney Disease <60  Kidney Failure <15     URINALYSIS W/ CULTURE IF INDICATED - Abnormal; Notable for the following components:    Specific Gravity, UA 1.034 (*)     Glucose, UA >=1000 mg/dL (3+) (*)     Ketones, UA 15 mg/dL (1+) (*)     Protein, UA Trace (*)     All other components within normal limits    Narrative:     In absence of clinical symptoms, the presence of pyuria, bacteria, and/or nitrites on the urinalysis result does not correlate with infection.  Urine microscopic not indicated.   CBC WITH AUTO DIFFERENTIAL - Abnormal; Notable for the following components:    Hemoglobin 11.6 (*)     RDW 16.0 (*)     Neutrophil % 77.7 (*)     Lymphocyte % 10.4 (*)     Lymphocytes, Absolute 0.50 (*)     All other components within normal limits   POCT GLUCOSE FINGERSTICK - Abnormal; Notable for the following components:    Glucose 230 (*)     All other components within normal limits   RESPIRATORY PANEL PCR W/ COVID-19 (SARS-COV-2) [facility]IE/[facility] IN-HOUSE, NP SWAB IN UTM/VTP, 3-4 HR TAT " - Normal    Narrative:     In the setting of a positive respiratory panel with a viral infection PLUS a negative procalcitonin without other underlying concern for bacterial infection, consider observing off antibiotics or discontinuation of antibiotics and continue supportive care. If the respiratory panel is positive for atypical bacterial infection (Bordetella pertussis, Chlamydophila pneumoniae, or Mycoplasma pneumoniae), consider antibiotic de-escalation to target atypical bacterial infection.   TSH - Normal   MAGNESIUM - Normal   TROPONIN (IN-HOUSE) - Normal    Narrative:     Troponin T Reference Range:  <= 0.03 ng/mL-   Negative for AMI  >0.03 ng/mL-     Abnormal for myocardial necrosis.  Clinicians would have to utilize clinical acumen, EKG, Troponin and serial changes to determine if it is an Acute Myocardial Infarction or myocardial injury due to an underlying chronic condition.       Results may be falsely decreased if patient taking Biotin.     CK - Normal   GASTROINTESTINAL PANEL, PCR   POCT GLUCOSE FINGERSTICK   CBC AND DIFFERENTIAL    Narrative:     The following orders were created for panel order CBC & Differential.  Procedure                               Abnormality         Status                     ---------                               -----------         ------                     CBC Auto Differential[[id number]]        Abnormal            Final result                 Please view results for these tests on the individual orders.   EXTRA TUBES    Narrative:     The following orders were created for panel order Extra Tubes.  Procedure                               Abnormality         Status                     ---------                               -----------         ------                     Green Top (Gel)[[id number]]                                  Final result               Green Top (Gel)[[id number]]                                  Final result                 Please view results for  "these tests on the individual orders.   GREEN TOP   GREEN TOP     CT Abdomen Pelvis Without Contrast    Result Date: 10/6/2022  Cholelithiasis  7 mm subpleural nodule left lung base. Recommend follow-up CT chest at 3-6 months to reevaluate,.  Electronically Signed ByJocelynn Costa On:10/6/2022 10:59 PM This report was finalized on [id number] by  Slade Costa, .    CT Head Without Contrast    Result Date: 10/6/2022  No acute intracranial abnormality.  Electronically Signed By-Slade Costa On:10/6/2022 10:40 PM This report was finalized on [id number] by  Slade Costa, .    XR Chest 1 View    Result Date: 10/6/2022  IMPRESSION : No acute process.[  Electronically Signed By-Slade Costa On:10/6/2022 9:25 PM This report was finalized on [id number] by  Slade Costa, .                                         [facility]    Chart review: Allergies reviewed  EKG: EKG reviewed by myself and interpreted by Dr. Arash Pritchard, shows sinus rhythm 89 bpm, no ST elevation apparent.  Similar to previous.    Imaging: See above  Labs: CBC and CMP largely unremarkable for acute findings although AST and ALT slightly elevated at 36 and 51 respectively.  Potassium slightly low at 3.3.  Magnesium normal.  TSH normal.  Respiratory viral panel with COVID-19 swab negative.  UA shows 1+ ketones.  Initial troponin negative.  CK normal.  GI panel ordered.  Vitals:  /40 (BP Location: Left arm, Patient Position: Lying)   Pulse 89   Temp 98.2 °F (36.8 °C) (Oral)   Resp 17   Ht 165.1 cm (65\")   Wt 66.7 kg (147 lb)   SpO2 95%   BMI 24.46 kg/m²     Medications given:    Medications   sodium chloride 0.9 % flush 10 mL (has no administration in time range)   sodium chloride 0.9 % infusion (75 mL/hr Intravenous Currently Infusing 10/7/22 9696)   primidone (MYSOLINE) tablet 250 mg (has no administration in time range)   sodium chloride 0.9 % bolus 1,000 mL (0 mL Intravenous Stopped 10/7/22 0035)   potassium chloride " (K-DUR,KLOR-CON) CR tablet 40 mEq (40 mEq Oral Given 10/7/22 0034)       Procedures:  Not indicated  MDM: Patient is a 67-year-old female comes in complaining of fall, generalized weakness and diarrhea.  CBC and CMP largely unremarkable for acute findings although AST and ALT slightly elevated at 36 and 51 respectively.  Potassium slightly low at 3.3.  Magnesium normal.  TSH normal.  Respiratory viral panel with COVID-19 swab negative.  UA shows 1+ ketones.  Initial troponin negative.  CK normal.  GI panel ordered.  Chest x-ray unremarkable for acute findings.  CT head unremarkable for acute findings.  CT abdomen pelvis without contrast shows cholelithiasis.  7 mm subpleural nodule left lung base.  I spoke with patient regarding these findings.  I am concerned for patient's generalized weakness and dehydration and potassium replacement was ordered as well as 1 L normal saline bolus and started on 75 mL/h normal saline.  Patient lives at home with spouse and as needed significant assistant with ambulation.  Spoke with , who accepted patient behalf hospitalist team for admission to hospital further work-up and treatment.  Results and plan discussed with patient and is agreeable with plan.      Final diagnoses:   Generalized weakness   Hypokalemia   Dehydration   Lung nodule       ED Disposition  ED Disposition     ED Disposition   Decision to Admit    Condition   --    Comment   Level of Care: Med/Surg [1]   Admitting Physician: DONOVAN SAMUEL [[id number]]   Attending Physician: DONOVAN SAMUEL [[id number]]               No follow-up provider specified.       Medication List      No changes were made to your prescriptions during this visit.          Jose Pack PA  10/07/22 0597

## 2022-10-07 NOTE — THERAPY EVALUATION
Patient Name: Divya Lomeli  : 1955    MRN: [id number]                              Today's Date: 10/7/2022       Admit Date: 10/6/2022    Visit Dx:     ICD-10-CM ICD-9-CM   1. Generalized weakness  R53.1 780.79   2. Hypokalemia  E87.6 276.8   3. Dehydration  E86.0 276.51   4. Lung nodule  R91.1 793.11     Patient Active Problem List   Diagnosis   • Cervicalgia   • Chronic right shoulder pain   • Subacromial bursitis of right shoulder joint   • Generalized arthritis   • Preventative health care   • Encounter for screening mammogram for malignant neoplasm of breast   • Gastroenteritis, acute   • Cellulitis of lower extremity   • Chronic obstructive pulmonary disease (HCC)   • Cough   • Dermatitis   • Elevated blood pressure reading without diagnosis of hypertension   • Family history of diabetes mellitus   • Hyperlipidemia   • Hypertension   • Insomnia   • Luetscher's syndrome   • Postmenopausal   • Rectal hemorrhage   • Sinusitis, acute maxillary   • Type 2 diabetes mellitus with hyperglycemia (HCC)   • Upper respiratory tract infection   • Encounter for immunization   • Gross hematuria   • Generalized weakness     Past Medical History:   Diagnosis Date   • Anxiety    • Arm pain     rt upper arm muscle   • COPD (chronic obstructive pulmonary disease) (HCC)     20 to 30 years ago. Does not take meds.   • Diabetes mellitus (HCC)     Type 2   • Diabetic nephropathy (HCC)    • Hyperlipidemia    • Hypertension      Past Surgical History:   Procedure Laterality Date   • APPENDECTOMY     • BACK SURGERY     • CYSTOSCOPY WITH CLOT EVACUATION N/A 2021    Procedure: CYSTOSCOPY WITH CLOT EVACUATION;  Surgeon: Jay Torres MD;  Location: [facility] MAIN OR;  Service: Urology;  Laterality: N/A;   • INTERVENTIONAL RADIOLOGY PROCEDURE N/A 2021    Procedure: STENT PLACEMENT;  Surgeon: Jay Torrse MD;  Location: [facility] MAIN OR;  Service: Urology;  Laterality: N/A;   • LUMBAR SPINE SURGERY      L5  removed-Abstracted from [facility]   • OOPHORECTOMY     • TONSILLECTOMY     • TOTAL ABDOMINAL HYSTERECTOMY     • TRANSURETHRAL RESECTION OF BLADDER TUMOR N/A 9/28/2021    Procedure: CYSTOSCOPY TRANSURETHRAL RESECTION OF BLADDER TUMOR;  Surgeon: Jay Torres MD;  Location: [facility] MAIN OR;  Service: Urology;  Laterality: N/A;      General Information     Row Name 10/07/22 1049          Physical Therapy Time and Intention    Document Type evaluation  -EJ     Mode of Treatment physical therapy  -     Row Name 10/07/22 1049          General Information    Patient Profile Reviewed yes  -EJ     Prior Level of Function independent:;ADL's;gait;transfer;work;driving;community mobility;home management  -EJ     Existing Precautions/Restrictions fall  -EJ     Barriers to Rehab none identified  -     Row Name 10/07/22 1049          Living Environment    People in Home significant other  Pt lives with boyfriend, and she also is the caretaker for him as he is medically complex.  -     Row Name 10/07/22 1049          Home Main Entrance    Number of Stairs, Main Entrance two  -EJ     Stair Railings, Main Entrance railings safe and in good condition  -     Row Name 10/07/22 1049          Stairs Within Home, Primary    Number of Stairs, Within Home, Primary other (see comments)  1 flight of steps to the basement.  -     Row Name 10/07/22 1049          Cognition    Orientation Status (Cognition) oriented x 4  -     Row Name 10/07/22 1049          Safety Issues, Functional Mobility    Impairments Affecting Function (Mobility) balance;endurance/activity tolerance;strength  -EJ           User Key  (r) = Recorded By, (t) = Taken By, (c) = Cosigned By    Initials Name Provider Type    EJ Ioana Asencio, PT Physical Therapist               Mobility     Row Name 10/07/22 1154          Bed Mobility    Bed Mobility bed mobility (all) activities  -     All Activities, [place] (Bed Mobility) moderate assist (50% patient  effort);1 person assist  -EJ     Assistive Device (Bed Mobility) bed rails;head of bed elevated  -EJ     Row Name 10/07/22 1154          Sit-Stand Transfer    Sit-Stand [place] (Transfers) minimum assist (75% patient effort);1 person assist  -EJ     Assistive Device (Sit-Stand Transfers) walker, front-wheeled  -EJ     Row Name 10/07/22 1154          Gait/Stairs (Locomotion)    [place] Level (Gait) contact guard  -EJ     Assistive Device (Gait) walker, front-wheeled  -EJ     Distance in Feet (Gait) 12 ft  forward/backward 3 ft x2 due to IV line limitations attached to wall.  -EJ           User Key  (r) = Recorded By, (t) = Taken By, (c) = Cosigned By    Initials Name Provider Type    Ioana Guzman, PT Physical Therapist               Obj/Interventions     Row Name 10/07/22 1156          Range of Motion Comprehensive    General Range of Motion bilateral upper extremity ROM WFL;bilateral lower extremity ROM WFL  -EJ     Row Name 10/07/22 1156          Strength Comprehensive (MMT)    Comment, General Manual Muscle Testing (MMT) Assessment BUE/BLE grossly 4-/5  -EJ     Row Name 10/07/22 1156          Balance    Balance Assessment sitting static balance;sitting dynamic balance;sit to stand dynamic balance;standing static balance;standing dynamic balance  -EJ     Static Sitting Balance independent  -EJ     Dynamic Sitting Balance independent  -EJ     Position, Sitting Balance sitting edge of bed  -EJ     Sit to Stand Dynamic Balance minimal assist;1-person assist  -EJ     Static Standing Balance contact guard  -EJ     Dynamic Standing Balance contact guard  -EJ     Position/Device Used, Standing Balance walker, 4-wheeled  -EJ     Balance Interventions sitting;standing;sit to stand;supported;static;dynamic;minimal challenge  -EJ           User Key  (r) = Recorded By, (t) = Taken By, (c) = Cosigned By    Initials Name Provider Type    Ioana Guzman, PT Physical Therapist               Goals/Plan     Row  Name 10/07/22 1205          Bed Mobility Goal 1 (PT)    Activity/Assistive Device (Bed Mobility Goal 1, PT) bed mobility activities, all  -EJ     [place] Level/Cues Needed (Bed Mobility Goal 1, PT) modified independence  -EJ     Time Frame (Bed Mobility Goal 1, PT) long term goal (LTG);2 weeks  -EJ     Row Name 10/07/22 1205          Transfer Goal 1 (PT)    Activity/Assistive Device (Transfer Goal 1, PT) transfers, all  -EJ     [place] Level/Cues Needed (Transfer Goal 1, PT) modified independence  -EJ     Time Frame (Transfer Goal 1, PT) long term goal (LTG);2 weeks  -EJ     Row Name 10/07/22 1205          Gait Training Goal 1 (PT)    Activity/Assistive Device (Gait Training Goal 1, PT) gait (walking locomotion)  -EJ     [place] Level (Gait Training Goal 1, PT) supervision required  -EJ     Time Frame (Gait Training Goal 1, PT) long term goal (LTG);2 weeks  -EJ     Row Name 10/07/22 1205          Therapy Assessment/Plan (PT)    Planned Therapy Interventions (PT) bed mobility training;balance training;gait training;home exercise program;patient/family education;neuromuscular re-education;postural re-education;stair training;strengthening;transfer training  -EJ           User Key  (r) = Recorded By, (t) = Taken By, (c) = Cosigned By    Initials Name Provider Type    Ioana Guzman, PT Physical Therapist               Clinical Impression     Row Name 10/07/22 1158          Pain    Pretreatment Pain Rating 0/10 - no pain  -EJ     Posttreatment Pain Rating 0/10 - no pain  -EJ     Row Name 10/07/22 1156          Plan of Care Review    Plan of Care Reviewed With patient  -EJ     Outcome Evaluation Patient is a 66 y/o F who was admitted 10/6/22 with c/o diarrhea, weakness, and a fall.  She has a h/o DM II, diabetic neuropathy, COPD, and HTN.  Imaging has revealed cholelithiasis and a lung nodule.  Pt at baseline is completley independent and recently took a new job where she wll be working part-time.  She  also assists in taking care of her boyfriend whom has multiple medical issues.  During today's evaluation pt was mod A x1 to get EOB, min A x1 for transfers, and CGA with ambulation.  She was weak in her legs with sit to stand and required use of RW.  Based on findings PT recommends inpatient rehab at d/c due to her being unsafe to d/c home considering how weak she is currently.  -     Row Name 10/07/22 1158          Therapy Assessment/Plan (PT)    Rehab Potential (PT) good, to achieve stated therapy goals  -EJ     Criteria for Skilled Interventions Met (PT) yes;skilled treatment is necessary  -EJ     Therapy Frequency (PT) 5 times/wk  -EJ     Predicted Duration of Therapy Intervention (PT) until discharge  -EJ     Row Name 10/07/22 1158          Positioning and Restraints    Pre-Treatment Position in bed  -EJ     Post Treatment Position bed  -EJ     In Bed call light within reach;encouraged to call for assist;fowlers  -EJ           User Key  (r) = Recorded By, (t) = Taken By, (c) = Cosigned By    Initials Name Provider Type    EJ Ioana Asencio, PT Physical Therapist               Outcome Measures     Row Name 10/07/22 1206          How much help from another person do you currently need...    Turning from your back to your side while in flat bed without using bedrails? 3  -EJ     Moving from lying on back to sitting on the side of a flat bed without bedrails? 2  -EJ     Moving to and from a bed to a chair (including a wheelchair)? 3  -EJ     Standing up from a chair using your arms (e.g., wheelchair, bedside chair)? 3  -EJ     Climbing 3-5 steps with a railing? 3  -EJ     To walk in hospital room? 3  -EJ     AM-PAC 6 Clicks Score (PT) 17  -EJ     Highest level of mobility 5 --> Static standing  -EJ     Row Name 10/07/22 1206          Functional Assessment    Outcome Measure Options AM-PAC 6 Clicks Basic Mobility (PT)  -EJ           User Key  (r) = Recorded By, (t) = Taken By, (c) = Cosigned By    Initials Name  Provider Type    Ioana Guzman, PT Physical Therapist                             Physical Therapy Education                 Title: PT OT SLP Therapies (In Progress)     Topic: Physical Therapy (In Progress)     Point: Mobility training (Done)     Learning Progress Summary           Patient Acceptance, E,TB, VU by SHAYNE at 10/7/2022 1206                   Point: Home exercise program (Not Started)     Learner Progress:  Not documented in this visit.          Point: Body mechanics (Done)     Learning Progress Summary           Patient Acceptance, E,TB, VU by SHAYNE at 10/7/2022 1206                   Point: Precautions (Done)     Learning Progress Summary           Patient Acceptance, E,TB, VU by SHAYNE at 10/7/2022 1206                               User Key     Initials Effective Dates Name Provider Type Discipline    SHAYNE 06/16/21 -  Ioana Asencio, PT Physical Therapist PT              PT Recommendation and Plan  Planned Therapy Interventions (PT): bed mobility training, balance training, gait training, home exercise program, patient/family education, neuromuscular re-education, postural re-education, stair training, strengthening, transfer training  Plan of Care Reviewed With: patient  Outcome Evaluation: Patient is a 66 y/o F who was admitted 10/6/22 with c/o diarrhea, weakness, and a fall.  She has a h/o DM II, diabetic neuropathy, COPD, and HTN.  Imaging has revealed cholelithiasis and a lung nodule.  [facility] and recently took a new job where she wll be working part-time.  She also assists in taking care of her boyfriend whom has multiple medical issues.  During today's evaluation pt was mod A x1 to get EOB, min A x1 for transfers, and CGA with ambulation.  She was weak in her legs with sit to stand and required use of RW.  Based on findings PT recommends inpatient rehab at d/c due to her being unsafe to d/c home considering how weak she is currently.     Time Calculation:    PT  Charges     Row Name 10/07/22 1207             Time Calculation    Start Time 0816  -EJ      Stop Time 0842  -EJ      Time Calculation (min) 26 min  -EJ      PT Received On 10/07/22  -EJ      PT - Next Appointment 10/08/22  -EJ      PT Goal Re-Cert Due Date 10/21/22  -EJ              Time Calculation- PT    Total Timed Code Minutes- PT 0 minute(s)  -EJ            User Key  (r) = Recorded By, (t) = Taken By, (c) = Cosigned By    Initials Name Provider Type     Ioana Asencio, PT Physical Therapist              Therapy Charges for Today     Code Description Service Date Service Provider Modifiers Qty    30981745698 HC PT EVAL MOD COMPLEXITY 4 10/7/2022 Ioana Asencio, PT GP 1          PT G-Codes  Outcome Measure Options: AM-PAC 6 Clicks Basic Mobility (PT)  AM-PAC 6 Clicks Score (PT): 17    Ioana Asencio, PT  10/7/2022

## 2022-10-07 NOTE — PLAN OF CARE
Goal Outcome Evaluation:  Plan of Care Reviewed With: patient        Progress: no change  Outcome Evaluation: Patient is a 66 y/o F who was admitted 10/6/22 with c/o diarrhea, weakness, and a fall.  She has a h/o DM II, diabetic neuropathy, COPD, and HTN.  Imaging has revealed cholelithiasis and a lung nodule.  Pt at baseline is completley independent and recently took a new job where she wll be working part-time. Pt. states she lives at home w/ her significant other whom she assists w/ his care secondary to multiple cancer diagnosis. Pt. present w/ generalized weakness this date BUE 3+/5 and per PT requires increased mod A for sit to stand transfers. Pt. reuqiring increased assist for LB ADLs at this time, not safe to d/c home and will require SNF at d/c. Will follow up w/ pt. 1-3x per week at [facility].

## 2022-10-07 NOTE — THERAPY EVALUATION
Patient Name: Divya Lomeli  : 1955    MRN: [id number]                              Today's Date: 10/7/2022       Admit Date: 10/6/2022    Visit Dx:     ICD-10-CM ICD-9-CM   1. Generalized weakness  R53.1 780.79   2. Hypokalemia  E87.6 276.8   3. Dehydration  E86.0 276.51   4. Lung nodule  R91.1 793.11     Patient Active Problem List   Diagnosis   • Cervicalgia   • Chronic right shoulder pain   • Subacromial bursitis of right shoulder joint   • Generalized arthritis   • Preventative health care   • Encounter for screening mammogram for malignant neoplasm of breast   • Gastroenteritis, acute   • Cellulitis of lower extremity   • Chronic obstructive pulmonary disease (HCC)   • Cough   • Dermatitis   • Elevated blood pressure reading without diagnosis of hypertension   • Family history of diabetes mellitus   • Hyperlipidemia   • Hypertension   • Insomnia   • Luetscher's syndrome   • Postmenopausal   • Rectal hemorrhage   • Sinusitis, acute maxillary   • Type 2 diabetes mellitus with hyperglycemia (HCC)   • Upper respiratory tract infection   • Encounter for immunization   • Gross hematuria   • Generalized weakness     Past Medical History:   Diagnosis Date   • Anxiety    • Arm pain     rt upper arm muscle   • COPD (chronic obstructive pulmonary disease) (HCC)     20 to 30 years ago. Does not take meds.   • Diabetes mellitus (HCC)     Type 2   • Diabetic nephropathy (HCC)    • Hyperlipidemia    • Hypertension      Past Surgical History:   Procedure Laterality Date   • APPENDECTOMY     • BACK SURGERY     • CYSTOSCOPY WITH CLOT EVACUATION N/A 2021    Procedure: CYSTOSCOPY WITH CLOT EVACUATION;  Surgeon: Jay Torres MD;  Location: [facility] MAIN OR;  Service: Urology;  Laterality: N/A;   • INTERVENTIONAL RADIOLOGY PROCEDURE N/A 2021    Procedure: STENT PLACEMENT;  Surgeon: Jay Torres MD;  Location: [facility] MAIN OR;  Service: Urology;  Laterality: N/A;   • LUMBAR SPINE SURGERY      L5  removed-Abstracted from [facility]   • OOPHORECTOMY     • TONSILLECTOMY     • TOTAL ABDOMINAL HYSTERECTOMY     • TRANSURETHRAL RESECTION OF BLADDER TUMOR N/A 9/28/2021    Procedure: CYSTOSCOPY TRANSURETHRAL RESECTION OF BLADDER TUMOR;  Surgeon: Jay Torres MD;  Location: [facility] MAIN OR;  Service: Urology;  Laterality: N/A;      General Information     Row Name 10/07/22 1705          OT Time and Intention    Document Type evaluation  -MP     Mode of Treatment occupational therapy  -MP     Row Name 10/07/22 1705          General Information    Patient Profile Reviewed yes  -MP     Prior Level of Function independent:;ADL's  -MP     Existing Precautions/Restrictions fall  -MP     Row Name 10/07/22 1705          Living Environment    People in Home significant other  -MP     Row Name 10/07/22 1705          Cognition    Orientation Status (Cognition) oriented x 4  -MP     Row Name 10/07/22 1705          Safety Issues, Functional Mobility    Impairments Affecting Function (Mobility) balance;endurance/activity tolerance;strength  -MP           User Key  (r) = Recorded By, (t) = Taken By, (c) = Cosigned By    Initials Name Provider Type    Ravi Noland OT Occupational Therapist                 Mobility/ADL's     Row Name 10/07/22 1706          Bed Mobility    Bed Mobility bed mobility (all) activities  -MP     All Activities, [place] (Bed Mobility) moderate assist (50% patient effort);1 person assist;minimum assist (75% patient effort)  -MP     Row Name 10/07/22 1706          Activities of Daily Living    BADL Assessment/Intervention lower body dressing  -MP     Row Name 10/07/22 1706          Lower Body Dressing Assessment/Training    [place] Level (Lower Body Dressing) doff;don;socks;minimum assist (75% patient effort)  -MP           User Key  (r) = Recorded By, (t) = Taken By, (c) = Cosigned By    Initials Name Provider Type    Ravi Noland OT Occupational Therapist                Obj/Interventions     Row Name 10/07/22 1708          Range of Motion Comprehensive    Comment, General Range of Motion BUE WFL  -MP     Row Name 10/07/22 1708          Strength Comprehensive (MMT)    Comment, General Manual Muscle Testing (MMT) Assessment BUE 3+/5  -MP           User Key  (r) = Recorded By, (t) = Taken By, (c) = Cosigned By    Initials Name Provider Type    Ravi Noland OT Occupational Therapist               Goals/Plan     Row Name 10/07/22 1716          Bed Mobility Goal 1 (OT)    Activity/Assistive Device (Bed Mobility Goal 1, OT) bed mobility activities, all  -MP     [place] Level/Cues Needed (Bed Mobility Goal 1, OT) supervision required  -MP     Time Frame (Bed Mobility Goal 1, OT) long term goal (LTG);2 weeks  -MP     Row Name 10/07/22 1716          Transfer Goal 1 (OT)    Activity/Assistive Device (Transfer Goal 1, OT) sit-to-stand/stand-to-sit;toilet  -MP     [place] Level/Cues Needed (Transfer Goal 1, OT) contact guard required  -MP     Time Frame (Transfer Goal 1, OT) long term goal (LTG);2 weeks  -MP     Row Name 10/07/22 1716          Dressing Goal 1 (OT)    Activity/Device (Dressing Goal 1, OT) lower body dressing  -MP     [place]/Cues Needed (Dressing Goal 1, OT) set-up required;supervision required  -MP     Time Frame (Dressing Goal 1, OT) long term goal (LTG);2 weeks  -MP           User Key  (r) = Recorded By, (t) = Taken By, (c) = Cosigned By    Initials Name Provider Type    Ravi Noland OT Occupational Therapist               Clinical Impression     Row Name 10/07/22 1708          Pain Assessment    Pretreatment Pain Rating 0/10 - no pain  -MP     Posttreatment Pain Rating 0/10 - no pain  -MP     Row Name 10/07/22 1708          Plan of Care Review    Plan of Care Reviewed With patient  -MP     Progress no change  -MP     Outcome Evaluation Patient is a 68 y/o F who was admitted 10/6/22 with c/o diarrhea, weakness, and a fall.  She has a h/o DM  II, diabetic neuropathy, COPD, and HTN.  Imaging has revealed cholelithiasis and a lung nodule.  Pt at baseline is completley independent and recently took a new job where she wll be working part-time. Pt. states she lives at home w/ her significant other whom she assists w/ his care secondary to multiple cancer diagnosis. Pt. present w/ generalized weakness this date BUE 3+/5 and per PT requires increased mod A for sit to stand transfers. Pt. reuqiring increased assist for LB ADLs at this time, not safe to d/c home and will require SNF at d/c. Will follow up w/ pt. 1-3x per week at [facility].  -MP     Row Name 10/07/22 1708          Therapy Assessment/Plan (OT)    Rehab Potential (OT) good, to achieve stated therapy goals  -MP     Criteria for Skilled Therapeutic Interventions Met (OT) yes;skilled treatment is necessary;meets criteria  -MP     Therapy Frequency (OT) 3 times/wk  -MP     Row Name 10/07/22 1708          Therapy Plan Review/Discharge Plan (OT)    Anticipated Discharge Disposition (OT) skilled nursing facility  -MP     Row Name 10/07/22 1708          Vital Signs    Pre Patient Position Supine  -MP     Intra Patient Position Side Lying  -MP     Post Patient Position Supine  -MP     Row Name 10/07/22 1708          Positioning and Restraints    Pre-Treatment Position in bed  -MP     Post Treatment Position bed  -MP     In Bed supine;encouraged to call for assist;call light within reach;exit alarm on  -MP           User Key  (r) = Recorded By, (t) = Taken By, (c) = Cosigned By    Initials Name Provider Type    Ravi Noland, OT Occupational Therapist               Outcome Measures     Row Name 10/07/22 1551 10/07/22 1545       How much help from another person do you currently need...    Turning from your back to your side while in flat bed without using bedrails? 3  -RM 3  -RM    Moving from lying on back to sitting on the side of a flat bed without bedrails? 3  -RM 3  -RM    Moving to and from a bed to  a chair (including a wheelchair)? 3  -RM 3  -RM    Standing up from a chair using your arms (e.g., wheelchair, bedside chair)? 3  -RM 3  -RM    Climbing 3-5 steps with a railing? 2  -RM 2  -RM    To walk in hospital room? 2  -RM 2  -RM    AM-PAC 6 Clicks Score (PT) 16  -RM 16  -RM    Highest level of mobility 5 --> Static standing  -RM 5 --> Static standing  -RM    Row Name 10/07/22 1206          How much help from another person do you currently need...    Turning from your back to your side while in flat bed without using bedrails? 3  -EJ     Moving from lying on back to sitting on the side of a flat bed without bedrails? 2  -EJ     Moving to and from a bed to a chair (including a wheelchair)? 3  -EJ     Standing up from a chair using your arms (e.g., wheelchair, bedside chair)? 3  -EJ     Climbing 3-5 steps with a railing? 3  -EJ     To walk in hospital room? 3  -EJ     AM-PAC 6 Clicks Score (PT) 17  -EJ     Highest level of mobility 5 --> Static standing  -EJ     Row Name 10/07/22 1206          Functional Assessment    Outcome Measure Options AM-PAC 6 Clicks Basic Mobility (PT)  -EJ           User Key  (r) = Recorded By, (t) = Taken By, (c) = Cosigned By    Initials Name Provider Type    EJ Ioana Asencio, PT Physical Therapist    RM Araceli Heller, RN Registered Nurse                  OT Recommendation and Plan  Therapy Frequency (OT): 3 times/wk  Plan of Care Review  Plan of Care Reviewed With: patient  Progress: no change  Outcome Evaluation: Patient is a 68 y/o F who was admitted 10/6/22 with c/o diarrhea, weakness, and a fall.  She has a h/o DM II, diabetic neuropathy, COPD, and HTN.  Imaging has revealed cholelithiasis and a lung nodule.  Pt at baseline is completley independent and recently took a new job where she wll be working part-time. Pt. states she lives at home w/ her significant other whom she assists w/ his care secondary to multiple cancer diagnosis. Pt. present w/ generalized weakness this  date BUE 3+/5 and per PT requires increased mod A for sit to stand transfers. Pt. reuqiring increased assist for LB ADLs at this time, not safe to d/c home and will require SNF at d/c. Will follow up w/ pt. 1-3x per week at [facility].     Time Calculation:    Time Calculation- OT     Row Name 10/07/22 1718 10/07/22 1524          Time Calculation- OT    OT Start Time 1534  -MP --     OT Stop Time 1600  -MP --     OT Time Calculation (min) 26 min  -MP --     Total Timed Code Minutes- OT 0 minute(s)  -MP --     OT Received On 10/07/22  -MP --     OT - Next Appointment 10/10/22  -MP 10/08/22  -     OT Goal Re-Cert Due Date 10/21/22  -MP --           User Key  (r) = Recorded By, (t) = Taken By, (c) = Cosigned By    Initials Name Provider Type    MP Ravi De La Cruz OT Occupational Therapist     Emelina Guzman OT Occupational Therapist              Therapy Charges for Today     Code Description Service Date Service Provider Modifiers Qty    [id number]  OT EVAL LOW COMPLEXITY 4 10/7/2022 Ravi De La Cruz OT GO 1               Ravi De La Cruz OT  10/7/2022

## 2022-10-08 ENCOUNTER — APPOINTMENT (OUTPATIENT)
Dept: MRI IMAGING | Facility: HOSPITAL | Age: 67
End: 2022-10-08

## 2022-10-08 ENCOUNTER — INPATIENT HOSPITAL (OUTPATIENT)
Dept: URBAN - METROPOLITAN AREA HOSPITAL 84 | Facility: HOSPITAL | Age: 67
End: 2022-10-08

## 2022-10-08 DIAGNOSIS — R19.7 DIARRHEA, UNSPECIFIED: ICD-10-CM

## 2022-10-08 LAB
ANION GAP SERPL CALCULATED.3IONS-SCNC: 11 MMOL/L (ref 5–15)
BASOPHILS # BLD AUTO: 0.1 10*3/MM3 (ref 0–0.2)
BASOPHILS NFR BLD AUTO: 1.2 % (ref 0–1.5)
BUN SERPL-MCNC: 14 MG/DL (ref 8–23)
BUN/CREAT SERPL: 18.4 (ref 7–25)
CALCIUM SPEC-SCNC: 8.3 MG/DL (ref 8.6–10.5)
CHLORIDE SERPL-SCNC: 102 MMOL/L (ref 98–107)
CO2 SERPL-SCNC: 25 MMOL/L (ref 22–29)
CREAT SERPL-MCNC: 0.76 MG/DL (ref 0.57–1)
DEPRECATED RDW RBC AUTO: 49.9 FL (ref 37–54)
EGFRCR SERPLBLD CKD-EPI 2021: 84.9 ML/MIN/1.73
EOSINOPHIL # BLD AUTO: 0.5 10*3/MM3 (ref 0–0.4)
EOSINOPHIL NFR BLD AUTO: 9.5 % (ref 0.3–6.2)
ERYTHROCYTE [DISTWIDTH] IN BLOOD BY AUTOMATED COUNT: 15.7 % (ref 12.3–15.4)
FOLATE SERPL-MCNC: >20 NG/ML (ref 4.78–24.2)
GLUCOSE BLDC GLUCOMTR-MCNC: 142 MG/DL (ref 70–105)
GLUCOSE BLDC GLUCOMTR-MCNC: 172 MG/DL (ref 70–105)
GLUCOSE BLDC GLUCOMTR-MCNC: 182 MG/DL (ref 70–105)
GLUCOSE SERPL-MCNC: 208 MG/DL (ref 65–99)
HCT VFR BLD AUTO: 33 % (ref 34–46.6)
HGB BLD-MCNC: 10.7 G/DL (ref 12–15.9)
LYMPHOCYTES # BLD AUTO: 1.9 10*3/MM3 (ref 0.7–3.1)
LYMPHOCYTES NFR BLD AUTO: 38.3 % (ref 19.6–45.3)
MCH RBC QN AUTO: 29 PG (ref 26.6–33)
MCHC RBC AUTO-ENTMCNC: 32.3 G/DL (ref 31.5–35.7)
MCV RBC AUTO: 89.7 FL (ref 79–97)
MONOCYTES # BLD AUTO: 0.6 10*3/MM3 (ref 0.1–0.9)
MONOCYTES NFR BLD AUTO: 11.5 % (ref 5–12)
NEUTROPHILS NFR BLD AUTO: 2 10*3/MM3 (ref 1.7–7)
NEUTROPHILS NFR BLD AUTO: 39.5 % (ref 42.7–76)
NRBC BLD AUTO-RTO: 0.1 /100 WBC (ref 0–0.2)
PLATELET # BLD AUTO: 190 10*3/MM3 (ref 140–450)
PMV BLD AUTO: 9.4 FL (ref 6–12)
POTASSIUM SERPL-SCNC: 3.7 MMOL/L (ref 3.5–5.2)
RBC # BLD AUTO: 3.68 10*6/MM3 (ref 3.77–5.28)
SODIUM SERPL-SCNC: 138 MMOL/L (ref 136–145)
T4 FREE SERPL-MCNC: 0.62 NG/DL (ref 0.93–1.7)
VIT B12 BLD-MCNC: 327 PG/ML (ref 211–946)
WBC NRBC COR # BLD: 5 10*3/MM3 (ref 3.4–10.8)

## 2022-10-08 PROCEDURE — G0378 HOSPITAL OBSERVATION PER HR: HCPCS

## 2022-10-08 PROCEDURE — 80053 COMPREHEN METABOLIC PANEL: CPT | Performed by: NURSE PRACTITIONER

## 2022-10-08 PROCEDURE — 63710000001 INSULIN LISPRO (HUMAN) PER 5 UNITS: Performed by: HOSPITALIST

## 2022-10-08 PROCEDURE — 82746 ASSAY OF FOLIC ACID SERUM: CPT | Performed by: PSYCHIATRY & NEUROLOGY

## 2022-10-08 PROCEDURE — 99214 OFFICE O/P EST MOD 30 MIN: CPT | Performed by: PSYCHIATRY & NEUROLOGY

## 2022-10-08 PROCEDURE — 82150 ASSAY OF AMYLASE: CPT | Performed by: NURSE PRACTITIONER

## 2022-10-08 PROCEDURE — 83690 ASSAY OF LIPASE: CPT | Performed by: NURSE PRACTITIONER

## 2022-10-08 PROCEDURE — 85025 COMPLETE CBC W/AUTO DIFF WBC: CPT | Performed by: NURSE PRACTITIONER

## 2022-10-08 PROCEDURE — 99232 SBSQ HOSP IP/OBS MODERATE 35: CPT | Performed by: NURSE PRACTITIONER

## 2022-10-08 PROCEDURE — 72148 MRI LUMBAR SPINE W/O DYE: CPT

## 2022-10-08 PROCEDURE — 82962 GLUCOSE BLOOD TEST: CPT

## 2022-10-08 PROCEDURE — 82248 BILIRUBIN DIRECT: CPT | Performed by: NURSE PRACTITIONER

## 2022-10-08 PROCEDURE — 86140 C-REACTIVE PROTEIN: CPT | Performed by: NURSE PRACTITIONER

## 2022-10-08 RX ORDER — LACTULOSE 10 G/15ML
20 SOLUTION ORAL 2 TIMES DAILY
Status: COMPLETED | OUTPATIENT
Start: 2022-10-08 | End: 2022-10-08

## 2022-10-08 RX ORDER — POLYETHYLENE GLYCOL 3350 17 G/17G
17 POWDER, FOR SOLUTION ORAL 2 TIMES DAILY
Status: DISCONTINUED | OUTPATIENT
Start: 2022-10-08 | End: 2022-10-08

## 2022-10-08 RX ORDER — POLYETHYLENE GLYCOL 3350 17 G/17G
17 POWDER, FOR SOLUTION ORAL 2 TIMES DAILY
Status: DISCONTINUED | OUTPATIENT
Start: 2022-10-09 | End: 2022-10-12 | Stop reason: HOSPADM

## 2022-10-08 RX ADMIN — PRIMIDONE 250 MG: 250 TABLET ORAL at 21:37

## 2022-10-08 RX ADMIN — Medication 10 ML: at 08:58

## 2022-10-08 RX ADMIN — TOPIRAMATE 25 MG: 25 TABLET, FILM COATED ORAL at 09:13

## 2022-10-08 RX ADMIN — PRIMIDONE 250 MG: 250 TABLET ORAL at 05:15

## 2022-10-08 RX ADMIN — TOPIRAMATE 25 MG: 25 TABLET, FILM COATED ORAL at 08:58

## 2022-10-08 RX ADMIN — Medication 10 ML: at 21:11

## 2022-10-08 RX ADMIN — PRIMIDONE 250 MG: 250 TABLET ORAL at 13:46

## 2022-10-08 RX ADMIN — LACTULOSE 20 G: 20 SOLUTION ORAL at 21:10

## 2022-10-08 RX ADMIN — SODIUM CHLORIDE 75 ML/HR: 9 INJECTION, SOLUTION INTRAVENOUS at 21:04

## 2022-10-08 RX ADMIN — TOPIRAMATE 50 MG: 25 TABLET, FILM COATED ORAL at 21:10

## 2022-10-08 RX ADMIN — INSULIN LISPRO 2 UNITS: 100 INJECTION, SOLUTION INTRAVENOUS; SUBCUTANEOUS at 17:23

## 2022-10-08 RX ADMIN — LACTULOSE 20 G: 20 SOLUTION ORAL at 08:59

## 2022-10-08 NOTE — SIGNIFICANT NOTE
10/08/22 1553   OTHER   Discipline physical therapy assistant   Rehab Time/Intention   Session Not Performed patient/family declined treatment  (pt had a  long day and was worn out, had MRI earlier and wanted to wait til tomorrow.)   Recommendation   PT - Next Appointment 10/09/22

## 2022-10-08 NOTE — PLAN OF CARE
Goal Outcome Evaluation:           Progress: no change  Outcome Evaluation: pt is currently resting in bed. pt has had minimal complaints during the shift so far.pt had new IV placed during the shift by IV team since old one started leaking. pt has been encouraged and assisted to turn to help prevent skin breakdown.

## 2022-10-08 NOTE — PROGRESS NOTES
" LOS: 0 days   Patient Care Team:  Trae Kaye MD as PCP - General (Family Medicine)      Subjective   \"I had an explosion this morning\"    Interval History:   1/4 of breakfast ate because she was having gas & abdominal cramping.   Labs: Sodium and potassium are normal.  WBCs 5, hemoglobin 11.6 down to 10.7, platelets 190.    ROS:   Diarrhea.  No chest pain, shortness of breath, or cough.       Medication Review:     Current Facility-Administered Medications:   •  acetaminophen (TYLENOL) tablet 650 mg, 650 mg, Oral, Q4H PRN **OR** acetaminophen (TYLENOL) 160 MG/5ML solution 650 mg, 650 mg, Oral, Q4H PRN **OR** acetaminophen (TYLENOL) suppository 650 mg, 650 mg, Rectal, Q4H PRN, Hortencia Herron MD  •  aluminum-magnesium hydroxide-simethicone (MAALOX MAX) 400-400-40 MG/5ML suspension 15 mL, 15 mL, Oral, Q6H PRN, Hortencia Herron MD  •  dextrose (D50W) (25 g/50 mL) IV injection 25 g, 25 g, Intravenous, Q15 Min PRN, Hortencia Herron MD  •  dextrose (GLUTOSE) oral gel 15 g, 15 g, Oral, Q15 Min PRN, Hortencia Herron MD  •  glucagon (human recombinant) (GLUCAGEN DIAGNOSTIC) 1 mg in sterile water (preservative free) 1 mL injection, 1 mg, Subcutaneous, PRN, Hortencia Herron MD  •  hydrOXYzine (ATARAX) tablet 10 mg, 10 mg, Oral, TID PRN, Hortencia Herron MD  •  insulin lispro (ADMELOG) injection 0-7 Units, 0-7 Units, Subcutaneous, BID AC, Hortencia Herron MD, 2 Units at 10/07/22 1051  •  lactulose (CHRONULAC) 10 GM/15ML solution 20 g, 20 g, Oral, BID, Ivone England APRN, 20 g at 10/08/22 0859  •  melatonin tablet 5 mg, 5 mg, Oral, Nightly PRN, Hortencia Herron MD  •  ondansetron (ZOFRAN) tablet 4 mg, 4 mg, Oral, Q6H PRN **OR** ondansetron (ZOFRAN) injection 4 mg, 4 mg, Intravenous, Q6H PRN, Hortencia Herron MD  •  primidone (MYSOLINE) tablet 250 mg, 250 mg, Oral, Q8H, Hortencia Herron MD, 250 mg at 10/08/22 0515  •  sodium chloride 0.9 % flush 10 mL, 10 mL, Intravenous, PRN, Jose Pack PA  •  sodium chloride 0.9 % " flush 10 mL, 10 mL, Intravenous, Q12H, Hortencia Herron MD, 10 mL at 10/08/22 0858  •  sodium chloride 0.9 % flush 10 mL, 10 mL, Intravenous, PRN, Hortencia Herron MD  •  sodium chloride 0.9 % infusion, 75 mL/hr, Intravenous, Continuous, Jose Pack PA, Last Rate: 75 mL/hr at 10/07/22 2124, 75 mL/hr at 10/07/22 2124  •  topiramate (TOPAMAX) tablet 50 mg, 50 mg, Oral, BID, Brandon Villafuerte DO, 25 mg at 10/08/22 0913  •  traMADol (ULTRAM) tablet 50 mg, 50 mg, Oral, TID PRN, Hortencia Herron MD      Objective Resting in hospital bed.  NAD.    Vital Signs  Temp:  [97.8 °F (36.6 °C)-98.4 °F (36.9 °C)] 97.9 °F (36.6 °C)  Heart Rate:  [76-79] 76  Resp:  [16-18] 16  BP: (122-134)/(68-77) 134/69  Physical Exam:    General Appearance:    Awake and alert, in no acute distress   Head:    Normocephalic, without obvious abnormality   Eyes:          Conjunctivae normal, anicteric sclerae   Ears:    Hearing intact   Throat:   No oral lesions, no thrush, oral mucosa moist   Neck:   No adenopathy, supple, no JVD   Lungs:     respirations regular, even and unlabored       Abdomen:     Normal bowel sounds, soft, non-tender, no rebound or guarding, non-distended, no hepatosplenomegaly   Rectal:     Deferred   Extremities:   No edema, no cyanosis, no redness   Skin:   No bleeding, bruising or rash, no jaundice   Neurologic:   Cranial nerves 2 - 12 grossly intact, no asterixis, sensation   intact        Results Review:    CBC    Results from last 7 days   Lab Units 10/07/22  2359 10/06/22  2101   WBC 10*3/mm3 5.00 5.20   HEMOGLOBIN g/dL 10.7* 11.6*   PLATELETS 10*3/mm3 190 241     CMP   Results from last 7 days   Lab Units 10/07/22  2359 10/06/22  2234 10/06/22  2101   SODIUM mmol/L 138 136  --    POTASSIUM mmol/L 3.7 3.3*  --    CHLORIDE mmol/L 102 99  --    CO2 mmol/L 25.0 29.0  --    BUN mg/dL 14 16  --    CREATININE mg/dL 0.76 0.65  --    GLUCOSE mg/dL 208* 122*  --    ALBUMIN g/dL  --  3.60  --    BILIRUBIN mg/dL  --  <0.2   --    ALK PHOS U/L  --  80  --    AST (SGOT) U/L  --  36*  --    ALT (SGPT) U/L  --  51*  --    MAGNESIUM mg/dL  --   --  1.8     Cr Clearance Estimated Creatinine Clearance: 69.8 mL/min (by C-G formula based on SCr of 0.76 mg/dL).  Coag     HbA1C   Lab Results   Component Value Date    HGBA1C 8.5 (H) 04/16/2021    HGBA1C 8.3 (H) 01/12/2021    HGBA1C 8.4 (H) 10/12/2020     Blood Glucose   Glucose   Date/Time Value Ref Range Status   10/08/2022 1152 182 (H) 70 - 105 mg/dL Final     Comment:     Serial Number: [id number]:  [id number]   10/08/2022 0730 142 (H) 70 - 105 mg/dL Final     Comment:     Serial Number: [id number]:  [id number]   10/07/2022 1739 136 (H) 70 - 105 mg/dL Final     Comment:     Serial Number: [id number]:  [id number]   10/07/2022 1245 199 (H) 70 - 105 mg/dL Final     Comment:     Serial Number: [id number]:  [id number]   10/07/2022 0849 173 (H) 70 - 105 mg/dL Final     Comment:     Serial Number: [id number]:  [id number]   10/06/2022 2052 230 (H) 70 - 105 mg/dL Final     Comment:     Serial Number: [id number]:  [id number]     Infection     UA    Results from last 7 days   Lab Units 10/06/22  2217   NITRITE UA  Negative     Radiology(recent) CT Abdomen Pelvis Without Contrast    Result Date: 10/6/2022  Cholelithiasis  7 mm subpleural nodule left lung base. Recommend follow-up CT chest at 3-6 months to reevaluate,.  Electronically Signed ByJocelynn Costa On:10/6/2022 10:59 PM This report was finalized on [id number] by  Slade Costa, .    CT Head Without Contrast    Result Date: 10/6/2022  No acute intracranial abnormality.  Electronically Signed ByJocelynn Costa On:10/6/2022 10:40 PM This report was finalized on [id number] by  Slade Csota, .    XR Chest 1 View    Result Date: 10/6/2022  IMPRESSION : No acute process.[  Electronically Signed By-Slade Costa On:10/6/2022 9:25 PM This report was finalized on [id number] by  Slade Costa,  ".        Assessment & Plan   Acute diarrhea -resolved, ? foodborne  History of chronic constipation  Mildly elevated LFTs  Progressive weakness  Lung nodule  Cholelithiasis  COPD  DMII     PLAN:  Patient presents with acute onset diarrhea that started in September after eating pulled pork and food truck.  Stool PCR 9/23/22 unremarkable.  Diarrhea has resolved and she had 3-4 soft formed bowel movements yesterday and abdominal exam is completely benign.  CT unremarkable except gallstones.  She is tolerating diet without weight loss.  Colonoscopy in July with polyps and hemorrhoids.  Suspect acute infectious or foodborne diarrhea that has resolved with underlying chronic constipation.    Patient given lactulose & is having diarrhea now.   She is complaining of abdominal cramps, gas & bloating which are known side effects of Lactulose.   Will decrease lactulose & place on Miralax now that she is having bowel movements.   Mildly elevated AST and ALT, if persists would recommend full liver serology work-up.  Will over LFT\"s on blood drawn this morning.      I discussed the patients findings and my recommendations with the patient.      Olivia Brown, WYATT  10/08/22  12:09 EDT          "

## 2022-10-08 NOTE — PROGRESS NOTES
[facility] Medicine Services Daily Progress Note    Patient Name: Divya Lomeli  : 1955  MRN: [id number]  Primary Care Physician:  Trae Kaye MD  Date of admission: 10/6/2022      Subjective      Chief Complaint: Walking difficulty      Patient Reports     10/8/22: Consulted neurology since the patient has difficulty walking with physical therapy.  Claims to have had several weeks of diarrhea but now constipation.  Does not want Lovenox shot.  Lives alone.    Review of Systems   All other systems reviewed and are negative.         Objective      Vitals:   Temp:  [97.8 °F (36.6 °C)-98.4 °F (36.9 °C)] 97.9 °F (36.6 °C)  Heart Rate:  [76-79] 76  Resp:  [16-18] 16  BP: (122-134)/(68-77) 134/69    Physical Exam  HENT:      Head: Normocephalic.      Nose: Nose normal.   Eyes:      Extraocular Movements: Extraocular movements intact.      Pupils: Pupils are equal, round, and reactive to light.   Cardiovascular:      Rate and Rhythm: Normal rate and regular rhythm.   Pulmonary:      Effort: Pulmonary effort is normal.   Abdominal:      General: Bowel sounds are normal.   Musculoskeletal:         General: Normal range of motion.      Cervical back: Normal range of motion.   Skin:     General: Skin is warm.   Neurological:      Mental Status: She is alert. Mental status is at baseline.   Psychiatric:         Mood and Affect: Mood normal.             Result Review    Result Review:  I have personally reviewed the results from the time of this admission to 10/8/2022 11:25 EDT and agree with these findings:  [x]  Laboratory  []  Microbiology  [x]  Radiology  []  EKG/Telemetry   []  Cardiology/Vascular   []  Pathology  [x]  Old records  []  Other:            Assessment & Plan      Brief Patient Summary:    67-year-old female with history of COPD, hypertension and diabetes mellitus presented to the emergency room on 10/6/2022 complaining of several weeks of diarrhea and weakness.  Apparently  the patient had slid off a recliner and had stayed on the floor for several hours until her boyfriend assisted her to get up.  GI has been consulted and the patient in the past has followed with Dr. Limon      insulin lispro, 0-7 Units, Subcutaneous, BID AC  lactulose, 20 g, Oral, BID  primidone, 250 mg, Oral, Q8H  sodium chloride, 10 mL, Intravenous, Q12H  topiramate, 50 mg, Oral, BID       sodium chloride, 75 mL/hr, Last Rate: 75 mL/hr (10/07/22 2124)         Active Hospital Problems:  Active Hospital Problems    Diagnosis    • Generalized weakness      Generalized weakness:  -Patient fell at home and was unable to get up from the floor for several hours  -She has been having several weeks of diarrhea  -PT/OT consulted  -Neurology consulted    Diabetes mellitus:  -Continue ISS and home medications    Hyperlipidemia:  -Statin    Hypertension:  -Continue lisinopril      DVT prophylaxis:  Mechanical DVT prophylaxis orders are present.    CODE STATUS:    Code Status (Patient has no pulse and is not breathing): CPR (Attempt to Resuscitate)  Medical Interventions (Patient has pulse or is breathing): Full Support      Disposition:  I expect patient to be discharged defer    This patient has been examined wearing appropriate Personal Protective Equipment and discussed with nursing. 10/08/22      Electronically signed by Brandon Villafuerte DO, 10/08/22, 11:25 EDT.  [facility], [place] Hospitalist Team

## 2022-10-08 NOTE — PLAN OF CARE
Goal Outcome Evaluation:  Plan of Care Reviewed With: patient        Progress: no change   Pt reports Heparin is making her itchy day shift nurse was notified. Itching stopped after an hour.

## 2022-10-08 NOTE — CONSULTS
Primary Care Provider: Trae Kaye MD     Consult requested by:     Reason for Consultation: Neurological evaluation/weakness    History taken from: patient chart RN    Chief complaint: Weakness     SUBJECTIVE:    History of present illness: Background per H&P: Divya Lomeli is a 67 y.o. year old female who was evaluated in room 230 at [facility]    Source of information was the patient and the medical records    The patient has significant GI issues, please refer below to the details    She has had diarrhea lately and now constipation.  She has history of chronic constipation.  Over the last few days has been having significant weakness.  She says that she was having difficulty ambulating.  She is reports that she fell a couple of days back and since then has significant weakness.  She has had diarrhea in the past due to likely food poisoning and then lately has had chronic constipation.  No genitourinary symptoms.  No numbness or tingling.  Generalized weakness and feeling of sickness    No passing out episodes    No speech problem or double vision or swallowing or breathing problems    No significant upper extremity issues otherwise    Not ascending paralysis or descending paralysis    As I mentioned above she is likely improving    She has chronic tremors and she is on primidone to 50 mg 3 times daily which is very high dose but I am not sure why she taking 3 times daily when it is a long-acting medication anyway    As per admitting,  Divya Lomlei is a 67 y.o. female with a medical history notable for COPD, hypertension, type 2 diabetes, and tremors who presented to [facility] on 10/6/2022 complaining of diarrhea and generalized weakness.     Patient stated after having barbecue pulled pork sandwich in August, she developed diarrhea, which has been ongoing till now.  She denied bloody or bloody stools. Due to her diarrhea, she has decreased appetite, some mild weight loss,  and debilitating weakness. Pt stated is difficult for her to walk independently now. Pt mentioned having traveling abdominal pain. Sometimes the pain can be in her lower abdomen and migrate then to her upper abdomen.      Review of Systems   Constitutional: Negative for chills and fever.   HENT: Negative.    Eyes: Negative.    Cardiovascular: Positive for near-syncope. Negative for chest pain, irregular heartbeat and orthopnea.   Respiratory: Negative for shortness of breath.    Skin: Negative.    Musculoskeletal: Negative.    Gastrointestinal: Positive for abdominal pain and diarrhea. Negative for nausea and vomiting.   Genitourinary: Negative for dysuria.   Neurological: Positive for loss of balance, tremors and weakness. Negative for dizziness and light-headedness.   Psychiatric/Behavioral: Negative.    - Portions of the above HPI were copied from previous encounters and edited as appropriate. PMH as detailed below.     Review of Systems   No fever chills rigors or sweats  No weight issues  No sleep problems  HEENT:  No speech problem, vision changes, facial asymmetry or pain, or neck problem  Chest: No chest pain, clubbing, cyanosis, orthopnea palpitations  Pulmonary:  No shortness of air, cough or expectoration  Abdomen:  Chronic constipation  No genitourinary symptoms  Extremity problems as discussed  No back problem  No psychotic issues, but history of anxiety  Neurologic issues as discussed  No hematologic, dermatologic or endocrine problems        PATIENT HISTORY:  Past Medical History:   Diagnosis Date   • Anxiety    • Arm pain     rt upper arm muscle   • COPD (chronic obstructive pulmonary disease) (HCC)     20 to 30 years ago. Does not take meds.   • Diabetes mellitus (HCC)     Type 2   • Diabetic nephropathy (HCC)    • Hyperlipidemia    • Hypertension    ,   Past Surgical History:   Procedure Laterality Date   • APPENDECTOMY  1958   • BACK SURGERY     • CYSTOSCOPY WITH CLOT EVACUATION N/A 9/28/2021     Procedure: CYSTOSCOPY WITH CLOT EVACUATION;  Surgeon: Jay Torres MD;  Location: [facility] OR;  Service: Urology;  Laterality: N/A;   • INTERVENTIONAL RADIOLOGY PROCEDURE N/A 2021    Procedure: STENT PLACEMENT;  Surgeon: Jay Torres MD;  Location: [facility] MA[facility] OR;  Service: Urology;  Laterality: N/A;   • LUMBAR SPINE SURGERY      L5 removed-Abstracted from [facility]   • OOPHORECTOMY     • TONSILLECTOMY     • TOTAL ABDOMINAL HYSTERECTOMY     • TRANSURETHRAL RESECTION OF BLADDER TUMOR N/A 2021    Procedure: CYSTOSCOPY TRANSURETHRAL RESECTION OF BLADDER TUMOR;  Surgeon: Jay Torres MD;  Location: [facility] MA[facility] OR;  Service: Urology;  Laterality: N/A;   ,   Family History   Problem Relation Age of Onset   • Diabetes Mother    • Diabetes Father    ,   Social History     Tobacco Use   • Smoking status: Former     Packs/day: 4.50     Years: 21.00     Pack years: 94.50     Types: Cigarettes     Start date:      Quit date:      Years since quittin.7   • Smokeless tobacco: Never   • Tobacco comments:     2nd Hand Smoke - boyfriend smokes.   Vaping Use   • Vaping Use: Never used   Substance Use Topics   • Alcohol use: Yes     Comment: occ   • Drug use: Yes     Types: Marijuana     Comment: college    ,   Prior to Admission medications    Medication Sig Start Date End Date Taking? Authorizing Provider   Cobalamin Combinations (NEURIVA PLUS PO) Take 1 tablet/day by mouth.    Provider, MD Dilcia   estradiol (ESTRACE) 1 MG tablet Take 1/2 (one-half) tablet by mouth once daily 21   Linda Singh APRN   glimepiride (AMARYL) 4 MG tablet Take 1 tablet by mouth once daily 21   Linda Singh APRN   hydrOXYzine (ATARAX) 10 MG tablet TAKE 1 TO 2 TABLETS BY MOUTH EVERY 8 HOURS AS NEEDED FOR ANXIETY 21   Linda Singh APRN   Jardiance 25 MG tablet Take 1 tablet by mouth once daily 21   Linda Singh APRN   Multiple Vitamins-Minerals (PRESERVISION AREDS 2 PO)      Dilcia You MD   Narcan 4 MG/0.1ML nasal spray  3/27/21   Dilcia You MD   Omega-3 Fatty Acids (Fish Oil Burp-Less) 1200 MG capsule Take 1 capsule by mouth Daily. 10/19/20   Linda Singh APRN   primidone (MYSOLINE) 250 MG tablet Take 250 mg by mouth 3 (Three) Times a Day.    Dilcia You MD   Semaglutide,0.25 or 0.5MG/DOS, (Ozempic, 0.25 or 0.5 MG/DOSE,) 2 MG/1.5ML solution pen-injector Inject 0.5 mg Subcutaneously weekly on tuesdays 5/4/21   Linda Singh APRN   SITagliptin-metFORMIN HCl ER (Janumet XR)  MG tablet Take 2 tablets every day with a meal 1/19/21   Linda Singh APRN   topiramate (TOPAMAX) 50 MG tablet Take 50 mg by mouth 2 (Two) Times a Day.    Dilcia You MD   traMADol (ULTRAM) 50 MG tablet Take 1 tablet by mouth 3 (Three) Times a Day As Needed for Moderate Pain . 12/22/20   Steve Shields MD    Allergies:  Ether and Other    Current Facility-Administered Medications   Medication Dose Route Frequency Provider Last Rate Last Admin   • acetaminophen (TYLENOL) tablet 650 mg  650 mg Oral Q4H PRN Hortencia Herron MD        Or   • acetaminophen (TYLENOL) 160 MG/5ML solution 650 mg  650 mg Oral Q4H PRN Hortencia Herron MD        Or   • acetaminophen (TYLENOL) suppository 650 mg  650 mg Rectal Q4H PRN Hortencia Herron MD       • aluminum-magnesium hydroxide-simethicone (MAALOX MAX) 400-400-40 MG/5ML suspension 15 mL  15 mL Oral Q6H PRN Hortencia Herron MD       • dextrose (D50W) (25 g/50 mL) IV injection 25 g  25 g Intravenous Q15 Min PRN Hortencia Herron MD       • dextrose (GLUTOSE) oral gel 15 g  15 g Oral Q15 Min PRN Hortencia Herron MD       • glucagon (human recombinant) (GLUCAGEN DIAGNOSTIC) 1 mg in sterile water (preservative free) 1 mL injection  1 mg Subcutaneous PRN Hortencia Herron MD       • hydrOXYzine (ATARAX) tablet 10 mg  10 mg Oral TID PRN Hortencia Herron MD       • insulin lispro (ADMELOG) injection 0-7 Units  0-7 Units  Subcutaneous BID AC Hortencia Herron MD   2 Units at 10/07/22 1051   • lactulose (CHRONULAC) 10 GM/15ML solution 20 g  20 g Oral BID Ivone England APRN   20 g at 10/08/22 0859   • melatonin tablet 5 mg  5 mg Oral Nightly PRN Hortencia Herron MD       • ondansetron (ZOFRAN) tablet 4 mg  4 mg Oral Q6H PRN Hortencia Herron MD        Or   • ondansetron (ZOFRAN) injection 4 mg  4 mg Intravenous Q6H PRN Hortencia Herron MD       • primidone (MYSOLINE) tablet 250 mg  250 mg Oral Q8H Hortencia Hreron MD   250 mg at 10/08/22 0515   • sodium chloride 0.9 % flush 10 mL  10 mL Intravenous PRN Jose Pack PA       • sodium chloride 0.9 % flush 10 mL  10 mL Intravenous Q12H Hortencia Herron MD   10 mL at 10/08/22 0858   • sodium chloride 0.9 % flush 10 mL  10 mL Intravenous PRN Hortencia Herron MD       • sodium chloride 0.9 % infusion  75 mL/hr Intravenous Continuous Jose Pack PA 75 mL/hr at 10/07/22 2124 75 mL/hr at 10/07/22 2124   • topiramate (TOPAMAX) tablet 50 mg  50 mg Oral BID Brandon Villafuerte DO   25 mg at 10/08/22 0913   • traMADol (ULTRAM) tablet 50 mg  50 mg Oral TID PRN Hortencia Herron MD            ________________________________________________________        OBJECTIVE:  Upon today's exam, patient is resting comfortably in bed in no acute distress  Neurologic Exam    The patient is awake and alert and oriented x3  Normal speech  Cranial nerve examination demonstrate:  Full fields of vision to confrontation  Pupils are round, reactive to light and accommodation and size of about 3 mm  No ptosis or nystagmus  Funduscopic examination was not successful  Eye movements are conjugate     Sensation on the face and scalp are normal  Muscles of mastication are normal and symmetric  Muscles of  facial expression are normal and symmetric  Hearing is intact bilaterally  Head turning and shoulder shrugs were unremarkable  Tongue was midline  I could not visualize her oropharynx or uvula     Motor  examination:  Normal bulk, tone and strength was 5-/5 questionable left lower extremity may be 4+  And there may be slight asymmetry with proximal weakness more than distal weakness and again is that effort.  I will give her proximal muscle strength as 4+ symmetrically both upper and lower extremities  No fasciculations     Sensory examination:  Intact for soft touch, pain and position sensation  Romberg was not evaluated     Reflexes:  0/4     Coordination:  Normal finger-to-nose to finger, rapid alternating movements and toe to finger     Gait:  Deferred     Toe signs:  Mute    ________________________________________________________   RESULTS REVIEW:    VITAL SIGNS:   Temp:  [97.8 °F (36.6 °C)-98.4 °F (36.9 °C)] 97.9 °F (36.6 °C)  Heart Rate:  [76-79] 76  Resp:  [16-18] 16  BP: (122-134)/(68-77) 134/69     LABS:      Lab 10/07/22  2359 10/06/22  2234 10/06/22  2101   WBC 5.00  --  5.20   HEMOGLOBIN 10.7*  --  11.6*   HEMATOCRIT 33.0*  --  36.1   PLATELETS 190  --  241   NEUTROS ABS 2.00  --  4.00   LYMPHS ABS 1.90  --  0.50*   MONOS ABS 0.60  --  0.40   EOS ABS 0.50*  --  0.10   MCV 89.7  --  92.1   SED RATE  --   --  20   CRP  --  14.72*  --          Lab 10/07/22  2359 10/06/22  2234 10/06/22  2101   SODIUM 138 136  --    POTASSIUM 3.7 3.3*  --    CHLORIDE 102 99  --    CO2 25.0 29.0  --    ANION GAP 11.0 8.0  --    BUN 14 16  --    CREATININE 0.76 0.65  --    EGFR 84.9 95.4  --    GLUCOSE 208* 122*  --    CALCIUM 8.3* 8.6  --    MAGNESIUM  --   --  1.8   TSH  --   --  1.340         Lab 10/06/22  2234   TOTAL PROTEIN 6.0   ALBUMIN 3.60   GLOBULIN 2.4   ALT (SGPT) 51*   AST (SGOT) 36*   BILIRUBIN <0.2   ALK PHOS 80         Lab 10/06/22  2234   TROPONIN T <0.010                 UA    Urinalysis 10/6/22   Specific [place], UA 1.034 (A)   Ketones, UA 15 mg/dL (1+) (A)   Blood, UA Negative   Leukocytes, UA Negative   Nitrite, UA Negative   (A) Abnormal value              Lab Results   Component Value Date    TSH  1.340 10/06/2022    LDL 38 04/16/2021    HGBA1C 8.5 (H) 04/16/2021       IMAGING STUDIES:  CT Abdomen Pelvis Without Contrast    Result Date: 10/6/2022  Cholelithiasis  7 mm subpleural nodule left lung base. Recommend follow-up CT chest at 3-6 months to reevaluate,.  Electronically Signed ByJocelynn Costa On:10/6/2022 10:59 PM This report was finalized on [id number] by  Slade Costa, .    CT Head Without Contrast    Result Date: 10/6/2022  No acute intracranial abnormality.  Electronically Signed By-Slade Costa On:10/6/2022 10:40 PM This report was finalized on [id number] by  Slade Costa, .    XR Chest 1 View    Result Date: 10/6/2022  IMPRESSION : No acute process.[  Electronically Signed By-Slade Costa On:10/6/2022 9:25 PM This report was finalized on [id number] by  Slade Costa, .      I reviewed the patient's new clinical results.    ________________________________________________________     PROBLEM LIST:    Generalized weakness            ASSESSMENT/PLAN:  This is a very interesting 67-year-old female who has had significant diarrhea and GI issues and she is complaining of weakness.  I agree with Dr. Mallory to be concerned for something like AIDP, following a GI illness but she does not really fit the criteria at this time.  She has more proximal weakness.  His both upper extremities is also not an ascending paralysis and distally she is essentially 5 - at worst and she is improving    I will check her MRI of her sacral spine because she reported fall and otherwise consider continuing PT OT and rehab and see how things go    If things do not improve the nerve conduction EMGs LP and IVIG or plasma exchange may be considered but at present she does not look that type yet      I discussed the patient's findings and my recommendations with patient and nursing staff    Jasmin Felton MD  10/08/22  11:13 EDT

## 2022-10-09 ENCOUNTER — INPATIENT HOSPITAL (OUTPATIENT)
Dept: URBAN - METROPOLITAN AREA HOSPITAL 84 | Facility: HOSPITAL | Age: 67
End: 2022-10-09

## 2022-10-09 DIAGNOSIS — R19.7 DIARRHEA, UNSPECIFIED: ICD-10-CM

## 2022-10-09 DIAGNOSIS — R94.5 ABNORMAL RESULTS OF LIVER FUNCTION STUDIES: ICD-10-CM

## 2022-10-09 DIAGNOSIS — K59.00 CONSTIPATION, UNSPECIFIED: ICD-10-CM

## 2022-10-09 LAB
ALBUMIN SERPL-MCNC: 3.4 G/DL (ref 3.5–5.2)
ALBUMIN/GLOB SERPL: 1.3 G/DL
ALP SERPL-CCNC: 86 U/L (ref 39–117)
ALT SERPL W P-5'-P-CCNC: 79 U/L (ref 1–33)
AMYLASE SERPL-CCNC: 147 U/L (ref 28–100)
ANION GAP SERPL CALCULATED.3IONS-SCNC: 12 MMOL/L (ref 5–15)
AST SERPL-CCNC: 48 U/L (ref 1–32)
BASOPHILS # BLD AUTO: 0.1 10*3/MM3 (ref 0–0.2)
BASOPHILS NFR BLD AUTO: 1.2 % (ref 0–1.5)
BILIRUB CONJ SERPL-MCNC: <0.2 MG/DL (ref 0–0.3)
BILIRUB SERPL-MCNC: <0.2 MG/DL (ref 0–1.2)
BUN SERPL-MCNC: 10 MG/DL (ref 8–23)
BUN/CREAT SERPL: 17.9 (ref 7–25)
CALCIUM SPEC-SCNC: 8.6 MG/DL (ref 8.6–10.5)
CHLORIDE SERPL-SCNC: 102 MMOL/L (ref 98–107)
CO2 SERPL-SCNC: 22 MMOL/L (ref 22–29)
CREAT SERPL-MCNC: 0.56 MG/DL (ref 0.57–1)
CRP SERPL-MCNC: 7.27 MG/DL (ref 0–0.5)
DEPRECATED RDW RBC AUTO: 50.3 FL (ref 37–54)
EGFRCR SERPLBLD CKD-EPI 2021: 98.9 ML/MIN/1.73
EOSINOPHIL # BLD AUTO: 0.4 10*3/MM3 (ref 0–0.4)
EOSINOPHIL NFR BLD AUTO: 7.7 % (ref 0.3–6.2)
ERYTHROCYTE [DISTWIDTH] IN BLOOD BY AUTOMATED COUNT: 15.9 % (ref 12.3–15.4)
GLOBULIN UR ELPH-MCNC: 2.7 GM/DL
GLUCOSE BLDC GLUCOMTR-MCNC: 150 MG/DL (ref 70–105)
GLUCOSE BLDC GLUCOMTR-MCNC: 159 MG/DL (ref 70–105)
GLUCOSE BLDC GLUCOMTR-MCNC: 168 MG/DL (ref 70–105)
GLUCOSE BLDC GLUCOMTR-MCNC: 205 MG/DL (ref 70–105)
GLUCOSE SERPL-MCNC: 192 MG/DL (ref 65–99)
HCT VFR BLD AUTO: 34.6 % (ref 34–46.6)
HGB BLD-MCNC: 11.2 G/DL (ref 12–15.9)
LIPASE SERPL-CCNC: 69 U/L (ref 13–60)
LYMPHOCYTES # BLD AUTO: 2.1 10*3/MM3 (ref 0.7–3.1)
LYMPHOCYTES NFR BLD AUTO: 40.1 % (ref 19.6–45.3)
MCH RBC QN AUTO: 29.1 PG (ref 26.6–33)
MCHC RBC AUTO-ENTMCNC: 32.5 G/DL (ref 31.5–35.7)
MCV RBC AUTO: 89.8 FL (ref 79–97)
MONOCYTES # BLD AUTO: 0.6 10*3/MM3 (ref 0.1–0.9)
MONOCYTES NFR BLD AUTO: 10.9 % (ref 5–12)
NEUTROPHILS NFR BLD AUTO: 2.1 10*3/MM3 (ref 1.7–7)
NEUTROPHILS NFR BLD AUTO: 40.1 % (ref 42.7–76)
NRBC BLD AUTO-RTO: 0.2 /100 WBC (ref 0–0.2)
PLATELET # BLD AUTO: 229 10*3/MM3 (ref 140–450)
PMV BLD AUTO: 9.7 FL (ref 6–12)
POTASSIUM SERPL-SCNC: 3.6 MMOL/L (ref 3.5–5.2)
PROT SERPL-MCNC: 6.1 G/DL (ref 6–8.5)
QT INTERVAL: 347 MS
RBC # BLD AUTO: 3.85 10*6/MM3 (ref 3.77–5.28)
SODIUM SERPL-SCNC: 136 MMOL/L (ref 136–145)
WBC NRBC COR # BLD: 5.3 10*3/MM3 (ref 3.4–10.8)

## 2022-10-09 PROCEDURE — 82962 GLUCOSE BLOOD TEST: CPT

## 2022-10-09 PROCEDURE — 99232 SBSQ HOSP IP/OBS MODERATE 35: CPT | Performed by: NURSE PRACTITIONER

## 2022-10-09 PROCEDURE — G0378 HOSPITAL OBSERVATION PER HR: HCPCS

## 2022-10-09 PROCEDURE — 63710000001 INSULIN LISPRO (HUMAN) PER 5 UNITS: Performed by: HOSPITALIST

## 2022-10-09 PROCEDURE — 97116 GAIT TRAINING THERAPY: CPT

## 2022-10-09 PROCEDURE — 97535 SELF CARE MNGMENT TRAINING: CPT

## 2022-10-09 RX ADMIN — TOPIRAMATE 50 MG: 25 TABLET, FILM COATED ORAL at 09:12

## 2022-10-09 RX ADMIN — POLYETHYLENE GLYCOL 3350 17 G: 17 POWDER, FOR SOLUTION ORAL at 09:13

## 2022-10-09 RX ADMIN — POLYETHYLENE GLYCOL 3350 17 G: 17 POWDER, FOR SOLUTION ORAL at 20:34

## 2022-10-09 RX ADMIN — Medication 10 ML: at 20:34

## 2022-10-09 RX ADMIN — PRIMIDONE 250 MG: 250 TABLET ORAL at 05:27

## 2022-10-09 RX ADMIN — Medication 10 ML: at 09:12

## 2022-10-09 RX ADMIN — SODIUM CHLORIDE 75 ML/HR: 9 INJECTION, SOLUTION INTRAVENOUS at 23:30

## 2022-10-09 RX ADMIN — TOPIRAMATE 50 MG: 25 TABLET, FILM COATED ORAL at 20:34

## 2022-10-09 RX ADMIN — INSULIN LISPRO 2 UNITS: 100 INJECTION, SOLUTION INTRAVENOUS; SUBCUTANEOUS at 09:14

## 2022-10-09 RX ADMIN — PRIMIDONE 250 MG: 250 TABLET ORAL at 21:55

## 2022-10-09 RX ADMIN — INSULIN LISPRO 2 UNITS: 100 INJECTION, SOLUTION INTRAVENOUS; SUBCUTANEOUS at 18:12

## 2022-10-09 RX ADMIN — PRIMIDONE 250 MG: 250 TABLET ORAL at 14:10

## 2022-10-09 NOTE — PLAN OF CARE
Goal Outcome Evaluation:  Plan of Care Reviewed With: patient        Progress: no change   No issues or concerns at this time

## 2022-10-09 NOTE — PLAN OF CARE
Goal Outcome Evaluation:           Progress: no change  Outcome Evaluation: pt is currently resting in bed. pt has had minimal complaints of pain through out the shift so far. pt has had continuous fluids running during the shift. pt has been encouraged and assisted to turn to help prevent skin breakdown.

## 2022-10-09 NOTE — PROGRESS NOTES
" LOS: 0 days   Patient Care Team:  Trae Kaye MD as PCP - General (Family Medicine)      Subjective   \"weak & tired\"    Interval History:   Labs: Sodium, potassium are normal, creatinine 0.56.  TB 0.2, alk phos 86, AST 48, ALT 79, amylase 147, lipase 69, CRP 7.27 down from 14.  WBCs 5.3, hemoglobin 11.2 and platelets are 229.  Pending right upper quadrant ultrasound.  No further bowel movements.    ROS:   Fatigue   No chest pain, shortness of breath, or cough.       Medication Review:     Current Facility-Administered Medications:   •  acetaminophen (TYLENOL) tablet 650 mg, 650 mg, Oral, Q4H PRN **OR** acetaminophen (TYLENOL) 160 MG/5ML solution 650 mg, 650 mg, Oral, Q4H PRN **OR** acetaminophen (TYLENOL) suppository 650 mg, 650 mg, Rectal, Q4H PRN, Hortencia Herron MD  •  aluminum-magnesium hydroxide-simethicone (MAALOX MAX) 400-400-40 MG/5ML suspension 15 mL, 15 mL, Oral, Q6H PRN, Hortencia Herron MD  •  dextrose (D50W) (25 g/50 mL) IV injection 25 g, 25 g, Intravenous, Q15 Min PRN, Hortencia Herron MD  •  dextrose (GLUTOSE) oral gel 15 g, 15 g, Oral, Q15 Min PRN, Hortencia Herron MD  •  glucagon (human recombinant) (GLUCAGEN DIAGNOSTIC) 1 mg in sterile water (preservative free) 1 mL injection, 1 mg, Subcutaneous, PRN, Hortencia Herron MD  •  hydrOXYzine (ATARAX) tablet 10 mg, 10 mg, Oral, TID PRN, Hortencia Herron MD  •  insulin lispro (ADMELOG) injection 0-7 Units, 0-7 Units, Subcutaneous, BID AC, Hortencia Herron MD, 2 Units at 10/09/22 0914  •  melatonin tablet 5 mg, 5 mg, Oral, Nightly PRN, Hortencia Herron MD  •  ondansetron (ZOFRAN) tablet 4 mg, 4 mg, Oral, Q6H PRN **OR** ondansetron (ZOFRAN) injection 4 mg, 4 mg, Intravenous, Q6H PRN, Hortencia Herron MD  •  polyethylene glycol (MIRALAX) packet 17 g, 17 g, Oral, BID, Olivia Brown, WYATT, 17 g at 10/09/22 0913  •  primidone (MYSOLINE) tablet 250 mg, 250 mg, Oral, Q8H, Hortencia Herron MD, 250 mg at 10/09/22 0527  •  sodium chloride 0.9 % flush 10 mL, " 10 mL, Intravenous, PRN, Jose Pack PA  •  sodium chloride 0.9 % flush 10 mL, 10 mL, Intravenous, Q12H, Hortencia Herron MD, 10 mL at 10/09/22 0912  •  sodium chloride 0.9 % flush 10 mL, 10 mL, Intravenous, PRN, Hortencia Herron MD  •  sodium chloride 0.9 % infusion, 75 mL/hr, Intravenous, Continuous, Jose Pack PA, Last Rate: 75 mL/hr at 10/08/22 2104, 75 mL/hr at 10/08/22 2104  •  topiramate (TOPAMAX) tablet 50 mg, 50 mg, Oral, BID, Brandon Villafuerte DO, 50 mg at 10/09/22 0912  •  traMADol (ULTRAM) tablet 50 mg, 50 mg, Oral, TID PRN, Hortencia Herron MD      Objective Resting in hospital bed.  NAD.    Vital Signs  Temp:  [97.4 °F (36.3 °C)-98.6 °F (37 °C)] 98 °F (36.7 °C)  Heart Rate:  [71-77] 71  Resp:  [16-18] 18  BP: (131-155)/(70-80) 148/76  Physical Exam:    General Appearance:    Awake and alert, in no acute distress   Head:    Normocephalic, without obvious abnormality   Eyes:          Conjunctivae normal, anicteric sclerae   Ears:    Hearing intact   Throat:   No oral lesions, no thrush, oral mucosa moist   Neck:   No adenopathy, supple, no JVD   Lungs:     respirations regular, even and unlabored       Abdomen:     Normal bowel sounds, soft, non-tender, no rebound or guarding, non-distended, no hepatosplenomegaly   Rectal:     Deferred   Extremities:   No edema, no cyanosis, no redness   Skin:   No bleeding, bruising or rash, no jaundice   Neurologic:   Cranial nerves 2 - 12 grossly intact, no asterixis, sensation   intact        Results Review:    CBC    Results from last 7 days   Lab Units 10/08/22  2331 10/07/22  2359 10/06/22  2101   WBC 10*3/mm3 5.30 5.00 5.20   HEMOGLOBIN g/dL 11.2* 10.7* 11.6*   PLATELETS 10*3/mm3 229 190 241     CMP   Results from last 7 days   Lab Units 10/08/22  2331 10/07/22  2359 10/06/22  2234 10/06/22  2101   SODIUM mmol/L 136 138 136  --    POTASSIUM mmol/L 3.6 3.7 3.3*  --    CHLORIDE mmol/L 102 102 99  --    CO2 mmol/L 22.0 25.0 29.0  --    BUN mg/dL 10 14 16  --     CREATININE mg/dL 0.56* 0.76 0.65  --    GLUCOSE mg/dL 192* 208* 122*  --    ALBUMIN g/dL 3.40*  --  3.60  --    BILIRUBIN mg/dL <0.2  --  <0.2  --    ALK PHOS U/L 86  --  80  --    AST (SGOT) U/L 48*  --  36*  --    ALT (SGPT) U/L 79*  --  51*  --    MAGNESIUM mg/dL  --   --   --  1.8   AMYLASE U/L 147*  --   --   --    LIPASE U/L 69*  --   --   --      Cr Clearance Estimated Creatinine Clearance: 89.5 mL/min (A) (by C-G formula based on SCr of 0.56 mg/dL (L)).  Coag     HbA1C   Lab Results   Component Value Date    HGBA1C 8.5 (H) 04/16/2021    HGBA1C 8.3 (H) 01/12/2021    HGBA1C 8.4 (H) 10/12/2020     Blood Glucose   Glucose   Date/Time Value Ref Range Status   10/09/2022 1103 205 (H) 70 - 105 mg/dL Final     Comment:     Serial Number: [id number]:  [id number]   10/09/2022 0710 159 (H) 70 - 105 mg/dL Final     Comment:     Serial Number: [id number]:  [id number]   10/08/2022 1634 172 (H) 70 - 105 mg/dL Final     Comment:     Serial Number: [id number]:  [id number]   10/08/2022 1152 182 (H) 70 - 105 mg/dL Final     Comment:     Serial Number: [id number]:  [id number]   10/08/2022 0730 142 (H) 70 - 105 mg/dL Final     Comment:     Serial Number: [id number]:  [id number]   10/07/2022 1739 136 (H) 70 - 105 mg/dL Final     Comment:     Serial Number: [id number]:  [id number]   10/07/2022 1245 199 (H) 70 - 105 mg/dL Final     Comment:     Serial Number: [id number]:  [id number]   10/07/2022 0849 173 (H) 70 - 105 mg/dL Final     Comment:     Serial Number: [id number]:  [id number]     Infection     UA    Results from last 7 days   Lab Units 10/06/22  2217   NITRITE UA  Negative     Radiology(recent) MRI Lumbar Spine Without Contrast    Result Date: 10/8/2022  1. Negative for acute fracture. 2. Degenerative findings with disc disease most pronounced at L3-4, L4-5 and L5-S1 without high-grade canal stenosis.  Electronically Signed By-Samson Copeland MD On:10/8/2022 7:27 PM This  report was finalized on [id number] by  Samson Copeland MD.        Assessment & Plan   Acute diarrhea -resolved, ? foodborne  History of chronic constipation  Mildly elevated LFTs  Progressive weakness  Lung nodule  Cholelithiasis  COPD  DMII     PLAN:  Patient presents with acute onset diarrhea that started in September after eating pulled pork and food truck.  Stool PCR 9/23/22 unremarkable.  Diarrhea has resolved and she had 3-4 soft formed bowel movements yesterday and abdominal exam is completely benign.  CT unremarkable except gallstones.  She is tolerating diet without weight loss.  Colonoscopy in July with polyps and hemorrhoids.  Suspect acute infectious or foodborne diarrhea that has resolved with underlying chronic constipation.    Patient had a good bowel movement yesterday after lactulose.  Patient has been placed on MiraLAX twice a day.  Is feeling weak and tired today otherwise no complaints.  Pending right upper quadrant ultrasound.  Labs reveal some mild pancreatitis.  Would recommend repeating amylase, lipase and LFTs tomorrow morning.  Tolerating low residue diet.  Patient will eventually need to see general surgery for possible cholecystectomy.  Hopefully home tomorrow.     I discussed the patients findings and my recommendations with the patient.      Olivia Brown, APRN  10/09/22  13:14 EDT

## 2022-10-09 NOTE — PROGRESS NOTES
[facility] Medicine Services Daily Progress Note    Patient Name: Divya Lomeli  : 1955  MRN: [id number]  Primary Care Physician:  Trea Kaye MD  Date of admission: 10/6/2022      Subjective      Chief Complaint: Walking difficulty      Patient Reports     10/8/22: Consulted neurology since the patient has difficulty walking with physical therapy.  Claims to have had several weeks of diarrhea but now constipation.  Does not want Lovenox shot.  Lives alone.    10/9/22: Claims primidone dose increased recently by neurologist due to uncontrolled tremors.  MRI of the pelvis done yesterday.  Abdominal ultrasound ordered.    Review of Systems   All other systems reviewed and are negative.         Objective      Vitals:   Temp:  [97.4 °F (36.3 °C)-98.6 °F (37 °C)] 98 °F (36.7 °C)  Heart Rate:  [73-77] 77  Resp:  [16-18] 18  BP: (131-155)/(70-80) 155/70    Physical Exam  HENT:      Head: Normocephalic.      Nose: Nose normal.   Eyes:      Extraocular Movements: Extraocular movements intact.      Pupils: Pupils are equal, round, and reactive to light.   Cardiovascular:      Rate and Rhythm: Normal rate and regular rhythm.   Pulmonary:      Effort: Pulmonary effort is normal.   Abdominal:      General: Bowel sounds are normal.   Musculoskeletal:         General: Normal range of motion.      Cervical back: Normal range of motion.   Skin:     General: Skin is warm.   Neurological:      Mental Status: She is alert. Mental status is at baseline.   Psychiatric:         Mood and Affect: Mood normal.             Result Review    Result Review:  I have personally reviewed the results from the time of this admission to 10/9/2022 13:03 EDT and agree with these findings:  [x]  Laboratory  []  Microbiology  [x]  Radiology  []  EKG/Telemetry   []  Cardiology/Vascular   []  Pathology  [x]  Old records  []  Other:            Assessment & Plan      Brief Patient Summary:    67-year-old female with  history of COPD, hypertension and diabetes mellitus presented to the emergency room on 10/6/2022 complaining of several weeks of diarrhea and weakness.  Apparently the patient had slid off a recliner and had stayed on the floor for several hours until her boyfriend assisted her to get up.  GI has been consulted and the patient in the past has followed with Dr. Limon      insulin lispro, 0-7 Units, Subcutaneous, BID AC  polyethylene glycol, 17 g, Oral, BID  primidone, 250 mg, Oral, Q8H  sodium chloride, 10 mL, Intravenous, Q12H  topiramate, 50 mg, Oral, BID       sodium chloride, 75 mL/hr, Last Rate: 75 mL/hr (10/08/22 2104)         Active Hospital Problems:  Active Hospital Problems    Diagnosis    • Generalized weakness      Generalized weakness:  -Patient fell at home and was unable to get up from the floor for several hours  -She has been having several weeks of diarrhea  -PT/OT consulted  -Neurology consulted    Diabetes mellitus:  -Continue ISS and home medications    Hyperlipidemia:  -Statin    Hypertension:  -Continue lisinopril      DVT prophylaxis:  Mechanical DVT prophylaxis orders are present.    CODE STATUS:    Code Status (Patient has no pulse and is not breathing): CPR (Attempt to Resuscitate)  Medical Interventions (Patient has pulse or is breathing): Full Support      Disposition:  I expect patient to be discharged defer    This patient has been examined wearing appropriate Personal Protective Equipment and discussed with nursing. 10/09/22      Electronically signed by Brandon Villafuerte DO, 10/09/22, 13:03 EDT.  [facility] Hospitalist Team

## 2022-10-09 NOTE — PLAN OF CARE
Assessment: Divya Lomeli presents with functional mobility impairments which indicate the need for skilled intervention. Tolerating session today without incident. Pt trying to do as much for herself as possible.Not able to be challenged yet with balance. Would benefit from acute rehab.  Will continue to follow and progress as tolerated.

## 2022-10-09 NOTE — PROGRESS NOTES
Reports improvement  MRI LS spine reviewed and nothing major, no evidence of fracture or significant disc disease    My recommendation is PT OT and rehab    She does not improve then neuromuscular work-up may be considered including EMGs nerve conduction studies and possible LP    Right now she does not look like AIDP now but would really see no clear indication for IVIG or plasma exchange at this time because those are significant and invasive treatments and we have to establish diagnosis and she really does not fit the classic criteria    Does not look like myasthenia gravis or paraneoplastic syndrome but if symptoms do not improve then no critical studies and EMG and paraneoplastic work-up may be considered.  I am waiting instead of doing the work-up because if she improves then we may not need those kind of studies and she does not have any significant swallowing or breathing/respiratory issues and she is improving    Discussed briefly with admitting team and the patient and family    Follow-up with neurology if needed

## 2022-10-09 NOTE — THERAPY TREATMENT NOTE
Subjective: Pt agreeable to therapeutic plan of care. Pt stated she's stronger but they still don't know what's going on.     Objective:     Bed mobility - Supervision  Transfers - CGA and with rolling walker  Ambulation - 20, 40 feet CGA and with rolling walker    Vitals: WNL    Pain: 0 VAS  Education: Provided education on importance of mobility and skilled verbal / tactile cueing throughout intervention.     Assessment: Divya Lomeli presents with functional mobility impairments which indicate the need for skilled intervention. Tolerating session today without incident. Pt trying to do as much for herself as possible.Not able to be challenged yet with balance. Would benefit from acute rehab.  Will continue to follow and progress as tolerated.     Plan/Recommendations:   High Intensity Therapy recommended post-acute care. This is recommended as therapy feels the patient would require 5-6 days per week, 2-3 hours per day. At this time, inpatient rehabilitation (acute rehab) would be the first choice and SNF would be second.. Pt requires no DME at discharge.     Pt desires Inpatient Rehabilitation placement at discharge. Pt cooperative; agreeable to therapeutic recommendations and plan of care.         Basic Mobility 6-click:  Rollin = Total, A lot = 2, A little = 3; 4 = None  Supine>Sit:   1 = Total, A lot = 2, A little = 3; 4 = None   Sit>Stand with arms:  1 = Total, A lot = 2, A little = 3; 4 = None  Bed>Chair:   1 = Total, A lot = 2, A little = 3; 4 = None  Ambulate in room:  1 = Total, A lot = 2, A little = 3; 4 = None  3-5 Steps with railin = Total, A lot = 2, A little = 3; 4 = None  Score: 19    Post-Tx Position: Supine with HOB Elevated, Alarms activated and Call light and personal items within reach  PPE: mask and gloves

## 2022-10-10 ENCOUNTER — ANESTHESIA EVENT (OUTPATIENT)
Dept: PERIOP | Facility: HOSPITAL | Age: 67
End: 2022-10-10

## 2022-10-10 ENCOUNTER — ON CAMPUS - OUTPATIENT (OUTPATIENT)
Dept: URBAN - METROPOLITAN AREA HOSPITAL 85 | Facility: HOSPITAL | Age: 67
End: 2022-10-10

## 2022-10-10 ENCOUNTER — APPOINTMENT (OUTPATIENT)
Dept: ULTRASOUND IMAGING | Facility: HOSPITAL | Age: 67
End: 2022-10-10

## 2022-10-10 DIAGNOSIS — K59.04 CHRONIC IDIOPATHIC CONSTIPATION: ICD-10-CM

## 2022-10-10 DIAGNOSIS — K80.20 CALCULUS OF GALLBLADDER WITHOUT CHOLECYSTITIS WITHOUT OBSTRU: ICD-10-CM

## 2022-10-10 DIAGNOSIS — R91.1 SOLITARY PULMONARY NODULE: ICD-10-CM

## 2022-10-10 DIAGNOSIS — R19.7 DIARRHEA, UNSPECIFIED: ICD-10-CM

## 2022-10-10 DIAGNOSIS — R74.01 ELEVATION OF LEVELS OF LIVER TRANSAMINASE LEVELS: ICD-10-CM

## 2022-10-10 DIAGNOSIS — R93.3 ABNORMAL FINDINGS ON DIAGNOSTIC IMAGING OF OTHER PARTS OF DI: ICD-10-CM

## 2022-10-10 LAB
ALBUMIN SERPL-MCNC: 3.3 G/DL (ref 3.5–5.2)
ALBUMIN/GLOB SERPL: 1.3 G/DL
ALP SERPL-CCNC: 84 U/L (ref 39–117)
ALT SERPL W P-5'-P-CCNC: 97 U/L (ref 1–33)
AMYLASE SERPL-CCNC: 131 U/L (ref 28–100)
ANION GAP SERPL CALCULATED.3IONS-SCNC: 14 MMOL/L (ref 5–15)
AST SERPL-CCNC: 70 U/L (ref 1–32)
BASOPHILS # BLD AUTO: 0.1 10*3/MM3 (ref 0–0.2)
BASOPHILS NFR BLD AUTO: 1.2 % (ref 0–1.5)
BILIRUB SERPL-MCNC: <0.2 MG/DL (ref 0–1.2)
BUN SERPL-MCNC: 15 MG/DL (ref 8–23)
BUN/CREAT SERPL: 23.4 (ref 7–25)
CALCIUM SPEC-SCNC: 8.5 MG/DL (ref 8.6–10.5)
CHLORIDE SERPL-SCNC: 102 MMOL/L (ref 98–107)
CO2 SERPL-SCNC: 20 MMOL/L (ref 22–29)
CREAT SERPL-MCNC: 0.64 MG/DL (ref 0.57–1)
DEPRECATED RDW RBC AUTO: 53.8 FL (ref 37–54)
EGFRCR SERPLBLD CKD-EPI 2021: 95.8 ML/MIN/1.73
EOSINOPHIL # BLD AUTO: 0.3 10*3/MM3 (ref 0–0.4)
EOSINOPHIL NFR BLD AUTO: 6 % (ref 0.3–6.2)
ERYTHROCYTE [DISTWIDTH] IN BLOOD BY AUTOMATED COUNT: 16 % (ref 12.3–15.4)
GLOBULIN UR ELPH-MCNC: 2.5 GM/DL
GLUCOSE BLDC GLUCOMTR-MCNC: 162 MG/DL (ref 70–105)
GLUCOSE BLDC GLUCOMTR-MCNC: 191 MG/DL (ref 70–105)
GLUCOSE BLDC GLUCOMTR-MCNC: 296 MG/DL (ref 70–105)
GLUCOSE SERPL-MCNC: 236 MG/DL (ref 65–99)
HBA1C MFR BLD: 6 % (ref 3.5–5.6)
HCT VFR BLD AUTO: 36 % (ref 34–46.6)
HGB BLD-MCNC: 11.1 G/DL (ref 12–15.9)
LIPASE SERPL-CCNC: 61 U/L (ref 13–60)
LYMPHOCYTES # BLD AUTO: 2.8 10*3/MM3 (ref 0.7–3.1)
LYMPHOCYTES NFR BLD AUTO: 47.8 % (ref 19.6–45.3)
MCH RBC QN AUTO: 28.8 PG (ref 26.6–33)
MCHC RBC AUTO-ENTMCNC: 30.9 G/DL (ref 31.5–35.7)
MCV RBC AUTO: 93.2 FL (ref 79–97)
MONOCYTES # BLD AUTO: 0.6 10*3/MM3 (ref 0.1–0.9)
MONOCYTES NFR BLD AUTO: 9.6 % (ref 5–12)
NEUTROPHILS NFR BLD AUTO: 2.1 10*3/MM3 (ref 1.7–7)
NEUTROPHILS NFR BLD AUTO: 35.4 % (ref 42.7–76)
NRBC BLD AUTO-RTO: 0.1 /100 WBC (ref 0–0.2)
PLATELET # BLD AUTO: 265 10*3/MM3 (ref 140–450)
PMV BLD AUTO: 9.1 FL (ref 6–12)
POTASSIUM SERPL-SCNC: 3.7 MMOL/L (ref 3.5–5.2)
PROT SERPL-MCNC: 5.8 G/DL (ref 6–8.5)
RBC # BLD AUTO: 3.86 10*6/MM3 (ref 3.77–5.28)
SODIUM SERPL-SCNC: 136 MMOL/L (ref 136–145)
WBC NRBC COR # BLD: 5.8 10*3/MM3 (ref 3.4–10.8)

## 2022-10-10 PROCEDURE — 82150 ASSAY OF AMYLASE: CPT | Performed by: NURSE PRACTITIONER

## 2022-10-10 PROCEDURE — 99219 PR INITIAL OBSERVATION CARE/DAY 50 MINUTES: CPT | Performed by: SURGERY

## 2022-10-10 PROCEDURE — 80053 COMPREHEN METABOLIC PANEL: CPT | Performed by: NURSE PRACTITIONER

## 2022-10-10 PROCEDURE — 76705 ECHO EXAM OF ABDOMEN: CPT

## 2022-10-10 PROCEDURE — 82962 GLUCOSE BLOOD TEST: CPT

## 2022-10-10 PROCEDURE — 85025 COMPLETE CBC W/AUTO DIFF WBC: CPT | Performed by: NURSE PRACTITIONER

## 2022-10-10 PROCEDURE — 83690 ASSAY OF LIPASE: CPT | Performed by: NURSE PRACTITIONER

## 2022-10-10 PROCEDURE — 99212 OFFICE O/P EST SF 10 MIN: CPT | Performed by: NURSE PRACTITIONER

## 2022-10-10 PROCEDURE — G0378 HOSPITAL OBSERVATION PER HR: HCPCS

## 2022-10-10 PROCEDURE — 63710000001 INSULIN LISPRO (HUMAN) PER 5 UNITS: Performed by: HOSPITALIST

## 2022-10-10 RX ADMIN — PSYLLIUM HUSK 1 PACKET: 3.4 POWDER ORAL at 10:39

## 2022-10-10 RX ADMIN — ACETAMINOPHEN 650 MG: 325 TABLET, FILM COATED ORAL at 09:34

## 2022-10-10 RX ADMIN — PRIMIDONE 250 MG: 250 TABLET ORAL at 21:53

## 2022-10-10 RX ADMIN — TOPIRAMATE 50 MG: 25 TABLET, FILM COATED ORAL at 21:53

## 2022-10-10 RX ADMIN — Medication 10 ML: at 10:39

## 2022-10-10 RX ADMIN — POLYETHYLENE GLYCOL 3350 17 G: 17 POWDER, FOR SOLUTION ORAL at 21:53

## 2022-10-10 RX ADMIN — Medication 10 ML: at 21:53

## 2022-10-10 RX ADMIN — POLYETHYLENE GLYCOL 3350 17 G: 17 POWDER, FOR SOLUTION ORAL at 10:39

## 2022-10-10 RX ADMIN — TOPIRAMATE 50 MG: 25 TABLET, FILM COATED ORAL at 10:39

## 2022-10-10 RX ADMIN — PRIMIDONE 250 MG: 250 TABLET ORAL at 13:17

## 2022-10-10 RX ADMIN — PRIMIDONE 250 MG: 250 TABLET ORAL at 05:12

## 2022-10-10 RX ADMIN — INSULIN LISPRO 4 UNITS: 100 INJECTION, SOLUTION INTRAVENOUS; SUBCUTANEOUS at 18:07

## 2022-10-10 NOTE — PROGRESS NOTES
[facility] Medicine Services Daily Progress Note    Patient Name: Divya Lomeli  : 1955  MRN: [id number]  Primary Care Physician:  Trae Kaye MD  Date of admission: 10/6/2022      Subjective      Chief Complaint: Walking difficulty      Patient Reports     10/8/22: Consulted neurology since the patient has difficulty walking with physical therapy.  Claims to have had several weeks of diarrhea but now constipation.  Does not want Lovenox shot.  Lives alone.    10/9/22: Claims primidone dose increased recently by neurologist due to uncontrolled tremors.  MRI of the pelvis done yesterday.  Abdominal ultrasound ordered.    10/10/22: N.p.o. for abdominal ultrasound.  PT/OT consulted.    Review of Systems   All other systems reviewed and are negative.         Objective      Vitals:   Temp:  [98 °F (36.7 °C)-98.5 °F (36.9 °C)] 98 °F (36.7 °C)  Heart Rate:  [72-73] 72  Resp:  [16-18] 18  BP: (136-170)/(69-75) 170/75    Physical Exam  HENT:      Head: Normocephalic.      Nose: Nose normal.   Eyes:      Extraocular Movements: Extraocular movements intact.      Pupils: Pupils are equal, round, and reactive to light.   Cardiovascular:      Rate and Rhythm: Normal rate and regular rhythm.   Pulmonary:      Effort: Pulmonary effort is normal.   Abdominal:      General: Bowel sounds are normal.   Musculoskeletal:         General: Normal range of motion.      Cervical back: Normal range of motion.   Skin:     General: Skin is warm.   Neurological:      Mental Status: She is alert. Mental status is at baseline.   Psychiatric:         Mood and Affect: Mood normal.             Result Review    Result Review:  I have personally reviewed the results from the time of this admission to 10/10/2022 12:55 EDT and agree with these findings:  [x]  Laboratory  []  Microbiology  [x]  Radiology  []  EKG/Telemetry   []  Cardiology/Vascular   []  Pathology  [x]  Old records  []  Other:            Assessment &  Plan      Brief Patient Summary:    67-year-old female with history of COPD, hypertension and diabetes mellitus presented to the emergency room on 10/6/2022 complaining of several weeks of diarrhea and weakness.  Apparently the patient had slid off a recliner and had stayed on the floor for several hours until her boyfriend assisted her to get up.  GI has been consulted and the patient in the past has followed with Dr. Limon      insulin lispro, 0-7 Units, Subcutaneous, BID AC  polyethylene glycol, 17 g, Oral, BID  primidone, 250 mg, Oral, Q8H  psyllium, 1 packet, Oral, Daily  sodium chloride, 10 mL, Intravenous, Q12H  topiramate, 50 mg, Oral, BID       sodium chloride, 75 mL/hr, Last Rate: 75 mL/hr (10/10/22 0324)         Active Hospital Problems:  Active Hospital Problems    Diagnosis    • Generalized weakness      Generalized weakness:  -Patient fell at home and was unable to get up from the floor for several hours  -She has been having several weeks of diarrhea  -PT/OT consulted  -Neurology consulted    Diabetes mellitus:  -Continue ISS and home medications    Hyperlipidemia:  -Statin    Hypertension:  -Continue lisinopril      DVT prophylaxis:  Mechanical DVT prophylaxis orders are present.    CODE STATUS:    Code Status (Patient has no pulse and is not breathing): CPR (Attempt to Resuscitate)  Medical Interventions (Patient has pulse or is breathing): Full Support      Disposition:  I expect patient to be discharged defer    This patient has been examined wearing appropriate Personal Protective Equipment and discussed with nursing. 10/10/22      Electronically signed by Brandon Villafuerte DO, 10/10/22, 12:55 EDT.  [facility] Hospitalist Team

## 2022-10-10 NOTE — PLAN OF CARE
Goal Outcome Evaluation:  Plan of Care Reviewed With: patient        Progress: no change   Pt is more stable with ambulation tonight. No issues or concerns. Pt has been NPO since midnight

## 2022-10-10 NOTE — CASE MANAGEMENT/SOCIAL WORK
Discharge Planning Assessment   [place]     Patient Name: Divya Lomeli  MRN: [id number]  Today's Date: 10/10/2022    Admit Date: 10/6/2022    Plan: DC PLAN: Referral to [facility], Pending response.   Discharge Needs Assessment     Row Name 10/10/22 1417       Living Environment    People in Home significant other    Name(s) of People in Home Home with Boyfriend    Current Living Arrangements home    Primary Care Provided by self    Able to Return to Prior Arrangements yes       Resource/Environmental Concerns    Resource/Environmental Concerns none       Transition Planning    Patient/Family Anticipates Transition to home with family    Patient/Family Anticipated Services at Transition     Transportation Anticipated family or friend will provide       Discharge Needs Assessment    Readmission Within the Last 30 Days no previous admission in last 30 days    Equipment Currently Used at Home none    Concerns to be Addressed no discharge needs identified               Discharge Plan     Row Name 10/10/22 1418       Plan    Plan DC PLAN: Referral to [facility], Pending response.    Patient/Family in Agreement with Plan yes    Plan Comments Met with patient at bedside,from routine home with boyfriend.Independent with most ADL's increased fatigue. PCP and pharmacy verified.Able to afford medications and denies any transportation issues. PT/ OT recomends Inpatient rehab. DCP report sent to [facility] per patient request, message sent to Yessy with [facility] pending response. DC BARRIERS: NPO for an ultrasound. Possible Choly.              Continued Care and Services - Admitted Since 10/6/2022     Destination     Service Provider Request Status Selected Services Address Phone Fax Patient Preferred    [facility] Pending - Request Sent N/A [id number] [address] [phone] --              Expected Discharge Date and Time     Expected Discharge Date Expected Discharge Time    Oct  11, 2022          Demographic Summary     Row Name 10/10/22 1417       General Information    Admission Type observation    Arrived From emergency department    Referral Source admission list    Reason for Consult discharge planning    Preferred Language English       Contact Information    Permission Granted to Share Info With     Row Name 10/10/22 1405       General Information    Admission Type observation    Arrived From emergency department    Referral Source admission list    Reason for Consult discharge planning               Functional Status     Row Name 10/10/22 1417       Functional Status    Usual Activity Tolerance moderate    Current Activity Tolerance moderate       Assessment of Health Literacy    How often do you have someone help you read hospital materials? Sometimes    How often do you have problems learning about your medical condition because of difficulty understanding written information? Sometimes    How often do you have a problem understanding what is told to you about your medical condition? Sometimes    How confident are you filling out medical forms by yourself? Somewhat    Health Literacy Moderate       Functional Status, IADL    Medications independent    Meal Preparation independent    Housekeeping independent    Laundry independent    Shopping independent       Mental Status    General Appearance WDL WDL       Mental Status Summary    Recent Changes in Mental Status/Cognitive Functioning no changes    Row Name 10/10/22 1405       Functional Status    Usual Activity Tolerance moderate    Current Activity Tolerance moderate       Assessment of Health Literacy    How often do you have someone help you read hospital materials? Sometimes    How often do you have problems learning about your medical condition because of difficulty understanding written information? Sometimes    How often do you have a problem understanding what is told to you about your medical condition?  Sometimes    How confident are you filling out medical forms by yourself? Somewhat    Health Literacy Moderate       Functional Status, IADL    Medications independent    Meal Preparation independent    Housekeeping independent    Laundry independent    Shopping independent       Mental Status    General Appearance WDL WDL                Wendy Cardoso RN     Office phone: [phone]  Cell phone: [phone]

## 2022-10-10 NOTE — PROGRESS NOTES
" LOS: 0 days   Patient Care Team:  Trae Kaye MD as PCP - General (Family Medicine)      Subjective \"I am ok\"    Interval History:     Subjective: 1 bowel movement since admission.  No abdominal pain.  No vomiting.  Weakness persists.  Mild headache      ROS:   No chest pain, shortness of breath, or cough.        Medication Review:     Current Facility-Administered Medications:   •  acetaminophen (TYLENOL) tablet 650 mg, 650 mg, Oral, Q4H PRN, 650 mg at 10/10/22 0934 **OR** acetaminophen (TYLENOL) 160 MG/5ML solution 650 mg, 650 mg, Oral, Q4H PRN **OR** acetaminophen (TYLENOL) suppository 650 mg, 650 mg, Rectal, Q4H PRN, Hortencia Herron MD  •  aluminum-magnesium hydroxide-simethicone (MAALOX MAX) 400-400-40 MG/5ML suspension 15 mL, 15 mL, Oral, Q6H PRN, Hortencia Herron MD  •  dextrose (D50W) (25 g/50 mL) IV injection 25 g, 25 g, Intravenous, Q15 Min PRN, Hortencia Herron MD  •  dextrose (GLUTOSE) oral gel 15 g, 15 g, Oral, Q15 Min PRN, Hortencia Herron MD  •  glucagon (human recombinant) (GLUCAGEN DIAGNOSTIC) 1 mg in sterile water (preservative free) 1 mL injection, 1 mg, Subcutaneous, PRN, Hortencia Herron MD  •  hydrOXYzine (ATARAX) tablet 10 mg, 10 mg, Oral, TID PRN, Hortencia Herron MD  •  insulin lispro (ADMELOG) injection 0-7 Units, 0-7 Units, Subcutaneous, BID AC, Hortencia Herron MD, 2 Units at 10/09/22 1812  •  melatonin tablet 5 mg, 5 mg, Oral, Nightly PRN, Hortencia Herron MD  •  ondansetron (ZOFRAN) tablet 4 mg, 4 mg, Oral, Q6H PRN **OR** ondansetron (ZOFRAN) injection 4 mg, 4 mg, Intravenous, Q6H PRN, Hortencia Herron MD  •  polyethylene glycol (MIRALAX) packet 17 g, 17 g, Oral, BID, Olivia Brown APRN, 17 g at 10/09/22 2034  •  primidone (MYSOLINE) tablet 250 mg, 250 mg, Oral, Q8H, Hortencia Herron MD, 250 mg at 10/10/22 0512  •  psyllium (METAMUCIL MULTIHEALTH FIBER) 58.12 % packet 1 packet, 1 packet, Oral, Daily, Ivone England, APRN  •  sodium chloride 0.9 % flush 10 mL, 10 mL, " Intravenous, PRN, Jose Pack PA  •  sodium chloride 0.9 % flush 10 mL, 10 mL, Intravenous, Q12H, Hortencia Herron MD, 10 mL at 10/09/22 2034  •  sodium chloride 0.9 % flush 10 mL, 10 mL, Intravenous, PRN, Hortencia Herron MD  •  sodium chloride 0.9 % infusion, 75 mL/hr, Intravenous, Continuous, Jose Pack PA, Last Rate: 75 mL/hr at 10/10/22 0324, 75 mL/hr at 10/10/22 0324  •  topiramate (TOPAMAX) tablet 50 mg, 50 mg, Oral, BID, Brandon Villafuerte DO, 50 mg at 10/09/22 2034  •  traMADol (ULTRAM) tablet 50 mg, 50 mg, Oral, TID PRN, Hortencia Herron MD      Objective Resting in bed, no acute distress, no family    Vital Signs  Vitals:    10/09/22 0900 10/09/22 1206 10/09/22 2001 10/10/22 0345   BP: 155/70 148/76 136/69 170/75   BP Location: Right arm Right arm Right arm    Patient Position: Lying Lying Lying    Pulse: 77 71 73 72   Resp: 18 18 16 18   Temp: 98 °F (36.7 °C) 98 °F (36.7 °C) 98.5 °F (36.9 °C) 98 °F (36.7 °C)   TempSrc: Oral Oral Oral Oral   SpO2: 98% 97% 93% 98%   Weight:       Height:           Physical Exam:     General Appearance:    Awake and alert, in no acute distress   Head:    Normocephalic, without obvious abnormality   Eyes:          Conjunctivae normal, anicteric sclera   Throat:   No oral lesions, no thrush, oral mucosa moist   Neck:  supple, no JVD   Lungs:     respirations regular, even and unlabored   Abdomen:     Soft, non-tender, nondistended   Rectal:     Deferred   Extremities:  no cyanosis   Skin:   No bruising or rash, no jaundice        Results Review:    CBC    Results from last 7 days   Lab Units 10/10/22  0128 10/08/22  2331 10/07/22  2359 10/06/22  2101   WBC 10*3/mm3 5.80 5.30 5.00 5.20   HEMOGLOBIN g/dL 11.1* 11.2* 10.7* 11.6*   PLATELETS 10*3/mm3 265 229 190 241     CMP   Results from last 7 days   Lab Units 10/10/22  0128 10/08/22  2331 10/07/22  2359 10/06/22  2234 10/06/22  2101   SODIUM mmol/L 136 136 138 136  --    POTASSIUM mmol/L 3.7 3.6 3.7 3.3*  --    CHLORIDE  mmol/L 102 102 102 99  --    CO2 mmol/L 20.0* 22.0 25.0 29.0  --    BUN mg/dL 15 10 14 16  --    CREATININE mg/dL 0.64 0.56* 0.76 0.65  --    GLUCOSE mg/dL 236* 192* 208* 122*  --    ALBUMIN g/dL 3.30* 3.40*  --  3.60  --    BILIRUBIN mg/dL <0.2 <0.2  --  <0.2  --    ALK PHOS U/L 84 86  --  80  --    AST (SGOT) U/L 70* 48*  --  36*  --    ALT (SGPT) U/L 97* 79*  --  51*  --    MAGNESIUM mg/dL  --   --   --   --  1.8   AMYLASE U/L 131* 147*  --   --   --    LIPASE U/L 61* 69*  --   --   --      Cr Clearance Estimated Creatinine Clearance: 78.3 mL/min (by C-G formula based on SCr of 0.64 mg/dL).  Coag     HbA1C   Lab Results   Component Value Date    HGBA1C 6.0 (H) 10/07/2022    HGBA1C 8.5 (H) 04/16/2021    HGBA1C 8.3 (H) 01/12/2021     Blood Glucose   Glucose   Date/Time Value Ref Range Status   10/10/2022 0751 162 (H) 70 - 105 mg/dL Final     Comment:     Serial Number: [id number]:  [id number]   10/09/2022 2006 168 (H) 70 - 105 mg/dL Final     Comment:     Serial Number: [id number]:  [id number]   10/09/2022 1732 150 (H) 70 - 105 mg/dL Final     Comment:     Serial Number: [id number]:  [id number]   10/09/2022 1103 205 (H) 70 - 105 mg/dL Final     Comment:     Serial Number: [id number]:  [id number]   10/09/2022 0710 159 (H) 70 - 105 mg/dL Final     Comment:     Serial Number: [id number]:  [id number]   10/08/2022 1634 172 (H) 70 - 105 mg/dL Final     Comment:     Serial Number: [id number]:  [id number]   10/08/2022 1152 182 (H) 70 - 105 mg/dL Final     Comment:     Serial Number: [id number]:  [id number]   10/08/2022 0730 142 (H) 70 - 105 mg/dL Final     Comment:     Serial Number: [id number]:  [id number]     Infection     UA    Results from last 7 days   Lab Units 10/06/22  2217   NITRITE UA  Negative     Radiology(recent) MRI Lumbar Spine Without Contrast    Result Date: 10/8/2022  1. Negative for acute fracture. 2. Degenerative findings with disc disease most pronounced  at L3-4, L4-5 and L5-S1 without high-grade canal stenosis.  Electronically Signed By-Samson Copeland MD On:10/8/2022 7:27 PM This report was finalized on [id number] by  Samson Copeland MD.         Assessment & Plan   Acute diarrhea -resolved  Chronic constipation  Mildly elevated LFTs  Progressive weakness  Lung nodule  Cholelithiasis  COPD  DMII    PLAN  No further diarrhea and reports 1 bowel movement since admission.  Continue MiraLAX twice daily and add Metamucil.  Mildly elevated LFTs without change in liver serology work-up in progress.  Abdominal exam benign.  Tolerating diet. General surgery evaluation pending for possible cholecystectomy, could consider IOC but no abdominal pain to suggest choledocholithiasis.  Okay to discharge home from GI standpoint once otherwise medically stable.  She can follow-up with us as an outpatient for further management of her chronic constipation and to monitor/work-up LFTs.  We will see inpatient as needed, call questions or concerns.    Electronically signed by WYATT Boothe, 10/10/22, 9:59 AM EDT.

## 2022-10-10 NOTE — SIGNIFICANT NOTE
10/10/22 1045   OTHER   Discipline occupational therapist   Rehab Time/Intention   Session Not Performed patient/family declined, not feeling well  (Pt reports fatigue from transport to/from testing. Requested follow-up. OT to follow-up as time permits.)   Recommendation   OT - Next Appointment 10/11/22

## 2022-10-10 NOTE — CONSULTS
GENERAL SURGERY CONSULT    Referring Provider: Kevin  Reason for Consultation: gallstones    Patient Care Team:  Trae Kaye MD as PCP - General (Family Medicine)    Chief complaint weakness    Subjective .     History of present illness: 67-year-old lady who came to the hospital and was admitted on 10/6/2022 for weakness and significant constipation.  Patient states that she has had constipation since she was a child.  She notes that at the beginning of September after eating at a food truck at a craft show she did have some fairly profuse diarrhea but then has since returned to being constipated.  During evaluation in the hospital the patient was found to have mild elevation of her LFTs.  Subsequent imaging has confirmed the presence of gallstones.  She has also had mild elevation of her pancreatic enzymes.  At the time of my visit the patient was eating regular food without pain or nausea.  She does note a long history of intermittent epigastric and occasional right upper quadrant abdominal pain which does not seem to be related to meals.  Given that she had mild elevation of her LFTs, elevation of her amylase and lipase and gallstones I was asked to see the patient to consider possible cholecystectomy.    Review of Systems    Review of Systems   Gastrointestinal: Positive for constipation.   Neurological: Positive for weakness.         History  Past Medical History:   Diagnosis Date   • Anxiety    • Arm pain     rt upper arm muscle   • COPD (chronic obstructive pulmonary disease) (HCC)     20 to 30 years ago. Does not take meds.   • Diabetes mellitus (HCC)     Type 2   • Diabetic nephropathy (HCC)    • Hyperlipidemia    • Hypertension    ,   Past Surgical History:   Procedure Laterality Date   • APPENDECTOMY  1958   • BACK SURGERY     • CYSTOSCOPY WITH CLOT EVACUATION N/A 9/28/2021    Procedure: CYSTOSCOPY WITH CLOT EVACUATION;  Surgeon: Jay Torres MD;  Location: [facility] MAIN OR;  Service: Urology;   Laterality: N/A;   • INTERVENTIONAL RADIOLOGY PROCEDURE N/A 2021    Procedure: STENT PLACEMENT;  Surgeon: Jay Torres MD;  Location: [facility] MAIN OR;  Service: Urology;  Laterality: N/A;   • LUMBAR SPINE SURGERY      L5 removed-Abstracted from [facility]   • OOPHORECTOMY     • TONSILLECTOMY     • TOTAL ABDOMINAL HYSTERECTOMY     • TRANSURETHRAL RESECTION OF BLADDER TUMOR N/A 2021    Procedure: CYSTOSCOPY TRANSURETHRAL RESECTION OF BLADDER TUMOR;  Surgeon: Jay Torres MD;  Location: [facility] MAIN OR;  Service: Urology;  Laterality: N/A;   ,   Family History   Problem Relation Age of Onset   • Diabetes Mother    • Diabetes Father    ,   Social History     Tobacco Use   • Smoking status: Former     Packs/day: 4.50     Years: 21.00     Pack years: 94.50     Types: Cigarettes     Start date:      Quit date:      Years since quittin.7   • Smokeless tobacco: Never   • Tobacco comments:     2nd Hand Smoke - boyfriend smokes.   Vaping Use   • Vaping Use: Never used   Substance Use Topics   • Alcohol use: Yes     Comment: occ   • Drug use: Yes     Types: Marijuana     Comment: college    ,   Medications Prior to Admission   Medication Sig Dispense Refill Last Dose   • Cobalamin Combinations (NEURIVA PLUS PO) Take 1 tablet/day by mouth.      • estradiol (ESTRACE) 1 MG tablet Take 1/2 (one-half) tablet by mouth once daily 45 tablet 0    • glimepiride (AMARYL) 4 MG tablet Take 1 tablet by mouth once daily 90 tablet 0    • hydrOXYzine (ATARAX) 10 MG tablet TAKE 1 TO 2 TABLETS BY MOUTH EVERY 8 HOURS AS NEEDED FOR ANXIETY 40 tablet 0    • Jardiance 25 MG tablet Take 1 tablet by mouth once daily 90 tablet 0    • Multiple Vitamins-Minerals (PRESERVISION AREDS 2 PO)       • Narcan 4 MG/0.1ML nasal spray       • Omega-3 Fatty Acids (Fish Oil Burp-Less) 1200 MG capsule Take 1 capsule by mouth Daily. 90 capsule 3    • primidone (MYSOLINE) 250 MG tablet Take 250 mg by mouth 3 (Three) Times a Day.      •  Semaglutide,0.25 or 0.5MG/DOS, (Ozempic, 0.25 or 0.5 MG/DOSE,) 2 MG/1.5ML solution pen-injector Inject 0.5 mg Subcutaneously weekly on tuesdays 3 pen 3    • SITagliptin-metFORMIN HCl ER (Janumet XR)  MG tablet Take 2 tablets every day with a meal 180 tablet 1    • topiramate (TOPAMAX) 50 MG tablet Take 50 mg by mouth 2 (Two) Times a Day.      • traMADol (ULTRAM) 50 MG tablet Take 1 tablet by mouth 3 (Three) Times a Day As Needed for Moderate Pain . 90 tablet 3    , Scheduled Meds:  insulin lispro, 0-7 Units, Subcutaneous, BID AC  polyethylene glycol, 17 g, Oral, BID  primidone, 250 mg, Oral, Q8H  psyllium, 1 packet, Oral, Daily  sodium chloride, 10 mL, Intravenous, Q12H  topiramate, 50 mg, Oral, BID    , Continuous Infusions:  sodium chloride, 75 mL/hr, Last Rate: 75 mL/hr (10/10/22 0324)    , PRN Meds:  •  acetaminophen **OR** acetaminophen **OR** acetaminophen  •  aluminum-magnesium hydroxide-simethicone  •  dextrose  •  dextrose  •  glucagon (human recombinant)  •  hydrOXYzine  •  melatonin  •  ondansetron **OR** ondansetron  •  sodium chloride  •  sodium chloride  •  traMADol and Allergies:  Ether and Other    Objective     Vital Signs   Temp:  [97.9 °F (36.6 °C)-98.5 °F (36.9 °C)] 97.9 °F (36.6 °C)  Heart Rate:  [70-73] 70  Resp:  [16-18] 16  BP: (136-170)/(69-75) 151/75    Physical Exam:       General Appearance:    Alert, cooperative, in no acute distress   Head:    Normocephalic, without obvious abnormality, atraumatic   Eyes:            Lids and lashes normal, conjunctivae and sclerae normal, no   icterus, no pallor, corneas clear   Ears:    Ears appear intact with no abnormalities noted   Lungs:     Breathing unlabored   Abdomen:     Soft, nontender, nondistended, no masses   Extremities:   Moves all extremities well   Skin:   No bleeding, bruising or rash   Neurologic:   Cranial nerves 2 - 12 grossly intact, sensation intact       Results Review:  Lab Results (last 72 hours)     Procedure Component  Value Units Date/Time    POC Glucose Once [[id number]]  (Abnormal) Collected: 10/10/22 1109    Specimen: Blood Updated: 10/10/22 1111     Glucose 191 mg/dL      Comment: Serial Number: [id number]:  [id number]       Hemoglobin A1c [[id number]]  (Abnormal) Collected: 10/07/22 2359    Specimen: Blood Updated: 10/10/22 0943     Hemoglobin A1C 6.0 %     Narrative:      Hemoglobin A1C Reference Range:    <5.7 %        Normal  5.7-6.4 %     Increased risk for diabetes  > 6.4 %        Diabetes       These guidelines have been recommended by the American Diabetic Association for Hgb A1c.      The following 2010 guidelines have been recommended by the American Diabetes Association for Hemoglobin A1c.    HBA1c 5.7-6.4% Increased risk for future diabetes (pre-diabetes)  HBA1c     >6.4% Diabetes      POC Glucose Once [[id number]]  (Abnormal) Collected: 10/10/22 0751    Specimen: Blood Updated: 10/10/22 0753     Glucose 162 mg/dL      Comment: Serial Number: [id number]:  [id number]       Comprehensive Metabolic Panel [[id number]]  (Abnormal) Collected: 10/10/22 0128    Specimen: Blood Updated: 10/10/22 0343     Glucose 236 mg/dL      BUN 15 mg/dL      Creatinine 0.64 mg/dL      Sodium 136 mmol/L      Potassium 3.7 mmol/L      Chloride 102 mmol/L      CO2 20.0 mmol/L      Calcium 8.5 mg/dL      Total Protein 5.8 g/dL      Albumin 3.30 g/dL      ALT (SGPT) 97 U/L      AST (SGOT) 70 U/L      Alkaline Phosphatase 84 U/L      Total Bilirubin <0.2 mg/dL      Globulin 2.5 gm/dL      A/G Ratio 1.3 g/dL      BUN/Creatinine Ratio 23.4     Anion Gap 14.0 mmol/L      eGFR 95.8 mL/min/1.73      Comment: National Kidney Foundation and American Society of Nephrology (ASN) Task Force recommended calculation based on the Chronic Kidney Disease Epidemiology Collaboration (CKD-EPI) equation refit without adjustment for race.       Narrative:      GFR Normal >60  Chronic Kidney Disease <60  Kidney Failure <15      Lipase [[id number]]   (Abnormal) Collected: 10/10/22 0128    Specimen: Blood Updated: 10/10/22 0342     Lipase 61 U/L     Amylase [[id number]]  (Abnormal) Collected: 10/10/22 0128    Specimen: Blood Updated: 10/10/22 0324     Amylase 131 U/L     CBC & Differential [[id number]]  (Abnormal) Collected: 10/10/22 0128    Specimen: Blood Updated: 10/10/22 0257    Narrative:      The following orders were created for panel order CBC & Differential.  Procedure                               Abnormality         Status                     ---------                               -----------         ------                     CBC Auto Differential[[id number]]        Abnormal            Final result                 Please view results for these tests on the individual orders.    CBC Auto Differential [[id number]]  (Abnormal) Collected: 10/10/22 0128    Specimen: Blood Updated: 10/10/22 0257     WBC 5.80 10*3/mm3      RBC 3.86 10*6/mm3      Hemoglobin 11.1 g/dL      Hematocrit 36.0 %      MCV 93.2 fL      MCH 28.8 pg      MCHC 30.9 g/dL      RDW 16.0 %      RDW-SD 53.8 fl      MPV 9.1 fL      Platelets 265 10*3/mm3      Neutrophil % 35.4 %      Lymphocyte % 47.8 %      Monocyte % 9.6 %      Eosinophil % 6.0 %      Basophil % 1.2 %      Neutrophils, Absolute 2.10 10*3/mm3      Lymphocytes, Absolute 2.80 10*3/mm3      Monocytes, Absolute 0.60 10*3/mm3      Eosinophils, Absolute 0.30 10*3/mm3      Basophils, Absolute 0.10 10*3/mm3      nRBC 0.1 /100 WBC     POC Glucose Once [[id number]]  (Abnormal) Collected: 10/09/22 2006    Specimen: Blood Updated: 10/09/22 2007     Glucose 168 mg/dL      Comment: Serial Number: [id number]:  [id number]       POC Glucose Once [[id number]]  (Abnormal) Collected: 10/09/22 1732    Specimen: Blood Updated: 10/09/22 1733     Glucose 150 mg/dL      Comment: Serial Number: [id number]:  [id number]       POC Glucose Once [[id number]]  (Abnormal) Collected: 10/09/22 1103    Specimen: Blood Updated: 10/09/22 1104     Glucose  205 mg/dL      Comment: Serial Number: [id number]:  [id number]       POC Glucose Once [[id number]]  (Abnormal) Collected: 10/09/22 0710    Specimen: Blood Updated: 10/09/22 0712     Glucose 159 mg/dL      Comment: Serial Number: [id number]:  [id number]       Comprehensive Metabolic Panel [[id number]]  (Abnormal) Collected: 10/08/22 2331    Specimen: Blood Updated: 10/09/22 0051     Glucose 192 mg/dL      BUN 10 mg/dL      Creatinine 0.56 mg/dL      Sodium 136 mmol/L      Potassium 3.6 mmol/L      Comment: Slight hemolysis detected by analyzer. Results may be affected.        Chloride 102 mmol/L      CO2 22.0 mmol/L      Calcium 8.6 mg/dL      Total Protein 6.1 g/dL      Albumin 3.40 g/dL      ALT (SGPT) 79 U/L      AST (SGOT) 48 U/L      Alkaline Phosphatase 86 U/L      Total Bilirubin <0.2 mg/dL      Globulin 2.7 gm/dL      A/G Ratio 1.3 g/dL      BUN/Creatinine Ratio 17.9     Anion Gap 12.0 mmol/L      eGFR 98.9 mL/min/1.73      Comment: National Kidney Foundation and American Society of Nephrology (ASN) Task Force recommended calculation based on the Chronic Kidney Disease Epidemiology Collaboration (CKD-EPI) equation refit without adjustment for race.       Narrative:      GFR Normal >60  Chronic Kidney Disease <60  Kidney Failure <15      Lipase [[id number]]  (Abnormal) Collected: 10/08/22 2331    Specimen: Blood Updated: 10/09/22 0051     Lipase 69 U/L     C-reactive Protein [[id number]]  (Abnormal) Collected: 10/08/22 2331    Specimen: Blood Updated: 10/09/22 0051     C-Reactive Protein 7.27 mg/dL     Bilirubin, Direct [[id number]]  (Normal) Collected: 10/08/22 2331    Specimen: Blood Updated: 10/09/22 0051     Bilirubin, Direct <0.2 mg/dL     Amylase [[id number]]  (Abnormal) Collected: 10/08/22 2331    Specimen: Blood Updated: 10/09/22 0051     Amylase 147 U/L     CBC & Differential [[id number]]  (Abnormal) Collected: 10/08/22 2331    Specimen: Blood Updated: 10/09/22 0035    Narrative:      The  following orders were created for panel order CBC & Differential.  Procedure                               Abnormality         Status                     ---------                               -----------         ------                     CBC Auto Differential[[id number]]        Abnormal            Final result                 Please view results for these tests on the individual orders.    CBC Auto Differential [[id number]]  (Abnormal) Collected: 10/08/22 2331    Specimen: Blood Updated: 10/09/22 0035     WBC 5.30 10*3/mm3      RBC 3.85 10*6/mm3      Hemoglobin 11.2 g/dL      Hematocrit 34.6 %      MCV 89.8 fL      MCH 29.1 pg      MCHC 32.5 g/dL      RDW 15.9 %      RDW-SD 50.3 fl      MPV 9.7 fL      Platelets 229 10*3/mm3      Comment: Result checked          Neutrophil % 40.1 %      Lymphocyte % 40.1 %      Monocyte % 10.9 %      Eosinophil % 7.7 %      Basophil % 1.2 %      Neutrophils, Absolute 2.10 10*3/mm3      Lymphocytes, Absolute 2.10 10*3/mm3      Monocytes, Absolute 0.60 10*3/mm3      Eosinophils, Absolute 0.40 10*3/mm3      Basophils, Absolute 0.10 10*3/mm3      nRBC 0.2 /100 WBC     Folate [[id number]]  (Normal) Collected: 10/08/22 1320    Specimen: Blood Updated: 10/08/22 1823     Folate >20.00 ng/mL     Narrative:      Results may be falsely increased if patient taking Biotin.      POC Glucose Once [[id number]]  (Abnormal) Collected: 10/08/22 1634    Specimen: Blood Updated: 10/08/22 1635     Glucose 172 mg/dL      Comment: Serial Number: [id number]:  [id number]       Vitamin B12 [[id number]]  (Normal) Collected: 10/07/22 2359    Specimen: Blood Updated: 10/08/22 1339     Vitamin B-12 327 pg/mL     Narrative:      Results may be falsely increased if patient taking Biotin.      POC Glucose Once [[id number]]  (Abnormal) Collected: 10/08/22 1152    Specimen: Blood Updated: 10/08/22 1154     Glucose 182 mg/dL      Comment: Serial Number: [id number]:  [id number]       POC Glucose Once  [[id number]]  (Abnormal) Collected: 10/08/22 0730    Specimen: Blood Updated: 10/08/22 0731     Glucose 142 mg/dL      Comment: Serial Number: [id number]:  [id number]       CBC Auto Differential [[id number]]  (Abnormal) Collected: 10/07/22 2359    Specimen: Blood Updated: 10/08/22 0119     WBC 5.00 10*3/mm3      RBC 3.68 10*6/mm3      Hemoglobin 10.7 g/dL      Hematocrit 33.0 %      MCV 89.7 fL      MCH 29.0 pg      MCHC 32.3 g/dL      RDW 15.7 %      RDW-SD 49.9 fl      MPV 9.4 fL      Platelets 190 10*3/mm3      Neutrophil % 39.5 %      Lymphocyte % 38.3 %      Monocyte % 11.5 %      Eosinophil % 9.5 %      Basophil % 1.2 %      Neutrophils, Absolute 2.00 10*3/mm3      Lymphocytes, Absolute 1.90 10*3/mm3      Monocytes, Absolute 0.60 10*3/mm3      Eosinophils, Absolute 0.50 10*3/mm3      Basophils, Absolute 0.10 10*3/mm3      nRBC 0.1 /100 WBC     T4, Free [[id number]]  (Abnormal) Collected: 10/07/22 2359    Specimen: Blood Updated: 10/08/22 0115     Free T4 0.62 ng/dL     Narrative:      Results may be falsely increased if patient taking Biotin.      Basic Metabolic Panel [[id number]]  (Abnormal) Collected: 10/07/22 2359    Specimen: Blood Updated: 10/08/22 0104     Glucose 208 mg/dL      BUN 14 mg/dL      Creatinine 0.76 mg/dL      Sodium 138 mmol/L      Potassium 3.7 mmol/L      Comment: Slight hemolysis detected by analyzer. Results may be affected.        Chloride 102 mmol/L      CO2 25.0 mmol/L      Calcium 8.3 mg/dL      BUN/Creatinine Ratio 18.4     Anion Gap 11.0 mmol/L      eGFR 84.9 mL/min/1.73      Comment: National Kidney Foundation and American Society of Nephrology (ASN) Task Force recommended calculation based on the Chronic Kidney Disease Epidemiology Collaboration (CKD-EPI) equation refit without adjustment for race.       Narrative:      GFR Normal >60  Chronic Kidney Disease <60  Kidney Failure <15      Hepatitis Panel, Acute [[id number]]  (Normal) Collected: 10/07/22 1550    Specimen: Blood  Updated: 10/07/22 1840     Hepatitis B Surface Ag Non-Reactive     Hep A IgM Non-Reactive     Hep B C IgM Non-Reactive     Hepatitis C Ab Non-Reactive    Narrative:      Results may be falsely decreased if patient taking Biotin.     POC Glucose Once [[id number]]  (Abnormal) Collected: 10/07/22 1739    Specimen: Blood Updated: 10/07/22 1741     Glucose 136 mg/dL      Comment: Serial Number: [id number]:  [id number]           Imaging Results (Last 72 Hours)     Procedure Component Value Units Date/Time    US Abdomen Limited [[id number]] Collected: 10/10/22 1104     Updated: 10/10/22 1108    Narrative:      DATE OF EXAM:  10/10/2022 10:14 AM     PROCEDURE:  US ABDOMEN LIMITED-     INDICATIONS:  abnormal LFT's, gastroenteritis; R53.1-Weakness; E87.6-Hypokalemia;  E86.0-Dehydration; R91.1-Solitary pulmonary nodule     COMPARISON:  CT abdomen pelvis October 6, 2022     TECHNIQUE:   Grayscale and color Doppler ultrasound evaluation of the limited abdomen  was performed.     FINDINGS:  The visualized portions of pancreas do not appear unusual. The liver is  relatively homogeneous in echotexture. Doppler waveforms of the portal  vein and hepatic vein reveal patency of these vessels. The common bile  duct measures 0.24 cm. There are suggested gallstones within the  gallbladder. The gallbladder wall is slightly prominent measuring 0.38  cm. The right kidney measures 9.3 cm in length. There is no  hydronephrosis.       Impression:      1.     Cholelithiasis. Gallbladder wall is slightly thickened which may  relate to cholecystitis in the proper clinical setting.     Electronically Signed By-Mark Carlton MD On:10/10/2022 11:06 AM  This report was finalized on [id number] by  Mark Carlton MD.    MRI Lumbar Spine Without Contrast [[id number]] Collected: 10/08/22 1920     Updated: 10/08/22 1929    Narrative:      Examination: MRI LUMBAR SPINE WO CONTRAST-     Date of Exam: 10/8/2022 2:21 PM     Indication: Weakness bilateral  lower extremities; R53.1-Weakness;  E87.6-Hypokalemia; E86.0-Dehydration; R91.1-Solitary pulmonary nodule.     Comparison: None available.     Technique: Standard noncontrast MR pulse sequences of the lumbar spine  were obtained.     Findings:  There are 5 nonrib-bearing lumbar-type vertebral bodies. Vertebral body  heights are maintained. Negative for acute fracture. Multilevel disc  desiccation most pronounced at L3-4, L4-5 and L5-S1. The conus  medullaris terminates at the L1 level. Marrow edema at the superior  endplate of T12 likely related to associated disc disease with endplate  sclerosis seen on CT. No paraspinal fluid collection. Negative for  diffuse marrow replacing process. Kidneys without hydronephrosis.  Abdominal aorta without aneurysm. Visualized portions of the pelvis are  intact.     T12-L1: There is no disc bulge. Mild left-sided facet arthritis. No  canal or foraminal stenosis.     L1-2: No disc bulge. Mild facet hypertrophy.. Negative for canal or  foraminal stenosis.     L2-3: No disc bulge. Mild facet hypertrophy. Negative for canal or  foraminal stenosis.     L3-4: There is disc desiccation with mild disc bulge and marginal  osteophyte formation. Mild facet arthritis. Negative for canal stenosis.  Mild left foraminal stenosis. No right foraminal stenosis.     L4-5: There is disc desiccation with mild disc bulge and marginal  osteophyte formation. Mild facet arthritis with moderate ligament flavum  thickening. There is mild canal narrowing in right paracentral lateral  recess with contact of the right L5 nerve root. No high-grade central  canal stenosis. Mild right foraminal stenosis. No left foraminal  stenosis.     L5-S1: There is a mild circumferential disc bulge with marginal  osteophyte formation. Facet joints are normally aligned. Negative for  canal stenosis. No foraminal stenosis.       Impression:      1. Negative for acute fracture.  2. Degenerative findings with disc disease most  pronounced at L3-4, L4-5  and L5-S1 without high-grade canal stenosis.     Electronically Signed By-Samson Copeland MD On:10/8/2022 7:27 PM  This report was finalized on [id number] by  Samson Copeland MD.          I reviewed the patient's new clinical results.  I reviewed the patient's new imaging results and agree with the interpretation.  I reviewed the patient's other test results and agree with the interpretation      Assessment & Plan       Generalized weakness      67-year-old lady with cholelithiasis, recent mild elevation of LFTs and amylase lipase    Had a long discussion with the patient today about her gallbladder, gallstones, pancreas, constipation and other issues.  Certainly its not clear to me that the patient actually clinically has pancreatitis per tickly since she is on regular diet without any pain or nausea.  However, given that she has had mild elevation of her enzymes it is reasonable to offer her cholecystectomy to prevent future bouts of biliary colic as well as potential pancreatitis.  Again, we had a long discussion about this.  After considering surgery versus continued observation the patient would like to undergo cholecystectomy during this hospitalization.  I believe that is reasonable.  The risks and benefits of laparoscopic cholecystectomy, cholangiogram, possible open operation were reviewed with her.  She will be made n.p.o. after midnight for surgery tomorrow.    I discussed the patient's findings and my recommendations with patient              This document has been electronically signed by Jose Angel Wan MD on October 10, 2022 15:37 EDT      Jose Angel Wan MD  10/10/22  15:37 EDT

## 2022-10-10 NOTE — DISCHARGE PLACEMENT REQUEST
"Danny Leo (67 y.o. Female)     Date of Birth   1955    Social Security Number       Address   [address] [place] [address] [place] [facility]    Home Phone   [phone]    MRN   [id number]       Evangel[employer]   Zoroastrianism    Marital Status                               Admission Date   10/6/22    Admission Type   Emergency    Admitting Provider   Hortencia Herron MD    Attending Provider   Brandon Villafuerte DO    Department, Room/Bed   [facility] 2B MEDICAL INPATIENT, 230/1       Discharge Date       Discharge Disposition       Discharge Destination                               Attending Provider: Brandon Villafuerte DO    Allergies: Ether, Other    Isolation: None   Infection: None   Code Status: CPR    Ht: 165.1 cm (65\")   Wt: 59.8 kg (131 lb 13.4 oz)    Admission Cmt: None   Principal Problem: None                Active Insurance as of 10/6/2022     Primary Coverage     Payor Plan Insurance Group Employer/Plan Group    [facility] MEDICARE REPLACEMENT [facility] MEDICARE REPLACEMENT [id number]     Payor Plan Address Payor Plan Phone Number Payor Plan Fax Number Effective Dates    [address] [address]   1/1/2019 - None Entered    [facility] [id number]       Subscriber Name Subscriber Birth Date Member ID       DANNY LEO 1955 [id number]                 Emergency Contacts      (Rel.) Home Phone Work Phone Mobile Phone    STEFF YOU (Other) [phone] -- --    YESSENIA CALVERT (Daughter) [phone] -- --              "

## 2022-10-10 NOTE — ANESTHESIA PREPROCEDURE EVALUATION
Anesthesia Evaluation     Patient summary reviewed and Nursing notes reviewed   no history of anesthetic complications:  NPO Solid Status: > 8 hours  NPO Liquid Status: > 8 hours           Airway   Dental      Pulmonary    (+) a smoker Former, COPD, recent URI,   Cardiovascular     ECG reviewed    (+) hypertension, hyperlipidemia,       Neuro/Psych  (+) weakness, psychiatric history Anxiety,    GI/Hepatic/Renal/Endo    (+)  GI bleeding , liver disease history of elevated LFT, renal disease CRI, diabetes mellitus,     Musculoskeletal     (+) neck pain,   Abdominal    Substance History   (+) drug use     OB/GYN          Other   arthritis,      ROS/Med Hx Other: Hyperglycemia, low protein/albumin, hematuria, nephropathy, MJ abuse, shoulder/arm pain, insomnia, dermatitis, Luetscher's syndrome, cellulitis, gastroenteritis, dermatitis    PSH  BACK SURGERY APPENDECTOMY  TONSILLECTOMY LUMBAR SPINE SURGERY  TOTAL ABDOMINAL HYSTERECTOMY OOPHORECTOMY  CYSTOSCOPY WITH CLOT EVACUATION TRANSURETHRAL RESECTION OF BLADDER TUMOR  INTERVENTIONAL RADIOLOGY PROCEDURE                   Anesthesia Plan    ASA 3     general     (Patient identified; pre-operative vital signs, all relevant labs/studies, complete medical/surgical/anesthetic history, full medication list, full allergy list, and NPO status obtained/reviewed; physical assessment performed; anesthetic options, side effects, potential complications, risks, and benefits discussed; questions answered; written anesthesia consent obtained; patient cleared for procedure; anesthesia machine and equipment checked and functioning)  intravenous induction     Anesthetic plan, risks, benefits, and alternatives have been provided, discussed and informed consent has been obtained with: patient.    Plan discussed with CRNA and CAA.        CODE STATUS:    Code Status (Patient has no pulse and is not breathing): CPR (Attempt to Resuscitate)  Medical Interventions (Patient has pulse or is  breathing): Full Support

## 2022-10-11 ENCOUNTER — ANESTHESIA (OUTPATIENT)
Dept: PERIOP | Facility: HOSPITAL | Age: 67
End: 2022-10-11

## 2022-10-11 LAB
ALBUMIN SERPL-MCNC: 3.8 G/DL (ref 3.5–5.2)
ALBUMIN/GLOB SERPL: 1.3 G/DL
ALP SERPL-CCNC: 110 U/L (ref 39–117)
ALT SERPL W P-5'-P-CCNC: 114 U/L (ref 1–33)
ANION GAP SERPL CALCULATED.3IONS-SCNC: 9 MMOL/L (ref 5–15)
AST SERPL-CCNC: 52 U/L (ref 1–32)
BASOPHILS # BLD AUTO: 0.1 10*3/MM3 (ref 0–0.2)
BASOPHILS NFR BLD AUTO: 1.3 % (ref 0–1.5)
BILIRUB SERPL-MCNC: <0.2 MG/DL (ref 0–1.2)
BUN SERPL-MCNC: 13 MG/DL (ref 8–23)
BUN/CREAT SERPL: 21.7 (ref 7–25)
CALCIUM SPEC-SCNC: 8.8 MG/DL (ref 8.6–10.5)
CHLORIDE SERPL-SCNC: 105 MMOL/L (ref 98–107)
CO2 SERPL-SCNC: 24 MMOL/L (ref 22–29)
CREAT SERPL-MCNC: 0.6 MG/DL (ref 0.57–1)
DEPRECATED RDW RBC AUTO: 50.8 FL (ref 37–54)
EGFRCR SERPLBLD CKD-EPI 2021: 97.3 ML/MIN/1.73
EOSINOPHIL # BLD AUTO: 0.4 10*3/MM3 (ref 0–0.4)
EOSINOPHIL NFR BLD AUTO: 6.3 % (ref 0.3–6.2)
ERYTHROCYTE [DISTWIDTH] IN BLOOD BY AUTOMATED COUNT: 15.6 % (ref 12.3–15.4)
GLOBULIN UR ELPH-MCNC: 3 GM/DL
GLUCOSE BLDC GLUCOMTR-MCNC: 113 MG/DL (ref 70–105)
GLUCOSE BLDC GLUCOMTR-MCNC: 181 MG/DL (ref 70–105)
GLUCOSE BLDC GLUCOMTR-MCNC: 237 MG/DL (ref 70–105)
GLUCOSE BLDC GLUCOMTR-MCNC: 240 MG/DL (ref 70–105)
GLUCOSE SERPL-MCNC: 116 MG/DL (ref 65–99)
HCT VFR BLD AUTO: 37 % (ref 34–46.6)
HGB BLD-MCNC: 11.6 G/DL (ref 12–15.9)
LYMPHOCYTES # BLD AUTO: 3.2 10*3/MM3 (ref 0.7–3.1)
LYMPHOCYTES NFR BLD AUTO: 50.1 % (ref 19.6–45.3)
MCH RBC QN AUTO: 29 PG (ref 26.6–33)
MCHC RBC AUTO-ENTMCNC: 31.2 G/DL (ref 31.5–35.7)
MCV RBC AUTO: 92.7 FL (ref 79–97)
MONOCYTES # BLD AUTO: 0.6 10*3/MM3 (ref 0.1–0.9)
MONOCYTES NFR BLD AUTO: 9.8 % (ref 5–12)
NEUTROPHILS NFR BLD AUTO: 2.1 10*3/MM3 (ref 1.7–7)
NEUTROPHILS NFR BLD AUTO: 32.5 % (ref 42.7–76)
NRBC BLD AUTO-RTO: 0.1 /100 WBC (ref 0–0.2)
PLATELET # BLD AUTO: 288 10*3/MM3 (ref 140–450)
PMV BLD AUTO: 9.3 FL (ref 6–12)
POTASSIUM SERPL-SCNC: 4 MMOL/L (ref 3.5–5.2)
PROT SERPL-MCNC: 6.8 G/DL (ref 6–8.5)
RBC # BLD AUTO: 3.99 10*6/MM3 (ref 3.77–5.28)
SODIUM SERPL-SCNC: 138 MMOL/L (ref 136–145)
WBC NRBC COR # BLD: 6.5 10*3/MM3 (ref 3.4–10.8)

## 2022-10-11 PROCEDURE — 25010000002 PHENYLEPHRINE 10 MG/ML SOLUTION: Performed by: ANESTHESIOLOGY

## 2022-10-11 PROCEDURE — 47562 LAPAROSCOPIC CHOLECYSTECTOMY: CPT | Performed by: SURGERY

## 2022-10-11 PROCEDURE — 25010000002 CEFAZOLIN PER 500 MG: Performed by: SURGERY

## 2022-10-11 PROCEDURE — 25010000002 ONDANSETRON PER 1 MG: Performed by: HOSPITALIST

## 2022-10-11 PROCEDURE — 25010000002 DEXAMETHASONE PER 1 MG: Performed by: ANESTHESIOLOGY

## 2022-10-11 PROCEDURE — 85025 COMPLETE CBC W/AUTO DIFF WBC: CPT | Performed by: SURGERY

## 2022-10-11 PROCEDURE — 63710000001 HYDROCODONE-ACETAMINOPHEN 5-325 MG TABLET: Performed by: SURGERY

## 2022-10-11 PROCEDURE — 25010000002 HYDRALAZINE PER 20 MG: Performed by: ANESTHESIOLOGY

## 2022-10-11 PROCEDURE — 25010000002 HYDROMORPHONE 1 MG/ML SOLUTION: Performed by: ANESTHESIOLOGY

## 2022-10-11 PROCEDURE — A9270 NON-COVERED ITEM OR SERVICE: HCPCS | Performed by: SURGERY

## 2022-10-11 PROCEDURE — G0378 HOSPITAL OBSERVATION PER HR: HCPCS

## 2022-10-11 PROCEDURE — 63710000001 POLYETHYLENE GLYCOL 17 G PACK: Performed by: SURGERY

## 2022-10-11 PROCEDURE — 63710000001 INSULIN LISPRO (HUMAN) PER 5 UNITS: Performed by: SURGERY

## 2022-10-11 PROCEDURE — 47562 LAPAROSCOPIC CHOLECYSTECTOMY: CPT | Performed by: NURSE PRACTITIONER

## 2022-10-11 PROCEDURE — 82962 GLUCOSE BLOOD TEST: CPT

## 2022-10-11 PROCEDURE — 25010000002 FENTANYL CITRATE (PF) 100 MCG/2ML SOLUTION: Performed by: ANESTHESIOLOGY

## 2022-10-11 PROCEDURE — 88304 TISSUE EXAM BY PATHOLOGIST: CPT | Performed by: SURGERY

## 2022-10-11 PROCEDURE — 63710000001 PRIMIDONE 250 MG TABLET: Performed by: SURGERY

## 2022-10-11 PROCEDURE — 25010000002 ONDANSETRON PER 1 MG: Performed by: SURGERY

## 2022-10-11 PROCEDURE — 25010000002 PROPOFOL 10 MG/ML EMULSION: Performed by: ANESTHESIOLOGY

## 2022-10-11 PROCEDURE — 80053 COMPREHEN METABOLIC PANEL: CPT | Performed by: SURGERY

## 2022-10-11 PROCEDURE — 63710000001 INSULIN LISPRO (HUMAN) PER 5 UNITS: Performed by: HOSPITALIST

## 2022-10-11 PROCEDURE — 63710000001 TOPIRAMATE PER 25 MG: Performed by: SURGERY

## 2022-10-11 DEVICE — LIGAMAX 5 MM ENDOSCOPIC MULTIPLE CLIP APPLIER
Type: IMPLANTABLE DEVICE | Site: ABDOMEN | Status: FUNCTIONAL
Brand: LIGAMAX

## 2022-10-11 RX ORDER — SODIUM CHLORIDE 9 MG/ML
125 INJECTION, SOLUTION INTRAVENOUS CONTINUOUS
Status: DISCONTINUED | OUTPATIENT
Start: 2022-10-11 | End: 2022-10-12 | Stop reason: HOSPADM

## 2022-10-11 RX ORDER — SODIUM CHLORIDE, SODIUM LACTATE, POTASSIUM CHLORIDE, CALCIUM CHLORIDE 600; 310; 30; 20 MG/100ML; MG/100ML; MG/100ML; MG/100ML
INJECTION, SOLUTION INTRAVENOUS CONTINUOUS PRN
Status: DISCONTINUED | OUTPATIENT
Start: 2022-10-11 | End: 2022-10-11 | Stop reason: SURG

## 2022-10-11 RX ORDER — ROCURONIUM BROMIDE 10 MG/ML
INJECTION, SOLUTION INTRAVENOUS AS NEEDED
Status: DISCONTINUED | OUTPATIENT
Start: 2022-10-11 | End: 2022-10-11 | Stop reason: SURG

## 2022-10-11 RX ORDER — HYDRALAZINE HYDROCHLORIDE 20 MG/ML
INJECTION INTRAMUSCULAR; INTRAVENOUS AS NEEDED
Status: DISCONTINUED | OUTPATIENT
Start: 2022-10-11 | End: 2022-10-11 | Stop reason: SURG

## 2022-10-11 RX ORDER — PHENYLEPHRINE HYDROCHLORIDE 10 MG/ML
INJECTION INTRAVENOUS AS NEEDED
Status: DISCONTINUED | OUTPATIENT
Start: 2022-10-11 | End: 2022-10-11 | Stop reason: SURG

## 2022-10-11 RX ORDER — NALOXONE HCL 0.4 MG/ML
0.1 VIAL (ML) INJECTION
Status: DISCONTINUED | OUTPATIENT
Start: 2022-10-11 | End: 2022-10-12 | Stop reason: HOSPADM

## 2022-10-11 RX ORDER — PROPOFOL 10 MG/ML
VIAL (ML) INTRAVENOUS AS NEEDED
Status: DISCONTINUED | OUTPATIENT
Start: 2022-10-11 | End: 2022-10-11 | Stop reason: SURG

## 2022-10-11 RX ORDER — BUPIVACAINE HYDROCHLORIDE AND EPINEPHRINE 5; 5 MG/ML; UG/ML
INJECTION, SOLUTION EPIDURAL; INTRACAUDAL; PERINEURAL AS NEEDED
Status: DISCONTINUED | OUTPATIENT
Start: 2022-10-11 | End: 2022-10-11 | Stop reason: HOSPADM

## 2022-10-11 RX ORDER — LIDOCAINE HYDROCHLORIDE 10 MG/ML
INJECTION, SOLUTION EPIDURAL; INFILTRATION; INTRACAUDAL; PERINEURAL AS NEEDED
Status: DISCONTINUED | OUTPATIENT
Start: 2022-10-11 | End: 2022-10-11 | Stop reason: SURG

## 2022-10-11 RX ORDER — HYDROCODONE BITARTRATE AND ACETAMINOPHEN 5; 325 MG/1; MG/1
1 TABLET ORAL EVERY 4 HOURS PRN
Status: DISCONTINUED | OUTPATIENT
Start: 2022-10-11 | End: 2022-10-12 | Stop reason: HOSPADM

## 2022-10-11 RX ORDER — NEOSTIGMINE METHYLSULFATE 5 MG/5 ML
SYRINGE (ML) INTRAVENOUS AS NEEDED
Status: DISCONTINUED | OUTPATIENT
Start: 2022-10-11 | End: 2022-10-11 | Stop reason: SURG

## 2022-10-11 RX ORDER — GLYCOPYRROLATE 0.2 MG/ML
INJECTION INTRAMUSCULAR; INTRAVENOUS AS NEEDED
Status: DISCONTINUED | OUTPATIENT
Start: 2022-10-11 | End: 2022-10-11 | Stop reason: SURG

## 2022-10-11 RX ORDER — FENTANYL CITRATE 50 UG/ML
INJECTION, SOLUTION INTRAMUSCULAR; INTRAVENOUS AS NEEDED
Status: DISCONTINUED | OUTPATIENT
Start: 2022-10-11 | End: 2022-10-11 | Stop reason: SURG

## 2022-10-11 RX ORDER — DEXAMETHASONE SODIUM PHOSPHATE 4 MG/ML
INJECTION, SOLUTION INTRA-ARTICULAR; INTRALESIONAL; INTRAMUSCULAR; INTRAVENOUS; SOFT TISSUE AS NEEDED
Status: DISCONTINUED | OUTPATIENT
Start: 2022-10-11 | End: 2022-10-11 | Stop reason: SURG

## 2022-10-11 RX ADMIN — PRIMIDONE 250 MG: 250 TABLET ORAL at 21:29

## 2022-10-11 RX ADMIN — PSYLLIUM HUSK 1 PACKET: 3.4 POWDER ORAL at 09:12

## 2022-10-11 RX ADMIN — FENTANYL CITRATE 100 MCG: 50 INJECTION, SOLUTION INTRAMUSCULAR; INTRAVENOUS at 15:47

## 2022-10-11 RX ADMIN — PRIMIDONE 250 MG: 250 TABLET ORAL at 04:35

## 2022-10-11 RX ADMIN — TOPIRAMATE 50 MG: 25 TABLET, FILM COATED ORAL at 09:11

## 2022-10-11 RX ADMIN — HYDROCODONE BITARTRATE AND ACETAMINOPHEN 1 TABLET: 5; 325 TABLET ORAL at 19:19

## 2022-10-11 RX ADMIN — ROCURONIUM BROMIDE 40 MG: 10 INJECTION, SOLUTION INTRAVENOUS at 15:52

## 2022-10-11 RX ADMIN — PHENYLEPHRINE HYDROCHLORIDE 100 MCG: 10 INJECTION INTRAVENOUS at 15:57

## 2022-10-11 RX ADMIN — SODIUM CHLORIDE, SODIUM LACTATE, POTASSIUM CHLORIDE, AND CALCIUM CHLORIDE: .6; .31; .03; .02 INJECTION, SOLUTION INTRAVENOUS at 15:45

## 2022-10-11 RX ADMIN — CEFAZOLIN 2 G: 2 INJECTION, POWDER, FOR SOLUTION INTRAMUSCULAR; INTRAVENOUS at 15:55

## 2022-10-11 RX ADMIN — ONDANSETRON 4 MG: 2 INJECTION INTRAMUSCULAR; INTRAVENOUS at 16:39

## 2022-10-11 RX ADMIN — POLYETHYLENE GLYCOL 3350 17 G: 17 POWDER, FOR SOLUTION ORAL at 09:12

## 2022-10-11 RX ADMIN — Medication 2.5 MG: at 16:41

## 2022-10-11 RX ADMIN — INSULIN LISPRO 2 UNITS: 100 INJECTION, SOLUTION INTRAVENOUS; SUBCUTANEOUS at 09:13

## 2022-10-11 RX ADMIN — Medication 10 ML: at 09:12

## 2022-10-11 RX ADMIN — HYDROMORPHONE HYDROCHLORIDE 0.5 MG: 1 INJECTION, SOLUTION INTRAMUSCULAR; INTRAVENOUS; SUBCUTANEOUS at 17:20

## 2022-10-11 RX ADMIN — HYDROMORPHONE HYDROCHLORIDE 0.5 MG: 1 INJECTION, SOLUTION INTRAMUSCULAR; INTRAVENOUS; SUBCUTANEOUS at 17:04

## 2022-10-11 RX ADMIN — GLYCOPYRROLATE 0.4 MCG: 0.2 INJECTION INTRAMUSCULAR; INTRAVENOUS at 16:41

## 2022-10-11 RX ADMIN — TRAMADOL HYDROCHLORIDE 50 MG: 50 TABLET, COATED ORAL at 12:44

## 2022-10-11 RX ADMIN — DEXAMETHASONE SODIUM PHOSPHATE 8 MG: 4 INJECTION, SOLUTION INTRAMUSCULAR; INTRAVENOUS at 15:55

## 2022-10-11 RX ADMIN — SODIUM CHLORIDE 75 ML/HR: 9 INJECTION, SOLUTION INTRAVENOUS at 04:32

## 2022-10-11 RX ADMIN — POLYETHYLENE GLYCOL 3350 17 G: 17 POWDER, FOR SOLUTION ORAL at 21:31

## 2022-10-11 RX ADMIN — SODIUM CHLORIDE 125 ML/HR: 9 INJECTION, SOLUTION INTRAVENOUS at 19:35

## 2022-10-11 RX ADMIN — ACETAMINOPHEN 650 MG: 325 TABLET, FILM COATED ORAL at 09:23

## 2022-10-11 RX ADMIN — TOPIRAMATE 50 MG: 25 TABLET, FILM COATED ORAL at 21:28

## 2022-10-11 RX ADMIN — LIDOCAINE HYDROCHLORIDE 80 MG: 10 INJECTION, SOLUTION EPIDURAL; INFILTRATION; INTRACAUDAL; PERINEURAL at 15:52

## 2022-10-11 RX ADMIN — HYDRALAZINE HYDROCHLORIDE 10 MG: 20 INJECTION INTRAMUSCULAR; INTRAVENOUS at 16:32

## 2022-10-11 RX ADMIN — INSULIN LISPRO 3 UNITS: 100 INJECTION, SOLUTION INTRAVENOUS; SUBCUTANEOUS at 17:05

## 2022-10-11 RX ADMIN — PROPOFOL 160 MG: 10 INJECTION, EMULSION INTRAVENOUS at 15:52

## 2022-10-11 NOTE — ANESTHESIA POSTPROCEDURE EVALUATION
Patient: Divya Lomeli    Procedure Summary     Date: 10/11/22 Room / Location: [facility] OR 09 / [facility] MAIN OR    Anesthesia Start: 1545 Anesthesia Stop: 1655    Procedure: CHOLECYSTECTOMY LAPAROSCOPIC (Abdomen) Diagnosis:       Calculus of gallbladder without cholecystitis without obstruction      (Calculus of gallbladder without cholecystitis without obstruction [K80.20])    Surgeons: Jose Angel Wan MD Provider: Ric Shelton MD    Anesthesia Type: general ASA Status: 3          Anesthesia Type: general    Vitals  Vitals Value Taken Time   /50 10/11/22 1702   Temp 97.5 °F (36.4 °C) 10/11/22 1652   Pulse 75 10/11/22 1703   Resp 10 10/11/22 1702   SpO2 96 % 10/11/22 1703   Vitals shown include unvalidated device data.        Post Anesthesia Care and Evaluation    Patient location during evaluation: PACU  Patient participation: complete - patient cannot participate  Level of consciousness: responsive to light touch and responsive to physical stimuli  Pain score: 0  Pain management: adequate    Airway patency: patent  Anesthetic complications: No anesthetic complications  PONV Status: controlled  Cardiovascular status: acceptable and hemodynamically stable  Respiratory status: acceptable and face mask  Hydration status: acceptable    Comments: Satisfactory progress.Patient seen and examined postoperatively; vital signs stable; SpO2 greater than or equal to 90%; cardiopulmonary status stable; nausea/vomiting adequately controlled; pain adequately controlled; no apparent anesthesia complications; patient discharged from anesthesia care when discharge criteria were met

## 2022-10-11 NOTE — PROGRESS NOTES
[facility] Medicine Services Daily Progress Note    Patient Name: Divya Lomeli  : 1955  MRN: [id number]  Primary Care Physician:  Trae Kaye MD  Date of admission: 10/6/2022      Subjective      Chief Complaint: Walking difficulty      Patient Reports     10/8/22: Consulted neurology since the patient has difficulty walking with physical therapy.  Claims to have had several weeks of diarrhea but now constipation.  Does not want Lovenox shot.  Lives alone.    10/9/22: Claims primidone dose increased recently by neurologist due to uncontrolled tremors.  MRI of the pelvis done yesterday.  Abdominal ultrasound ordered.    10/10/22: N.p.o. for abdominal ultrasound.  PT/OT consulted.    10/11/22: Cholecystectomy today.    Review of Systems   All other systems reviewed and are negative.         Objective      Vitals:   Temp:  [97.6 °F (36.4 °C)-98.5 °F (36.9 °C)] 98.5 °F (36.9 °C)  Heart Rate:  [71-78] 71  Resp:  [18-20] 18  BP: (137-158)/(68-77) 147/77    Physical Exam  HENT:      Head: Normocephalic.      Nose: Nose normal.   Eyes:      Extraocular Movements: Extraocular movements intact.      Pupils: Pupils are equal, round, and reactive to light.   Cardiovascular:      Rate and Rhythm: Normal rate and regular rhythm.   Pulmonary:      Effort: Pulmonary effort is normal.   Abdominal:      General: Bowel sounds are normal.   Musculoskeletal:         General: Normal range of motion.      Cervical back: Normal range of motion.   Skin:     General: Skin is warm.   Neurological:      Mental Status: She is alert. Mental status is at baseline.   Psychiatric:         Mood and Affect: Mood normal.             Result Review    Result Review:  I have personally reviewed the results from the time of this admission to 10/11/2022 14:26 EDT and agree with these findings:  [x]  Laboratory  []  Microbiology  [x]  Radiology  []  EKG/Telemetry   []  Cardiology/Vascular   []  Pathology  [x]  Old  records  []  Other:            Assessment & Plan      Brief Patient Summary:    67-year-old female with history of COPD, hypertension and diabetes mellitus presented to the emergency room on 10/6/2022 complaining of several weeks of diarrhea and weakness.  Apparently the patient had slid off a recliner and had stayed on the floor for several hours until her boyfriend assisted her to get up.  GI has been consulted and the patient in the past has followed with Dr. Limon      [MAR Hold] insulin lispro, 0-7 Units, Subcutaneous, BID AC  [MAR Hold] polyethylene glycol, 17 g, Oral, BID  primidone, 250 mg, Oral, Q8H  [MAR Hold] psyllium, 1 packet, Oral, Daily  [MAR Hold] sodium chloride, 10 mL, Intravenous, Q12H  topiramate, 50 mg, Oral, BID       sodium chloride, 75 mL/hr, Last Rate: 75 mL/hr (10/11/22 0432)         Active Hospital Problems:  Active Hospital Problems    Diagnosis    • **Calculus of gallbladder without cholecystitis without obstruction      Added automatically from request for surgery [id number]     • Generalized weakness      Generalized weakness:  -Patient fell at home and was unable to get up from the floor for several hours  -She has been having several weeks of diarrhea  -PT/OT consulted  -Neurology consulted    Diabetes mellitus:  -Continue ISS and home medications    Hyperlipidemia:  -Statin    Hypertension:  -Continue lisinopril      DVT prophylaxis:  Mechanical DVT prophylaxis orders are present.    CODE STATUS:    Code Status (Patient has no pulse and is not breathing): CPR (Attempt to Resuscitate)  Medical Interventions (Patient has pulse or is breathing): Full Support      Disposition:  I expect patient to be discharged defer    This patient has been examined wearing appropriate Personal Protective Equipment and discussed with nursing. 10/11/22      Electronically signed by Brandon Villafuerte DO, 10/11/22, 14:26 EDT.  [facility] Hospitalist Team

## 2022-10-11 NOTE — PLAN OF CARE
Problem: Adult Inpatient Plan of Care  Goal: Absence of Hospital-Acquired Illness or Injury  Intervention: Identify and Manage Fall Risk  Description: Perform standard risk assessment on admission using a validated tool or comprehensive approach appropriate to the patient; reassess fall risk frequently, with change in status or transfer to another level of care.  Communicate fall injury risk to interprofessional healthcare team.  Determine need for increased observation, equipment and environmental modification, such as low bed, signage and supportive, nonskid footwear.  Adjust safety measures to individual developmental age, stage and identified risk factors.  Reinforce the importance of safety and physical activity with patient and family.  Perform regular intentional rounding to assess need for position change, pain assessment and personal needs, including assistance with toileting.  Recent Flowsheet Documentation  Taken 10/11/2022 0200 by Benoit Smith LPN  Safety Promotion/Fall Prevention:   safety round/check completed   room organization consistent   nonskid shoes/slippers when out of bed   muscle strengthening facilitated   mobility aid in reach   lighting adjusted   fall prevention program maintained   clutter free environment maintained   assistive device/personal items within reach   activity supervised  Taken 10/11/2022 0005 by Benoit Smith LPN  Safety Promotion/Fall Prevention:   safety round/check completed   room organization consistent   nonskid shoes/slippers when out of bed   muscle strengthening facilitated   mobility aid in reach   lighting adjusted   fall prevention program maintained   clutter free environment maintained   activity supervised   assistive device/personal items within reach  Taken 10/10/2022 2200 by Benoit Smith LPN  Safety Promotion/Fall Prevention:   safety round/check completed   toileting scheduled   room organization consistent   nonskid  shoes/slippers when out of bed   muscle strengthening facilitated   mobility aid in reach   lighting adjusted   fall prevention program maintained   clutter free environment maintained   assistive device/personal items within reach   activity supervised  Taken 10/10/2022 2052 by Benoit Smith LPN  Safety Promotion/Fall Prevention:   safety round/check completed   room organization consistent   nonskid shoes/slippers when out of bed   muscle strengthening facilitated   mobility aid in reach   lighting adjusted   fall prevention program maintained   clutter free environment maintained   assistive device/personal items within reach   activity supervised  Intervention: Prevent Skin Injury  Description: Perform a screening for skin injury risk, such as pressure or moisture associated skin damage on admission and at regular intervals throughout hospital stay.  Keep all areas of skin (especially folds) clean and dry.  Maintain adequate skin hydration.  Relieve and redistribute pressure and protect bony prominences; implement measures based on patient-specific risk factors.  Match turning and repositioning schedule to clinical condition.  Encourage weight shift frequently; assist with reposition if unable to complete independently.  Float heels off bed; avoid pressure on the Achilles tendon.  Keep skin free from extended contact with medical devices.  Encourage functional activity and mobility, as early as tolerated.  Use aids (e.g., slide boards, mechanical lift) during transfer.  Recent Flowsheet Documentation  Taken 10/11/2022 0200 by Benoit Smith LPN  Body Position: position changed independently  Taken 10/11/2022 0005 by Benoit Smith LPN  Body Position: position changed independently  Taken 10/10/2022 2200 by Benoit Smith LPN  Body Position: position changed independently  Taken 10/10/2022 2052 by Benoit Smith LPN  Body Position: position changed independently  Intervention:  Prevent and Manage VTE (Venous Thromboembolism) Risk  Description: Assess for VTE (venous thromboembolism) risk.  Encourage and assist with early ambulation.  Initiate and maintain compression or other therapy, as indicated, based on identified risk in accordance with organizational protocol and provider order.  Encourage both active and passive leg exercises while in bed, if unable to ambulate.  Recent Flowsheet Documentation  Taken 10/11/2022 0200 by Benoit Smith LPN  Activity Management: activity encouraged  Taken 10/11/2022 0005 by Benoit Smith LPN  Activity Management: up to bedside commode  Taken 10/10/2022 2200 by Benoit Smith LPN  Activity Management: activity adjusted per tolerance  Taken 10/10/2022 2052 by Benoit Smith LPN  Activity Management:   up to bedside commode   activity encouraged   activity adjusted per tolerance  Intervention: Prevent Infection  Description: Maintain skin and mucous membrane integrity; promote hand, oral and pulmonary hygiene.  Optimize fluid balance, nutrition, sleep and glycemic control to maximize infection resistance.  Identify potential sources of infection early to prevent or mitigate progression of infection (e.g., wound, lines, devices).  Evaluate ongoing need for invasive devices; remove promptly when no longer indicated.  Recent Flowsheet Documentation  Taken 10/11/2022 0200 by Benoit Smith LPN  Infection Prevention:   visitors restricted/screened   single patient room provided   rest/sleep promoted   personal protective equipment utilized   hand hygiene promoted  Taken 10/11/2022 0005 by Benoit Smith LPN  Infection Prevention:   visitors restricted/screened   single patient room provided   rest/sleep promoted   personal protective equipment utilized   hand hygiene promoted  Taken 10/10/2022 2200 by Benoit Smith LPN  Infection Prevention:   visitors restricted/screened   single patient room provided   rest/sleep  promoted   hand hygiene promoted   personal protective equipment utilized  Taken 10/10/2022 2052 by Benoit Smith LPN  Infection Prevention:   visitors restricted/screened   single patient room provided   hand hygiene promoted   personal protective equipment utilized   rest/sleep promoted     Problem: Skin Injury Risk Increased  Goal: Skin Health and Integrity  Intervention: Optimize Skin Protection  Description: Perform a full pressure injury risk assessment, as indicated by screening, upon admission to care unit.  Reassess skin (injury risk, full inspection) frequently (e.g., scheduled interval, with change in condition) to provide optimal early detection and prevention.  Maintain adequate tissue perfusion (e.g., encourage fluid balance; avoid crossing legs, constrictive clothing or devices) to promote tissue oxygenation.  Maintain head of bed at lowest degree of elevation tolerated, considering medical condition and other restrictions.  Avoid positioning onto an area that remains reddened.  Minimize incontinence and moisture (e.g., toileting schedule; moisture-wicking pad, diaper or incontinence collection device; skin moisture barrier).  Cleanse skin promptly and gently when soiled utilizing a pH-balanced cleanser.  Relieve and redistribute pressure (e.g., scheduled position changes, weight shifts, use of support surface, medical device repositioning, protective dressing application, use of positioning device, microclimate control, use of pressure-injury-monitor  Encourage increased activity, such as sitting in a chair at the bedside or early mobilization, when able to tolerate.  Recent Flowsheet Documentation  Taken 10/11/2022 0200 by Benoit Smith LPN  Head of Bed (HOB) Positioning: HOB at 20-30 degrees  Taken 10/10/2022 2200 by Benoit Smith LPN  Head of Bed (HOB) Positioning: HOB at 30-45 degrees  Taken 10/10/2022 2052 by Benoit Smith LPN  Pressure Reduction Techniques: frequent  weight shift encouraged  Head of Bed (HOB) Positioning: HOB at 30-45 degrees  Pressure Reduction Devices: pressure-redistributing mattress utilized     Problem: Fall Injury Risk  Goal: Absence of Fall and Fall-Related Injury  Intervention: Identify and Manage Contributors  Description: Develop a fall prevention plan with the patient and caregiver/family.  Provide reorientation, appropriate sensory stimulation and routines with changes in mental status to decrease risk of fall.  Promote use of personal vision and auditory aids.  Assess assistance level required for safe and effective self-care; provide support as needed, such as toileting, mobilization. For age 65 and older, implement timed toileting with assistance.  Encourage physical activity, such as performance of mobility and self-care at highest level of patient ability, multicomponent exercise program and provision of appropriate assistive devices.  If fall occurs, assess the severity of injury; implement fall injury protocol. Determine the cause and revise fall injury prevention plan.  Regularly review medication contribution to fall risk; adjust medication administration times to minimize risk of falling.  Consider risk related to polypharmacy and age.  Balance adequate pain management with potential for oversedation.  Recent Flowsheet Documentation  Taken 10/11/2022 0200 by Benoit Smith LPN  Medication Review/Management: medications reviewed  Taken 10/10/2022 2200 by Benoit Smith LPN  Medication Review/Management: medications reviewed  Taken 10/10/2022 2052 by Bneoit Smith LPN  Medication Review/Management: medications reviewed  Intervention: Promote Injury-Free Environment  Description: Provide a safe, barrier-free environment that encourages independent activity.  Keep care area uncluttered and well-lighted.  Determine need for increased observation or monitoring.  Avoid use of devices that minimize mobility, such as restraints or  indwelling urinary catheter.  Recent Flowsheet Documentation  Taken 10/11/2022 0200 by Benoit Smith LPN  Safety Promotion/Fall Prevention:   safety round/check completed   room organization consistent   nonskid shoes/slippers when out of bed   muscle strengthening facilitated   mobility aid in reach   lighting adjusted   fall prevention program maintained   clutter free environment maintained   assistive device/personal items within reach   activity supervised  Taken 10/11/2022 0005 by Benoit Smith LPN  Safety Promotion/Fall Prevention:   safety round/check completed   room organization consistent   nonskid shoes/slippers when out of bed   muscle strengthening facilitated   mobility aid in reach   lighting adjusted   fall prevention program maintained   clutter free environment maintained   activity supervised   assistive device/personal items within reach  Taken 10/10/2022 2200 by Benoit Smith LPN  Safety Promotion/Fall Prevention:   safety round/check completed   toileting scheduled   room organization consistent   nonskid shoes/slippers when out of bed   muscle strengthening facilitated   mobility aid in reach   lighting adjusted   fall prevention program maintained   clutter free environment maintained   assistive device/personal items within reach   activity supervised  Taken 10/10/2022 2052 by eBnoit Smith LPN  Safety Promotion/Fall Prevention:   safety round/check completed   room organization consistent   nonskid shoes/slippers when out of bed   muscle strengthening facilitated   mobility aid in reach   lighting adjusted   fall prevention program maintained   clutter free environment maintained   assistive device/personal items within reach   activity supervised   Goal Outcome Evaluation:  Plans for lap emyey today, npo after mn , CN and this nurse explain reason and rational of NPO and pt harrison time could be move to an early slot,   VSS monitor needs and progress of goal

## 2022-10-11 NOTE — OP NOTE
Operative Note    Divya Lomeli  10/11/2022    Pre-op Diagnosis:   Calculus of gallbladder without cholecystitis without obstruction [K80.20]    Post-op Diagnosis:     Post-Op Diagnosis Codes:     * Calculus of gallbladder without cholecystitis without obstruction [K80.20]    Procedure/CPT® Codes:      Procedure(s):  CHOLECYSTECTOMY LAPAROSCOPIC    Surgeon(s):  Jose Angel Wan MD    Anesthesia: General    Staff:   Circulator: Conchis Greenberg RN  Scrub Person: Tammi Lim RN; Steffany Hdez  Assistant: Araceli Connor APRN    Estimated Blood Loss: minimal    Specimens:                ID Type Source Tests Collected by Time   A :  Tissue Gallbladder TISSUE PATHOLOGY EXAM Jose Angel Wan MD 10/11/2022 1619         Drains: * No LDAs found *    Findings: adhesions, normal appearing galbladder    Complications: none    Indication: Biliary colic    Operative Note:    Patient was seen and consented preop.  Taken to OR and placed in supine position.  General anesthetic administered and orotracheally intubated without issue.  Briefing was then performed and the abdomen prepped and draped. Timeout was then performed.    Following timeout local was injected below the right subcostal margin at the midclavicular line.  A skin incision was made and the 5mm optical viewing port with the scope inserted was then used to attempt entry into the abdomen, however, given failure to advance the port through the muscle easily I aborted this.  Local anesthetic was then injected below the umbilicus.  A curvilinear incision was made and sharp dissection was carried down to the base of the umbilical stalk.  This was elevated up and the fascia was exposed.  The fascia was then opened sharply and the abdomen was then entered bluntly.  A 5 mm trocar was then placed through this fascial defect and used to insufflate the abdomen without incident.  The 5 mm port in the right upper quadrant was then advanced the rest of  the way through the abdominal wall under direct visualization and without issue.  On initial inspection of the right upper quadrant there were significant adhesions of the right lobe of the liver to the abdominal wall and omentum to the right lobe of the liver and gallbladder.    The umbilical port was then upsized to an 11 mm port.  2 additional 5 mm ports were then placed in the subxiphoid and right anterior axillary line positions both under direct vision and after injection of local anesthetic.    The patient was then placed in reverse Trendelenburg right side up position.  The fundus of the gallbladder was identified, grasped and elevated cephalad.  This was facilitated by division of the line of adhesions holding the right lobe of the liver to the abdominal wall.  The adhesions of the omentum were stripped away revealing the body and infundibulum of the gallbladder.  The infundibulum was grasped and elevated outward.  Hook cautery was used to divide the peritoneum at the medial and lateral gallbladder neck.  The remaining peritoneum was stripped away and blunt dissection undertaken until a critical view of safety was achieved.  Clips were then placed: 2 on the downside of the artery, 1 on the upside; 2 on the downside of the duct, one on the upside.  These structures were then divided with scissors.     Hook cautery was then used to remove the gallbladder from its fossa.  It was placed into an EndoCatch bag and retrieved through the umbilical port site.  The abdomen was then reinsufflated and the area of dissection inspected and found to be hemostatic.  The RUQ was irrigated and the patient returned to a neutral position.  The umbilical port was then removed and the port site closed with 0 Vicryl.  The abdomen was then reinsufflated and the closure inspected and found adequate.  The abdomen was then desufflated and the ports removed.  All skin incisions closed with 4-0 Vicryl and Skin Affix.    Assistant:  Araceli Connor APRN was responsible for performing the following activities: Retraction, Suturing, Placing Dressing and Held/Positioned Camera and their skilled assistance was necessary for the success of this case.          This document has been electronically signed by Jose Angel Wan MD on October 11, 2022 16:43 EDT      Jose Angel Wan MD     Date: 10/11/2022  Time: 16:42 EDT

## 2022-10-11 NOTE — ANESTHESIA PROCEDURE NOTES
Airway  Urgency: elective    Airway not difficult    General Information and Staff    Patient location during procedure: OR  Anesthesiologist: Ric Shelton MD    Indications and Patient Condition  Indications for airway management: airway protection    Preoxygenated: yes  Mask difficulty assessment: 1 - vent by mask    Final Airway Details  Final airway type: endotracheal airway      Successful airway: ETT  Cuffed: yes   Successful intubation technique: video laryngoscopy  Blade: Lacey  Blade size: 3  ETT size (mm): 7.0  Cormack-Lehane Classification: grade I - full view of glottis  Placement verified by: chest auscultation, capnometry and palpation of cuff   Cuff volume (mL): 10  Measured from: teeth  Number of attempts at approach: 1  Assessment: lips, teeth, and gum same as pre-op and atraumatic intubation

## 2022-10-11 NOTE — CASE MANAGEMENT/SOCIAL WORK
Continued Stay Note   [place]     Patient Name: Divya Lomeli  MRN: [id number]  Today's Date: 10/11/2022    Admit Date: 10/6/2022    Plan: DC PLAN: Referral to [facility], Pending Response   Discharge Plan     Row Name 10/11/22 1428       Plan    Plan DC PLAN: Referral to [facility], Pending Response    Patient/Family in Agreement with Plan yes    Plan Comments Received message from Yessy with [facility] who states cannot accept patient. DCP report sent and message sent to Alba with [facility] per patient request. Pending Response. DC BARRIERS: OR 10/11/2022                    Expected Discharge Date and Time     Expected Discharge Date Expected Discharge Time    Oct 12, 2022           Wendy Cardoso RN     Office phone: [phone]  Cell phone: [phone]

## 2022-10-11 NOTE — SIGNIFICANT NOTE
10/11/22 1201   OTHER   Discipline physical therapist   Rehab Time/Intention   Session Not Performed unable to treat, medical status change  (to leave shortly to undergo lap deangelo; will need re-eval)   Recommendation   PT - Next Appointment 10/12/22

## 2022-10-11 NOTE — CONSULTS
Nutrition Services    Patient Name: Divya Lomeli  YOB: 1955  MRN: [id number]  Admission date: 10/6/2022    Comment:        PPE Documentation        PPE Worn By Provider mask and eye protection   PPE Worn By Patient  none     CLINICAL NUTRITION ASSESSMENT      Reason for Assessment MST = 2      H&P      Past Medical History:   Diagnosis Date   • Anxiety    • Arm pain     rt upper arm muscle   • COPD (chronic obstructive pulmonary disease) (HCC)     20 to 30 years ago. Does not take meds.   • Diabetes mellitus (HCC)     Type 2   • Diabetic nephropathy (HCC)    • Hyperlipidemia    • Hypertension        Past Surgical History:   Procedure Laterality Date   • APPENDECTOMY  1958   • BACK SURGERY     • CYSTOSCOPY WITH CLOT EVACUATION N/A 9/28/2021    Procedure: CYSTOSCOPY WITH CLOT EVACUATION;  Surgeon: Jay Torres MD;  Location: [facility] MAIN OR;  Service: Urology;  Laterality: N/A;   • INTERVENTIONAL RADIOLOGY PROCEDURE N/A 9/28/2021    Procedure: STENT PLACEMENT;  Surgeon: Jay Torres MD;  Location: [facility] MAIN OR;  Service: Urology;  Laterality: N/A;   • LUMBAR SPINE SURGERY  1959    L5 removed-Abstracted from [facility]   • OOPHORECTOMY     • TONSILLECTOMY     • TOTAL ABDOMINAL HYSTERECTOMY     • TRANSURETHRAL RESECTION OF BLADDER TUMOR N/A 9/28/2021    Procedure: CYSTOSCOPY TRANSURETHRAL RESECTION OF BLADDER TUMOR;  Surgeon: Jay Torres MD;  Location: [facility] OR;  Service: Urology;  Laterality: N/A;        Current Problems   COPD  T2DM  Diabetic Nephropathy  HTN  Hyperlipidemia     Encounter Information        Trending Narrative     10/11: Visited pt in room. Pt reported no issues with PO intake when on regular diet in the hospital, however preferred the regular diet to the low res diet, stating she liked the cooked spinach. Pt reports consuming 3 meals/day when at home, consisting of high fat, frozen and convenience meals. Pt has taken supplements at home per doctor and nurse  "recommendation including Ensure, Pedialyte, and Gatorade. Pt has no chewing or swallowing difficulty.      Anthropometrics        Current Height, Weight Height: 165.1 cm (65\")  Weight: 59.8 kg (131 lb 13.4 oz) (10/09/22 0309)       Ideal Body Weight (IBW) 125 lbs   Usual Body Weight (UBW) 132 lbs       Trending Weight Hx     This admission: 131 lb 13.4 oz              PTA: 5% loss x 1 year (not significant)  12% loss x 4 years (not significant)    Wt Readings from Last 30 Encounters:   10/09/22 0309 59.8 kg (131 lb 13.4 oz)   10/08/22 0609 63.3 kg (139 lb 8.8 oz)   10/06/22 1732 66.7 kg (147 lb)   09/28/21 0111 67.1 kg (147 lb 14.9 oz)   09/27/21 1525 63 kg (138 lb 14.2 oz)   05/04/21 1354 62.8 kg (138 lb 6.4 oz)   01/19/21 1317 62.6 kg (138 lb)   12/22/20 1532 62.6 kg (138 lb)   10/23/20 1502 62.7 kg (138 lb 3.2 oz)   10/19/20 1410 62.4 kg (137 lb 9.6 oz)   08/25/20 1006 63 kg (139 lb)   08/19/20 1148 63 kg (139 lb)   07/22/20 1047 63.2 kg (139 lb 6.4 oz)   07/06/20 1356 63 kg (139 lb)   04/14/20 1039 64.9 kg (143 lb)   01/21/20 1405 64.9 kg (143 lb)   01/16/20 1302 65.3 kg (144 lb)   01/06/20 1442 64.1 kg (141 lb 6.4 oz)   10/23/19 1537 63.5 kg (140 lb)   07/23/19 1308 64.9 kg (143 lb)   07/19/19 1137 64.9 kg (143 lb)   07/01/19 1127 65.8 kg (145 lb)   05/24/19 1312 64.4 kg (142 lb)   05/09/19 1540 66.5 kg (146 lb 8 oz)   02/07/19 0905 67.1 kg (148 lb)   11/09/18 1317 68.5 kg (151 lb)   09/22/18 0908 67.6 kg (149 lb)   09/15/18 1002 67.6 kg (149 lb 2.1 oz)   06/01/18 1033 67.1 kg (148 lb)   06/20/16 1900 68 kg (150 lb)   03/18/16 1412 67.5 kg (148 lb 12.8 oz)   01/19/16 1004 69.8 kg (153 lb 12.8 oz)      BMI kg/m2 Body mass index is 21.94 kg/m².       Labs        Pertinent Labs    Results from last 7 days   Lab Units 10/10/22  0128 10/08/22  2331 10/07/22  2359 10/06/22  2234   SODIUM mmol/L 136 136 138 136   POTASSIUM mmol/L 3.7 3.6 3.7 3.3*   CHLORIDE mmol/L 102 102 102 99   CO2 mmol/L 20.0* 22.0 25.0 29.0   BUN " mg/dL 15 10 14 16   CREATININE mg/dL 0.64 0.56* 0.76 0.65   CALCIUM mg/dL 8.5* 8.6 8.3* 8.6   BILIRUBIN mg/dL <0.2 <0.2  --  <0.2   ALK PHOS U/L 84 86  --  80   ALT (SGPT) U/L 97* 79*  --  51*   AST (SGOT) U/L 70* 48*  --  36*   GLUCOSE mg/dL 236* 192* 208* 122*     Results from last 7 days   Lab Units 10/11/22  0052 10/07/22  2359 10/06/22  2101   MAGNESIUM mg/dL  --   --  1.8   HEMOGLOBIN g/dL 11.6*   < > 11.6*   HEMATOCRIT % 37.0   < > 36.1    < > = values in this interval not displayed.     COVID19   Date Value Ref Range Status   10/06/2022 Not Detected Not Detected - Ref. Range Final     Lab Results   Component Value Date    HGBA1C 6.0 (H) 10/07/2022        Medications    Scheduled Medications insulin lispro, 0-7 Units, Subcutaneous, BID AC  polyethylene glycol, 17 g, Oral, BID  primidone, 250 mg, Oral, Q8H  psyllium, 1 packet, Oral, Daily  sodium chloride, 10 mL, Intravenous, Q12H  topiramate, 50 mg, Oral, BID        Infusions sodium chloride, 75 mL/hr, Last Rate: 75 mL/hr (10/11/22 0432)        PRN Medications •  acetaminophen **OR** acetaminophen **OR** acetaminophen  •  aluminum-magnesium hydroxide-simethicone  •  dextrose  •  dextrose  •  glucagon (human recombinant)  •  hydrOXYzine  •  melatonin  •  ondansetron **OR** ondansetron  •  sodium chloride  •  sodium chloride  •  traMADol     Physical Findings        Trending Physical   Appearance, NFPE 10/11: NFPE completed  Not enough evidence for malnutrition diagnosis.   --  Edema  None reported.     Bowel Function Last BM: 10/8   Tubes   None.    Chewing/Swallowing No difficulty reported.     Skin No pressure injuries reported. Pressure reduction devices and techniques in place.      --  Current Nutrition Orders & Evaluation of Intake       Oral Nutrition     Food Allergies None.   Current PO Diet NPO Diet NPO Type: Sips with Meds   Supplement No nutrition supplements currently ordered.   PO Evaluation     Trending % PO Intake % (on previous  low-residue diet)   --  Nutritional Risk Screening        NRS-2002 Score          Nutrition Diagnosis         Nutrition Dx Problem 1 No nutrition diagnosis at this time.       Nutrition Dx Problem 2        Intervention Goal         Intervention Goal(s) Advance diet as tolerated.      Nutrition Intervention        RD Action No action at this time.      Nutrition Prescription          Diet Prescription NPO   Supplement Prescription None.    --  Monitor/Evaluation        Monitor Per protocol, I&O, PO intake, Pertinent labs, Weight, GI status         Electronically signed by:  Jasmin Etienne  10/11/22 10:57 EDT

## 2022-10-11 NOTE — SIGNIFICANT NOTE
10/11/22 1300   OTHER   Discipline occupational therapist   Rehab Time/Intention   Session Not Performed unable to treat, medical status change  (Pt to undergo lap deangelo today. OT to re-eval following procedure.)   Therapy Assessment/Plan (PT)   Criteria for Skilled Interventions Met (PT) yes;skilled treatment is necessary   Recommendation   OT - Next Appointment 10/12/22

## 2022-10-12 VITALS
DIASTOLIC BLOOD PRESSURE: 61 MMHG | OXYGEN SATURATION: 95 % | TEMPERATURE: 99 F | HEART RATE: 83 BPM | RESPIRATION RATE: 18 BRPM | SYSTOLIC BLOOD PRESSURE: 117 MMHG | WEIGHT: 131.84 LBS | HEIGHT: 65 IN | BODY MASS INDEX: 21.97 KG/M2

## 2022-10-12 LAB
ALBUMIN SERPL-MCNC: 3.4 G/DL (ref 3.5–5.2)
ALBUMIN/GLOB SERPL: 1.4 G/DL
ALP SERPL-CCNC: 110 U/L (ref 39–117)
ALT SERPL W P-5'-P-CCNC: 99 U/L (ref 1–33)
ANION GAP SERPL CALCULATED.3IONS-SCNC: 13 MMOL/L (ref 5–15)
AST SERPL-CCNC: 56 U/L (ref 1–32)
BASOPHILS # BLD AUTO: 0.1 10*3/MM3 (ref 0–0.2)
BASOPHILS NFR BLD AUTO: 0.8 % (ref 0–1.5)
BILIRUB SERPL-MCNC: <0.2 MG/DL (ref 0–1.2)
BUN SERPL-MCNC: 13 MG/DL (ref 8–23)
BUN/CREAT SERPL: 20.3 (ref 7–25)
CALCIUM SPEC-SCNC: 8.5 MG/DL (ref 8.6–10.5)
CHLORIDE SERPL-SCNC: 101 MMOL/L (ref 98–107)
CO2 SERPL-SCNC: 22 MMOL/L (ref 22–29)
CREAT SERPL-MCNC: 0.64 MG/DL (ref 0.57–1)
DEPRECATED RDW RBC AUTO: 51.6 FL (ref 37–54)
EGFRCR SERPLBLD CKD-EPI 2021: 95.8 ML/MIN/1.73
EOSINOPHIL # BLD AUTO: 0 10*3/MM3 (ref 0–0.4)
EOSINOPHIL NFR BLD AUTO: 0.2 % (ref 0.3–6.2)
ERYTHROCYTE [DISTWIDTH] IN BLOOD BY AUTOMATED COUNT: 16.1 % (ref 12.3–15.4)
GLOBULIN UR ELPH-MCNC: 2.5 GM/DL
GLUCOSE BLDC GLUCOMTR-MCNC: 150 MG/DL (ref 70–105)
GLUCOSE BLDC GLUCOMTR-MCNC: 209 MG/DL (ref 70–105)
GLUCOSE SERPL-MCNC: 201 MG/DL (ref 65–99)
HCT VFR BLD AUTO: 35.2 % (ref 34–46.6)
HGB BLD-MCNC: 11.2 G/DL (ref 12–15.9)
LYMPHOCYTES # BLD AUTO: 2 10*3/MM3 (ref 0.7–3.1)
LYMPHOCYTES NFR BLD AUTO: 16.9 % (ref 19.6–45.3)
MAGNESIUM SERPL-MCNC: 1.7 MG/DL (ref 1.6–2.4)
MCH RBC QN AUTO: 28.7 PG (ref 26.6–33)
MCHC RBC AUTO-ENTMCNC: 31.9 G/DL (ref 31.5–35.7)
MCV RBC AUTO: 90.1 FL (ref 79–97)
MONOCYTES # BLD AUTO: 1 10*3/MM3 (ref 0.1–0.9)
MONOCYTES NFR BLD AUTO: 8.2 % (ref 5–12)
NEUTROPHILS NFR BLD AUTO: 73.9 % (ref 42.7–76)
NEUTROPHILS NFR BLD AUTO: 8.8 10*3/MM3 (ref 1.7–7)
NRBC BLD AUTO-RTO: 0 /100 WBC (ref 0–0.2)
PLATELET # BLD AUTO: 302 10*3/MM3 (ref 140–450)
PMV BLD AUTO: 8.9 FL (ref 6–12)
POTASSIUM SERPL-SCNC: 4.2 MMOL/L (ref 3.5–5.2)
PROT SERPL-MCNC: 5.9 G/DL (ref 6–8.5)
RBC # BLD AUTO: 3.9 10*6/MM3 (ref 3.77–5.28)
SODIUM SERPL-SCNC: 136 MMOL/L (ref 136–145)
WBC NRBC COR # BLD: 12 10*3/MM3 (ref 3.4–10.8)

## 2022-10-12 PROCEDURE — A9270 NON-COVERED ITEM OR SERVICE: HCPCS | Performed by: SURGERY

## 2022-10-12 PROCEDURE — 63710000001 POLYETHYLENE GLYCOL 17 G PACK: Performed by: SURGERY

## 2022-10-12 PROCEDURE — 97116 GAIT TRAINING THERAPY: CPT

## 2022-10-12 PROCEDURE — 97168 OT RE-EVAL EST PLAN CARE: CPT

## 2022-10-12 PROCEDURE — 63710000001 BISACODYL 10 MG SUPPOSITORY: Performed by: HOSPITALIST

## 2022-10-12 PROCEDURE — 63710000001 ACETAMINOPHEN 325 MG TABLET: Performed by: SURGERY

## 2022-10-12 PROCEDURE — 63710000001 INSULIN LISPRO (HUMAN) PER 5 UNITS: Performed by: SURGERY

## 2022-10-12 PROCEDURE — 85025 COMPLETE CBC W/AUTO DIFF WBC: CPT | Performed by: SURGERY

## 2022-10-12 PROCEDURE — 63710000001 TOPIRAMATE PER 25 MG: Performed by: SURGERY

## 2022-10-12 PROCEDURE — 80053 COMPREHEN METABOLIC PANEL: CPT | Performed by: SURGERY

## 2022-10-12 PROCEDURE — 63710000001 HYDROCODONE-ACETAMINOPHEN 5-325 MG TABLET: Performed by: SURGERY

## 2022-10-12 PROCEDURE — 82962 GLUCOSE BLOOD TEST: CPT

## 2022-10-12 PROCEDURE — 97164 PT RE-EVAL EST PLAN CARE: CPT

## 2022-10-12 PROCEDURE — 25010000002 CEFAZOLIN PER 500 MG: Performed by: SURGERY

## 2022-10-12 PROCEDURE — A9270 NON-COVERED ITEM OR SERVICE: HCPCS | Performed by: HOSPITALIST

## 2022-10-12 PROCEDURE — 97535 SELF CARE MNGMENT TRAINING: CPT

## 2022-10-12 PROCEDURE — 83735 ASSAY OF MAGNESIUM: CPT | Performed by: SURGERY

## 2022-10-12 PROCEDURE — 99024 POSTOP FOLLOW-UP VISIT: CPT | Performed by: SURGERY

## 2022-10-12 PROCEDURE — G0378 HOSPITAL OBSERVATION PER HR: HCPCS

## 2022-10-12 PROCEDURE — 63710000001 PRIMIDONE 250 MG TABLET: Performed by: SURGERY

## 2022-10-12 PROCEDURE — 63710000001 PSYLLIUM 58.12 % PACK: Performed by: SURGERY

## 2022-10-12 RX ORDER — BISACODYL 10 MG
10 SUPPOSITORY, RECTAL RECTAL ONCE
Status: COMPLETED | OUTPATIENT
Start: 2022-10-12 | End: 2022-10-12

## 2022-10-12 RX ORDER — POLYETHYLENE GLYCOL 3350 17 G/17G
17 POWDER, FOR SOLUTION ORAL 2 TIMES DAILY
Qty: 510 G | Refills: 0 | Status: SHIPPED | OUTPATIENT
Start: 2022-10-12

## 2022-10-12 RX ADMIN — HYDROCODONE BITARTRATE AND ACETAMINOPHEN 1 TABLET: 5; 325 TABLET ORAL at 04:45

## 2022-10-12 RX ADMIN — Medication 10 ML: at 08:08

## 2022-10-12 RX ADMIN — BISACODYL 10 MG: 10 SUPPOSITORY RECTAL at 14:04

## 2022-10-12 RX ADMIN — PRIMIDONE 250 MG: 250 TABLET ORAL at 14:04

## 2022-10-12 RX ADMIN — ACETAMINOPHEN 650 MG: 325 TABLET, FILM COATED ORAL at 00:59

## 2022-10-12 RX ADMIN — PSYLLIUM HUSK 1 PACKET: 3.4 POWDER ORAL at 08:08

## 2022-10-12 RX ADMIN — POLYETHYLENE GLYCOL 3350 17 G: 17 POWDER, FOR SOLUTION ORAL at 08:07

## 2022-10-12 RX ADMIN — CEFAZOLIN 2 G: 2 INJECTION, POWDER, FOR SOLUTION INTRAMUSCULAR; INTRAVENOUS at 08:08

## 2022-10-12 RX ADMIN — SODIUM CHLORIDE 125 ML/HR: 9 INJECTION, SOLUTION INTRAVENOUS at 04:45

## 2022-10-12 RX ADMIN — INSULIN LISPRO 2 UNITS: 100 INJECTION, SOLUTION INTRAVENOUS; SUBCUTANEOUS at 08:07

## 2022-10-12 RX ADMIN — TOPIRAMATE 50 MG: 25 TABLET, FILM COATED ORAL at 08:08

## 2022-10-12 RX ADMIN — PRIMIDONE 250 MG: 250 TABLET ORAL at 04:40

## 2022-10-12 RX ADMIN — CEFAZOLIN 2 G: 2 INJECTION, POWDER, FOR SOLUTION INTRAMUSCULAR; INTRAVENOUS at 00:05

## 2022-10-12 RX ADMIN — HYDROCODONE BITARTRATE AND ACETAMINOPHEN 1 TABLET: 5; 325 TABLET ORAL at 11:10

## 2022-10-12 NOTE — THERAPY RE-EVALUATION
Patient Name: Divya Lomeli  : 1955    MRN: [id number]                              Today's Date: 10/12/2022       Admit Date: 10/6/2022    Visit Dx:     ICD-10-CM ICD-9-CM   1. Generalized weakness  R53.1 780.79   2. Hypokalemia  E87.6 276.8   3. Dehydration  E86.0 276.51   4. Lung nodule  R91.1 793.11   5. Calculus of gallbladder without cholecystitis without obstruction  K80.20 574.20     Patient Active Problem List   Diagnosis   • Cervicalgia   • Chronic right shoulder pain   • Subacromial bursitis of right shoulder joint   • Generalized arthritis   • Preventative health care   • Encounter for screening mammogram for malignant neoplasm of breast   • Gastroenteritis, acute   • Cellulitis of lower extremity   • Chronic obstructive pulmonary disease (HCC)   • Cough   • Dermatitis   • Elevated blood pressure reading without diagnosis of hypertension   • Family history of diabetes mellitus   • Hyperlipidemia   • Hypertension   • Insomnia   • Luetscher's syndrome   • Postmenopausal   • Rectal hemorrhage   • Sinusitis, acute maxillary   • Type 2 diabetes mellitus with hyperglycemia (HCC)   • Upper respiratory tract infection   • Encounter for immunization   • Gross hematuria   • Generalized weakness   • Calculus of gallbladder without cholecystitis without obstruction     Past Medical History:   Diagnosis Date   • Anxiety    • Arm pain     rt upper arm muscle   • COPD (chronic obstructive pulmonary disease) (HCC)     20 to 30 years ago. Does not take meds.   • Diabetes mellitus (HCC)     Type 2   • Diabetic nephropathy (HCC)    • Hyperlipidemia    • Hypertension      Past Surgical History:   Procedure Laterality Date   • APPENDECTOMY     • BACK SURGERY     • CYSTOSCOPY WITH CLOT EVACUATION N/A 2021    Procedure: CYSTOSCOPY WITH CLOT EVACUATION;  Surgeon: Jay Torres MD;  Location: [facility] OR;  Service: Urology;  Laterality: N/A;   • INTERVENTIONAL RADIOLOGY PROCEDURE N/A 2021     Procedure: STENT PLACEMENT;  Surgeon: Jay Torres MD;  Location: [facility] MAIN OR;  Service: Urology;  Laterality: N/A;   • LUMBAR SPINE SURGERY  1959    L5 removed-Abstracted from [facility]   • OOPHORECTOMY     • TONSILLECTOMY     • TOTAL ABDOMINAL HYSTERECTOMY     • TRANSURETHRAL RESECTION OF BLADDER TUMOR N/A 9/28/2021    Procedure: CYSTOSCOPY TRANSURETHRAL RESECTION OF BLADDER TUMOR;  Surgeon: Jay Torres MD;  Location: [facility] MAIN OR;  Service: Urology;  Laterality: N/A;      General Information     Row Name 10/12/22 1240          Physical Therapy Time and Intention    Document Type re-evaluation  -CR     Mode of Treatment physical therapy  -CR     Row Name 10/12/22 1344          General Information    Existing Precautions/Restrictions --  abd incision  -CR           User Key  (r) = Recorded By, (t) = Taken By, (c) = Cosigned By    Initials Name Provider Type    CR Reyes, Carmela, PT Physical Therapist               Mobility     Row Name 10/12/22 1347          Bed Mobility    Bed Mobility supine-sit  -CR     Supine-Sit [place] (Bed Mobility) modified independence  -CR     Assistive Device (Bed Mobility) bed rails;head of bed elevated  -CR     Row Name 10/12/22 1347          Bed-Chair Transfer    Bed-Chair Barnwell (Transfers) modified independence  -CR     Assistive Device (Bed-Chair Transfers) walker, front-wheeled  -CR     Row Name 10/12/22 1347          Sit-Stand Transfer    Sit-Stand Barnwell (Transfers) modified independence  -CR     Assistive Device (Sit-Stand Transfers) walker, front-wheeled  -CR     Row Name 10/12/22 1347          Gait/Stairs (Locomotion)    Barnwell Level (Gait) standby assist  -CR     Assistive Device (Gait) walker, front-wheeled  -CR     Distance in Feet (Gait) 100 ft x 2  -CR     Bilateral Gait Deviations forward flexed posture  -CR           User Key  (r) = Recorded By, (t) = Taken By, (c) = Cosigned By    Initials Name Provider Type    CR Reyes, Carmela, PT  Physical Therapist               Obj/Interventions     Row Name 10/12/22 1354          Range of Motion Comprehensive    Comment, General Range of Motion AROM bilateral hip flexion limited due to pain  -CR     Row Name 10/12/22 1354          Strength Comprehensive (MMT)    Comment, General Manual Muscle Testing (MMT) Assessment BLE grossly wfl  -CR     Row Name 10/12/22 1354          Balance    Static Sitting Balance independent  -CR     Dynamic Sitting Balance independent  -CR     Position, Sitting Balance sitting edge of bed  -CR     Static Standing Balance modified independence  -CR     Dynamic Standing Balance modified independence  -CR     Position/Device Used, Standing Balance walker, rolling  -CR     Row Name 10/12/22 1354          Sensory Assessment (Somatosensory)    Sensory Assessment (Somatosensory) sensation intact  -CR           User Key  (r) = Recorded By, (t) = Taken By, (c) = Cosigned By    Initials Name Provider Type    CR Reyes, Carmela, PT Physical Therapist               Goals/Plan     Row Name 10/12/22 1354          Bed Mobility Goal 1 (PT)    Progress/Outcomes (Bed Mobility Goal 1, PT) goal met  -CR     Row Name 10/12/22 1359          Transfer Goal 1 (PT)    Progress/Outcome (Transfer Goal 1, PT) goal met  -CR     Row Name 10/12/22 1359          Gait Training Goal 1 (PT)    Activity/Assistive Device (Gait Training Goal 1, PT) assistive device use;gait (walking locomotion)  -CR     Distance (Gait Training Goal 1, PT) 150 ft x 2  -CR     Progress/Outcome (Gait Training Goal 1, PT) goal revised this date  -CR           User Key  (r) = Recorded By, (t) = Taken By, (c) = Cosigned By    Initials Name Provider Type    CR Reyes, Carmela, PT Physical Therapist               Clinical Impression     Row Name 10/12/22 1356          Pain    Pretreatment Pain Rating 10/10  -CR     Posttreatment Pain Rating 10/10  -CR     Pain Location incisional  -CR     Pain Location - abdomen  -CR     Pain Intervention(s)  Medication (See MAR)  -CR     Row Name 10/12/22 1355          Plan of Care Review    Plan of Care Reviewed With patient  -CR     Outcome Evaluation This is re eval following lap cholecystetomy on 10/11, found with calculus, no obstruction. Patient reporting of 10/10 pain however able to perform supine to sit with modified independence prior pain medication. Patient able to ambulate 100 ft x 2 this session using rw for support, no loss of balance. Patient reports inc pain during stand to sit transfer. Patient much improved and should be safe to return home with HH and family assistance.  -CR     Row Name 10/12/22 1355          Therapy Assessment/Plan (PT)    Patient/Family Therapy Goals Statement (PT) home  -CR           User Key  (r) = Recorded By, (t) = Taken By, (c) = Cosigned By    Initials Name Provider Type    CR Reyes, Carmela, PT Physical Therapist               Outcome Measures     Row Name 10/12/22 1359 10/12/22 0806       How much help from another person do you currently need...    Turning from your back to your side while in flat bed without using bedrails? 3  -CR 4  -RM    Moving from lying on back to sitting on the side of a flat bed without bedrails? 3  -CR 4  -RM    Moving to and from a bed to a chair (including a wheelchair)? 4  -CR 3  -RM    Standing up from a chair using your arms (e.g., wheelchair, bedside chair)? 4  -CR 3  -RM    Climbing 3-5 steps with a railing? 4  -CR 3  -RM    To walk in hospital room? 4  -CR 3  -RM    AM-PAC 6 Clicks Score (PT) 22  -CR 20  -RM    Highest level of mobility 7 --> Walked 25 feet or more  -CR 6 --> Walked 10 steps or more  -RM          User Key  (r) = Recorded By, (t) = Taken By, (c) = Cosigned By    Initials Name Provider Type    CR Reyes, Carmela, PT Physical Therapist    Araceli Henley, RN Registered Nurse                             Physical Therapy Education     Title: PT OT SLP Therapies (Resolved)     Topic: Physical Therapy (Resolved)     Point:  Mobility training (Resolved)     Learning Progress Summary           Patient Acceptance, E,TB,D, VU,DU,NR by  at 10/11/2022 0334    Acceptance, E,TB, VU by  at 10/7/2022 1206                   Point: Home exercise program (Resolved)     Learning Progress Summary           Patient Acceptance, E,TB,D, VU,DU,NR by  at 10/11/2022 0334                   Point: Body mechanics (Resolved)     Learning Progress Summary           Patient Acceptance, E,TB,D, VU,DU,NR by  at 10/11/2022 0334    Acceptance, E,TB, VU by  at 10/7/2022 1206                   Point: Precautions (Resolved)     Learning Progress Summary           Patient Acceptance, E,TB,D, VU,DU,NR by  at 10/11/2022 0334    Acceptance, E,TB, VU by  at 10/7/2022 1206                               User Key     Initials Effective Dates Name Provider Type Discipline     06/16/21 -  Ioana Asencio, PT Physical Therapist PT     06/16/21 -  Benoit Smith LPN Licensed Nurse Nurse              PT Recommendation and Plan     Plan of Care Reviewed With: patient  Outcome Evaluation: This is re eval following lap cholecystetomy on 10/11, found with calculus, no obstruction. Patient reporting of 10/10 pain however able to perform supine to sit with modified independence prior pain medication. Patient able to ambulate 100 ft x 2 this session using rw for support, no loss of balance. Patient reports inc pain during stand to sit transfer. Patient much improved and should be safe to return home with HH and family assistance.     Time Calculation:    PT Charges     Row Name 10/12/22 1232             Time Calculation    Start Time 1055  -CR      Stop Time 1120  -CR      Time Calculation (min) 25 min  -CR      PT Received On 10/12/22  -CR      PT - Next Appointment 10/13/22  -CR         Time Calculation- PT    Total Timed Code Minutes- PT 25 minute(s)  -CR            User Key  (r) = Recorded By, (t) = Taken By, (c) = Cosigned By    Initials Name Provider Type     CR Reyes, Carmela, PT Physical Therapist              Therapy Charges for Today     Code Description Service Date Service Provider Modifiers Qty    85780382527 HC GAIT TRAINING EA 15 MIN 10/12/2022 Reyes, Carmela, PT GP 1    17977329320 HC PT RE-EVAL ESTABLISHED PLAN 2 10/12/2022 Reyes, Carmela, PT GP 1          PT G-Codes  Outcome Measure Options: AM-PAC 6 Clicks Basic Mobility (PT)  AM-PAC 6 Clicks Score (PT): 22    Carmela Reyes, PT  10/12/2022

## 2022-10-12 NOTE — PLAN OF CARE
Goal Outcome Evaluation:  Plan of Care Reviewed With: patient        Progress: improving  Outcome Evaluation: Pt discharging

## 2022-10-12 NOTE — DISCHARGE PLACEMENT REQUEST
"Danny Leo (67 y.o. Female)     Date of Birth   1955    Social Security Number       Address   [address] [place] [address] [place] [facility]    Home Phone   [phone]    MRN   [id number]       Roman [employer]   Spiritism    Marital Status                               Admission Date   10/6/22    Admission Type   Emergency    Admitting Provider   Hortencia Herron MD    Attending Provider   Brandon Villafuerte DO    Department, Room/Bed   [facility] 2B MEDICAL INPATIENT, 230/1       Discharge Date       Discharge Disposition   Home or Self Care    Discharge Destination                               Attending Provider: Brandon Villafuerte DO    Allergies: Ether, Other    Isolation: None   Infection: None   Code Status: CPR    Ht: 165.1 cm (65\")   Wt: 59.8 kg (131 lb 13.4 oz)    Admission Cmt: None   Principal Problem: Calculus of gallbladder without cholecystitis without obstruction [K80.20]                 Active Insurance as of 10/6/2022     Primary Coverage     Payor Plan Insurance Group Employer/Plan Group    [facility] MEDICARE REPLACEMENT [facility] MEDICARE REPLACEMENT [id number]     Payor Plan Address Payor Plan Phone Number Payor Plan Fax Number Effective Dates    [address] [address]   1/1/2019 - None Entered    [facility] [id number]       Subscriber Name Subscriber Birth Date Member ID       DANNY LEO 1955 [id number]                 Emergency Contacts      (Rel.) Home Phone Work Phone Mobile Phone    STEFF YOU (Other) [phone] -- --    YESSENIA CALVERT (Daughter) [phone] -- --              "

## 2022-10-12 NOTE — PROGRESS NOTES
GENERAL SURGERY PROGRESS NOTE  Chief Complaint:  Surgery Follow up   LOS: 0 days       Subjective     Interval History:     Overall feels well, has some mild incisional soreness.  Tolerating liquids.  Ready to attend regular food.    Objective     Vital Signs  Temp:  [97.3 °F (36.3 °C)-99 °F (37.2 °C)] 99 °F (37.2 °C)  Heart Rate:  [72-92] 83  Resp:  [10-20] 18  BP: (111-144)/(41-74) 117/61    Physical Exam:   Abdomen soft, incisions appropriate  Labs:  Lab Results (last 24 hours)     Procedure Component Value Units Date/Time    POC Glucose Once [[id number]]  (Abnormal) Collected: 10/12/22 1122    Specimen: Blood Updated: 10/12/22 1124     Glucose 209 mg/dL      Comment: Serial Number: [id number]:  [id number]       POC Glucose Once [[id number]]  (Abnormal) Collected: 10/12/22 0723    Specimen: Blood Updated: 10/12/22 0725     Glucose 150 mg/dL      Comment: Serial Number: [id number]:  [id number]       Tissue Pathology Exam [[id number]] Collected: 10/11/22 1619    Specimen: Tissue from Gallbladder Updated: 10/12/22 0659    Comprehensive Metabolic Panel [[id number]]  (Abnormal) Collected: 10/12/22 0120    Specimen: Blood Updated: 10/12/22 0242     Glucose 201 mg/dL      BUN 13 mg/dL      Creatinine 0.64 mg/dL      Sodium 136 mmol/L      Potassium 4.2 mmol/L      Chloride 101 mmol/L      CO2 22.0 mmol/L      Calcium 8.5 mg/dL      Total Protein 5.9 g/dL      Albumin 3.40 g/dL      ALT (SGPT) 99 U/L      AST (SGOT) 56 U/L      Alkaline Phosphatase 110 U/L      Total Bilirubin <0.2 mg/dL      Globulin 2.5 gm/dL      A/G Ratio 1.4 g/dL      BUN/Creatinine Ratio 20.3     Anion Gap 13.0 mmol/L      eGFR 95.8 mL/min/1.73      Comment: National Kidney Foundation and American Society of Nephrology (ASN) Task Force recommended calculation based on the Chronic Kidney Disease Epidemiology Collaboration (CKD-EPI) equation refit without adjustment for race.       Narrative:      GFR Normal >60  Chronic Kidney Disease  <60  Kidney Failure <15      Magnesium [[id number]]  (Normal) Collected: 10/12/22 0120    Specimen: Blood Updated: 10/12/22 0242     Magnesium 1.7 mg/dL     CBC & Differential [[id number]]  (Abnormal) Collected: 10/12/22 0120    Specimen: Blood Updated: 10/12/22 0216    Narrative:      The following orders were created for panel order CBC & Differential.  Procedure                               Abnormality         Status                     ---------                               -----------         ------                     CBC Auto Differential[[id number]]        Abnormal            Final result                 Please view results for these tests on the individual orders.    CBC Auto Differential [[id number]]  (Abnormal) Collected: 10/12/22 0120    Specimen: Blood Updated: 10/12/22 0216     WBC 12.00 10*3/mm3      RBC 3.90 10*6/mm3      Hemoglobin 11.2 g/dL      Hematocrit 35.2 %      MCV 90.1 fL      MCH 28.7 pg      MCHC 31.9 g/dL      RDW 16.1 %      RDW-SD 51.6 fl      MPV 8.9 fL      Platelets 302 10*3/mm3      Neutrophil % 73.9 %      Lymphocyte % 16.9 %      Monocyte % 8.2 %      Eosinophil % 0.2 %      Basophil % 0.8 %      Neutrophils, Absolute 8.80 10*3/mm3      Lymphocytes, Absolute 2.00 10*3/mm3      Monocytes, Absolute 1.00 10*3/mm3      Eosinophils, Absolute 0.00 10*3/mm3      Basophils, Absolute 0.10 10*3/mm3      nRBC 0.0 /100 WBC     POC Glucose Once [[id number]]  (Abnormal) Collected: 10/11/22 1820    Specimen: Blood Updated: 10/11/22 1821     Glucose 240 mg/dL      Comment: Serial Number: [id number]:  [id number]       POC Glucose Once [[id number]]  (Abnormal) Collected: 10/11/22 1657    Specimen: Blood Updated: 10/11/22 1658     Glucose 237 mg/dL      Comment: Serial Number: [id number]:  [id number]       Comprehensive Metabolic Panel [[id number]]  (Abnormal) Collected: 10/11/22 1212    Specimen: Blood Updated: 10/11/22 1321     Glucose 116 mg/dL      BUN 13 mg/dL      Creatinine 0.60 mg/dL       Sodium 138 mmol/L      Potassium 4.0 mmol/L      Chloride 105 mmol/L      CO2 24.0 mmol/L      Calcium 8.8 mg/dL      Total Protein 6.8 g/dL      Albumin 3.80 g/dL      ALT (SGPT) 114 U/L      AST (SGOT) 52 U/L      Alkaline Phosphatase 110 U/L      Total Bilirubin <0.2 mg/dL      Globulin 3.0 gm/dL      A/G Ratio 1.3 g/dL      BUN/Creatinine Ratio 21.7     Anion Gap 9.0 mmol/L      eGFR 97.3 mL/min/1.73      Comment: National Kidney Foundation and American Society of Nephrology (ASN) Task Force recommended calculation based on the Chronic Kidney Disease Epidemiology Collaboration (CKD-EPI) equation refit without adjustment for race.       Narrative:      GFR Normal >60  Chronic Kidney Disease <60  Kidney Failure <15             Results Review:     Labs and imaging for today were reviewed.    Assessment & Plan     Divya Lomeli is a 67 y.o. female who is status post laparoscopic cholecystectomy      Okay for discharge home from my standpoint.  See me in 2 to 3 weeks for follow-up.          This document has been electronically signed by Jose Angel Wan MD on October 12, 2022 13:15 EDT        Jose Angel Wan MD  10/12/22  13:15 EDT

## 2022-10-12 NOTE — PLAN OF CARE
Problem: Adult Inpatient Plan of Care  Goal: Plan of Care Review  Outcome: Ongoing, Progressing  Goal: Patient-Specific Goal (Individualized)  Outcome: Ongoing, Progressing  Goal: Absence of Hospital-Acquired Illness or Injury  Outcome: Ongoing, Progressing  Intervention: Identify and Manage Fall Risk  Recent Flowsheet Documentation  Taken 10/12/2022 0200 by Shanta Cortes LPN  Safety Promotion/Fall Prevention:   safety round/check completed   room organization consistent   patient off unit   muscle strengthening facilitated  Taken 10/12/2022 0000 by Shanta Cortes LPN  Safety Promotion/Fall Prevention:   safety round/check completed   room organization consistent   patient off unit   nonskid shoes/slippers when out of bed  Taken 10/11/2022 2200 by Shanta Cortes LPN  Safety Promotion/Fall Prevention: safety round/check completed  Taken 10/11/2022 2000 by Shanta Cortes LPN  Safety Promotion/Fall Prevention:   safety round/check completed   room organization consistent   nonskid shoes/slippers when out of bed  Intervention: Prevent Skin Injury  Recent Flowsheet Documentation  Taken 10/11/2022 2000 by Shanta Cortes LPN  Body Position: 30 degrees  Intervention: Prevent and Manage VTE (Venous Thromboembolism) Risk  Recent Flowsheet Documentation  Taken 10/11/2022 2000 by Shanta Cortes LPN  Activity Management: activity adjusted per tolerance  Intervention: Prevent Infection  Recent Flowsheet Documentation  Taken 10/12/2022 0000 by Shanta Cortes LPN  Infection Prevention:   visitors restricted/screened   single patient room provided   rest/sleep promoted   hand hygiene promoted  Taken 10/11/2022 2200 by Shanta Cortes LPN  Infection Prevention:   visitors restricted/screened   single patient room provided   rest/sleep promoted  Taken 10/11/2022 2000 by Shanta Cortes LPN  Infection Prevention:   visitors restricted/screened   single patient room provided   personal protective equipment utilized    rest/sleep promoted   hand hygiene promoted  Goal: Optimal Comfort and Wellbeing  Outcome: Ongoing, Progressing  Intervention: Monitor Pain and Promote Comfort  Recent Flowsheet Documentation  Taken 10/11/2022 2118 by Shanta Cortes LPN  Pain Management Interventions: see MAR  Intervention: Provide Person-Centered Care  Recent Flowsheet Documentation  Taken 10/11/2022 2000 by Shanta Cortes LPN  Trust Relationship/Rapport: care explained  Goal: Readiness for Transition of Care  Outcome: Ongoing, Progressing     Problem: Skin Injury Risk Increased  Goal: Skin Health and Integrity  Outcome: Ongoing, Progressing  Intervention: Optimize Skin Protection  Recent Flowsheet Documentation  Taken 10/11/2022 2000 by Shanta Cortes LPN  Head of Bed (HOB) Positioning: HOB at 30-45 degrees     Problem: Fall Injury Risk  Goal: Absence of Fall and Fall-Related Injury  Outcome: Ongoing, Progressing  Intervention: Identify and Manage Contributors  Recent Flowsheet Documentation  Taken 10/12/2022 0200 by Shanta Cortes LPN  Medication Review/Management: medications reviewed  Taken 10/12/2022 0000 by Shanta Cortes LPN  Medication Review/Management: medications reviewed  Taken 10/11/2022 2200 by Shanta Cortes LPN  Medication Review/Management: medications reviewed  Taken 10/11/2022 2000 by Shanta Cortes LPN  Medication Review/Management: medications reviewed  Intervention: Promote Injury-Free Environment  Recent Flowsheet Documentation  Taken 10/12/2022 0200 by Shanta Cortes LPN  Safety Promotion/Fall Prevention:   safety round/check completed   room organization consistent   patient off unit   muscle strengthening facilitated  Taken 10/12/2022 0000 by Shanta Cortes LPN  Safety Promotion/Fall Prevention:   safety round/check completed   room organization consistent   patient off unit   nonskid shoes/slippers when out of bed  Taken 10/11/2022 2200 by Shanta Cortes LPN  Safety Promotion/Fall Prevention: safety  round/check completed  Taken 10/11/2022 2000 by Shanta Cortes LPN  Safety Promotion/Fall Prevention:   safety round/check completed   room organization consistent   nonskid shoes/slippers when out of bed     Problem: Diabetes Comorbidity  Goal: Blood Glucose Level Within Targeted Range  Outcome: Ongoing, Progressing   Goal Outcome Evaluation:      Continue to work toward goals and DC

## 2022-10-12 NOTE — PLAN OF CARE
Goal Outcome Evaluation:  Plan of Care Reviewed With: patient        Progress: no change  Outcome Evaluation: Pt. reevaluated following lap cholecystetomy on 10/11, found with calculus, no obstruction. Pt. w/ significant pain this date 10/10, nsg notified. Despite pain patient demonstrates improved functional mobility this date w/ short ambulatory transfer w/ CGA for safety + rolling walker support. Pt. completes donning socks w/ increased time and setup assist. Pt. progress limited secondary to increased pain impacting prolonged ADL participation/independence. Pt. previously working FT. Recommend HH therapy at d/c to address aforementioned deficits and facilitate return to PLOF.

## 2022-10-12 NOTE — NURSING NOTE
Mag 1.7 , notified on call provider no new orders,   IS provided with instruction on use , pt verbalize back understanding of use and frequency and reps

## 2022-10-12 NOTE — CASE MANAGEMENT/SOCIAL WORK
Continued Stay Note  [facility]     Patient Name: Divya Lomeli  MRN: [id number]  Today's Date: 10/12/2022    Admit Date: 10/6/2022    Plan: D/C Plan: Pending home health choice from patient. From home with family.   Discharge Plan     Row Name 10/12/22 1115       Plan    Plan D/C Plan: Pending home health choice from patient. From home with family.    Patient/Family in Agreement with Plan yes    Provided Post Acute Provider List? Yes    Post Acute Provider List Home Health;Inpatient Rehab    Delivered To Patient    Method of Delivery In person    Plan Comments CM notified by [facility] that patient not appropriate for acute rehab.  spoke to patient at bedside wearing mask and keeping distance greater than 6 feet and spent less than 15 minutes in room. Patient feels she may be able to return home with home health, agency list provided to patient. CM spoke to MD and he is agreeable to home health, CM contacted PT and PT will re-eval patient today. Patient denies any further d/c needs at this time.                    Expected Discharge Date and Time     Expected Discharge Date Expected Discharge Time    Oct 12, 2022         Met with patient in room wearing PPE: mask.      Maintained distance greater than six feet and spent less than 15 minutes in the room.        DERIAN Andino, [place] [address]    Office: [phone]  Fax: [phone]

## 2022-10-12 NOTE — THERAPY RE-EVALUATION
Patient Name: Divya Lomeli  : 1955    MRN: [id number]                              Today's Date: 10/12/2022       Admit Date: 10/6/2022    Visit Dx:     ICD-10-CM ICD-9-CM   1. Generalized weakness  R53.1 780.79   2. Hypokalemia  E87.6 276.8   3. Dehydration  E86.0 276.51   4. Lung nodule  R91.1 793.11   5. Calculus of gallbladder without cholecystitis without obstruction  K80.20 574.20     Patient Active Problem List   Diagnosis   • Cervicalgia   • Chronic right shoulder pain   • Subacromial bursitis of right shoulder joint   • Generalized arthritis   • Preventative health care   • Encounter for screening mammogram for malignant neoplasm of breast   • Gastroenteritis, acute   • Cellulitis of lower extremity   • Chronic obstructive pulmonary disease (HCC)   • Cough   • Dermatitis   • Elevated blood pressure reading without diagnosis of hypertension   • Family history of diabetes mellitus   • Hyperlipidemia   • Hypertension   • Insomnia   • Luetscher's syndrome   • Postmenopausal   • Rectal hemorrhage   • Sinusitis, acute maxillary   • Type 2 diabetes mellitus with hyperglycemia (HCC)   • Upper respiratory tract infection   • Encounter for immunization   • Gross hematuria   • Generalized weakness   • Calculus of gallbladder without cholecystitis without obstruction     Past Medical History:   Diagnosis Date   • Anxiety    • Arm pain     rt upper arm muscle   • COPD (chronic obstructive pulmonary disease) (HCC)     20 to 30 years ago. Does not take meds.   • Diabetes mellitus (HCC)     Type 2   • Diabetic nephropathy (HCC)    • Hyperlipidemia    • Hypertension      Past Surgical History:   Procedure Laterality Date   • APPENDECTOMY     • BACK SURGERY     • CYSTOSCOPY WITH CLOT EVACUATION N/A 2021    Procedure: CYSTOSCOPY WITH CLOT EVACUATION;  Surgeon: Jay Torres MD;  Location: [facility] OR;  Service: Urology;  Laterality: N/A;   • INTERVENTIONAL RADIOLOGY PROCEDURE N/A 2021     Procedure: STENT PLACEMENT;  Surgeon: Jay Torres MD;  Location: [facility] MAIN OR;  Service: Urology;  Laterality: N/A;   • LUMBAR SPINE SURGERY  1959    L5 removed-Abstracted from [facility]   • OOPHORECTOMY     • TONSILLECTOMY     • TOTAL ABDOMINAL HYSTERECTOMY     • TRANSURETHRAL RESECTION OF BLADDER TUMOR N/A 9/28/2021    Procedure: CYSTOSCOPY TRANSURETHRAL RESECTION OF BLADDER TUMOR;  Surgeon: Jay Torres MD;  Location: [facility] MAIN OR;  Service: Urology;  Laterality: N/A;      General Information     Row Name 10/12/22 1642          OT Time and Intention    Document Type re-evaluation  -MP     Mode of Treatment occupational therapy  -MP     Row Name 10/12/22 1642          General Information    Patient Profile Reviewed yes  -MP     Row Name 10/12/22 1642          Cognition    Orientation Status (Cognition) oriented x 4  -MP     Row Name 10/12/22 1642          Safety Issues, Functional Mobility    Impairments Affecting Function (Mobility) balance;endurance/activity tolerance;strength  -MP           User Key  (r) = Recorded By, (t) = Taken By, (c) = Cosigned By    Initials Name Provider Type    MP Ravi De La Cruz OT Occupational Therapist                 Mobility/ADL's     Row Name 10/12/22 1642          Bed Mobility    Bed Mobility supine-sit  -MP     Supine-Sit [place] (Bed Mobility) modified independence  -MP     Row Name 10/12/22 1642          Bed-Chair Transfer    Bed-Chair [place] (Transfers) modified independence  -MP     Assistive Device (Bed-Chair Transfers) walker, front-wheeled  -MP     Row Name 10/12/22 1642          Sit-Stand Transfer    Sit-Stand [place] (Transfers) modified independence  -MP     Assistive Device (Sit-Stand Transfers) walker, front-wheeled  -MP     Row Name 10/12/22 1642          Functional Mobility    Functional Mobility- Ind. Level supervision required;1 person  -MP     Row Name 10/12/22 1642          Lower Body Dressing Assessment/Training    [place]  Level (Lower Body Dressing) doff;don;socks;set up  -MP           User Key  (r) = Recorded By, (t) = Taken By, (c) = Cosigned By    Initials Name Provider Type    Ravi Noland OT Occupational Therapist               Obj/Interventions     Row Name 10/12/22 1643          Range of Motion Comprehensive    Comment, General Range of Motion BUE WFL  -MP     Row Name 10/12/22 1643          Strength Comprehensive (MMT)    Comment, General Manual Muscle Testing (MMT) Assessment BUE 4/5  -MP     Row Name 10/12/22 1643          Balance    Balance Interventions sitting;standing;sit to stand;supported;static;dynamic  -MP           User Key  (r) = Recorded By, (t) = Taken By, (c) = Cosigned By    Initials Name Provider Type    Ravi Noland OT Occupational Therapist               Goals/Plan     Row Name 10/12/22 1646          Bed Mobility Goal 1 (OT)    Activity/Assistive Device (Bed Mobility Goal 1, OT) bed mobility activities, all  -MP     [place] Level/Cues Needed (Bed Mobility Goal 1, OT) modified independence  -MP     Time Frame (Bed Mobility Goal 1, OT) long term goal (LTG);2 weeks  -MP     Row Name 10/12/22 1646          Transfer Goal 1 (OT)    Activity/Assistive Device (Transfer Goal 1, OT) sit-to-stand/stand-to-sit;toilet  -MP     [place] Level/Cues Needed (Transfer Goal 1, OT) modified independence  -MP     Time Frame (Transfer Goal 1, OT) long term goal (LTG);2 weeks  -MP     Row Name 10/12/22 1646          Toileting Goal 1 (OT)    Activity/Device (Toileting Goal 1, OT) toileting skills, all  -MP     [place] Level/Cues Needed (Toileting Goal 1, OT) modified independence  -MP     Time Frame (Toileting Goal 1, OT) long term goal (LTG);2 weeks  -MP           User Key  (r) = Recorded By, (t) = Taken By, (c) = Cosigned By    Initials Name Provider Type    Ravi Noland OT Occupational Therapist               Clinical Impression     Row Name 10/12/22 1643          Pain Assessment     Pretreatment Pain Rating 10/10  -MP     Posttreatment Pain Rating 10/10  -MP     Pain Location incisional  -MP     Row Name 10/12/22 1643          Plan of Care Review    Plan of Care Reviewed With patient  -MP     Progress no change  -MP     Outcome Evaluation Pt. reevaluated following lap cholecystetomy on 10/11, found with calculus, no obstruction. Pt. w/ significant pain this date 10/10, nsg notified. Despite pain patient demonstrates improved functional mobility this date w/ short ambulatory transfer w/ CGA for safety + rolling walker support. Pt. completes donning socks w/ increased time and setup assist. Pt. progress limited secondary to increased pain impacting prolonged ADL participation/independence. Pt. previously working FT. Recommend HH therapy at d/c to address aforementioned deficits and facilitate return to PLOF.  -MP     Row Name 10/12/22 1643          Therapy Assessment/Plan (OT)    Rehab Potential (OT) good, to achieve stated therapy goals  -MP     Criteria for Skilled Therapeutic Interventions Met (OT) yes;meets criteria;skilled treatment is necessary  -MP     Therapy Frequency (OT) 3 times/wk  -MP     Row Name 10/12/22 1643          Therapy Plan Review/Discharge Plan (OT)    Anticipated Discharge Disposition (OT) home with home health;home with assist  -MP     Row Name 10/12/22 1643          Vital Signs    Pre Patient Position Supine  -MP     Intra Patient Position Standing  -MP     Post Patient Position Sitting  -MP     Row Name 10/12/22 1643          Positioning and Restraints    Pre-Treatment Position in bed  -MP     Post Treatment Position chair  -MP     In Chair sitting;call light within reach;encouraged to call for assist;exit alarm on  -MP           User Key  (r) = Recorded By, (t) = Taken By, (c) = Cosigned By    Initials Name Provider Type    Ravi Noland, OT Occupational Therapist               Outcome Measures     Row Name 10/12/22 1359 10/12/22 0806       How much help from  another person do you currently need...    Turning from your back to your side while in flat bed without using bedrails? 3  -CR 4  -RM    Moving from lying on back to sitting on the side of a flat bed without bedrails? 3  -CR 4  -RM    Moving to and from a bed to a chair (including a wheelchair)? 4  -CR 3  -RM    Standing up from a chair using your arms (e.g., wheelchair, bedside chair)? 4  -CR 3  -RM    Climbing 3-5 steps with a railing? 4  -CR 3  -RM    To walk in hospital room? 4  -CR 3  -RM    AM-PAC 6 Clicks Score (PT) 22  -CR 20  -RM    Highest level of mobility 7 --> Walked 25 feet or more  -CR 6 --> Walked 10 steps or more  -RM          User Key  (r) = Recorded By, (t) = Taken By, (c) = Cosigned By    Initials Name Provider Type    CR Reyes, Carmela, PT Physical Therapist    RM Araceli Heller, RN Registered Nurse                  OT Recommendation and Plan  Therapy Frequency (OT): 3 times/wk  Plan of Care Review  Plan of Care Reviewed With: patient  Progress: no change  Outcome Evaluation: Pt. reevaluated following lap cholecystetomy on 10/11, found with calculus, no obstruction. Pt. w/ significant pain this date 10/10, nsg notified. Despite pain patient demonstrates improved functional mobility this date w/ short ambulatory transfer w/ CGA for safety + rolling walker support. Pt. completes donning socks w/ increased time and setup assist. Pt. progress limited secondary to increased pain impacting prolonged ADL participation/independence. Pt. previously working FT. Recommend HH therapy at d/c to address aforementioned deficits and facilitate return to PLOF.     Time Calculation:    Time Calculation- OT     Row Name 10/12/22 1646             Time Calculation- OT    OT Start Time 1055  -MP      OT Stop Time 1119  -MP      OT Time Calculation (min) 24 min  -MP      Total Timed Code Minutes- OT 8 minute(s)  -MP      OT Received On 10/12/22  -MP      OT - Next Appointment 10/14/22  -MP      OT Goal Re-Cert Due  Date 10/26/22  -BAKARI            User Key  (r) = Recorded By, (t) = Taken By, (c) = Cosigned By    Initials Name Provider Type    Ravi Noland OT Occupational Therapist              Therapy Charges for Today     Code Description Service Date Service Provider Modifiers Qty    [id number]  OT RE-EVAL 2 10/12/2022 Ravi De La Cruz OT GO 1    [id number]  OT SELF CARE/MGMT/TRAIN EA 15 MIN 10/12/2022 Ravi De La Cruz OT GO 1               Ravi De La Cruz OT  10/12/2022

## 2022-10-12 NOTE — CASE MANAGEMENT/SOCIAL WORK
Continued Stay Note   [facility]     Patient Name: Divya Lomeli  MRN: [id number]  Today's Date: 10/12/2022    Admit Date: 10/6/2022    Plan: D/C Plan : Home , rolling walker with [facility]   Discharge Plan     Row Name 10/12/22 1601       Plan    Plan D/C Plan : Home , rolling walker with [facility]    Plan Comments Pt unsure if she needs H/H , pt instucted if she gets home and decideds she needs home health to call her PCP and they can set it up               Discharge Codes    No documentation.               Expected Discharge Date and Time     Expected Discharge Date Expected Discharge Time    Oct 12, 2022       Phone communication or documentation only - no physical contact with patient or family.      Yessy Delgado RN

## 2022-10-12 NOTE — PROGRESS NOTES
[facility] Medicine Services Daily Progress Note    Patient Name: Divya Lomeli  : 1955  MRN: [id number]  Primary Care Physician:  Trae Kaye MD  Date of admission: 10/6/2022      Subjective      Chief Complaint: Walking difficulty      Patient Reports     10/8/22: Consulted neurology since the patient has difficulty walking with physical therapy.  Claims to have had several weeks of diarrhea but now constipation.  Does not want Lovenox shot.  Lives alone.    10/9/22: Claims primidone dose increased recently by neurologist due to uncontrolled tremors.  MRI of the pelvis done yesterday.  Abdominal ultrasound ordered.    10/10/22: N.p.o. for abdominal ultrasound.  PT/OT consulted.    10/11/22: Cholecystectomy today.    10/12/22: Agrees to go home with home health.    Review of Systems   All other systems reviewed and are negative.         Objective      Vitals:   Temp:  [97.3 °F (36.3 °C)-99 °F (37.2 °C)] 99 °F (37.2 °C)  Heart Rate:  [72-92] 83  Resp:  [10-20] 18  BP: (111-144)/(41-74) 117/61  Flow (L/min):  [2-6] 2    Physical Exam  HENT:      Head: Normocephalic.      Nose: Nose normal.   Eyes:      Extraocular Movements: Extraocular movements intact.      Pupils: Pupils are equal, round, and reactive to light.   Cardiovascular:      Rate and Rhythm: Normal rate and regular rhythm.   Pulmonary:      Effort: Pulmonary effort is normal.   Abdominal:      General: Bowel sounds are normal.   Musculoskeletal:         General: Normal range of motion.      Cervical back: Normal range of motion.   Skin:     General: Skin is warm.   Neurological:      Mental Status: She is alert. Mental status is at baseline.   Psychiatric:         Mood and Affect: Mood normal.             Result Review    Result Review:  I have personally reviewed the results from the time of this admission to 10/12/2022 13:23 EDT and agree with these findings:  [x]  Laboratory  []  Microbiology  [x]  Radiology  []   EKG/Telemetry   []  Cardiology/Vascular   []  Pathology  [x]  Old records  []  Other:      Wounds (last 24 hours)     LDA Wound     Row Name 10/12/22 0806 10/12/22 0400 10/12/22 0000       Wound 10/11/22 1635 abdomen Incision    Wound - Properties Group Placement Date: 10/11/22  -IR Placement Time: 1635  -IR Present on Hospital Admission: N  -IR Location: abdomen  -IR Primary Wound Type: Incision  -IR    Dressing Appearance no drainage;open to air  -RM open to air;no drainage  -TM no drainage  -TM    Closure Approximated;Liquid skin adhesive  -RM Approximated;Liquid skin adhesive  -TM Approximated;Liquid skin adhesive  -TM    Drainage Amount none  -RM none  -TM --    Retired Wound - Properties Group Placement Date: 10/11/22  -IR Placement Time: 1635  -IR Present on Hospital Admission: N  -IR Location: abdomen  -IR Primary Wound Type: Incision  -IR    Retired Wound - Properties Group Date first assessed: 10/11/22  -IR Time first assessed: 1635  -IR Present on Hospital Admission: N  -IR Location: abdomen  -IR Primary Wound Type: Incision  -IR    Row Name 10/11/22 2000 10/11/22 1737 10/11/22 1722       Wound 10/11/22 1635 abdomen Incision    Wound - Properties Group Placement Date: 10/11/22  -IR Placement Time: 1635  -IR Present on Hospital Admission: N  -IR Location: abdomen  -IR Primary Wound Type: Incision  -IR    Dressing Appearance no drainage;open to air;dry;intact  -TM dry;intact;no drainage;open to air  -KS dry;intact;no drainage;open to air  -KS    Closure Approximated;Liquid skin adhesive  -TM Approximated;Liquid skin adhesive  -KS Approximated;Liquid skin adhesive  -KS    Drainage Amount none  -TM none  -KS none  -KS    Retired Wound - Properties Group Placement Date: 10/11/22  -IR Placement Time: 1635  -IR Present on Hospital Admission: N  -IR Location: abdomen  -IR Primary Wound Type: Incision  -IR    Retired Wound - Properties Group Date first assessed: 10/11/22  -IR Time first assessed: 1635  -IR Present  on Hospital Admission: N  -IR Location: abdomen  -IR Primary Wound Type: Incision  -IR    Row Name 10/11/22 1707 10/11/22 1652 10/11/22 1635       Wound 10/11/22 1635 abdomen Incision    Wound - Properties Group Placement Date: 10/11/22  -IR Placement Time: 1635  -IR Present on Hospital Admission: N  -IR Location: abdomen  -IR Primary Wound Type: Incision  -IR    Dressing Appearance dry;intact;no drainage;open to air  -KS dry;intact;no drainage;open to air  -KS --    Closure Approximated;Liquid skin adhesive  -KS Approximated;Liquid skin adhesive  -KS --    Drainage Amount none  -KS none  -KS --    Dressing Care -- -- --  esxofin x4  -IR    Retired Wound - Properties Group Placement Date: 10/11/22  -IR Placement Time: 1635  -IR Present on Hospital Admission: N  -IR Location: abdomen  -IR Primary Wound Type: Incision  -IR    Retired Wound - Properties Group Date first assessed: 10/11/22  -IR Time first assessed: 1635  -IR Present on Hospital Admission: N  -IR Location: abdomen  -IR Primary Wound Type: Incision  -IR          User Key  (r) = Recorded By, (t) = Taken By, (c) = Cosigned By    Initials Name Provider Type    Dwayne Keyes RN Registered Nurse    Araceli Henley RN Registered Nurse    Conchis Teran RN Registered Nurse    Shanta Rawls LPN Licensed Nurse                  Assessment & Plan      Brief Patient Summary:    67-year-old female with history of COPD, hypertension and diabetes mellitus presented to the emergency room on 10/6/2022 complaining of several weeks of diarrhea and weakness.  Apparently the patient had slid off a recliner and had stayed on the floor for several hours until her boyfriend assisted her to get up.  GI has been consulted and the patient in the past has followed with Dr. Limon      insulin lispro, 0-7 Units, Subcutaneous, BID AC  polyethylene glycol, 17 g, Oral, BID  primidone, 250 mg, Oral, Q8H  psyllium, 1 packet, Oral, Daily  sodium chloride, 10  mL, Intravenous, Q12H  topiramate, 50 mg, Oral, BID       sodium chloride, 75 mL/hr, Last Rate: 75 mL/hr (10/11/22 0432)  sodium chloride, 125 mL/hr, Last Rate: 125 mL/hr (10/12/22 0714)         Active Hospital Problems:  Active Hospital Problems    Diagnosis    • **Calculus of gallbladder without cholecystitis without obstruction      Added automatically from request for surgery [id number]     • Generalized weakness      Generalized weakness:  -Patient fell at home and was unable to get up from the floor for several hours  -She has been having several weeks of diarrhea  -PT/OT consulted  -Neurology consulted      Cholecystitis:  -s/p cholecystectomy      Diabetes mellitus:  -Continue ISS and home medications    Hyperlipidemia:  -Statin    Hypertension:  -Continue lisinopril      DVT prophylaxis:  Mechanical DVT prophylaxis orders are present.    CODE STATUS:    Code Status (Patient has no pulse and is not breathing): CPR (Attempt to Resuscitate)  Medical Interventions (Patient has pulse or is breathing): Full Support      Disposition:  I expect patient to be discharged defer    This patient has been examined wearing appropriate Personal Protective Equipment and discussed with nursing. 10/12/22      Electronically signed by Brandon Villafuerte DO, 10/12/22, 13:23 EDT.  [facility] Hospitalist Team

## 2022-10-12 NOTE — PLAN OF CARE
Goal Outcome Evaluation:  Plan of Care Reviewed With: patient           Outcome Evaluation: This is re eval following lap cholecystetomy on 10/11, found with calculus, no obstruction. Patient reporting of 10/10 pain however able to perform supine to sit with modified independence prior pain medication. Patient able to ambulate 100 ft x 2 this session using rw for support, no loss of balance. Patient reports inc pain during stand to sit transfer. Patient much improved and should be safe to return home with HH and family assistance.

## 2022-10-13 ENCOUNTER — READMISSION MANAGEMENT (OUTPATIENT)
Dept: CALL CENTER | Facility: HOSPITAL | Age: 67
End: 2022-10-13

## 2022-10-13 LAB
LAB AP CASE REPORT: NORMAL
PATH REPORT.FINAL DX SPEC: NORMAL
PATH REPORT.GROSS SPEC: NORMAL

## 2022-10-13 NOTE — OUTREACH NOTE
Prep Survey    Flowsheet Row Responses   [facility] facility patient discharged from? [facility]   Is LACE score < 7 ? No   Emergency Room discharge w/ pulse ox? No   Eligibility Readm Mgmt   Discharge diagnosis diarrhea and generalized weakness, hypokalemia,  cholelithiasis s/p lap deangelo   Does the patient have one of the following disease processes/diagnoses(primary or secondary)? General Surgery   Does the patient have Home health ordered? No   Is there a DME ordered? Yes   What DME was ordered? rolling walker- [place]   Prep survey completed? Yes          JAVIER ECKERT - Registered Nurse

## 2022-10-13 NOTE — SIGNIFICANT NOTE
Case Management Discharge Note      Final Note: (P) d/c home    Provided Post Acute Provider List?: Yes  Post Acute Provider List: Home Health, Inpatient Rehab  Delivered To: Patient  Method of Delivery: In person    Selected Continued Care - Discharged on 10/12/2022 Admission date: 10/6/2022 - Discharge disposition: Home or Self Care                    Final Discharge Disposition Code: (P) 01 - home or self-care

## 2022-10-15 ENCOUNTER — TRANSCRIBE ORDERS (OUTPATIENT)
Dept: HOME HEALTH SERVICES | Facility: HOME HEALTHCARE | Age: 67
End: 2022-10-15

## 2022-10-15 ENCOUNTER — HOME HEALTH ADMISSION (OUTPATIENT)
Dept: HOME HEALTH SERVICES | Facility: HOME HEALTHCARE | Age: 67
End: 2022-10-15

## 2022-10-15 DIAGNOSIS — Z48.815 ENCOUNTER FOR SURGICAL AFTERCARE FOLLOWING SURGERY OF DIGESTIVE SYSTEM: Primary | ICD-10-CM

## 2022-10-16 PROBLEM — K80.20 CALCULUS OF GALLBLADDER WITHOUT CHOLECYSTITIS WITHOUT OBSTRUCTION: Status: RESOLVED | Noted: 2022-10-06 | Resolved: 2022-10-16

## 2022-10-16 NOTE — DISCHARGE SUMMARY
[facility] Medicine Services  DISCHARGE SUMMARY    Patient Name: Divya Lomeli  : 1955  MRN: [id number]    Date of Admission: 10/6/2022  Date of Discharge:  10/12/2022  Primary Care Physician: Trae Kaye MD      Presenting Problem:   Dehydration [E86.0]  Hypokalemia [E87.6]  Lung nodule [R91.1]  Generalized weakness [R53.1]    Active and Resolved Hospital Problems:  Active Hospital Problems    Diagnosis POA   • Generalized weakness [R53.1] Yes     Priority: Medium   • Chronic obstructive pulmonary disease (HCC) [J44.9] Yes     Priority: Medium   • Hyperlipidemia [E78.5] Yes     Priority: Medium   • Hypertension [I10] Yes     Priority: Medium   • Type 2 diabetes mellitus with hyperglycemia (HCC) [E11.65] Yes     Priority: Medium      Resolved Hospital Problems    Diagnosis POA   • **Calculus of gallbladder without cholecystitis without obstruction [K80.20] Unknown     Priority: High         Hospital Course     Hospital Course:    The patient is a 67-year-old female with history of COPD, hypertension and diabetes mellitus presented to the emergency room on 10/6/2022 complaining of several weeks of diarrhea and weakness.  Apparently the patient had slid off a recliner and had stayed on the floor for several hours until her boyfriend assisted her to get up.  GI has been consulted and the patient in the past has followed with Dr. Limon.  Work-up revealed possible cholecystitis.  Neurologist evaluated her for generalized weakness and MRI of the lumbar spine showed L3-S1 high-grade canal stenosis.  PT followed the patient.  She underwent cholecystectomy on 10/11/2022.  She was discharged home on 10/12/2022.      DISCHARGE Follow Up Recommendations for labs and diagnostics:       Reasons For Change In Medications and Indications for New Medications:      Day of Discharge     Vital Signs:       Physical Exam:    HENT:      Head: Normocephalic.      Nose: Nose normal.   Eyes:       Extraocular Movements: Extraocular movements intact.      Pupils: Pupils are equal, round, and reactive to light.   Cardiovascular:      Rate and Rhythm: Normal rate and regular rhythm.   Pulmonary:      Effort: Pulmonary effort is normal.   Abdominal:      General: Bowel sounds are normal.   Musculoskeletal:         General: Normal range of motion.      Cervical back: Normal range of motion.   Skin:     General: Skin is warm.   Neurological:      Mental Status: She is alert. Mental status is at baseline.   Psychiatric:         Mood and Affect: Mood normal.           Pertinent  and/or Most Recent Results     LAB RESULTS:      Lab 10/12/22  0120 10/11/22  0052 10/10/22  0128   WBC 12.00* 6.50 5.80   HEMOGLOBIN 11.2* 11.6* 11.1*   HEMATOCRIT 35.2 37.0 36.0   PLATELETS 302 288 265   NEUTROS ABS 8.80* 2.10 2.10   LYMPHS ABS 2.00 3.20* 2.80   MONOS ABS 1.00* 0.60 0.60   EOS ABS 0.00 0.40 0.30   MCV 90.1 92.7 93.2         Lab 10/12/22  0120 10/11/22  1212 10/10/22  0128   SODIUM 136 138 136   POTASSIUM 4.2 4.0 3.7   CHLORIDE 101 105 102   CO2 22.0 24.0 20.0*   ANION GAP 13.0 9.0 14.0   BUN 13 13 15   CREATININE 0.64 0.60 0.64   EGFR 95.8 97.3 95.8   GLUCOSE 201* 116* 236*   CALCIUM 8.5* 8.8 8.5*   MAGNESIUM 1.7  --   --          Lab 10/12/22  0120 10/11/22  1212 10/10/22  0128   TOTAL PROTEIN 5.9* 6.8 5.8*   ALBUMIN 3.40* 3.80 3.30*   GLOBULIN 2.5 3.0 2.5   ALT (SGPT) 99* 114* 97*   AST (SGOT) 56* 52* 70*   BILIRUBIN <0.2 <0.2 <0.2   ALK PHOS 110 110 84   AMYLASE  --   --  131*   LIPASE  --   --  61*                     Brief Urine Lab Results  (Last result in the past 365 days)      Color   Clarity   Blood   Leuk Est   Nitrite   Protein   CREAT   Urine HCG        10/06/22 2217 Yellow   Clear   Negative   Negative   Negative   Trace               Microbiology Results (last 10 days)     Procedure Component Value - Date/Time    Respiratory Panel PCR w/COVID-19(SARS-CoV-2) [facility] In-House, NP Swab  in UTM/VTM, 3-4 HR TAT - Swab, Nasopharynx [[id number]]  (Normal) Collected: 10/06/22 2102    Lab Status: Final result Specimen: Swab from Nasopharynx Updated: 10/06/22 2152     ADENOVIRUS, PCR Not Detected     Coronavirus 229E Not Detected     Coronavirus HKU1 Not Detected     Coronavirus NL63 Not Detected     Coronavirus OC43 Not Detected     COVID19 Not Detected     Human Metapneumovirus Not Detected     Human Rhinovirus/Enterovirus Not Detected     Influenza A PCR Not Detected     Influenza B PCR Not Detected     Parainfluenza Virus 1 Not Detected     Parainfluenza Virus 2 Not Detected     Parainfluenza Virus 3 Not Detected     Parainfluenza Virus 4 Not Detected     RSV, PCR Not Detected     Bordetella pertussis pcr Not Detected     Bordetella parapertussis PCR Not Detected     Chlamydophila pneumoniae PCR Not Detected     Mycoplasma pneumo by PCR Not Detected    Narrative:      In the setting of a positive respiratory panel with a viral infection PLUS a negative procalcitonin without other underlying concern for bacterial infection, consider observing off antibiotics or discontinuation of antibiotics and continue supportive care. If the respiratory panel is positive for atypical bacterial infection (Bordetella pertussis, Chlamydophila pneumoniae, or Mycoplasma pneumoniae), consider antibiotic de-escalation to target atypical bacterial infection.          CT Abdomen Pelvis Without Contrast    Result Date: 10/6/2022  Impression: Cholelithiasis  7 mm subpleural nodule left lung base. Recommend follow-up CT chest at 3-6 months to reevaluate,.  Electronically Signed ByJocelynn Costa On:10/6/2022 10:59 PM This report was finalized on [id number] by  Slade Costa, .    CT Head Without Contrast    Result Date: 10/6/2022  Impression: No acute intracranial abnormality.  Electronically Signed ByJocelynn Costa On:10/6/2022 10:40 PM This report was finalized on [id number] by  Anthony Sanchez    MRI  Lumbar Spine Without Contrast    Result Date: 10/8/2022  Impression: 1. Negative for acute fracture. 2. Degenerative findings with disc disease most pronounced at L3-4, L4-5 and L5-S1 without high-grade canal stenosis.  Electronically Signed By-Samson Copeland MD On:10/8/2022 7:27 PM This report was finalized on [id number] by  Samson Copeland MD.    XR Chest 1 View    Result Date: 10/6/2022  Impression: IMPRESSION : No acute process.[  Electronically Signed By-Slade Costa On:10/6/2022 9:25 PM This report was finalized on [id number] by  Slade Costa, .    US Abdomen Limited    Result Date: 10/10/2022  Impression: 1.     Cholelithiasis. Gallbladder wall is slightly thickened which may relate to cholecystitis in the proper clinical setting.  Electronically Signed By-Mark Carlton MD On:10/10/2022 11:06 AM This report was finalized on [id number] by  Mark Carlton MD.                  Labs Pending at Discharge:      Procedures Performed  Procedure(s):  CHOLECYSTECTOMY LAPAROSCOPIC         Consults:   Consults     Date and Time Order Name Status Description    10/9/2022  6:52 PM Inpatient General Surgery Consult Completed     10/8/2022  7:44 AM Inpatient Neurology Consult General Completed             Discharge Details        Discharge Medications      New Medications      Instructions Start Date   polyethylene glycol 17 GM/SCOOP powder  Commonly known as: MIRALAX   mix 1 capful (17 g) in liquid by mouth 2 (Two) Times a Day.      Roller Walker misc   Rolling walker         Continue These Medications      Instructions Start Date   estradiol 1 MG tablet  Commonly known as: ESTRACE   Take 1/2 (one-half) tablet by mouth once daily      Fish Oil Burp-Less 1200 MG capsule   1 capsule, Oral, Daily      glimepiride 4 MG tablet  Commonly known as: AMARYL   Take 1 tablet by mouth once daily      hydrOXYzine 10 MG tablet  Commonly known as: ATARAX   TAKE 1 TO 2 TABLETS BY MOUTH EVERY 8 HOURS AS NEEDED FOR ANXIETY       Janumet XR  MG tablet  Generic drug: SITagliptin-metFORMIN HCl ER   Take 2 tablets every day with a meal      Jardiance 25 MG tablet tablet  Generic drug: empagliflozin   Take 1 tablet by mouth once daily      Narcan 4 MG/0.1ML nasal spray  Generic drug: naloxone   No dose, route, or frequency recorded.      NEURIVA PLUS PO   1 tablet/day, Oral      Ozempic (0.25 or 0.5 MG/DOSE) 2 MG/1.5ML solution pen-injector  Generic drug: Semaglutide(0.25 or 0.5MG/DOS)   Inject 0.5 mg Subcutaneously weekly on tuesdays      PRESERVISION AREDS 2 PO   No dose, route, or frequency recorded.      primidone 250 MG tablet  Commonly known as: MYSOLINE   250 mg, Oral, 3 Times Daily      topiramate 50 MG tablet  Commonly known as: TOPAMAX   50 mg, Oral, 2 Times Daily      traMADol 50 MG tablet  Commonly known as: ULTRAM   50 mg, Oral, 3 Times Daily PRN             Allergies   Allergen Reactions   • Ether Nausea And Vomiting   • Other Swelling     wasp         Discharge Disposition:   Home or Self Care    Diet:  Hospital:No active diet order        Discharge Activity:   Activity Instructions    Activity as tolerated           Discharge Condition: Hemodynamically stable      CODE STATUS:  Code Status and Medical Interventions:   Ordered at: 10/07/22 0701     Code Status (Patient has no pulse and is not breathing):    CPR (Attempt to Resuscitate)     Medical Interventions (Patient has pulse or is breathing):    Full Support         Future Appointments   Date Time Provider Department Center   11/2/2022  3:15 PM Jose Angel Wan MD [facility]       Additional Instructions for the Follow-ups that You Need to Schedule     Ambulatory Referral to Home Health (Hospital)   As directed      Face to Face Visit Date: 10/12/2022    Follow-up provider for Plan of Care?: I treated the patient in an acute care facility and will not continue treatment after discharge.    Follow-up provider: SAMIRA CHRISTIANSON [[id number]]    Reason/Clinical  Findings: Deconditioning    Describe mobility limitations that make leaving home difficult: Deconditioning    Nursing/Therapeutic Services Requested: Skilled Nursing    Skilled nursing orders: Medication education    Frequency: 1 Week 1         Discharge Follow-up with PCP   As directed       Currently Documented PCP:    Trae Kaye MD    PCP Phone Number:    [phone]     Follow Up Details: 2 weeks               Time spent on Discharge including face to face service:  20 minutes    This patient has been examined wearing appropriate Personal Protective Equipment and discussed with nursing. 10/16/22      Signature: Electronically signed by Brandon Villafuerte DO, 10/16/22, 5:52 PM EDT.

## 2022-10-17 ENCOUNTER — READMISSION MANAGEMENT (OUTPATIENT)
Dept: CALL CENTER | Facility: HOSPITAL | Age: 67
End: 2022-10-17

## 2022-10-17 NOTE — OUTREACH NOTE
General Surgery Week 1 Survey    Flowsheet Row Responses   [facility] patient discharged from? [facility]   Does the patient have one of the following disease processes/diagnoses(primary or secondary)? General Surgery   Week 1 attempt successful? Yes   Call start time 1100   Call end time 1104   Discharge diagnosis diarrhea and generalized weakness, hypokalemia,  cholelithiasis s/p lap deangelo   Meds reviewed with patient/caregiver? Yes   Is the patient having any side effects they believe may be caused by any medication additions or changes? No   Does the patient have all medications related to this admission filled (includes all antibiotics, pain medications, etc.) Yes   Is the patient taking all medications as directed (includes completed medication regime)? Yes   Does the patient have a follow up appointment scheduled with their surgeon? Yes   Has the patient kept scheduled appointments due by today? N/A   Has home health visited the patient within 72 hours of discharge? N/A   What DME was ordered? [facility]   Has all DME been delivered? Yes   Psychosocial issues? No   Did the patient receive a copy of their discharge instructions? Yes   Nursing interventions Reviewed instructions with patient   What is the patient's perception of their health status since discharge? Improving   Nursing interventions Nurse provided patient education   Is the patient /caregiver able to teach back basic post-op care? Take showers only when approved by MD-sponge bathe until then, No tub bath, swimming, or hot tub until instructed by MD, Lifting as instructed by MD in discharge instructions   Is the patient/caregiver able to teach back signs and symptoms of incisional infection? Increased redness, swelling or pain at the incisonal site, Increased drainage or bleeding, Incisional warmth, Pus or odor from incision, Fever   Is the patient/caregiver able to teach back steps to recovery at home? Eat a well-balance diet, Set  small, achievable goals for return to baseline health   Is the patient/caregiver able to teach back the hierarchy of who to call/visit for symptoms/problems? PCP, Specialist, Home health nurse, Urgent Care, ED, 911 Yes   Week 1 call completed? Yes   Wrap up additional comments Pt reports she is doing well, except for having loose stool. Pt states she is not using the miralax. Pt is aware to call her Dr if loose stools continue and to drink plenty of fluids          MUSA KEN - Registered Nurse

## 2022-10-26 ENCOUNTER — TELEPHONE (OUTPATIENT)
Dept: SURGERY | Facility: CLINIC | Age: 67
End: 2022-10-26

## 2022-10-26 NOTE — TELEPHONE ENCOUNTER
Patient called wondering about her bandage. States it is coming loose. She wants to know what to do. Instructions given for care of the surgery site. She also complained of fatigue. She did say she was fatigued prior to surgery. I advised she call her PCP. She thinks it may also be her sleeping pill making her tired.

## 2022-11-02 ENCOUNTER — OFFICE VISIT (OUTPATIENT)
Dept: SURGERY | Facility: CLINIC | Age: 67
End: 2022-11-02

## 2022-11-02 VITALS
SYSTOLIC BLOOD PRESSURE: 141 MMHG | OXYGEN SATURATION: 96 % | HEIGHT: 65 IN | BODY MASS INDEX: 21.49 KG/M2 | HEART RATE: 89 BPM | TEMPERATURE: 98 F | WEIGHT: 129 LBS | DIASTOLIC BLOOD PRESSURE: 74 MMHG

## 2022-11-02 DIAGNOSIS — Z09 ENCOUNTER FOR FOLLOW-UP: ICD-10-CM

## 2022-11-02 DIAGNOSIS — R19.7 DIARRHEA, UNSPECIFIED TYPE: Primary | ICD-10-CM

## 2022-11-02 PROCEDURE — 99024 POSTOP FOLLOW-UP VISIT: CPT | Performed by: SURGERY

## 2022-11-02 RX ORDER — EZETIMIBE 10 MG/1
TABLET ORAL
COMMUNITY
Start: 2022-10-05

## 2022-11-02 RX ORDER — HYDROCODONE BITARTRATE AND ACETAMINOPHEN 5; 325 MG/1; MG/1
1 TABLET ORAL EVERY 8 HOURS PRN
Qty: 10 TABLET | Refills: 0 | Status: SHIPPED | OUTPATIENT
Start: 2022-11-02

## 2022-11-04 ENCOUNTER — LAB (OUTPATIENT)
Dept: LAB | Facility: HOSPITAL | Age: 67
End: 2022-11-04

## 2022-11-04 DIAGNOSIS — R19.7 DIARRHEA, UNSPECIFIED TYPE: ICD-10-CM

## 2022-11-04 PROCEDURE — 87209 SMEAR COMPLEX STAIN: CPT | Performed by: SURGERY

## 2022-11-04 PROCEDURE — 87329 GIARDIA AG IA: CPT | Performed by: SURGERY

## 2022-11-04 PROCEDURE — 87324 CLOSTRIDIUM AG IA: CPT

## 2022-11-04 PROCEDURE — 87177 OVA AND PARASITES SMEARS: CPT | Performed by: SURGERY

## 2022-11-04 PROCEDURE — 87449 NOS EACH ORGANISM AG IA: CPT

## 2022-11-04 PROCEDURE — 87045 FECES CULTURE AEROBIC BACT: CPT | Performed by: SURGERY

## 2022-11-04 PROCEDURE — 87328 CRYPTOSPORIDIUM AG IA: CPT | Performed by: SURGERY

## 2022-11-04 PROCEDURE — 87427 SHIGA-LIKE TOXIN AG IA: CPT | Performed by: SURGERY

## 2022-11-04 PROCEDURE — 87046 STOOL CULTR AEROBIC BACT EA: CPT | Performed by: SURGERY

## 2022-11-05 LAB
C DIFF GDH + TOXINS A+B STL QL IA.RAPID: NEGATIVE
C DIFF GDH + TOXINS A+B STL QL IA.RAPID: NEGATIVE
CRYPTOSP AG STL QL IA: NEGATIVE
G LAMBLIA AG STL QL IA: NEGATIVE

## 2022-11-07 LAB
O+P SPEC MICRO: NORMAL
O+P STL CONC: NORMAL

## 2022-11-08 LAB
BACTERIA SPEC CULT: NORMAL
BACTERIA SPEC CULT: NORMAL
CAMPYLOBACTER STL CULT: NORMAL
E COLI SXT STL QL IA: NEGATIVE
SALM + SHIG STL CULT: NORMAL

## 2022-11-09 NOTE — PROGRESS NOTES
CHIEF COMPLAINT:    Chief Complaint   Patient presents with   • lap deangelo     PO       HISTORY OF PRESENT ILLNESS:    Divya Lomeli is a 67 y.o. female who underwent prior cholecystectomy on 10/11/2022.  She returns today for follow-up.  Her main complaint and concern today is profuse diarrhea.  She states this has been ongoing since at least September.  She notes no fevers or chills.  She does have some incisional pain particularly at night and is requesting a refill of pain medicine.    EXAM:  Vitals:    11/02/22 1452   BP: 141/74   Pulse: 89   Temp: 98 °F (36.7 °C)   SpO2: 96%         Abdomen soft, incisions well-healed    ASSESSMENT:    Status post cholecystectomy, diarrhea since September-prior to surgery.    PLAN:    Given her profuse diarrhea we will send her for a C. difficile test.  She was given a short-term refill of pain medicine with the understanding that she will get no further refills of pain medicine.  She does need to follow-up with her PCP likely for the diarrhea as it was existent prior to surgery.  She should see us in a couple weeks.          This document has been electronically signed by Jose Angel Wan MD on November 9, 2022 14:30 EST

## 2023-01-04 ENCOUNTER — HOME HEALTH ADMISSION (OUTPATIENT)
Dept: HOME HEALTH SERVICES | Facility: HOME HEALTHCARE | Age: 68
End: 2023-01-04
Payer: MEDICARE

## 2023-01-04 ENCOUNTER — TRANSCRIBE ORDERS (OUTPATIENT)
Dept: HOME HEALTH SERVICES | Facility: HOME HEALTHCARE | Age: 68
End: 2023-01-04
Payer: MEDICARE

## 2023-01-04 DIAGNOSIS — M53.3 PAIN IN THE COCCYX: Primary | ICD-10-CM

## 2023-01-11 ENCOUNTER — OFFICE (OUTPATIENT)
Dept: URBAN - METROPOLITAN AREA CLINIC 64 | Facility: CLINIC | Age: 68
End: 2023-01-11

## 2023-01-11 VITALS — HEIGHT: 65 IN | WEIGHT: 125 LBS

## 2023-01-11 DIAGNOSIS — R94.5 ABNORMAL RESULTS OF LIVER FUNCTION STUDIES: ICD-10-CM

## 2023-01-11 DIAGNOSIS — K59.04 CHRONIC IDIOPATHIC CONSTIPATION: ICD-10-CM

## 2023-01-11 PROCEDURE — 99214 OFFICE O/P EST MOD 30 MIN: CPT | Performed by: INTERNAL MEDICINE

## 2023-01-23 ENCOUNTER — OFFICE VISIT (OUTPATIENT)
Dept: ORTHOPEDIC SURGERY | Facility: CLINIC | Age: 68
End: 2023-01-23
Payer: MEDICARE

## 2023-01-23 VITALS — WEIGHT: 132 LBS | HEART RATE: 82 BPM | OXYGEN SATURATION: 95 % | HEIGHT: 65 IN | BODY MASS INDEX: 21.99 KG/M2

## 2023-01-23 DIAGNOSIS — G89.29 CHRONIC PAIN OF BOTH SHOULDERS: Primary | ICD-10-CM

## 2023-01-23 DIAGNOSIS — M25.511 CHRONIC PAIN OF BOTH SHOULDERS: Primary | ICD-10-CM

## 2023-01-23 DIAGNOSIS — M25.512 CHRONIC PAIN OF BOTH SHOULDERS: Primary | ICD-10-CM

## 2023-01-23 DIAGNOSIS — M75.02 ADHESIVE CAPSULITIS OF LEFT SHOULDER: ICD-10-CM

## 2023-01-23 PROCEDURE — 99213 OFFICE O/P EST LOW 20 MIN: CPT | Performed by: FAMILY MEDICINE

## 2023-01-23 PROCEDURE — 20611 DRAIN/INJ JOINT/BURSA W/US: CPT | Performed by: FAMILY MEDICINE

## 2023-01-23 RX ORDER — TIZANIDINE 4 MG/1
4 TABLET ORAL EVERY 6 HOURS
COMMUNITY
Start: 2023-01-20 | End: 2023-01-27 | Stop reason: SDUPTHER

## 2023-01-23 RX ORDER — TRIAMCINOLONE ACETONIDE 40 MG/ML
80 INJECTION, SUSPENSION INTRA-ARTICULAR; INTRAMUSCULAR
Status: COMPLETED | OUTPATIENT
Start: 2023-01-23 | End: 2023-01-23

## 2023-01-23 RX ADMIN — TRIAMCINOLONE ACETONIDE 80 MG: 40 INJECTION, SUSPENSION INTRA-ARTICULAR; INTRAMUSCULAR at 12:32

## 2023-01-23 NOTE — PROGRESS NOTES
Primary Care Sports Medicine Office Visit Note     Patient ID: Divya Lomeli is a 67 y.o. female.    Chief Complaint:  Chief Complaint   Patient presents with   • Right Shoulder - Pain, Initial Evaluation     Pain 8   • Left Shoulder - Pain, Initial Evaluation     Pain 9     HPI:    Ms. Divya Lomeli is a 67 y.o. female. The patient presents to the clinic today for bilateral shoulder pain.    The patient reports that approximately for the last 2 months, her left shoulder pain started. Her left shoulder has gotten significantly painful, tight, and sore, and she has trouble moving it. It has radiated up through the whole back and neck and almost even down to the other arm. She reports pain in her bilateral shoulders. She sleeps on her sides, but she is not allowed to sleep on her stomach because of her back surgery. She reports that her L5 was removed as much as they could get it. She always sleeps on her sides. Her left shoulder is more painful than her right shoulder. She states she was hurting to reach her car door and pull. She denies any tingling or numbness. She reports that she is not allowed to drive on the medication. Her primary care physician prescribed her tramadol with Tylenol, Zanaflex, tizanidine and 1 ibuprofen, which are not helping. She uses Biofreeze on her hands. She reports she fell bouncing down the stairs and hurt her arm. It took 2 years for her to recover from that injury.    Past Medical History:   Diagnosis Date   • Anxiety    • Arm pain     rt upper arm muscle   • COPD (chronic obstructive pulmonary disease) ([facility])     20 to 30 years ago. Does not take meds.   • Diabetes mellitus ([facility])     Type 2   • Diabetic nephropathy ([facility])    • Hyperlipidemia    • Hypertension        Past Surgical History:   Procedure Laterality Date   • APPENDECTOMY  1958   • BACK SURGERY     • CATARACT EXTRACTION     • CHOLECYSTECTOMY WITH INTRAOPERATIVE CHOLANGIOGRAM N/A 10/11/2022    Procedure: CHOLECYSTECTOMY  "LAPAROSCOPIC;  Surgeon: Jose Angel Wan MD;  Location: [facility] MAIN OR;  Service: General;  Laterality: N/A;   • CYSTOSCOPY WITH CLOT EVACUATION N/A 2021    Procedure: CYSTOSCOPY WITH CLOT EVACUATION;  Surgeon: Jay Torres MD;  Location: [facility] MAIN OR;  Service: Urology;  Laterality: N/A;   • INTERVENTIONAL RADIOLOGY PROCEDURE N/A 2021    Procedure: STENT PLACEMENT;  Surgeon: Jay Torres MD;  Location: [facility] MAIN OR;  Service: Urology;  Laterality: N/A;   • LUMBAR SPINE SURGERY      L5 removed-Abstracted from [facility]   • OOPHORECTOMY     • TONSILLECTOMY     • TOTAL ABDOMINAL HYSTERECTOMY     • TRANSURETHRAL RESECTION OF BLADDER TUMOR N/A 2021    Procedure: CYSTOSCOPY TRANSURETHRAL RESECTION OF BLADDER TUMOR;  Surgeon: Jay Torres MD;  Location: [facility] MAIN OR;  Service: Urology;  Laterality: N/A;       Family History   Problem Relation Age of Onset   • Diabetes Mother    • Diabetes Father      Social History     Occupational History   • Not on file   Tobacco Use   • Smoking status: Former     Packs/day: 4.50     Years: 21.00     Pack years: 94.50     Types: Cigarettes     Start date:      Quit date:      Years since quittin.0   • Smokeless tobacco: Never   • Tobacco comments:     2nd Hand Smoke - boyfriend smokes.   Vaping Use   • Vaping Use: Never used   Substance and Sexual Activity   • Alcohol use: Not Currently     Comment: occ   • Drug use: Not Currently     Types: Marijuana     Comment: college    • Sexual activity: Defer      Review of Systems   Constitutional: Negative for activity change and fever.   Musculoskeletal: Positive for arthralgias.   Skin: Negative for color change and rash.   Neurological: Negative for weakness.     Objective:    Pulse 82   Ht 165.1 cm (65\")   Wt 59.9 kg (132 lb)   SpO2 95%   BMI 21.97 kg/m²     Physical Examination:  Physical Exam  Vitals and nursing note reviewed.   Constitutional:       General: She is not in " acute distress.     Appearance: She is well-developed. She is not diaphoretic.   HENT:      Head: Normocephalic and atraumatic.   Eyes:      Conjunctiva/sclera: Conjunctivae normal.   Pulmonary:      Effort: Pulmonary effort is normal. No respiratory distress.   Skin:     General: Skin is warm.      Capillary Refill: Capillary refill takes less than 2 seconds.   Neurological:      Mental Status: She is alert.       Left Shoulder Exam     Comments:  Left shoulder examination yields moderate decrease in range of motion to lateral abduction of about 90 degrees and forward flexion similarly. Rotator cuff testing is difficult, but resisted external rotation is positive for weakness. Belly press is negative. There is tenderness to palpation of the posterior shoulder musculature. Limitation in active and passive range of motion.        Imaging and other tests:  Three view x-ray of bilateral shoulders today yields right greater than left joint space narrowing consistent with mild osteoarthritic change of the glenohumeral joints and moderate osteoarthritic change in bilateral acromioclavicular joints. Otherwise, no acute bony abnormality.    Assessment and Plan:    1. Chronic pain of both shoulders  - XR Shoulder 2+ View Bilateral  - Ambulatory Referral to Physical Therapy    2. Adhesive capsulitis of the left shoulder    I discussed pathology and treatment options with the patient today. I recommended she start physical therapy for stretching and strengthening of the shoulder, and to return range of motion with adhesive capsulitis. The patient agreed, and also elected to proceed with intra-articular corticosteroid injection for pain relief. She tolerated this well with ultrasound guidance. Return to clinic in 2-3 months for follow-up evaluation.    Transcribed from ambient dictation for Pete Polk II,  by Alla Tatum.  01/23/23   12:11 EST    Patient or patient representative verbalized consent to the visit  recording.  I have personally performed the services described in this document as transcribed by the above individual, and it is both accurate and complete.    Disclaimer: Please note that areas of this note were completed with computer voice recognition software.  Quite often unanticipated grammatical, syntax, homophones, and other interpretive errors are inadvertently transcribed by the computer software. Please excuse any errors that have escaped final proofreading.

## 2023-01-23 NOTE — PROGRESS NOTES
Procedure   Large Joint Arthrocentesis: L glenohumeral  Date/Time: 1/23/2023 12:32 PM  Consent given by: patient  Site marked: site marked  Timeout: Immediately prior to procedure a time out was called to verify the correct patient, procedure, equipment, support staff and site/side marked as required   Supporting Documentation  Indications: pain   Procedure Details  Location: shoulder - L glenohumeral  Preparation: Patient was prepped and draped in the usual sterile fashion  Needle size: 25 G  Approach: posterior (Ultrasound guidance was used to ensure intrarticular needle placement. Please see ultrasound machine for saved images.)  Medications administered: 80 mg triamcinolone acetonide 40 MG/ML  Patient tolerance: patient tolerated the procedure well with no immediate complications

## 2023-01-27 ENCOUNTER — PATIENT ROUNDING (BHMG ONLY) (OUTPATIENT)
Dept: ORTHOPEDIC SURGERY | Facility: CLINIC | Age: 68
End: 2023-01-27
Payer: MEDICARE

## 2023-01-27 ENCOUNTER — OFFICE VISIT (OUTPATIENT)
Dept: PAIN MEDICINE | Facility: CLINIC | Age: 68
End: 2023-01-27
Payer: MEDICARE

## 2023-01-27 VITALS
OXYGEN SATURATION: 99 % | SYSTOLIC BLOOD PRESSURE: 148 MMHG | HEART RATE: 92 BPM | DIASTOLIC BLOOD PRESSURE: 57 MMHG | RESPIRATION RATE: 16 BRPM

## 2023-01-27 DIAGNOSIS — M54.2 CERVICALGIA: ICD-10-CM

## 2023-01-27 DIAGNOSIS — M25.512 ACUTE PAIN OF BOTH SHOULDERS: ICD-10-CM

## 2023-01-27 DIAGNOSIS — M25.511 CHRONIC RIGHT SHOULDER PAIN: ICD-10-CM

## 2023-01-27 DIAGNOSIS — M67.912 DISORDER OF ROTATOR CUFF OF BOTH SHOULDERS: ICD-10-CM

## 2023-01-27 DIAGNOSIS — R53.1 GENERALIZED WEAKNESS: ICD-10-CM

## 2023-01-27 DIAGNOSIS — M67.911 DISORDER OF ROTATOR CUFF OF BOTH SHOULDERS: ICD-10-CM

## 2023-01-27 DIAGNOSIS — M25.511 ACUTE PAIN OF BOTH SHOULDERS: ICD-10-CM

## 2023-01-27 DIAGNOSIS — Z79.899 HIGH RISK MEDICATION USE: Primary | ICD-10-CM

## 2023-01-27 DIAGNOSIS — G89.29 CHRONIC RIGHT SHOULDER PAIN: ICD-10-CM

## 2023-01-27 DIAGNOSIS — M19.90 GENERALIZED ARTHRITIS: ICD-10-CM

## 2023-01-27 DIAGNOSIS — M75.51 SUBACROMIAL BURSITIS OF RIGHT SHOULDER JOINT: ICD-10-CM

## 2023-01-27 PROCEDURE — 99214 OFFICE O/P EST MOD 30 MIN: CPT | Performed by: PHYSICAL MEDICINE & REHABILITATION

## 2023-01-27 RX ORDER — TIZANIDINE 4 MG/1
4 TABLET ORAL NIGHTLY PRN
Qty: 90 TABLET | Refills: 0 | Status: SHIPPED | OUTPATIENT
Start: 2023-01-27

## 2023-01-27 RX ORDER — TRAMADOL HYDROCHLORIDE 50 MG/1
50 TABLET ORAL 3 TIMES DAILY PRN
Qty: 90 TABLET | Refills: 1 | Status: SHIPPED | OUTPATIENT
Start: 2023-01-27

## 2023-01-27 RX ORDER — METHOCARBAMOL 500 MG/1
500 TABLET, FILM COATED ORAL 3 TIMES DAILY PRN
Qty: 270 TABLET | Refills: 0 | Status: SHIPPED | OUTPATIENT
Start: 2023-01-27

## 2023-01-27 NOTE — PROGRESS NOTES
"Subjective   Divya Lomeli is a 67 y.o. female.     History of Present Illness  Right shoulder pain, began around 2017, aggravated with overhead reaching, 9/10 at worst, 0/10 at best, intermittent, varies, aching, stabbing, worse with lifting interferes with ADLs, work. Had PT with HEP. Subluxates, improves with reduction. X-ray C-spine with DDD and DJD. Notes reviewed, as above, also DM2, COPD. Has used Tramadol with some relief, insufficient at BID prn. Trouble getting to sleep. Saw Dr. Kaye, started on Ultracet and Tizanidine 4mg QID prn, but cannot drive on that combination. Saw Dr. Polk, given L shoulder GH injection using U/S, steroid made her \"loony\" for 1-2 days, some improvement, also referred for PT.       The following portions of the patient's history were reviewed and updated as appropriate: allergies, current medications, past family history, past medical history, past social history, past surgical history and problem list.    Review of Systems   Constitutional: Negative for chills, fatigue and fever.   HENT: Negative for hearing loss and trouble swallowing.    Eyes: Negative for visual disturbance.   Respiratory: Negative for shortness of breath.    Cardiovascular: Negative for chest pain.   Gastrointestinal: Negative for abdominal pain, constipation, diarrhea, nausea and vomiting.   Genitourinary: Negative for urinary incontinence.   Musculoskeletal: Positive for arthralgias and neck pain. Negative for back pain, joint swelling and myalgias.   Neurological: Negative for dizziness, weakness, numbness and headache.       Objective   Physical Exam   Constitutional: She is oriented to person, place, and time. She appears well-developed and well-nourished.   HENT:   Head: Normocephalic and atraumatic.   Eyes: Pupils are equal, round, and reactive to light.   Cardiovascular: Normal rate, regular rhythm and normal heart sounds.   Pulmonary/Chest: Effort normal and breath sounds normal.   Abdominal: " "Soft. Normal appearance and bowel sounds are normal. She exhibits no distension. There is no abdominal tenderness.   Musculoskeletal:      Comments: B/l: shoulder limited ROM   Neurological: She is alert and oriented to person, place, and time. She has normal reflexes. She displays normal reflexes. No sensory deficit.   Psychiatric: Her behavior is normal. Mood, judgment and thought content normal.         Assessment & Plan   Diagnoses and all orders for this visit:    1. Cervicalgia (Primary)    2. Chronic right shoulder pain    3. Generalized weakness    4. Subacromial bursitis of right shoulder joint        Inspect reviewed, in order. UDS in order 7/1/19.  Reduced to to Tramadol 50mg TID prn, refill  Began Tizanidine 4mg qHS prn instead of Flexeril. Refill Tizanidine 4mg qHS only, begin Robaxin 750mg TID prn during day.  Ordered RxAlt #1 cream, insurance would not approve, cont OTC \"Blue Stuff\" cream.  Performed R subacromial injection with excellent relief, returning, schedule repeat. May need subacromial injection in L shoulder to complement GH injection.   May need TPIs in cervical paraspinals and traps.  RTC for R shoulder injection then in 2 months for f/u. Was q 4 months.                  "

## 2023-01-27 NOTE — PROGRESS NOTES
A my chart message has been sent to the patient for PATIENT ROUNDING with [facility]

## 2023-02-01 ENCOUNTER — TREATMENT (OUTPATIENT)
Dept: PHYSICAL THERAPY | Facility: CLINIC | Age: 68
End: 2023-02-01
Payer: MEDICARE

## 2023-02-01 DIAGNOSIS — G89.29 CHRONIC PAIN OF BOTH SHOULDERS: Primary | ICD-10-CM

## 2023-02-01 DIAGNOSIS — M25.511 CHRONIC PAIN OF BOTH SHOULDERS: Primary | ICD-10-CM

## 2023-02-01 DIAGNOSIS — M54.2 CERVICAL SPINE PAIN: ICD-10-CM

## 2023-02-01 DIAGNOSIS — M25.512 CHRONIC PAIN OF BOTH SHOULDERS: Primary | ICD-10-CM

## 2023-02-01 PROCEDURE — 97110 THERAPEUTIC EXERCISES: CPT | Performed by: PHYSICAL THERAPIST

## 2023-02-01 PROCEDURE — 97162 PT EVAL MOD COMPLEX 30 MIN: CPT | Performed by: PHYSICAL THERAPIST

## 2023-02-01 NOTE — PROGRESS NOTES
"Physical Therapy Initial Evaluation and Plan of Care  Office: [address] at the [facility] [address], [place] [address]  P: [phone]  F: [phone]    Patient: Divya Lomeli   : 1955  Diagnosis/ICD-10 Code:  Chronic pain of both shoulders [M25.511, G89.29, M25.512]  Referring practitioner: Pete Polk I*  Date of Initial Visit: 2023  Today's Date: 2023  Patient seen for 1 sessions           Subjective Questionnaire: QuickDASH: 60% impairment       Subjective Evaluation    History of Present Illness  Mechanism of injury: Pt states that she is having B shoulder pain that started back end of last year. She was getting around well with no issues but then everything went down hill back in September. Can't lie on either shoulder so she's not sleeping very well at night time. She has to take ibuprofen plus her prescribed medication from MD to help with the pain. Noticed that she had a bump on the L shoulder/upper arm. It was raised and after a while she got tired of it and she scraped it off. A little bump is back but not as much. She was having pain going down the arm in that area so thought it might be related. Showed her MD and they didn't think it was anything.The L one worse than the R. X-rays of shoulder showed some arthritis. She was given corticosteroid injection in L by Ortho on 2023. Didn't help much. Plans on having injection in the R shoulder from pain management coming up soon. She had an injection in the R shoulder 4-5 years ago after falling on the R arm down the steps. That helped a lot. R shoulder usually only hurts at night. L shoulder pain is more constant. She can't reach out to the side, can't pull car door shut or reach up overhead due to pain. Can't lift iron skillet. Pain in L shoulder is \"a whole ball\" at end of shoulder. R is the same when lying down. No numbness or tingling. She was having spasms in her arms and up her neck as well as down L shoulder blade. She gets " some spasms but nothing like what she was having. She is having some pain in the neck. Went to Urgent care and they told her her upper traps were tight and told her to stop crocheting and sewing because that was probably making it worse.     Pain  Current pain rating: 10  At best pain ratin  At worst pain rating: 10  Quality: sharp  Relieving factors: medications, rest, change in position and heat (Super blue stuff cream helps)  Aggravating factors: lifting, movement, sleeping, outstretched reach and overhead activity    Hand dominance: right    Diagnostic Tests  Abnormal x-ray: see imaging.    Patient Goals  Patient goals for therapy: return to work, independence with ADLs/IADLs, return to sport/leisure activities, increased strength, decreased pain and increased motion  Patient goal: crocheting and sewing          Past Medical History:   Diagnosis Date   • Anxiety    • Arm pain     rt upper arm muscle   • COPD (chronic obstructive pulmonary disease) ([facility])     20 to 30 years ago. Does not take meds.   • Diabetes mellitus ([facility])     Type 2   • Diabetic nephropathy ([facility])    • Hyperlipidemia    • Hypertension         Past Surgical History:   Procedure Laterality Date   • APPENDECTOMY     • BACK SURGERY     • CATARACT EXTRACTION     • CHOLECYSTECTOMY WITH INTRAOPERATIVE CHOLANGIOGRAM N/A 10/11/2022    Procedure: CHOLECYSTECTOMY LAPAROSCOPIC;  Surgeon: Jose Angel Wan MD;  Location: [facility] MAIN OR;  Service: General;  Laterality: N/A;   • CYSTOSCOPY WITH CLOT EVACUATION N/A 2021    Procedure: CYSTOSCOPY WITH CLOT EVACUATION;  Surgeon: Jay Torres MD;  Location: [facility] MAIN OR;  Service: Urology;  Laterality: N/A;   • INTERVENTIONAL RADIOLOGY PROCEDURE N/A 2021    Procedure: STENT PLACEMENT;  Surgeon: Jay Torres MD;  Location: [facility] MAIN OR;  Service: Urology;  Laterality: N/A;   • LUMBAR SPINE SURGERY      L5 removed-Abstracted from [facility]   • OOPHORECTOMY     • TONSILLECTOMY      • TOTAL ABDOMINAL HYSTERECTOMY     • TRANSURETHRAL RESECTION OF BLADDER TUMOR N/A 09/28/2021    Procedure: CYSTOSCOPY TRANSURETHRAL RESECTION OF BLADDER TUMOR;  Surgeon: Jay Torres MD;  Location: [facility] MAIN OR;  Service: Urology;  Laterality: N/A;        Objective          Static Posture     Head  Forward.    Shoulders  Depressed and rounded.    Thoracic Spine  Hyperkyphosis.    Postural Observations  Seated posture: poor  Standing posture: fair        Observations   Left Shoulder   Positive for atrophy.     Right Shoulder  Positive for atrophy.     Additional Shoulder Observation Details  Fwd shoulders and forward head, increased thoracic kyphosis  Small bump lateral shoulder, possibly from mole. Will continue to assess as needed.     Palpation   Left   Tenderness of the anterior deltoid, infraspinatus, supraspinatus and upper trapezius.     Right Tenderness of the upper trapezius.     Tenderness     Left Shoulder   Tenderness in the AC joint, acromion and supraspinatus tendon.     Neurological Testing     Sensation     Shoulder   Left Shoulder   Intact: light touch    Right Shoulder   Intact: light touch    Active Range of Motion   Left Shoulder   Flexion: 96 degrees with pain  Abduction: 97 degrees with pain  External rotation 45°: 50 degrees with pain    Right Shoulder   Flexion: 137 degrees   Abduction: 146 degrees with pain  External rotation 45°: WFL    Additional Active Range of Motion Details  L shoulder abd painful - felt like she just couldn't get the arm up on the L   IR/ext painful and limited to lateral hip on the L, able to reach to posterior hip on the R    Scapular Mobility   Left Shoulder   Scapular mobility: fair  Scapular Mobility with Shoulder to 90° FF   Scapular winging: moderate  Scapular elevation: minimal  Upward rotation: inadequate    Right Shoulder   Scapular mobility: fair  Scapular Mobility with Shoulder to 90° FF   Scapular winging: moderate  Scapular elevation:  minimal  Upward rotation: inadequate    Scapular Mobility beyond 90° FF   Scapular winging: moderate  Scapular elevation: minimal  Upward rotation: inadequate    Strength/Myotome Testing     Left Shoulder     Planes of Motion   Flexion: 4-   Abduction: 4-   External rotation at 0°: 4-   Internal rotation at 0°: 4-     Isolated Muscles   Biceps: 4   Lower trapezius: 3   Middle trapezius: 3   Triceps: 4     Right Shoulder     Planes of Motion   Flexion: 4   Abduction: 4   External rotation at 0°: 4   Internal rotation at 0°: 4     Isolated Muscles   Biceps: 4+   Lower trapezius: 3+   Middle trapezius: 3+   Triceps: 4+     Tests     Left Shoulder   Positive empty can, Hawkin's and Neer's.     Right Shoulder   Positive Hawkin's and Neer's.   Negative empty can.     Ambulation     Comments   Mod unsteadiness with ambulation, most noted when getting up from standing as well. Pt reports that her walking/balance has been worse since Sept as well. She had a surgery that she think has affected her, plus the B shoulder pain.           Assessment & Plan     Assessment  Impairments: abnormal coordination, abnormal muscle firing, abnormal muscle tone, abnormal or restricted ROM, activity intolerance, impaired physical strength, lacks appropriate home exercise program, pain with function and weight-bearing intolerance  Functional Limitations: carrying objects, lifting, sleeping, pulling, pushing, uncomfortable because of pain, reaching behind back and reaching overhead  Assessment details: Pt is a 67 year old female with c/o B shoulder pain as well as cervical spine pain with L worse than R. Pt demonstrates limited ROM B, however more on the L due to pain and poor mechanics. Decreased activation of scap stabilizers as well as decreased strength in RTC B. Pt displays poor overall posture with sitting and fair with standing. Unsteadiness noted while standing due to overall deconditioning and weakness in LE's and decreased balance as  well. Functional limitations are listed above. Pt will benefit from PT in order to address impairments and improve overall function.     Prognosis: good    Goals  Plan Goals: STG (3 weeks):  Pt will demonstrate increased activation of scap stabilizers during activities/exercises to decrease load on shoulder.   Pt will display improved ROM of B shoulders to WFL with min to no pain to assist with functional tasks.     LTG (6 weeks):  Pt will be independent with home exercises to assist with improved strength and continue to improve function.   Pt will demonstrate decreased score on the QuickDASH to 30% to demonstrate decreased overall impairment.   Pt will be able to perform housework and ADL's with min to no increased tightness or pain in the shoulder for improved function.   Pt will demonstrate improved shoulder and scapular strength to 4-4+/5 overall to assist with function.        Plan  Therapy options: will be seen for skilled therapy services  Planned modality interventions: cryotherapy, TENS, thermotherapy (hydrocollator packs) and ultrasound  Planned therapy interventions: ADL retraining, body mechanics training, fine motor coordination training, flexibility, functional ROM exercises, home exercise program, joint mobilization, manual therapy, motor coordination training, neuromuscular re-education, postural training, soft tissue mobilization, spinal/joint mobilization, strengthening, stretching and therapeutic activities  Frequency: 2x week  Duration in weeks: 12  Treatment plan discussed with: patient        HEP:   Access Code: [email or web address]  URL: [email or web address]  Date: 02/01/2023  Prepared by: Sondra Bernstein    Exercises  Supine Shoulder Press AAROM in Abduction with Dowel - 2 x daily - 10 reps - 3 sec hold  Seated Scapular Retraction - 2 x daily - 10 reps - 3 sec hold  Seated Shoulder Shrug Circles AROM Backward - 2 x daily - 10 reps      History # of Personal Factors and/or Comorbidities:  MODERATE (1-2)  Examination of Body System(s): # of elements: MODERATE (3)  Clinical Presentation: EVOLVING  Clinical Decision Making: MODERATE      Eval:  Low Eval          Mins  69044  Mod Eval     30     Mins  04966  High Eval                            Mins  57928    Timed:         Manual Therapy:         mins  35803;     Therapeutic Exercise:    15     mins  66040;     Neuromuscular Zhen:        mins  10590;    Therapeutic Activity:          mins  72233;     Gait Training:           mins  27252;       Un-Timed:  Electrical Stimulation:         mins  31467 ( );  Traction          mins 47695      Timed Treatment:   15   mins   Total Treatment:     45   mins    PT SIGNATURE: Sondra Bernstein PT, DPT, OCS           [place] License # [id number]           [place] License # [id number]    DATE TREATMENT INITIATED: 2/1/2023    Initial Certification  Certification Period: 5/1/2023  I certify that the therapy services are furnished while this patient is under my care.  The services outlined above are required by this patient, and will be reviewed every 90 days.     PHYSICIAN: Pete Polk II, DO      DATE:     Please sign and return via fax to [phone].. Thank you, [facility].

## 2023-02-02 ENCOUNTER — HOSPITAL ENCOUNTER (OUTPATIENT)
Dept: CARDIOLOGY | Facility: HOSPITAL | Age: 68
Discharge: HOME | End: 2023-02-02
Payer: MEDICARE

## 2023-02-02 ENCOUNTER — LAB (OUTPATIENT)
Dept: LAB | Facility: HOSPITAL | Age: 68
End: 2023-02-02
Payer: MEDICARE

## 2023-02-02 ENCOUNTER — TRANSCRIBE ORDERS (OUTPATIENT)
Dept: ADMINISTRATIVE | Facility: HOSPITAL | Age: 68
End: 2023-02-02
Payer: MEDICARE

## 2023-02-02 DIAGNOSIS — D49.4 BLADDER TUMOR: ICD-10-CM

## 2023-02-02 DIAGNOSIS — D49.4 BLADDER TUMOR: Primary | ICD-10-CM

## 2023-02-02 LAB — QT INTERVAL: 343 MS

## 2023-02-02 PROCEDURE — 93005 ELECTROCARDIOGRAM TRACING: CPT | Performed by: UROLOGY

## 2023-02-02 PROCEDURE — 36415 COLL VENOUS BLD VENIPUNCTURE: CPT

## 2023-02-02 PROCEDURE — 85025 COMPLETE CBC W/AUTO DIFF WBC: CPT

## 2023-02-02 PROCEDURE — 80048 BASIC METABOLIC PNL TOTAL CA: CPT

## 2023-02-02 PROCEDURE — 93010 ELECTROCARDIOGRAM REPORT: CPT | Performed by: INTERNAL MEDICINE

## 2023-02-03 LAB
ANION GAP SERPL CALCULATED.3IONS-SCNC: 11.1 MMOL/L (ref 5–15)
BASOPHILS # BLD AUTO: 0.07 10*3/MM3 (ref 0–0.2)
BASOPHILS NFR BLD AUTO: 0.9 % (ref 0–1.5)
BUN SERPL-MCNC: 19 MG/DL (ref 8–23)
BUN/CREAT SERPL: 32.2 (ref 7–25)
CALCIUM SPEC-SCNC: 9.4 MG/DL (ref 8.6–10.5)
CHLORIDE SERPL-SCNC: 101 MMOL/L (ref 98–107)
CO2 SERPL-SCNC: 27.9 MMOL/L (ref 22–29)
CREAT SERPL-MCNC: 0.59 MG/DL (ref 0.57–1)
DEPRECATED RDW RBC AUTO: 49.3 FL (ref 37–54)
EGFRCR SERPLBLD CKD-EPI 2021: 97.7 ML/MIN/1.73
EOSINOPHIL # BLD AUTO: 0.3 10*3/MM3 (ref 0–0.4)
EOSINOPHIL NFR BLD AUTO: 3.8 % (ref 0.3–6.2)
ERYTHROCYTE [DISTWIDTH] IN BLOOD BY AUTOMATED COUNT: 14.8 % (ref 12.3–15.4)
GLUCOSE SERPL-MCNC: 85 MG/DL (ref 65–99)
HCT VFR BLD AUTO: 37.1 % (ref 34–46.6)
HGB BLD-MCNC: 12.2 G/DL (ref 12–15.9)
IMM GRANULOCYTES # BLD AUTO: 0.05 10*3/MM3 (ref 0–0.05)
IMM GRANULOCYTES NFR BLD AUTO: 0.6 % (ref 0–0.5)
LYMPHOCYTES # BLD AUTO: 2.37 10*3/MM3 (ref 0.7–3.1)
LYMPHOCYTES NFR BLD AUTO: 29.8 % (ref 19.6–45.3)
MCH RBC QN AUTO: 30.2 PG (ref 26.6–33)
MCHC RBC AUTO-ENTMCNC: 32.9 G/DL (ref 31.5–35.7)
MCV RBC AUTO: 91.8 FL (ref 79–97)
MONOCYTES # BLD AUTO: 0.65 10*3/MM3 (ref 0.1–0.9)
MONOCYTES NFR BLD AUTO: 8.2 % (ref 5–12)
NEUTROPHILS NFR BLD AUTO: 4.5 10*3/MM3 (ref 1.7–7)
NEUTROPHILS NFR BLD AUTO: 56.7 % (ref 42.7–76)
NRBC BLD AUTO-RTO: 0 /100 WBC (ref 0–0.2)
PLATELET # BLD AUTO: 355 10*3/MM3 (ref 140–450)
PMV BLD AUTO: 11.6 FL (ref 6–12)
POTASSIUM SERPL-SCNC: 4.6 MMOL/L (ref 3.5–5.2)
RBC # BLD AUTO: 4.04 10*6/MM3 (ref 3.77–5.28)
SODIUM SERPL-SCNC: 140 MMOL/L (ref 136–145)
WBC NRBC COR # BLD: 7.94 10*3/MM3 (ref 3.4–10.8)

## 2023-02-07 PROCEDURE — 88305 TISSUE EXAM BY PATHOLOGIST: CPT | Performed by: UROLOGY

## 2023-02-08 ENCOUNTER — LAB REQUISITION (OUTPATIENT)
Dept: LAB | Facility: HOSPITAL | Age: 68
End: 2023-02-08
Payer: MEDICARE

## 2023-02-08 DIAGNOSIS — N32.9 BLADDER DISORDER, UNSPECIFIED: ICD-10-CM

## 2023-02-09 LAB
LAB AP CASE REPORT: NORMAL
LAB AP DIAGNOSIS COMMENT: NORMAL
PATH REPORT.FINAL DX SPEC: NORMAL
PATH REPORT.GROSS SPEC: NORMAL

## 2023-02-10 ENCOUNTER — TREATMENT (OUTPATIENT)
Dept: PHYSICAL THERAPY | Facility: CLINIC | Age: 68
End: 2023-02-10
Payer: MEDICARE

## 2023-02-10 DIAGNOSIS — M54.2 CERVICAL SPINE PAIN: ICD-10-CM

## 2023-02-10 DIAGNOSIS — M25.511 CHRONIC PAIN OF BOTH SHOULDERS: Primary | ICD-10-CM

## 2023-02-10 DIAGNOSIS — G89.29 CHRONIC PAIN OF BOTH SHOULDERS: Primary | ICD-10-CM

## 2023-02-10 DIAGNOSIS — M25.512 CHRONIC PAIN OF BOTH SHOULDERS: Primary | ICD-10-CM

## 2023-02-10 PROCEDURE — 97112 NEUROMUSCULAR REEDUCATION: CPT | Performed by: PHYSICAL THERAPIST

## 2023-02-10 PROCEDURE — 97110 THERAPEUTIC EXERCISES: CPT | Performed by: PHYSICAL THERAPIST

## 2023-02-10 PROCEDURE — 97140 MANUAL THERAPY 1/> REGIONS: CPT | Performed by: PHYSICAL THERAPIST

## 2023-02-10 NOTE — PROGRESS NOTES
Physical Therapy Daily Note  Office: [address], [place] [address]  P: [phone]  F: [phone]    Patient: Divya Lomeli   : 1955  Diagnosis/ICD-10 Code:  Chronic pain of both shoulders [M25.511, G89.29, M25.512]  Referring practitioner: Pete Polk I*  Date of Initial Visit: 2/10/2023  Today's Date: 2/10/2023  Patient seen for 2 sessions                                                                                                                                                                                                                                                                                                                               VISIT#: 2/10 sessions authorized per POC (PN due: 3/1/2023)     Precautions/Restrictions: H/o COPD, DM, HTN     Subjective  Divya Lomeli reports that she is hurting today. Hurts both upper arms. Been that way for a few days now. Thinks it's because she's trying not to take as much pain medication. Almost called in but decided to come. She has been working on the exercises at home       Objective  Tenderness B shoulders     See Exercise, Manual, and Modality Logs for complete treatment.     Home Exercises  Access Code: [email or web address]  URL: [email or web address]  Date: 02/10/2023  Prepared by: Sondra Bernstein    Exercises  Seated Elbow Flexion and Extension AROM - 2 x daily - 10-15 reps - 2 sec hold      Assessment/Plan   Pt demonstrates limited ROM this date due to pain B shoulders, however worse on the L. Pain improved with manual treatment and exercises. Pain L arm changed area throughout treatment from anterior shoulder to posterior elbow to forearm, etc. Pt had less pain B shoulders by end of session.     Goals  STG (3 weeks):  Pt will demonstrate increased activation of scap stabilizers during activities/exercises to decrease load on shoulder.   Pt will display improved ROM of B shoulders to WFL with min to no pain to  assist with functional tasks.     LTG (6 weeks):  Pt will be independent with home exercises to assist with improved strength and continue to improve function.   Pt will demonstrate decreased score on the QuickDASH to 30% to demonstrate decreased overall impairment.   Pt will be able to perform housework and ADL's with min to no increased tightness or pain in the shoulder for improved function.   Pt will demonstrate improved shoulder and scapular strength to 4-4+/5 overall to assist with function.      Progress per Plan of Care and Progress strengthening /stabilization /functional activity            Timed:         Manual Therapy:    15     mins  89428;     Therapeutic Exercise:    15     mins  48635;     Neuromuscular Zhen:    8    mins  24540;    Therapeutic Activity:          mins  15674;     Gait Training:           mins  20883;     Ultrasound:          mins  31417;    Ionto                                   mins   84239  Self Care                            mins   08027    Un-Timed:  Electrical Stimulation:         mins  75548 ( );  Traction          mins 55467    Timed Treatment:   38   mins   Total Treatment:     38   mins    Sondra Bernstein, PT, DPT, OCS     [place] License # [id number]     [place] License # [id number]

## 2023-02-13 ENCOUNTER — TELEPHONE (OUTPATIENT)
Dept: PHYSICAL THERAPY | Facility: CLINIC | Age: 68
End: 2023-02-13

## 2023-02-17 ENCOUNTER — TELEPHONE (OUTPATIENT)
Dept: PHYSICAL THERAPY | Facility: OTHER | Age: 68
End: 2023-02-17
Payer: MEDICARE

## 2023-02-17 NOTE — TELEPHONE ENCOUNTER
Caller: Divya Lomeli    Relationship: Self    What was the call regarding: PATIENT CANCELLED APPT TODAY BECAUSE THEY CANT MAKE IT

## 2023-02-24 ENCOUNTER — TELEPHONE (OUTPATIENT)
Dept: PHYSICAL THERAPY | Facility: CLINIC | Age: 68
End: 2023-02-24
Payer: MEDICARE

## 2023-02-24 ENCOUNTER — TELEPHONE (OUTPATIENT)
Dept: PAIN MEDICINE | Facility: CLINIC | Age: 68
End: 2023-02-24
Payer: MEDICARE

## 2023-02-24 DIAGNOSIS — M25.511 CHRONIC RIGHT SHOULDER PAIN: Primary | ICD-10-CM

## 2023-02-24 DIAGNOSIS — G89.29 CHRONIC RIGHT SHOULDER PAIN: Primary | ICD-10-CM

## 2023-02-24 DIAGNOSIS — M25.511 CHRONIC PAIN OF BOTH SHOULDERS: ICD-10-CM

## 2023-02-24 DIAGNOSIS — M25.512 CHRONIC PAIN OF BOTH SHOULDERS: ICD-10-CM

## 2023-02-24 DIAGNOSIS — G89.29 CHRONIC PAIN OF BOTH SHOULDERS: ICD-10-CM

## 2023-02-24 NOTE — TELEPHONE ENCOUNTER
That is much more than expected, so we're good. Let's just get her in for b/l shoulder injections, thanks.

## 2023-02-24 NOTE — TELEPHONE ENCOUNTER
Patient came in today for a drug count on her Tramadol the count was #48 also would like to have her left shoulder injection vise her right shoulder on 03/02 states her left shoulder is bothering her worse.

## 2023-03-02 ENCOUNTER — HOSPITAL ENCOUNTER (OUTPATIENT)
Dept: PAIN MEDICINE | Facility: HOSPITAL | Age: 68
Discharge: HOME | End: 2023-03-02
Admitting: PHYSICAL MEDICINE & REHABILITATION
Payer: MEDICARE

## 2023-03-02 VITALS
HEIGHT: 65 IN | BODY MASS INDEX: 20.66 KG/M2 | TEMPERATURE: 95.5 F | OXYGEN SATURATION: 95 % | DIASTOLIC BLOOD PRESSURE: 76 MMHG | WEIGHT: 124 LBS | RESPIRATION RATE: 16 BRPM | SYSTOLIC BLOOD PRESSURE: 127 MMHG | HEART RATE: 85 BPM

## 2023-03-02 DIAGNOSIS — M75.51 SUBACROMIAL BURSITIS OF BOTH SHOULDERS: Primary | ICD-10-CM

## 2023-03-02 DIAGNOSIS — M25.511 CHRONIC PAIN OF BOTH SHOULDERS: ICD-10-CM

## 2023-03-02 DIAGNOSIS — M25.512 CHRONIC PAIN OF BOTH SHOULDERS: ICD-10-CM

## 2023-03-02 DIAGNOSIS — G89.29 CHRONIC PAIN OF BOTH SHOULDERS: ICD-10-CM

## 2023-03-02 DIAGNOSIS — M75.52 SUBACROMIAL BURSITIS OF BOTH SHOULDERS: Primary | ICD-10-CM

## 2023-03-02 PROCEDURE — 25010000002 METHYLPREDNISOLONE PER 40 MG: Performed by: PHYSICAL MEDICINE & REHABILITATION

## 2023-03-02 PROCEDURE — 20610 DRAIN/INJ JOINT/BURSA W/O US: CPT | Performed by: PHYSICAL MEDICINE & REHABILITATION

## 2023-03-02 RX ORDER — METHYLPREDNISOLONE ACETATE 40 MG/ML
40 INJECTION, SUSPENSION INTRA-ARTICULAR; INTRALESIONAL; INTRAMUSCULAR; SOFT TISSUE ONCE
Status: COMPLETED | OUTPATIENT
Start: 2023-03-02 | End: 2023-03-02

## 2023-03-02 RX ORDER — LIDOCAINE HYDROCHLORIDE 10 MG/ML
5 INJECTION, SOLUTION EPIDURAL; INFILTRATION; INTRACAUDAL; PERINEURAL ONCE
Status: COMPLETED | OUTPATIENT
Start: 2023-03-02 | End: 2023-03-02

## 2023-03-02 RX ADMIN — LIDOCAINE HYDROCHLORIDE 4 ML: 10 INJECTION, SOLUTION EPIDURAL; INFILTRATION; INTRACAUDAL; PERINEURAL at 13:47

## 2023-03-02 RX ADMIN — METHYLPREDNISOLONE ACETATE 40 MG: 40 INJECTION, SUSPENSION INTRA-ARTICULAR; INTRALESIONAL; INTRAMUSCULAR; INTRASYNOVIAL; SOFT TISSUE at 13:47

## 2023-03-02 NOTE — PROCEDURES
"Procedures     Right > left shoulder pain, began around 2017, aggravated with overhead reaching, 9/10 at worst, 0/10 at best, intermittent, varies, aching, stabbing, worse with lifting interferes with ADLs, work. Had PT with HEP. Subluxates, improves with reduction. X-ray C-spine with DDD and DJD. Notes reviewed, as above, also DM2, COPD. Has used Tramadol with some relief, insufficient at BID prn. Trouble getting to sleep. Saw Dr. Kaye, started on Ultracet and Tizanidine 4mg QID prn, but cannot drive on that combination. Saw Dr. Polk, given L shoulder GH injection using U/S, steroid made her \"loony\" for 1-2 days, some improvement, also referred for PT.    Inspect reviewed, in order. UDS in order 7/1/19.  Reduced to to Tramadol 50mg TID prn, refill  Began Tizanidine 4mg qHS prn instead of Flexeril. Refill Tizanidine 4mg qHS only, begin Robaxin 750mg TID prn during day.  Ordered RxAlt #1 cream, insurance would not approve, cont OTC \"Blue Stuff\" cream.  Performed R subacromial injection with excellent relief, returning, perform repeat bilaterally to  complement GH injection on left.   May need TPIs in cervical paraspinals and traps.  RTC in 2 months for f/u. Was q 4 months.      Shoulder Subacromial Injection    PREOPERATIVE DIAGNOSIS: Shoulder pain    POSTOPERATIVE DIAGNOSIS: Shoulder pain    PROCEDURE PERFORMED: BILATERAL SUBACROMIAL SHOULDER INJECTION    The patient understands the risks and benefits of the procedure and wishes to proceed. The patient was seen in the preoperative area. Patient's consent was obtained and updated. Vitals were taken. Patient was then placed in a seated position for the injection. Site marked for a lateral approach and prepped with alcohol swabs x 4. A 25 gauge 1.5\"  needle was introduced into the joint space and 3mL of steroid solution containing 2mL 0.25% bupivacaine, and 1mL 40mg Depomedrol was injected after negative aspiration without resistance. The procedure was repeated in " all respects on the opposite side. The patient tolerated with no parth-procedural complications.  A sterile dressing was placed over the puncture sites.

## 2023-03-03 ENCOUNTER — TELEPHONE (OUTPATIENT)
Dept: PAIN MEDICINE | Facility: HOSPITAL | Age: 68
End: 2023-03-03
Payer: MEDICARE

## 2023-03-09 ENCOUNTER — TELEPHONE (OUTPATIENT)
Dept: PHYSICAL THERAPY | Facility: CLINIC | Age: 68
End: 2023-03-09

## 2023-03-10 ENCOUNTER — TELEPHONE (OUTPATIENT)
Dept: PAIN MEDICINE | Facility: CLINIC | Age: 68
End: 2023-03-10
Payer: MEDICARE

## 2023-03-10 DIAGNOSIS — M25.512 CHRONIC PAIN OF BOTH SHOULDERS: Primary | ICD-10-CM

## 2023-03-10 DIAGNOSIS — G89.29 CHRONIC PAIN OF BOTH SHOULDERS: Primary | ICD-10-CM

## 2023-03-10 DIAGNOSIS — M25.511 CHRONIC PAIN OF BOTH SHOULDERS: Primary | ICD-10-CM

## 2023-03-10 NOTE — TELEPHONE ENCOUNTER
Shoulder injections only gave her minimal relief. Is there anything else you can do? Left shoulder from elbow up causing the most pain, unable to sleep, can't hardly move it, unable to go to her PT appointments because of the pain. She wants to know if you think she should go to an urgent care for an eval?

## 2023-03-21 ENCOUNTER — OFFICE VISIT (OUTPATIENT)
Dept: ORTHOPEDIC SURGERY | Facility: CLINIC | Age: 68
End: 2023-03-21
Payer: MEDICARE

## 2023-03-21 VITALS — WEIGHT: 124 LBS | OXYGEN SATURATION: 98 % | HEART RATE: 98 BPM | HEIGHT: 65 IN | BODY MASS INDEX: 20.66 KG/M2

## 2023-03-21 DIAGNOSIS — R29.898 LUE WEAKNESS: Primary | ICD-10-CM

## 2023-03-21 DIAGNOSIS — R20.0 LUE NUMBNESS: ICD-10-CM

## 2023-03-21 PROCEDURE — 99214 OFFICE O/P EST MOD 30 MIN: CPT | Performed by: FAMILY MEDICINE

## 2023-03-21 RX ORDER — CELECOXIB 200 MG/1
1 CAPSULE ORAL DAILY
COMMUNITY
Start: 2023-02-26

## 2023-03-21 RX ORDER — VIBEGRON 75 MG/1
TABLET, FILM COATED ORAL
COMMUNITY
Start: 2023-03-13

## 2023-03-22 ENCOUNTER — TELEPHONE (OUTPATIENT)
Dept: ORTHOPEDIC SURGERY | Facility: CLINIC | Age: 68
End: 2023-03-22
Payer: MEDICARE

## 2023-03-22 NOTE — TELEPHONE ENCOUNTER
Patient called today to state to hold off on scheduling MRI Cspine due to patient getting new insurance 4/1/2023.  The Patient was advised to call us back once she receives her new insurance cards and let us know when we can proceed with scheduling MRI Cspine.  Patient expressed an understanding.

## 2023-03-22 NOTE — PROGRESS NOTES
"Primary Care Sports Medicine Office Visit Note     Patient ID: Divya Lomeli is a 67 y.o. female.    Chief Complaint:  Chief Complaint   Patient presents with   • Right Shoulder - Follow-up, Pain   • Left Shoulder - Follow-up, Pain     HPI:    Ms. Divya Lomeli is a 67 y.o. female. The patient presents to the clinic today for follow-up evaluation of bilateral shoulder pain.    The patient is still experiencing pain. She has been unable to sleep at night. She was in this clinic 2 to 3 months ago. She received an injection in her left shoulder that lasted 24 hours. She was then sent to Pain Management for 02/02/2023 to receive an injection from Dr. Steve Shields; however, she did not have a ride then, so she had to call off. She was informed that she can be rescheduled on 03/02/2023 or 03/03/2023, so she had to wait another month. Her symptoms have been going on for 6 months. She then saw Dr. Steve Shields, and she asked him if he could perform an injection on her left shoulder instead of her right shoulder because the left shoulder is more painful. When she went in with Dr. Steve Shields, she was informed that he will be doing injections on both shoulders. She then received the injections on both shoulders, and it lasted 24 hours. She is now experiencing pain in one of her arms and into its third finger lately. She can rub that hand and apply Blue-Emu cream, which might last 30 minutes. She might be sleeping and will wake up from the pain. She could not lay on either side of both shoulders. When she applies a heating pad, \"it will run back and hide in her shoulder blade.\" The pain also radiates up the nerves in the back of her neck and it gets stiff. She has lost use of that arm, and she can only move it to a certain degree. She could not even rub the Blue-Emu cream on that shoulder, as she could not reach it. She is unsure if she had an imaging of her neck. She could not sleep and the only thing that " helps her is taking hydrocodone, and she does not have any more left.    Past Medical History:   Diagnosis Date   • Anxiety    • Arm pain     rt upper arm muscle   • COPD (chronic obstructive pulmonary disease) (HCC)     20 to 30 years ago. Does not take meds.   • Diabetes mellitus (HCC)     Type 2   • Diabetic nephropathy (HCC)    • Hyperlipidemia    • Hypertension        Past Surgical History:   Procedure Laterality Date   • APPENDECTOMY     • BACK SURGERY     • CATARACT EXTRACTION     • CHOLECYSTECTOMY WITH INTRAOPERATIVE CHOLANGIOGRAM N/A 10/11/2022    Procedure: CHOLECYSTECTOMY LAPAROSCOPIC;  Surgeon: Jose Angel Wan MD;  Location: [facility] MAIN OR;  Service: General;  Laterality: N/A;   • CYSTOSCOPY WITH CLOT EVACUATION N/A 2021    Procedure: CYSTOSCOPY WITH CLOT EVACUATION;  Surgeon: Jay Torres MD;  Location: [facility] MAIN OR;  Service: Urology;  Laterality: N/A;   • INTERVENTIONAL RADIOLOGY PROCEDURE N/A 2021    Procedure: STENT PLACEMENT;  Surgeon: Jay Torres MD;  Location: [facility] MAIN OR;  Service: Urology;  Laterality: N/A;   • LUMBAR SPINE SURGERY      L5 removed-Abstracted from Cent   • OOPHORECTOMY     • TONSILLECTOMY     • TOTAL ABDOMINAL HYSTERECTOMY     • TRANSURETHRAL RESECTION OF BLADDER TUMOR N/A 2021    Procedure: CYSTOSCOPY TRANSURETHRAL RESECTION OF BLADDER TUMOR;  Surgeon: Jay Torres MD;  Location: [facility] MAIN OR;  Service: Urology;  Laterality: N/A;       Family History   Problem Relation Age of Onset   • Diabetes Mother    • Diabetes Father      Social History     Occupational History   • Not on file   Tobacco Use   • Smoking status: Former     Packs/day: 4.50     Years: 21.00     Pack years: 94.50     Types: Cigarettes     Start date:      Quit date:      Years since quittin.2   • Smokeless tobacco: Never   • Tobacco comments:     2nd Hand Smoke - boyfriend smokes.   Vaping Use   • Vaping Use: Never used   Substance and  "Sexual Activity   • Alcohol use: Not Currently     Comment: occ   • Drug use: Not Currently     Types: Marijuana     Comment: college 1971   • Sexual activity: Defer      Review of Systems   Constitutional: Negative for activity change and fever.   Musculoskeletal: Positive for arthralgias.   Skin: Negative for color change and rash.   Neurological: Negative for weakness.     Objective:    Pulse 98   Ht 165.1 cm (65\")   Wt 56.2 kg (124 lb)   SpO2 98%   BMI 20.63 kg/m²     Physical Examination:  Physical Exam  Vitals and nursing note reviewed.   Constitutional:       General: She is not in acute distress.     Appearance: She is well-developed. She is not diaphoretic.   HENT:      Head: Normocephalic and atraumatic.   Eyes:      Conjunctiva/sclera: Conjunctivae normal.   Pulmonary:      Effort: Pulmonary effort is normal. No respiratory distress.   Skin:     General: Skin is warm.      Capillary Refill: Capillary refill takes less than 2 seconds.   Neurological:      Mental Status: She is alert.       Right Shoulder Exam     Range of Motion   Active abduction: 120   Passive abduction: normal   Extension: normal   External rotation: normal   Forward flexion: 120   Internal rotation 0 degrees: normal     Muscle Strength   Abduction: 5/5   External rotation: 4/5   Supraspinatus: 4/5   Subscapularis: 5/5   Biceps: 5/5     Tests   Gurrola test: positive  Drop arm: negative    Other   Erythema: absent  Sensation: normal  Pulse: present    Comments:  Mildly positive Marce for pain, positive resisted external rotation for pain as well, negative liftoff.  Negative scarf.  Positive Neer.  Negative speeds/Yergason's.      Cervical range of motion is full with no tenderness to palpation to the bony midline or paraspinal cervical musculature.  Spurling's maneuver to the right is negative.        Left Shoulder Exam     Range of Motion   Active abduction:  120 abnormal   Passive abduction: normal   Extension: normal   External " rotation: normal   Forward flexion: normal   Internal rotation 0 degrees:  Sacrum abnormal     Muscle Strength   Abduction: 4/5   External rotation: 4/5   Supraspinatus: 4/5   Subscapularis: 5/5   Biceps: 5/5     Tests   Gurrola test: positive  Drop arm: negative    Other   Erythema: absent  Sensation: normal  Pulse: present     Comments:  Mildly positive Marce for pain, positive resisted external rotation for pain as well, negative liftoff.  Negative scarf.  Positive Neer.  Negative speeds/Yergason's.      Cervical range of motion is full with no tenderness to palpation to the bony midline or paraspinal cervical musculature.  Spurling's maneuver to the left is negative.          Imaging and other tests:  No new imaging today.    Assessment and Plan:    1. LUE weakness  - MRI Cervical Spine Without Contrast; Future    2. LUE numbness  - MRI Cervical Spine Without Contrast; Future    Discussed pathology and treatment options with the patient today.  With considerable decrease in function and poor response to injection, patient elects proceed with MRI.  Will order MRI C-spine for further evaluation of numbness and weakness of the left upper extremity.  RTC in 5 to 7 days to discuss MRI results and further treatment options at that time.    Transcribed from ambient dictation for Pete Polk II, DO by Bryan Lee.  03/22/23   10:29 EDT    Patient or patient representative verbalized consent to the visit recording.  I have personally performed the services described in this document as transcribed by the above individual, and it is both accurate and complete.    Disclaimer: Please note that areas of this note were completed with computer voice recognition software.  Quite often unanticipated grammatical, syntax, homophones, and other interpretive errors are inadvertently transcribed by the computer software. Please excuse any errors that have escaped final proofreading.

## 2023-04-18 ENCOUNTER — APPOINTMENT (OUTPATIENT)
Dept: GENERAL RADIOLOGY | Facility: HOSPITAL | Age: 68
End: 2023-04-18
Payer: COMMERCIAL

## 2023-04-18 ENCOUNTER — HOSPITAL ENCOUNTER (EMERGENCY)
Facility: HOSPITAL | Age: 68
Discharge: HOME | End: 2023-04-18
Attending: EMERGENCY MEDICINE | Admitting: EMERGENCY MEDICINE
Payer: COMMERCIAL

## 2023-04-18 VITALS
DIASTOLIC BLOOD PRESSURE: 67 MMHG | HEART RATE: 96 BPM | WEIGHT: 115.96 LBS | RESPIRATION RATE: 16 BRPM | HEIGHT: 65 IN | OXYGEN SATURATION: 97 % | SYSTOLIC BLOOD PRESSURE: 137 MMHG | TEMPERATURE: 97.4 F | BODY MASS INDEX: 19.32 KG/M2

## 2023-04-18 DIAGNOSIS — M25.512 BILATERAL SHOULDER PAIN, UNSPECIFIED CHRONICITY: ICD-10-CM

## 2023-04-18 DIAGNOSIS — M54.2 NECK PAIN: Primary | ICD-10-CM

## 2023-04-18 DIAGNOSIS — M25.511 BILATERAL SHOULDER PAIN, UNSPECIFIED CHRONICITY: ICD-10-CM

## 2023-04-18 PROCEDURE — 96372 THER/PROPH/DIAG INJ SC/IM: CPT

## 2023-04-18 PROCEDURE — 25010000002 ORPHENADRINE CITRATE PER 60 MG: Performed by: NURSE PRACTITIONER

## 2023-04-18 PROCEDURE — 72050 X-RAY EXAM NECK SPINE 4/5VWS: CPT

## 2023-04-18 PROCEDURE — 99283 EMERGENCY DEPT VISIT LOW MDM: CPT

## 2023-04-18 RX ORDER — HYDROCODONE BITARTRATE AND ACETAMINOPHEN 5; 325 MG/1; MG/1
1 TABLET ORAL ONCE AS NEEDED
Status: COMPLETED | OUTPATIENT
Start: 2023-04-18 | End: 2023-04-18

## 2023-04-18 RX ORDER — ORPHENADRINE CITRATE 30 MG/ML
60 INJECTION INTRAMUSCULAR; INTRAVENOUS ONCE
Status: COMPLETED | OUTPATIENT
Start: 2023-04-18 | End: 2023-04-18

## 2023-04-18 RX ADMIN — ORPHENADRINE CITRATE 60 MG: 30 INJECTION INTRAMUSCULAR; INTRAVENOUS at 23:02

## 2023-04-18 RX ADMIN — HYDROCODONE BITARTRATE AND ACETAMINOPHEN 1 TABLET: 5; 325 TABLET ORAL at 23:02

## 2023-04-19 ENCOUNTER — TELEPHONE (OUTPATIENT)
Dept: ORTHOPEDIC SURGERY | Facility: CLINIC | Age: 68
End: 2023-04-19
Payer: COMMERCIAL

## 2023-04-19 NOTE — TELEPHONE ENCOUNTER
Patient called office to state she dropped new ins cards off to office and has been waiting for MRI of Cspine.  I found insurance card in the media section and explained to patient what happened.  MRI request was re -entered today.  Advised patient to call back for Dr Polk appointment once knows MRI date and time.    Patient wanted Dr Polk to know she was seen yesterday at ER and Urgent care.  They determined she may have a pinched nerve.

## 2023-04-19 NOTE — DISCHARGE INSTRUCTIONS
Continue follow-up with your orthopedic provider regarding your MRI, call to discuss tomorrow  Return to ED for new or worsening symptoms  Continue medication as previously prescribed

## 2023-04-19 NOTE — ED PROVIDER NOTES
"Subjective   History of Present Illness  Patient presents with:  Neck Pain: Pt reports neck pain that radiates down her bilateral arms to bilateral hands x6 months.  Pt reports \"I just can't put up with it anymore\".      Trae Kaye MD   No LMP recorded. Patient has had a hysterectomy.    Patient is a 67-year-old female with a history of bilateral chronic shoulder pain, presents the ED with complaint of neck pain, shoulder pain for the past 6 months.  Patient denies any new direct trauma injury or fall today.  No new numbness tingling or weakness.  This has been an ongoing problem for her, she has been seen by her pain management provider, was referred to orthopedic surgery who is ordered an MRI for her.          Review of Systems   Constitutional: Negative for chills and fever.   Musculoskeletal: Positive for arthralgias and neck pain. Negative for neck stiffness.   Skin: Negative for color change and rash.   Neurological: Positive for numbness (Not changed from chronic). Negative for weakness.       Past Medical History:   Diagnosis Date   • Anxiety    • Arm pain     rt upper arm muscle   • Cataracts, bilateral    • COPD (chronic obstructive pulmonary disease)     20 to 30 years ago. Does not take meds.   • Diabetes mellitus     Type 2   • Diabetic nephropathy    • Hyperlipidemia    • Hypertension        Allergies   Allergen Reactions   • Ether Nausea And Vomiting   • Other Swelling     wasp       Past Surgical History:   Procedure Laterality Date   • APPENDECTOMY  1958   • BACK SURGERY     • CATARACT EXTRACTION     • CHOLECYSTECTOMY WITH INTRAOPERATIVE CHOLANGIOGRAM N/A 10/11/2022    Procedure: CHOLECYSTECTOMY LAPAROSCOPIC;  Surgeon: Jose Angel Wan MD;  Location: [facility] MAIN OR;  Service: General;  Laterality: N/A;   • CYSTOSCOPY WITH CLOT EVACUATION N/A 09/28/2021    Procedure: CYSTOSCOPY WITH CLOT EVACUATION;  Surgeon: Jay Torres MD;  Location: [facility] MAIN OR;  Service: Urology;  " Laterality: N/A;   • INTERVENTIONAL RADIOLOGY PROCEDURE N/A 2021    Procedure: STENT PLACEMENT;  Surgeon: Jay Torres MD;  Location: [facility] MAIN OR;  Service: Urology;  Laterality: N/A;   • LUMBAR SPINE SURGERY  9    L5 removed-Abstracted from [facility]   • OOPHORECTOMY     • TONSILLECTOMY     • TOTAL ABDOMINAL HYSTERECTOMY     • TRANSURETHRAL RESECTION OF BLADDER TUMOR N/A 2021    Procedure: CYSTOSCOPY TRANSURETHRAL RESECTION OF BLADDER TUMOR;  Surgeon: Jay Torres MD;  Location: [facility] MAIN OR;  Service: Urology;  Laterality: N/A;       Family History   Problem Relation Age of Onset   • Diabetes Mother    • Diabetes Father        Social History     Socioeconomic History   • Marital status:    Tobacco Use   • Smoking status: Former     Packs/day: 4.50     Years: 21.00     Pack years: 94.50     Types: Cigarettes     Start date:      Quit date:      Years since quittin.3   • Smokeless tobacco: Never   • Tobacco comments:     2nd Hand Smoke - boyfriend smokes.   Vaping Use   • Vaping Use: Never used   Substance and Sexual Activity   • Alcohol use: Not Currently     Comment: occ   • Drug use: Not Currently     Types: Marijuana     Comment: college    • Sexual activity: Defer           Objective   Physical Exam  Vitals and nursing note reviewed.   Constitutional:       Appearance: Normal appearance. She is not ill-appearing or toxic-appearing.   HENT:      Head: Normocephalic and atraumatic.      Mouth/Throat:      Mouth: Mucous membranes are moist.      Pharynx: Oropharynx is clear.   Eyes:      Extraocular Movements: Extraocular movements intact.      Conjunctiva/sclera: Conjunctivae normal.      Pupils: Pupils are equal, round, and reactive to light.   Neck:     Cardiovascular:      Rate and Rhythm: Normal rate and regular rhythm.      Pulses:           Radial pulses are 2+ on the right side and 2+ on the left side.      Heart sounds: Normal heart sounds. No murmur  "heard.    No friction rub. No gallop.   Pulmonary:      Effort: Pulmonary effort is normal.      Breath sounds: Normal breath sounds.   Abdominal:      General: Bowel sounds are normal.      Palpations: Abdomen is soft.   Musculoskeletal:         General: Normal range of motion.      Cervical back: Full passive range of motion without pain, normal range of motion and neck supple. Tenderness present.   Skin:     General: Skin is warm and dry.      Capillary Refill: Capillary refill takes less than 2 seconds.      Findings: No erythema or rash.   Neurological:      Mental Status: She is alert and oriented to person, place, and time.      GCS: GCS eye subscore is 4. GCS verbal subscore is 5. GCS motor subscore is 6.      Sensory: Sensation is intact.      Motor: Motor function is intact.         Procedures           ED Course  ED Course as of 04/18/23 2339   Tue Apr 18, 2023   2323 Pt sleeping, but arouses to voice.  Discussed follow-up, discharge instructions, need to call orthopedic surgery to discuss her MRI. [LB]      ED Course User Index  [LB] HarrisonTammi Desi, APRN      /67 (BP Location: Left arm, Patient Position: Sitting)   Pulse 96   Temp 97.4 °F (36.3 °C) (Temporal)   Resp 16   Ht 165.1 cm (65\")   Wt 52.6 kg (115 lb 15.4 oz)   SpO2 97%   BMI 19.30 kg/m²   Labs Reviewed - No data to display  Medications   orphenadrine (NORFLEX) injection 60 mg (60 mg Intramuscular Given 4/18/23 2302)   HYDROcodone-acetaminophen (NORCO) 5-325 MG per tablet 1 tablet (1 tablet Oral Given 4/18/23 2302)     XR Spine Cervical Complete 4 or 5 View    Result Date: 4/18/2023  Impression: 1.Disc degeneration with suspected moderate disc height loss in the lower cervical spine, as before. 2.Mild to moderate facet arthropathy. 3.MRI may be beneficial for further evaluation as clinically indicated. Electronically Signed: Paul Baltazar  4/18/2023 11:07 PM EDT  Workstation ID: [id number]                                     "     Medical Decision Making  Bilateral shoulder pain, unspecified chronicity: acute illness or injury  Neck pain: acute illness or injury  Amount and/or Complexity of Data Reviewed  Radiology: ordered.      Risk  Prescription drug management.      Record review: Record review orthopedic surgery note 1/23/2023.  3/21/2023 for bilateral shoulder pain, follow-up, MRIs ordered at this time.  Differential diagnosis considered for patient presentation includes, but not limited to malignancy, arthritis, tendinitis    Patient workup initiated, she presented with worsening neck pain, bilateral upper extremity pain.  Pain radiates from the neck into the bilateral upper extremities.  This pain has been an ongoing problem for her, she has been seen by orthopedic surgery as well as her pain management provider, it is not particularly new today and is unchanged from previous.  She has an MRI ordered outpatient with her orthopedic provider, she was given Norco and Norflex and had significant improvement in pain.  X-ray was obtained which reveals disc degeneration and moderate height loss of the lower cervical spine as before.  No acute changes were noted.  I discussed patient follow-up with her at the bedside.    Disposition : I spoke with the patient at the bedside regarding their plan of care, discharge instruction,  prescriptions, as well as reasons to return to the emergency department.  We discussed test results at the bedside, including incidental abnormal labs, radiological findings, understands need and importance  for follow-up with primary care or specialist if indicated.  Patient verbalizes understanding and agrees to the treatment plan at this time.   Pt is aware that discharge does not mean that nothing is wrong but it indicates no emergency is present and they must continue care with follow-up as given below or physician of their choice.    Note Disclaimer: At [facility], we believe that sharing information  builds trust and better relationships. You are receiving this note because you recently visited [facility]. It is possible you will see health information before a provider has talked with you about it. This kind of information can be easy to misunderstand. To help you fully understand what it means for your health, we urge you to discuss this note with your provider.Note dictated utilizing Dragon Dictation. Appropriate PPE worn during patient interactions.    Final diagnoses:   Neck pain   Bilateral shoulder pain, unspecified chronicity       ED Disposition  ED Disposition     ED Disposition   Discharge    Condition   Stable    Comment   --             Trae Kaye MD  [address] [address] [phone]    Schedule an appointment as soon as possible for a visit       [facility] EMERGENCY DEPARTMENT  [address] [place] [id number] [phone]    As needed, If symptoms worsen         Medication List      No changes were made to your prescriptions during this visit.          Tammi Terry, APRN  04/18/23 [id number]

## 2023-04-20 ENCOUNTER — TELEPHONE (OUTPATIENT)
Dept: PAIN MEDICINE | Facility: CLINIC | Age: 68
End: 2023-04-20
Payer: COMMERCIAL

## 2023-04-20 NOTE — TELEPHONE ENCOUNTER
Caller: Divya Lomeli    Relationship: Self    Best call back number:     What is the best time to reach you: ANY     Who are you requesting to speak with (clinical staff, provider,  specific staff member): CLINICAL    What was the call regarding: PATIENT SAID SHE CANCELLED HER LAST APPOINTMENTS BECAUSE SHE WAS TOLD THERE WAS NOTHING THAT COULD BE DONE WITH HER.  SHE WENT TO ER RECENTLY THEY ADVISED SHE HAS PINCHED NERVE AND SHE WANTS TO KNOW WHAT CAN BE DONE MOVING FORWARD    Do you require a callback: YES

## 2023-04-25 ENCOUNTER — OFFICE VISIT (OUTPATIENT)
Dept: PAIN MEDICINE | Facility: CLINIC | Age: 68
End: 2023-04-25
Payer: COMMERCIAL

## 2023-04-25 VITALS
RESPIRATION RATE: 16 BRPM | HEART RATE: 97 BPM | OXYGEN SATURATION: 99 % | SYSTOLIC BLOOD PRESSURE: 139 MMHG | DIASTOLIC BLOOD PRESSURE: 74 MMHG

## 2023-04-25 DIAGNOSIS — M75.51 SUBACROMIAL BURSITIS OF BOTH SHOULDERS: ICD-10-CM

## 2023-04-25 DIAGNOSIS — M25.511 CHRONIC PAIN OF BOTH SHOULDERS: ICD-10-CM

## 2023-04-25 DIAGNOSIS — Z09 ENCOUNTER FOR FOLLOW-UP: ICD-10-CM

## 2023-04-25 DIAGNOSIS — M54.2 CERVICALGIA: Primary | ICD-10-CM

## 2023-04-25 DIAGNOSIS — M75.52 SUBACROMIAL BURSITIS OF BOTH SHOULDERS: ICD-10-CM

## 2023-04-25 DIAGNOSIS — M25.512 CHRONIC PAIN OF BOTH SHOULDERS: ICD-10-CM

## 2023-04-25 DIAGNOSIS — G89.29 CHRONIC PAIN OF BOTH SHOULDERS: ICD-10-CM

## 2023-04-25 PROCEDURE — 99214 OFFICE O/P EST MOD 30 MIN: CPT | Performed by: PHYSICAL MEDICINE & REHABILITATION

## 2023-04-25 RX ORDER — GABAPENTIN 300 MG/1
300 CAPSULE ORAL 3 TIMES DAILY
Qty: 270 CAPSULE | Refills: 0 | Status: SHIPPED | OUTPATIENT
Start: 2023-04-25

## 2023-04-25 RX ORDER — HYDROCODONE BITARTRATE AND ACETAMINOPHEN 7.5; 325 MG/1; MG/1
1 TABLET ORAL EVERY 8 HOURS PRN
Qty: 90 TABLET | Refills: 0 | Status: SHIPPED | OUTPATIENT
Start: 2023-04-25

## 2023-04-25 RX ORDER — HYDROCODONE BITARTRATE AND ACETAMINOPHEN 7.5; 325 MG/1; MG/1
1 TABLET ORAL EVERY 8 HOURS PRN
Qty: 90 TABLET | Refills: 0 | Status: SHIPPED | OUTPATIENT
Start: 2023-04-25 | End: 2023-04-26 | Stop reason: SDUPTHER

## 2023-04-25 NOTE — PROGRESS NOTES
"Subjective   Divya Lomeli is a 67 y.o. female.     History of Present Illness  Right shoulder pain, began around 2017, aggravated with overhead reaching, 9/10 at worst, 0/10 at best, intermittent, varies, aching, stabbing, worse with lifting interferes with ADLs, work. Had PT with HEP. Subluxates, improves with reduction. X-ray C-spine with DDD and DJD. Notes reviewed, as above, also DM2, COPD. Has used Tramadol with some relief, insufficient at BID prn. Trouble getting to sleep. Saw Dr. Kaye, started on Ultracet and Tizanidine 4mg QID prn, but cannot drive on that combination. Saw Dr. Polk, given L shoulder GH injection using U/S, steroid made her \"loony\" for 1-2 days, some improvement, also referred for PT. Had b/l subacromial injections with 24h relief only. Dr. Polk ordered MRI C-spine. Had Norco 5mg which helped, but helped much better with 1.5 tabs.        The following portions of the patient's history were reviewed and updated as appropriate: allergies, current medications, past family history, past medical history, past social history, past surgical history and problem list.    Review of Systems   Constitutional: Negative for chills, fatigue and fever.   HENT: Negative for hearing loss and trouble swallowing.    Eyes: Negative for visual disturbance.   Respiratory: Negative for shortness of breath.    Cardiovascular: Negative for chest pain.   Gastrointestinal: Negative for abdominal pain, constipation, diarrhea, nausea and vomiting.   Genitourinary: Negative for urinary incontinence.   Musculoskeletal: Positive for arthralgias and neck pain. Negative for back pain, joint swelling and myalgias.   Neurological: Negative for dizziness, weakness, numbness and headache.       Objective   Physical Exam   Constitutional: She is oriented to person, place, and time. She appears well-developed and well-nourished.   HENT:   Head: Normocephalic and atraumatic.   Eyes: Pupils are equal, round, and reactive to " "light.   Cardiovascular: Normal rate, regular rhythm and normal heart sounds.   Pulmonary/Chest: Effort normal and breath sounds normal.   Abdominal: Soft. Normal appearance and bowel sounds are normal. She exhibits no distension. There is no abdominal tenderness.   Musculoskeletal:      Comments: B/l: shoulder limited ROM   Neurological: She is alert and oriented to person, place, and time. She has normal reflexes. She displays normal reflexes. No sensory deficit.   Psychiatric: Her behavior is normal. Mood, judgment and thought content normal.         Assessment & Plan   Diagnoses and all orders for this visit:    1. Cervicalgia (Primary)    2. Chronic pain of both shoulders    3. Subacromial bursitis of both shoulders        Inspect reviewed, in order. UDS in order 7/1/19.  Reduced to to Tramadol 50mg TID prn. Begin Norco 7.5mg TID prn.  Restart Gabapentin 300mg TID.  Began Tizanidine 4mg qHS prn instead of Flexeril. Refill Tizanidine 4mg qHS only, begin Robaxin 750mg TID prn during day.  Ordered RxAlt #1 cream, insurance would not approve, cont OTC \"Blue Stuff\" cream.  Performed R subacromial injection with excellent relief, returning, schedule repeat. May need subacromial injection in L shoulder to complement GH injection.   May need TPIs in cervical paraspinals and traps.  RTC in 2 months for f/u, review MRI C-spine. Was q 4 months.                  "

## 2023-04-26 ENCOUNTER — TELEPHONE (OUTPATIENT)
Dept: PAIN MEDICINE | Facility: CLINIC | Age: 68
End: 2023-04-26
Payer: COMMERCIAL

## 2023-04-26 DIAGNOSIS — M54.2 CERVICALGIA: ICD-10-CM

## 2023-04-26 RX ORDER — HYDROCODONE BITARTRATE AND ACETAMINOPHEN 7.5; 325 MG/1; MG/1
1 TABLET ORAL EVERY 8 HOURS PRN
Qty: 69 TABLET | Refills: 0 | Status: SHIPPED | OUTPATIENT
Start: 2023-04-26

## 2023-04-26 NOTE — TELEPHONE ENCOUNTER
4/26/23--- Dr STANLEY---  Pt called only filled hydrocodone for 7 days 28 pills-- needs new rx sent to pharmacy for the remainder of 90 pills minus 28=   62  Pills for rx to be sent to pharmacy.    [facility]  [place]-- Hydrocodone acet 7.5-325 #62

## 2023-04-26 NOTE — TELEPHONE ENCOUNTER
Actually, per the Inspect they filled #21, since she is taking TID for #90, they filled 7x3, so #69 sent, thanks.

## 2023-05-03 ENCOUNTER — OFFICE (OUTPATIENT)
Dept: URBAN - METROPOLITAN AREA CLINIC 64 | Facility: CLINIC | Age: 68
End: 2023-05-03

## 2023-05-03 VITALS
HEART RATE: 90 BPM | HEIGHT: 65 IN | WEIGHT: 115 LBS | SYSTOLIC BLOOD PRESSURE: 125 MMHG | DIASTOLIC BLOOD PRESSURE: 71 MMHG

## 2023-05-03 DIAGNOSIS — R15.0 INCOMPLETE DEFECATION: ICD-10-CM

## 2023-05-03 DIAGNOSIS — Z79.82 LONG TERM (CURRENT) USE OF ASPIRIN: ICD-10-CM

## 2023-05-03 DIAGNOSIS — F11.90 OPIOID USE, UNSPECIFIED, UNCOMPLICATED: ICD-10-CM

## 2023-05-03 DIAGNOSIS — R15.9 FULL INCONTINENCE OF FECES: ICD-10-CM

## 2023-05-03 DIAGNOSIS — Z87.891 PERSONAL HISTORY OF NICOTINE DEPENDENCE: ICD-10-CM

## 2023-05-03 DIAGNOSIS — K59.00 CONSTIPATION, UNSPECIFIED: ICD-10-CM

## 2023-05-03 PROCEDURE — 99213 OFFICE O/P EST LOW 20 MIN: CPT | Performed by: INTERNAL MEDICINE

## 2023-05-16 ENCOUNTER — HOSPITAL ENCOUNTER (OUTPATIENT)
Dept: MRI IMAGING | Facility: HOSPITAL | Age: 68
Discharge: HOME | End: 2023-05-16
Admitting: FAMILY MEDICINE
Payer: MEDICARE

## 2023-05-16 DIAGNOSIS — R29.898 LUE WEAKNESS: ICD-10-CM

## 2023-05-16 DIAGNOSIS — R20.0 LUE NUMBNESS: ICD-10-CM

## 2023-05-16 PROCEDURE — 72141 MRI NECK SPINE W/O DYE: CPT

## 2023-05-17 ENCOUNTER — OFFICE VISIT (OUTPATIENT)
Dept: ORTHOPEDIC SURGERY | Facility: CLINIC | Age: 68
End: 2023-05-17
Payer: MEDICARE

## 2023-05-17 VITALS — OXYGEN SATURATION: 97 % | BODY MASS INDEX: 19.32 KG/M2 | WEIGHT: 115.96 LBS | HEIGHT: 65 IN

## 2023-05-17 DIAGNOSIS — M54.12 CERVICAL RADICULOPATHY: Primary | ICD-10-CM

## 2023-05-17 PROCEDURE — 99214 OFFICE O/P EST MOD 30 MIN: CPT | Performed by: FAMILY MEDICINE

## 2023-05-17 RX ORDER — PRIMIDONE 50 MG/1
150 TABLET ORAL 3 TIMES DAILY
COMMUNITY
Start: 2023-04-22

## 2023-05-18 NOTE — PROGRESS NOTES
Primary Care Sports Medicine Office Visit Note     Patient ID: Divya Lomeli is a 67 y.o. female.    Chief Complaint:  Chief Complaint   Patient presents with   • Cervical Spine - Follow-up     Mri review      HPI:    Ms. Divya Lomeli is a 67 y.o. female. The patient presents to the clinic today for MRI follow-up of the C-spine.    The patient complains of left arm and hand numbness. Dr. Shields performed bilateral shoulder injections on 05/16/2023, which lasted 24 hours. He also prescribed gabapentin and hydrocodone, which are not helping. She is still experiencing pain in her left scapula, which radiates to her left shoulder, continues inferiorly to her left elbow and all left fingers. She has numbness to her left upper extremity including all left-hand fingers. Her right is affected also but does not include the entire right upper extremity. 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 She has tried topical creams which only provide temporary relief and does not have full range of motion in her left upper extremity. She denies ever having an injection in her neck.     Of note, she has lost a significant amount of weight, is weak due to her bladder cancer, and inquires if she can have general anesthesia for any injection to her neck or spinal column due to her severe trypanophobia.       Past Medical History:   Diagnosis Date   • Anxiety    • Arm pain     rt upper arm muscle   • Cataracts, bilateral    • COPD (chronic obstructive pulmonary disease)     20 to 30 years ago. Does not take meds.   • Diabetes mellitus     Type 2   • Diabetic nephropathy    • Hyperlipidemia    • Hypertension        Past Surgical History:   Procedure Laterality Date   • APPENDECTOMY  1958   • BACK SURGERY     • CATARACT EXTRACTION     • CHOLECYSTECTOMY WITH INTRAOPERATIVE CHOLANGIOGRAM N/A 10/11/2022    Procedure: CHOLECYSTECTOMY LAPAROSCOPIC;  Surgeon: Jose Angel Wan MD;  Location: [facility] MAIN OR;  Service: General;   "Laterality: N/A;   • CYSTOSCOPY WITH CLOT EVACUATION N/A 2021    Procedure: CYSTOSCOPY WITH CLOT EVACUATION;  Surgeon: Jay Torres MD;  Location: [facility] MAIN OR;  Service: Urology;  Laterality: N/A;   • INTERVENTIONAL RADIOLOGY PROCEDURE N/A 2021    Procedure: STENT PLACEMENT;  Surgeon: Jay Torres MD;  Location: [facility] MAIN OR;  Service: Urology;  Laterality: N/A;   • LUMBAR SPINE SURGERY      L5 removed-Abstracted from [facility]   • OOPHORECTOMY     • TONSILLECTOMY     • TOTAL ABDOMINAL HYSTERECTOMY     • TRANSURETHRAL RESECTION OF BLADDER TUMOR N/A 2021    Procedure: CYSTOSCOPY TRANSURETHRAL RESECTION OF BLADDER TUMOR;  Surgeon: Jay Torres MD;  Location: [facility] MAIN OR;  Service: Urology;  Laterality: N/A;       Family History   Problem Relation Age of Onset   • Diabetes Mother    • Diabetes Father      Social History     Occupational History   • Not on file   Tobacco Use   • Smoking status: Former     Packs/day: 4.50     Years: 21.00     Pack years: 94.50     Types: Cigarettes     Start date:      Quit date:      Years since quittin.3   • Smokeless tobacco: Never   • Tobacco comments:     2nd Hand Smoke - boyfriend smokes.   Vaping Use   • Vaping Use: Never used   Substance and Sexual Activity   • Alcohol use: Not Currently     Comment: occ   • Drug use: Not Currently     Types: Marijuana     Comment: college    • Sexual activity: Defer      Review of Systems   Constitutional: Negative for activity change and fever.   Musculoskeletal: Positive for arthralgias.   Skin: Negative for color change and rash.   Neurological: Negative for weakness.     Objective:    Ht 165.1 cm (65\")   Wt 52.6 kg (115 lb 15.4 oz)   SpO2 97%   BMI 19.30 kg/m²     Physical Examination:  Physical Exam  Vitals and nursing note reviewed.   Constitutional:       General: She is not in acute distress.     Appearance: She is well-developed. She is not diaphoretic.   HENT:      Head: " Normocephalic and atraumatic.   Eyes:      Conjunctiva/sclera: Conjunctivae normal.   Pulmonary:      Effort: Pulmonary effort is normal. No respiratory distress.   Skin:     General: Skin is warm.      Capillary Refill: Capillary refill takes less than 2 seconds.   Neurological:      Mental Status: She is alert.       Right Shoulder Exam     Range of Motion   Right shoulder forward flexion: 110 to 120.       Left Shoulder Exam     Range of Motion   Left shoulder forward flexion: 110 to 120.     Comments:  Moderate decreased range of motion of lateral abduction. Marce test is positive for considerable pain and 4/5 weakness. Spurling's maneuver to the left is strongly positive.             Imaging and other tests:  MRI of the C-spine dated 5/16/2023 is the following  IMPRESSION:  Advanced multilevel cervical spondylosis is present as above, with moderate to severe spinal canal and severe bilateral neuroforaminal narrowing present at multiple levels. There is no focal cord signal abnormality.    Assessment and Plan:    1. Cervical radiculopathy  2. Subacromial bursitis  3. Rotator cuff tendinitis    I discussed pathology and treatment options with the patient today. Although she has a mild intrinsic left shoulder pathology, I feel that her cervical pathology is contributing to her radiating left shoulder and left upper extremity hand pain. I recommend she follow up with Dr. Shields for possible epidural injection as per his recommendation. Otherwise, we will reevaluate the left shoulder in the future after her cervical radicular pain is improved. She can always consider a repeat corticosteroid injection of the left shoulder if warranted.    Transcribed from ambient dictation for Pete Polk II,  by Ramya Ayon.  05/17/23   20:24 EDT    Patient or patient representative verbalized consent to the visit recording.  I have personally performed the services described in this document as transcribed by the above  individual, and it is both accurate and complete.    Disclaimer: Please note that areas of this note were completed with computer voice recognition software.  Quite often unanticipated grammatical, syntax, homophones, and other interpretive errors are inadvertently transcribed by the computer software. Please excuse any errors that have escaped final proofreading.    Transcribed from ambient dictation for Pete Polk II, DO by Ramya Ayon.  05/17/23   20:24 EDT

## 2023-05-22 RX ORDER — TIZANIDINE 4 MG/1
TABLET ORAL
Qty: 90 TABLET | Refills: 0 | Status: SHIPPED | OUTPATIENT
Start: 2023-05-22

## 2023-05-22 NOTE — TELEPHONE ENCOUNTER
Rx Refill Note  Requested Prescriptions     Pending Prescriptions Disp Refills   • tiZANidine (ZANAFLEX) 4 MG tablet [Pharmacy Med Name: tiZANidine HCl 4 MG Oral Tablet] 90 tablet 0     Sig: TAKE 1 TABLET BY MOUTH AT NIGHT AS NEEDED FOR MUSCLE SPASM      Last office visit with prescribing clinician: 4/25/2023   Last telemedicine visit with prescribing clinician: 4/26/2023   Next office visit with prescribing clinician: 6/27/2023                         Would you like a call back once the refill request has been completed: [] Yes [] No    If the office needs to give you a call back, can they leave a voicemail: [] Yes [] No    Dulce Farris MA  05/22/23, 13:31 EDT

## 2023-05-23 ENCOUNTER — TELEPHONE (OUTPATIENT)
Dept: PAIN MEDICINE | Facility: CLINIC | Age: 68
End: 2023-05-23
Payer: MEDICARE

## 2023-05-23 NOTE — TELEPHONE ENCOUNTER
Provider: DR DOE    Caller: DANNY LEO    Relationship to Patient: SELF    Phone Number: [phone]    Reason for Call: PT WANTS TO KNOW IF SHE CAN MOVE UP HER APPT IN June SO THAT SHE CAN HAVE ANOTHER EPIDURAL SOONER. PLEASE CALL HER BACK AT THE NUMBER ABOVE.

## 2023-05-30 RX ORDER — GABAPENTIN 300 MG/1
CAPSULE ORAL
Qty: 270 CAPSULE | Refills: 0 | OUTPATIENT
Start: 2023-05-30

## 2023-05-31 ENCOUNTER — TELEPHONE (OUTPATIENT)
Dept: PAIN MEDICINE | Facility: CLINIC | Age: 68
End: 2023-05-31

## 2023-05-31 NOTE — TELEPHONE ENCOUNTER
Caller: Divya Lomeli    Relationship: Self    Best call back number: [phone]    Requested Prescriptions:   gabapentin (NEURONTIN) 300 MG capsule  HYDROcodone-acetaminophen (Norco) 7.5-325 MG per tablet    Pharmacy where request should be sent: [facility]     Last office visit with prescribing clinician: 4/25/2023   Last telemedicine visit with prescribing clinician: Visit date not found   Next office visit with prescribing clinician: 6/15/2023     Additional details provided by patient:   Does the patient have less than a 3 day supply:  [x] Yes  [] No    Would you like a call back once the refill request has been completed: [x] Yes [] No    If the office needs to give you a call back, can they leave a voicemail: [x] Yes [] No    Rebekah Ortega Rep   05/31/23 14:17 EDT

## 2023-06-01 RX ORDER — GABAPENTIN 300 MG/1
300 CAPSULE ORAL 3 TIMES DAILY
Qty: 270 CAPSULE | Refills: 0 | Status: SHIPPED | OUTPATIENT
Start: 2023-06-01

## 2023-06-01 NOTE — TELEPHONE ENCOUNTER
Caller: Divya Lomeli    Relationship: Self    Best call back number:     What was the call regarding: PT WOULD LIKE TO KNOW THE STATUS OF HER GABAPENTIN REFILL.    SHE STATED SHE HAS ENOUGH HYDROCODONE FOR NOW BUT NOT GABAPENTIN    Is it okay if the provider responds through MyChart: NO, CALL PREFERRED

## 2023-06-15 ENCOUNTER — OFFICE VISIT (OUTPATIENT)
Dept: PAIN MEDICINE | Facility: CLINIC | Age: 68
End: 2023-06-15
Payer: MEDICARE

## 2023-06-15 VITALS
SYSTOLIC BLOOD PRESSURE: 125 MMHG | HEART RATE: 103 BPM | DIASTOLIC BLOOD PRESSURE: 53 MMHG | OXYGEN SATURATION: 99 % | RESPIRATION RATE: 16 BRPM

## 2023-06-15 DIAGNOSIS — M75.52 SUBACROMIAL BURSITIS OF BOTH SHOULDERS: ICD-10-CM

## 2023-06-15 DIAGNOSIS — G89.29 CHRONIC PAIN OF BOTH SHOULDERS: ICD-10-CM

## 2023-06-15 DIAGNOSIS — M25.512 CHRONIC PAIN OF BOTH SHOULDERS: ICD-10-CM

## 2023-06-15 DIAGNOSIS — M25.511 CHRONIC PAIN OF BOTH SHOULDERS: ICD-10-CM

## 2023-06-15 DIAGNOSIS — M75.51 SUBACROMIAL BURSITIS OF BOTH SHOULDERS: ICD-10-CM

## 2023-06-15 DIAGNOSIS — M54.2 CERVICALGIA: Primary | ICD-10-CM

## 2023-06-15 RX ORDER — HYDROCODONE BITARTRATE AND ACETAMINOPHEN 7.5; 325 MG/1; MG/1
1 TABLET ORAL EVERY 8 HOURS PRN
Qty: 90 TABLET | Refills: 0 | Status: SHIPPED | OUTPATIENT
Start: 2023-06-15

## 2023-06-15 RX ORDER — GABAPENTIN 400 MG/1
400 CAPSULE ORAL 3 TIMES DAILY
Qty: 270 CAPSULE | Refills: 0 | Status: SHIPPED | OUTPATIENT
Start: 2023-06-15

## 2023-06-15 NOTE — PROGRESS NOTES
"Subjective   Divya Lomeli is a 67 y.o. female.     History of Present Illness  Right shoulder pain, began around 2017, aggravated with overhead reaching, 9/10 at worst, 0/10 at best, intermittent, varies, aching, stabbing, worse with lifting interferes with ADLs, work. Had PT with HEP. Subluxates, improves with reduction. X-ray C-spine with DDD and DJD. Notes reviewed, as above, also DM2, COPD. Has used Tramadol with some relief, insufficient at BID prn. Trouble getting to sleep. Saw Dr. Kaye, started on Ultracet and Tizanidine 4mg QID prn, but cannot drive on that combination. Saw Dr. Polk, given L shoulder GH injection using U/S, steroid made her \"loony\" for 1-2 days, some improvement, also referred for PT. Had b/l subacromial injections with 24h relief only. Dr. Polk ordered MRI C-spine. Had Norco 5mg which helped, but helped much better with 1.5 tabs. Dx with bladder cancer 2022.     The following portions of the patient's history were reviewed and updated as appropriate: allergies, current medications, past family history, past medical history, past social history, past surgical history and problem list.    Review of Systems   Constitutional:  Negative for chills, fatigue and fever.   HENT:  Negative for hearing loss and trouble swallowing.    Eyes:  Negative for visual disturbance.   Respiratory:  Negative for shortness of breath.    Cardiovascular:  Negative for chest pain.   Gastrointestinal:  Negative for abdominal pain, constipation, diarrhea, nausea and vomiting.   Genitourinary:  Negative for urinary incontinence.   Musculoskeletal:  Positive for arthralgias and neck pain. Negative for back pain, joint swelling and myalgias.   Neurological:  Negative for dizziness, weakness, numbness and headache.     Objective   Physical Exam   Constitutional: She is oriented to person, place, and time. She appears well-developed and well-nourished.   HENT:   Head: Normocephalic and atraumatic.   Eyes: Pupils " "are equal, round, and reactive to light.   Cardiovascular: Normal rate, regular rhythm and normal heart sounds.   Pulmonary/Chest: Effort normal and breath sounds normal.   Abdominal: Soft. Normal appearance and bowel sounds are normal. She exhibits no distension. There is no abdominal tenderness.   Musculoskeletal:      Comments: B/l: shoulder limited ROM   Neurological: She is alert and oriented to person, place, and time. She has normal reflexes. She displays normal reflexes. No sensory deficit.   Psychiatric: Her behavior is normal. Mood, judgment and thought content normal.       Assessment & Plan   Diagnoses and all orders for this visit:    1. Cervicalgia (Primary)    2. Chronic pain of both shoulders    3. Subacromial bursitis of both shoulders        Inspect reviewed, in order. UDS in order 1/27/23.  Reduced to to Tramadol 50mg TID prn. Began Norco 7.5mg TID prn. Filled 6/6/23 per new [facility].  Restarted Gabapentin 300mg TID. Increase to 400mg TID.  Began Tizanidine 4mg qHS prn instead of Flexeril. Refill Tizanidine 4mg qHS only, began Robaxin 750mg TID prn during day. Spasms resolved, she will try titrating off.  Ordered RxAlt #1 cream, insurance would not approve, cont OTC \"Blue Stuff\" cream.  Performed R subacromial injection with excellent relief, returning, schedule repeat. May need subacromial injection in L shoulder to complement GH injection.   May need TPIs in cervical paraspinals and traps.  MRI C-spine with mod-severe stenosis and neuroforaminal stenosis. She will check with Heme/Onc about ESIs, if cleared will schedule left cervical PARAMJIT.  RTC in 3 months for f/u, or sooner for JAMIE.                  "

## 2023-07-12 ENCOUNTER — TELEPHONE (OUTPATIENT)
Dept: PAIN MEDICINE | Facility: CLINIC | Age: 68
End: 2023-07-12

## 2023-07-12 NOTE — TELEPHONE ENCOUNTER
Caller: DANNY LEO  Relationship to Patient: SELF  Phone Number: [phone] (home)     Reason for Call: PT WOULD LIKE TO KNOW IF IT IS OKAY TO USE CBD OIL WITH THE MEDICATION SHE IS TAKING

## 2023-07-25 ENCOUNTER — APPOINTMENT (OUTPATIENT)
Dept: GENERAL RADIOLOGY | Facility: HOSPITAL | Age: 68
End: 2023-07-25
Payer: MEDICARE

## 2023-07-25 ENCOUNTER — HOSPITAL ENCOUNTER (EMERGENCY)
Facility: HOSPITAL | Age: 68
Discharge: HOME | End: 2023-07-25
Admitting: EMERGENCY MEDICINE
Payer: MEDICARE

## 2023-07-25 VITALS
BODY MASS INDEX: 20.97 KG/M2 | DIASTOLIC BLOOD PRESSURE: 54 MMHG | RESPIRATION RATE: 16 BRPM | TEMPERATURE: 98.2 F | WEIGHT: 125.88 LBS | HEIGHT: 65 IN | SYSTOLIC BLOOD PRESSURE: 131 MMHG | OXYGEN SATURATION: 98 % | HEART RATE: 84 BPM

## 2023-07-25 DIAGNOSIS — M79.10 MYALGIA: ICD-10-CM
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
## 2023-07-25 PROCEDURE — 72072 X-RAY EXAM THORAC SPINE 3VWS: CPT

## 2023-07-25 PROCEDURE — 72100 X-RAY EXAM L-S SPINE 2/3 VWS: CPT

## 2023-07-25 PROCEDURE — 73562 X-RAY EXAM OF KNEE 3: CPT

## 2023-07-25 PROCEDURE — 73521 X-RAY EXAM HIPS BI 2 VIEWS: CPT

## 2023-07-25 PROCEDURE — 73100 X-RAY EXAM OF WRIST: CPT

## 2023-07-25 PROCEDURE — 72040 X-RAY EXAM NECK SPINE 2-3 VW: CPT

## 2023-07-25 PROCEDURE — 73070 X-RAY EXAM OF ELBOW: CPT

## 2023-07-25 PROCEDURE — 99282 EMERGENCY DEPT VISIT SF MDM: CPT

## 2023-07-25 PROCEDURE — 73030 X-RAY EXAM OF SHOULDER: CPT

## 2023-07-26 NOTE — ED PROVIDER NOTES
Subjective   History of Present Illness  67-year-old  female presents to the emergency room with numerous body extremity complaints status post slipped and fell on hardwood floor yesterday.  She states that she sees a pain management doctor, Dr. Holt for degenerative disc disease and her neck.  Patient also states that she has chronic pain in her shoulders as well.  Patient states that she takes a muscle relaxer and hydrocodone 7.5 mg daily for chronic pain.  Patient also states she takes an antianxiety medication and CBD oil.    Review of Systems   Constitutional:  Positive for activity change. Negative for appetite change, chills, diaphoresis, fatigue and fever.   HENT: Negative.     Eyes: Negative.    Respiratory:  Negative for chest tightness and shortness of breath.    Cardiovascular:  Negative for chest pain and palpitations.   Gastrointestinal:  Negative for abdominal pain, diarrhea, nausea and vomiting.   Endocrine: Negative.    Genitourinary:  Negative for dysuria, flank pain and hematuria.   Musculoskeletal:  Positive for arthralgias, back pain, myalgias and neck pain. Negative for joint swelling and neck stiffness.   Skin:  Negative for color change, rash and wound.   Allergic/Immunologic: Negative.    Neurological:  Negative for dizziness, syncope, weakness and headaches.   Hematological: Negative.    Psychiatric/Behavioral: Negative.       Past Medical History:   Diagnosis Date    Anxiety     Arm pain     rt upper arm muscle    Cataracts, bilateral     COPD (chronic obstructive pulmonary disease)     20 to 30 years ago. Does not take meds.    Diabetes mellitus     Type 2    Diabetic nephropathy     Hyperlipidemia     Hypertension        Allergies   Allergen Reactions    Ether Nausea And Vomiting    Other Swelling     wasp       Past Surgical History:   Procedure Laterality Date    APPENDECTOMY  1958    BACK SURGERY      CATARACT EXTRACTION      CHOLECYSTECTOMY WITH INTRAOPERATIVE  CHOLANGIOGRAM N/A 10/11/2022    Procedure: CHOLECYSTECTOMY LAPAROSCOPIC;  Surgeon: Jose Angel Wan MD;  Location: [facility] MAIN OR;  Service: General;  Laterality: N/A;    CYSTOSCOPY WITH CLOT EVACUATION N/A 2021    Procedure: CYSTOSCOPY WITH CLOT EVACUATION;  Surgeon: Jay Torres MD;  Location: [facility] MAIN OR;  Service: Urology;  Laterality: N/A;    INTERVENTIONAL RADIOLOGY PROCEDURE N/A 2021    Procedure: STENT PLACEMENT;  Surgeon: Jay Torres MD;  Location: [facility] MAIN OR;  Service: Urology;  Laterality: N/A;    LUMBAR SPINE SURGERY      L5 removed-Abstracted from Cent    OOPHORECTOMY      TONSILLECTOMY      TOTAL ABDOMINAL HYSTERECTOMY      TRANSURETHRAL RESECTION OF BLADDER TUMOR N/A 2021    Procedure: CYSTOSCOPY TRANSURETHRAL RESECTION OF BLADDER TUMOR;  Surgeon: Jay Torres MD;  Location: [facility] MAIN OR;  Service: Urology;  Laterality: N/A;       Family History   Problem Relation Age of Onset    Diabetes Mother     Diabetes Father        Social History     Socioeconomic History    Marital status:    Tobacco Use    Smoking status: Former     Packs/day: 4.50     Years: 21.00     Pack years: 94.50     Types: Cigarettes     Start date:      Quit date:      Years since quittin.5    Smokeless tobacco: Never    Tobacco comments:     2nd Hand Smoke - boyfriend smokes.   Vaping Use    Vaping Use: Never used   Substance and Sexual Activity    Alcohol use: Not Currently     Comment: occ    Drug use: Not Currently     Types: Marijuana     Comment: [facility]     Sexual activity: Defer           Objective   Physical Exam  Constitutional:       General: She is not in acute distress.     Appearance: Normal appearance. She is not ill-appearing, toxic-appearing or diaphoretic.   HENT:      Head: Normocephalic and atraumatic.      Nose: Nose normal. No congestion or rhinorrhea.      Mouth/Throat:      Mouth: Mucous membranes are moist.      Pharynx: Oropharynx  is clear.   Eyes:      Extraocular Movements: Extraocular movements intact.      Conjunctiva/sclera: Conjunctivae normal.      Pupils: Pupils are equal, round, and reactive to light.   Cardiovascular:      Rate and Rhythm: Normal rate.      Pulses: Normal pulses.   Pulmonary:      Effort: Pulmonary effort is normal.      Breath sounds: Normal breath sounds.   Abdominal:      Palpations: Abdomen is soft.      Tenderness: There is no abdominal tenderness.   Musculoskeletal:         General: Tenderness present. No swelling, deformity or signs of injury. Normal range of motion.      Cervical back: Normal range of motion and neck supple. Tenderness present. No rigidity.      Right lower leg: No edema.      Left lower leg: No edema.   Skin:     General: Skin is warm.      Capillary Refill: Capillary refill takes 2 to 3 seconds.      Coloration: Skin is pale.   Neurological:      General: No focal deficit present.      Mental Status: She is alert and oriented to person, place, and time.   Psychiatric:         Mood and Affect: Mood normal.         Behavior: Behavior normal.         Thought Content: Thought content normal.         Judgment: Judgment normal.       Procedures           ED Course      XR Spine Cervical 2 or 3 View    Result Date: 7/25/2023  Impression: No obvious displaced fracture of the cervical spine though evaluation is limited due to poor visualization on lateral view. If there is high clinical concern consider CT. Degenerative changes of the cervical spine most advanced at C5-6. Electronically Signed: Yamil Jaquez  7/25/2023 8:30 PM EDT  Workstation ID: [id number]    XR Spine Thoracic 3 View    Result Date: 7/25/2023  Impression: No evidence of displaced fracture or malalignment. Mild multilevel degenerative changes of the thoracic spine. Degenerative changes of the lumbar spine most advanced at L4-5. Electronically Signed: Yamil Jaquez  7/25/2023 8:35 PM EDT  Workstation ID: [id number]    XR Spine  Lumbar 2 or 3 View    Result Date: 7/25/2023  Impression: No evidence of displaced fracture or malalignment. Mild multilevel degenerative changes of the thoracic spine. Degenerative changes of the lumbar spine most advanced at L4-5. Electronically Signed: Yamil Willoughbyy  7/25/2023 8:35 PM EDT  Workstation ID: [id number]    XR Knee 3 View Bilateral    Result Date: 7/25/2023  1.No acute fractures or dislocations. Electronically Signed: Jaymadhavi Crenshaw  7/25/2023 8:20 PM EDT  Workstation ID: [id number]    XR Elbow 2 View Bilateral    Result Date: 7/25/2023  1.No acute fractures or dislocations. Electronically Signed: Jay Crenshaw  7/25/2023 7:37 PM EDT  Workstation ID: [id number]    XR Shoulder 2+ View Bilateral    Result Date: 7/25/2023  1.No acute fractures or dislocations. Electronically Signed: Jaymadhavi Crenshaw  7/25/2023 7:35 PM EDT  Workstation ID: [id number]    XR Wrist 2 View Bilateral    Result Date: 7/25/2023  1.No acute fractures or dislocations. Electronically Signed: Jay Hansenordano  7/25/2023 7:33 PM EDT  Workstation ID: [id number]    XR Hips Bilateral With or Without Pelvis 2 View    Result Date: 7/25/2023  1.No acute fractures or dislocations. Electronically Signed: Jay Flower  7/25/2023 7:37 PM EDT  Workstation ID: [id number]                                       Medical Decision Making  67-year-old  female patient presents to the emergency room for numerous musculoskeletal complaints and joint pain status post fall yesterday.  Patient has no bruising, ecchymosis or wounds noted on her body.    Amount and/or Complexity of Data Reviewed  Radiology: ordered.     Details: XR of bilateral upper and lower extremities and entire spine obtained and ALL IS NEG for acute.    Risk  OTC drugs.  Prescription drug management.  Risk Details: Discharged home to self-care and encouraged to increase water intake and take previously prescribed pain medications as directed to help with her pain.  Encourage patient  to follow-up with her primary care physician and/or her pain management specialist if she felt like she needed additional pain medication.  Patient did not require or request any pain medication administration here in the ED today.      Vitals:    07/25/23 1946   BP: 131/54   Pulse: 84   Resp: 17   Temp: 98.2 °F (36.8 °C)   SpO2: 99%      Final diagnoses:   Fall, initial encounter   Myalgia       ED Disposition  ED Disposition       ED Disposition   Discharge    Condition   Stable    Comment   --               Trae Kaye MD  [address] [id number]  [phone]    Schedule an appointment as soon as possible for a visit in 1 week  As needed, If symptoms worsen         Medication List      No changes were made to your prescriptions during this visit.            Samantha Wang, APRN  07/25/23 9530

## 2023-08-11 DIAGNOSIS — M54.2 CERVICALGIA: ICD-10-CM

## 2023-08-11 RX ORDER — HYDROCODONE BITARTRATE AND ACETAMINOPHEN 7.5; 325 MG/1; MG/1
1 TABLET ORAL EVERY 8 HOURS PRN
Qty: 90 TABLET | Refills: 0 | Status: CANCELLED | OUTPATIENT
Start: 2023-08-11

## 2023-08-11 RX ORDER — GABAPENTIN 400 MG/1
400 CAPSULE ORAL 3 TIMES DAILY
Qty: 270 CAPSULE | Refills: 0 | Status: SHIPPED | OUTPATIENT
Start: 2023-08-11

## 2023-08-11 NOTE — TELEPHONE ENCOUNTER
Caller: DANNY LEO     Relationship: PATIENT     Best call back number: [phone]    Requested Prescriptions:   Requested Prescriptions     Pending Prescriptions Disp Refills    gabapentin (NEURONTIN) 400 MG capsule 270 capsule 0     Sig: Take 1 capsule by mouth 3 (Three) Times a Day.    HYDROcodone-acetaminophen (Norco) 7.5-325 MG per tablet 90 tablet 0     Sig: Take 1 tablet by mouth Every 8 (Eight) Hours As Needed for Moderate Pain.        Pharmacy where request should be sent:   [facility]    [facility]  PHONE [phone]    Last office visit with prescribing clinician: 6/15/2023   Last telemedicine visit with prescribing clinician: Visit date not found   Next office visit with prescribing clinician: 9/14/2023     Additional details provided by patient: [facility] PLEASE     Does the patient have less than a 3 day supply:  [x] Yes  [] No    Would you like a call back once the refill request has been completed: [] Yes [x] No    If the office needs to give you a call back, can they leave a voicemail: [] Yes [x] No    Rebekah Aguilar Rep   08/11/23 12:15 EDT

## 2023-08-31 RX ORDER — TIZANIDINE 4 MG/1
TABLET ORAL
Qty: 90 TABLET | Refills: 0 | Status: SHIPPED | OUTPATIENT
Start: 2023-08-31

## 2023-09-01 NOTE — TELEPHONE ENCOUNTER
9/1/23-- Dr STANLEY---- [facility] to [facility] --- is Tizanidine  to replace  the methocarb that was filled on7/5/23 #200 for 90 days??-- [facility] call back # [phone]

## 2023-09-11 ENCOUNTER — TELEPHONE (OUTPATIENT)
Dept: PAIN MEDICINE | Facility: CLINIC | Age: 68
End: 2023-09-11

## 2023-09-11 NOTE — TELEPHONE ENCOUNTER
9/11/23-- called pt and told her rx should be at [facility]  -- fill date of 9/4/23-- was to be the fill date

## 2023-09-11 NOTE — TELEPHONE ENCOUNTER
Caller: Divya Lomeli    Relationship: Self    Best call back number:     Requested Prescriptions:   HYDROcodone-acetaminophen (Norco) 7.5-325 MG per tablet     Pharmacy where request should be sent:  [facility]     Last office visit with prescribing clinician: 6/15/2023   Last telemedicine visit with prescribing clinician: Visit date not found   Next office visit with prescribing clinician: 9/14/2023     Additional details provided by patient: HAS ABOUT A 3 DAY SUPPLY    Does the patient have less than a 3 day supply:  [x] Yes  [] No    Would you like a call back once the refill request has been completed: [x] Yes [] No    If the office needs to give you a call back, can they leave a voicemail: [x] Yes [] No    Rebekah Armstrong Rep   09/11/23 15:52 EDT

## 2023-09-11 NOTE — TELEPHONE ENCOUNTER
Caller: Divya Lomeli    Relationship: Self    Best call back number: [phone]    RELAY RESPONSE, HUB TO READ

## 2023-09-12 ENCOUNTER — TELEPHONE (OUTPATIENT)
Dept: ORTHOPEDIC SURGERY | Facility: CLINIC | Age: 68
End: 2023-09-12
Payer: MEDICARE

## 2023-09-12 ENCOUNTER — TELEPHONE (OUTPATIENT)
Dept: PAIN MEDICINE | Facility: CLINIC | Age: 68
End: 2023-09-12
Payer: MEDICARE

## 2023-09-12 NOTE — TELEPHONE ENCOUNTER
Pt left message w/service:    Pts Dr: OFFICE/DR DOE  From: DANNY LEO  ANI: [phone]  Pt Name:  : 51  Msg:  NEEDING RX FOR HYDROCODONE 7.5/325MG  SENT INTO PHARM

## 2023-09-12 NOTE — TELEPHONE ENCOUNTER
PATIENT CALLED BACK TO LET ADILENE KNOW THAT SHE SPOKE WITH THE PHARMACY AND WILL BE ABLE TO GET HER RX TODAY. SAID TO THANK ADILENE.

## 2023-09-14 ENCOUNTER — OFFICE VISIT (OUTPATIENT)
Dept: PAIN MEDICINE | Facility: CLINIC | Age: 68
End: 2023-09-14
Payer: MEDICARE

## 2023-09-14 VITALS
OXYGEN SATURATION: 98 % | SYSTOLIC BLOOD PRESSURE: 134 MMHG | HEART RATE: 80 BPM | DIASTOLIC BLOOD PRESSURE: 57 MMHG | RESPIRATION RATE: 16 BRPM

## 2023-09-14 DIAGNOSIS — G89.29 CHRONIC PAIN OF RIGHT KNEE: ICD-10-CM

## 2023-09-14 DIAGNOSIS — G89.29 CHRONIC PAIN OF BOTH SHOULDERS: Primary | ICD-10-CM

## 2023-09-14 DIAGNOSIS — M17.11 PRIMARY OSTEOARTHRITIS OF RIGHT KNEE: ICD-10-CM

## 2023-09-14 DIAGNOSIS — M54.2 CERVICALGIA: ICD-10-CM

## 2023-09-14 DIAGNOSIS — M25.511 CHRONIC PAIN OF BOTH SHOULDERS: Primary | ICD-10-CM

## 2023-09-14 DIAGNOSIS — M75.51 SUBACROMIAL BURSITIS OF BOTH SHOULDERS: ICD-10-CM

## 2023-09-14 DIAGNOSIS — M25.561 CHRONIC PAIN OF RIGHT KNEE: ICD-10-CM

## 2023-09-14 DIAGNOSIS — M25.512 CHRONIC PAIN OF BOTH SHOULDERS: Primary | ICD-10-CM

## 2023-09-14 DIAGNOSIS — M75.52 SUBACROMIAL BURSITIS OF BOTH SHOULDERS: ICD-10-CM

## 2023-09-14 RX ORDER — HYDROCODONE BITARTRATE AND ACETAMINOPHEN 7.5; 325 MG/1; MG/1
1 TABLET ORAL EVERY 8 HOURS PRN
Qty: 90 TABLET | Refills: 0 | Status: SHIPPED | OUTPATIENT
Start: 2023-09-14

## 2023-09-14 RX ORDER — TRAZODONE HYDROCHLORIDE 50 MG/1
50 TABLET ORAL
COMMUNITY
Start: 2023-06-10

## 2023-09-14 NOTE — PROGRESS NOTES
"Subjective   Divya Lomeli is a 68 y.o. female.     History of Present Illness  Right shoulder pain, began around 2017, aggravated with overhead reaching, 9/10 at worst, 0/10 at best, intermittent, varies, aching, stabbing, worse with lifting interferes with ADLs, work. Had PT with HEP. Subluxates, improves with reduction. X-ray C-spine with DDD and DJD. Notes reviewed, as above, also DM2, COPD. Has used Tramadol with some relief, insufficient at BID prn. Trouble getting to sleep. Saw Dr. Kaye, started on Ultracet and Tizanidine 4mg QID prn, but cannot drive on that combination. Saw Dr. Polk, given L shoulder GH injection using U/S, steroid made her \"loony\" for 1-2 days, some improvement, also referred for PT. Had b/l subacromial injections with 24h relief only. Dr. Polk ordered MRI C-spine. Had Norco 5mg which helped, but helped much better with 1.5 tabs. Dx with bladder cancer 2022. Worsening medial right knee pain.     The following portions of the patient's history were reviewed and updated as appropriate: allergies, current medications, past family history, past medical history, past social history, past surgical history and problem list.    Review of Systems   Constitutional:  Negative for chills, fatigue and fever.   HENT:  Negative for hearing loss and trouble swallowing.    Eyes:  Negative for visual disturbance.   Respiratory:  Negative for shortness of breath.    Cardiovascular:  Negative for chest pain.   Gastrointestinal:  Negative for abdominal pain, constipation, diarrhea, nausea and vomiting.   Genitourinary:  Negative for urinary incontinence.   Musculoskeletal:  Positive for arthralgias and neck pain. Negative for back pain, joint swelling and myalgias.   Neurological:  Negative for dizziness, weakness, numbness and headache.     Objective   Physical Exam   Constitutional: She is oriented to person, place, and time. She appears well-developed and well-nourished.   HENT:   Head: " "Normocephalic and atraumatic.   Eyes: Pupils are equal, round, and reactive to light.   Cardiovascular: Normal rate, regular rhythm and normal heart sounds.   Pulmonary/Chest: Effort normal and breath sounds normal.   Abdominal: Soft. Normal appearance and bowel sounds are normal. She exhibits no distension. There is no abdominal tenderness.   Musculoskeletal:      Comments: B/l: shoulder limited ROM  R knee: (+) crepitus, (-) edema, warmth, erythema     Neurological: She is alert and oriented to person, place, and time. She has normal reflexes. She displays normal reflexes. No sensory deficit.   Psychiatric: Her behavior is normal. Mood, judgment and thought content normal.       Assessment & Plan   Diagnoses and all orders for this visit:    1. Chronic pain of both shoulders (Primary)    2. Subacromial bursitis of both shoulders    3. Cervicalgia        Inspect reviewed, in order. UDS in order 1/27/23.  Reduced to to Tramadol 50mg TID prn. Began Norco 7.5mg TID prn. Filled 8/13/23 per new [facility].  Restarted Gabapentin 300mg TID. Increased to 400mg TID.  Began Tizanidine 4mg qHS prn instead of Flexeril. Refilled Tizanidine 4mg qHS only, began Robaxin 750mg TID prn during day. Spasms resolved, she will try titrating off.  Ordered RxAlt #1 cream, insurance would not approve, cont OTC \"Blue Stuff\" cream.  Performed R subacromial injection with excellent relief, returning, schedule repeat. May need subacromial injection in L shoulder to complement GH injection.   Schedule R knee injection.  May need TPIs in cervical paraspinals and traps.  MRI C-spine with mod-severe stenosis and neuroforaminal stenosis. She declines ESIs.  RTC for R knee inj then in 3 months for f/u.                  "

## 2023-09-28 ENCOUNTER — HOSPITAL ENCOUNTER (OUTPATIENT)
Dept: PAIN MEDICINE | Facility: HOSPITAL | Age: 68
Discharge: HOME | End: 2023-09-28
Payer: MEDICARE

## 2023-09-28 VITALS
DIASTOLIC BLOOD PRESSURE: 76 MMHG | TEMPERATURE: 97.3 F | BODY MASS INDEX: 20.99 KG/M2 | SYSTOLIC BLOOD PRESSURE: 121 MMHG | HEIGHT: 65 IN | HEART RATE: 87 BPM | WEIGHT: 126 LBS | OXYGEN SATURATION: 98 % | RESPIRATION RATE: 14 BRPM

## 2023-09-28 DIAGNOSIS — M17.11 PRIMARY OSTEOARTHRITIS OF RIGHT KNEE: ICD-10-CM

## 2023-09-28 DIAGNOSIS — G89.29 CHRONIC PAIN OF RIGHT KNEE: Primary | ICD-10-CM

## 2023-09-28 DIAGNOSIS — M25.561 CHRONIC PAIN OF RIGHT KNEE: Primary | ICD-10-CM

## 2023-09-28 PROCEDURE — 25010000002 METHYLPREDNISOLONE PER 40 MG: Performed by: PHYSICAL MEDICINE & REHABILITATION

## 2023-09-28 PROCEDURE — 25010000002 BUPIVACAINE (PF) 0.25 % SOLUTION: Performed by: PHYSICAL MEDICINE & REHABILITATION

## 2023-09-28 RX ORDER — METHYLPREDNISOLONE ACETATE 40 MG/ML
40 INJECTION, SUSPENSION INTRA-ARTICULAR; INTRALESIONAL; INTRAMUSCULAR; SOFT TISSUE ONCE
Status: COMPLETED | OUTPATIENT
Start: 2023-09-28 | End: 2023-09-28

## 2023-09-28 RX ORDER — BUPIVACAINE HYDROCHLORIDE 2.5 MG/ML
5 INJECTION, SOLUTION EPIDURAL; INFILTRATION; INTRACAUDAL ONCE
Status: COMPLETED | OUTPATIENT
Start: 2023-09-28 | End: 2023-09-28

## 2023-09-28 RX ADMIN — METHYLPREDNISOLONE ACETATE 40 MG: 40 INJECTION, SUSPENSION INTRA-ARTICULAR; INTRALESIONAL; INTRAMUSCULAR; INTRASYNOVIAL; SOFT TISSUE at 14:09

## 2023-09-28 RX ADMIN — BUPIVACAINE HYDROCHLORIDE 2 ML: 2.5 INJECTION, SOLUTION EPIDURAL; INFILTRATION; INTRACAUDAL; PERINEURAL at 14:09

## 2023-09-28 NOTE — PROCEDURES
"Procedures    C/o R knee pain, here to inject.    Perform R knee steroid injection.  No change to meds today.  RTC for f/u.      KNEE STEROID INJECTION    PREOPERATIVE DIAGNOSIS:  Knee pain    POSTOPERATIVE DIAGNOSIS:  Knee pain    PROCEDURE PERFORMED:  RIGHT KNEE INJECTION     The patient understands the risks and benefits of the procedure and wishes to proceed. The patient was seen in the preoperative area. Patient's consent was obtained and updated. Vitals were taken. Patient was then placed in a seated position for the injection. Site marked for an inferior lateral approach and prepped with alcohol swabs x 4. A 25g 1.5\" needle was introduced to the joint space and 3mL of steroid solution containing 2mL 0.25% bupivacaine, and 1mL 40mg Depomedrol was injected after negative aspiration without resistance. The patient tolerated with no parth-procedural complications.  A sterile dressing was placed over the puncture site.    "

## 2023-09-29 ENCOUNTER — TELEPHONE (OUTPATIENT)
Dept: PAIN MEDICINE | Facility: HOSPITAL | Age: 68
End: 2023-09-29
Payer: MEDICARE

## 2023-09-29 NOTE — TELEPHONE ENCOUNTER
PATIENT CALLED BACK, SAID SHE IS DOING OKAY BUT THE PAIN ALMOST FEELS LIKE IT'S TRYING TO COME BACK SOMETIMES. BELIEVES THE SHOT IS WORKING OVERALL--ONLY TIME IT IS A PROBLEM IS WHEN SHE PUTS HER LEFT KNEE ON TOP OF HER RIGHT KNEE. OTHER THAN THAT SHE SAYS SHE IS DOING GOOD.

## 2023-11-17 ENCOUNTER — TELEPHONE (OUTPATIENT)
Dept: PAIN MEDICINE | Facility: CLINIC | Age: 68
End: 2023-11-17
Payer: MEDICARE

## 2023-11-17 DIAGNOSIS — M25.561 CHRONIC PAIN OF RIGHT KNEE: Primary | ICD-10-CM

## 2023-11-17 DIAGNOSIS — G89.29 CHRONIC PAIN OF RIGHT KNEE: Primary | ICD-10-CM

## 2023-11-17 NOTE — TELEPHONE ENCOUNTER
If they aren't lasting 3 months, we should move on to the gel injections. Would she like to do that instead?

## 2023-11-17 NOTE — TELEPHONE ENCOUNTER
Caller: Divya Lomeli    Relationship to patient: Self    Best call back number: [phone]    Chief complaint: RIGHT KNEE     Type of visit: INJECTION     Requested date: ASAP      If rescheduling, when is the original appointment: N/A      Additional notes:PATIENT WOULD LIKE A CALL TO SCHEDULE AN INJECTION. SHE STATES HER KNEE IS GETTING WORSE. DOES NOT HAVE VOICEMAIL YOU CAN TEXT IF NEEDED.

## 2023-11-17 NOTE — TELEPHONE ENCOUNTER
PATIENT CAN NOT HAVE ANOTHER STEROID INJECTION UNTIL AFTER 12/28/2023 PER INSURANCE DO YOU WANT TO DO SOMETHING DIFFERENT?

## 2023-11-20 NOTE — TELEPHONE ENCOUNTER
SHE WANTED TO MOVE TO GEL INJECTIONS SCHEDULED HER FOR 12/7/23 WHICH GEL WOULD YOU LIKE TO USE? ALSO CAN YOU PUT THE ORDER/REFERRAL IN

## 2023-12-05 ENCOUNTER — OFFICE VISIT (OUTPATIENT)
Dept: PAIN MEDICINE | Facility: CLINIC | Age: 68
End: 2023-12-05
Payer: MEDICARE

## 2023-12-05 VITALS
SYSTOLIC BLOOD PRESSURE: 153 MMHG | HEART RATE: 95 BPM | RESPIRATION RATE: 16 BRPM | OXYGEN SATURATION: 99 % | DIASTOLIC BLOOD PRESSURE: 66 MMHG

## 2023-12-05 DIAGNOSIS — G89.29 CHRONIC PAIN OF BOTH SHOULDERS: ICD-10-CM

## 2023-12-05 DIAGNOSIS — G89.29 CHRONIC PAIN OF RIGHT KNEE: ICD-10-CM

## 2023-12-05 DIAGNOSIS — M17.11 PRIMARY OSTEOARTHRITIS OF RIGHT KNEE: ICD-10-CM

## 2023-12-05 DIAGNOSIS — M75.51 SUBACROMIAL BURSITIS OF BOTH SHOULDERS: ICD-10-CM

## 2023-12-05 DIAGNOSIS — M25.512 CHRONIC PAIN OF BOTH SHOULDERS: ICD-10-CM

## 2023-12-05 DIAGNOSIS — Z79.899 HIGH RISK MEDICATION USE: Primary | ICD-10-CM

## 2023-12-05 DIAGNOSIS — M25.561 CHRONIC PAIN OF RIGHT KNEE: ICD-10-CM

## 2023-12-05 DIAGNOSIS — M75.52 SUBACROMIAL BURSITIS OF BOTH SHOULDERS: ICD-10-CM

## 2023-12-05 DIAGNOSIS — M54.2 CERVICALGIA: ICD-10-CM

## 2023-12-05 DIAGNOSIS — M25.511 CHRONIC PAIN OF BOTH SHOULDERS: ICD-10-CM

## 2023-12-05 PROCEDURE — 3078F DIAST BP <80 MM HG: CPT | Performed by: PHYSICAL MEDICINE & REHABILITATION

## 2023-12-05 PROCEDURE — 1125F AMNT PAIN NOTED PAIN PRSNT: CPT | Performed by: PHYSICAL MEDICINE & REHABILITATION

## 2023-12-05 PROCEDURE — 1160F RVW MEDS BY RX/DR IN RCRD: CPT | Performed by: PHYSICAL MEDICINE & REHABILITATION

## 2023-12-05 PROCEDURE — 99214 OFFICE O/P EST MOD 30 MIN: CPT | Performed by: PHYSICAL MEDICINE & REHABILITATION

## 2023-12-05 PROCEDURE — 3077F SYST BP >= 140 MM HG: CPT | Performed by: PHYSICAL MEDICINE & REHABILITATION

## 2023-12-05 PROCEDURE — 1159F MED LIST DOCD IN RCRD: CPT | Performed by: PHYSICAL MEDICINE & REHABILITATION

## 2023-12-05 RX ORDER — HYDROCODONE BITARTRATE AND ACETAMINOPHEN 7.5; 325 MG/1; MG/1
1 TABLET ORAL EVERY 8 HOURS PRN
Qty: 90 TABLET | Refills: 0 | Status: SHIPPED | OUTPATIENT
Start: 2023-12-05

## 2023-12-05 RX ORDER — GABAPENTIN 400 MG/1
400 CAPSULE ORAL 3 TIMES DAILY
Qty: 270 CAPSULE | Refills: 0 | Status: SHIPPED | OUTPATIENT
Start: 2023-12-05

## 2023-12-05 NOTE — PROGRESS NOTES
"Subjective   Divya Lomeli is a 68 y.o. female.     History of Present Illness  Right shoulder pain, began around 2017, aggravated with overhead reaching, 9/10 at worst, 0/10 at best, intermittent, varies, aching, stabbing, worse with lifting interferes with ADLs, work. Had PT with HEP. Subluxates, improves with reduction. X-ray C-spine with DDD and DJD. Notes reviewed, as above, also DM2, COPD. Has used Tramadol with some relief, insufficient at BID prn. Trouble getting to sleep. Saw Dr. Kaye, started on Ultracet and Tizanidine 4mg QID prn, but cannot drive on that combination. Saw Dr. Polk, given L shoulder GH injection using U/S, steroid made her \"loony\" for 1-2 days, some improvement, also referred for PT. Had b/l subacromial injections with 24h relief only. Dr. Polk ordered MRI C-spine. Had Norco 5mg which helped, but helped much better with 1.5 tabs. Dx with bladder cancer 2022. Worsening medial right knee pain.  Neck Pain   Pertinent negatives include no chest pain, fever, numbness, trouble swallowing or weakness.        The following portions of the patient's history were reviewed and updated as appropriate: allergies, current medications, past family history, past medical history, past social history, past surgical history and problem list.    Review of Systems   Constitutional:  Negative for chills, fatigue and fever.   HENT:  Negative for hearing loss and trouble swallowing.    Eyes:  Negative for visual disturbance.   Respiratory:  Negative for shortness of breath.    Cardiovascular:  Negative for chest pain.   Gastrointestinal:  Negative for abdominal pain, constipation, diarrhea, nausea and vomiting.   Genitourinary:  Negative for urinary incontinence.   Musculoskeletal:  Positive for arthralgias and neck pain. Negative for back pain, joint swelling and myalgias.   Neurological:  Negative for dizziness, weakness, numbness and headache.       Objective   Physical Exam   Constitutional: She is " "oriented to person, place, and time. She appears well-developed and well-nourished.   HENT:   Head: Normocephalic and atraumatic.   Eyes: Pupils are equal, round, and reactive to light.   Cardiovascular: Normal rate, regular rhythm and normal heart sounds.   Pulmonary/Chest: Effort normal and breath sounds normal.   Abdominal: Soft. Normal appearance and bowel sounds are normal. She exhibits no distension. There is no abdominal tenderness.   Musculoskeletal:      Comments: B/l: shoulder limited ROM  R knee: (+) crepitus, (-) edema, warmth, erythema     Neurological: She is alert and oriented to person, place, and time. She has normal reflexes. She displays normal reflexes. No sensory deficit.   Psychiatric: Her behavior is normal. Mood, judgment and thought content normal.         Assessment & Plan   Diagnoses and all orders for this visit:    1. Chronic pain of both shoulders (Primary)    2. Chronic pain of right knee    3. Primary osteoarthritis of right knee        Inspect reviewed, in order. UDS in order 1/27/23.  Reduced to to Tramadol 50mg TID prn. Began Norco 7.5mg TID prn. Filled 11/26/23 per new [facility].  Restarted Gabapentin 300mg TID. Increased to 400mg TID.  Began Tizanidine 4mg qHS prn instead of Flexeril. Refilled Tizanidine 4mg qHS only, began Robaxin 750mg TID prn during day. Spasms resolved, she will try titrating off.  Ordered RxAlt #1 cream, insurance would not approve, cont OTC \"Blue Stuff\" cream.  Performed R subacromial injection with excellent relief, returning, schedule repeat. May need subacromial injection in L shoulder to complement GH injection.   Performed R knee injection with limited relief, upcoming injection.  Schedule L shoulder injection.  May need TPIs in cervical paraspinals and traps.  MRI C-spine with mod-severe stenosis and neuroforaminal stenosis. She declines ESIs.  RTC for injections then in 3 months for f/u.                "

## 2023-12-07 ENCOUNTER — HOSPITAL ENCOUNTER (OUTPATIENT)
Dept: PAIN MEDICINE | Facility: HOSPITAL | Age: 68
Discharge: HOME | End: 2023-12-07
Payer: MEDICARE

## 2023-12-07 VITALS
SYSTOLIC BLOOD PRESSURE: 129 MMHG | HEART RATE: 82 BPM | DIASTOLIC BLOOD PRESSURE: 68 MMHG | HEIGHT: 64 IN | RESPIRATION RATE: 16 BRPM | TEMPERATURE: 97.1 F | BODY MASS INDEX: 22.36 KG/M2 | WEIGHT: 131 LBS | OXYGEN SATURATION: 95 %

## 2023-12-07 DIAGNOSIS — M25.561 CHRONIC PAIN OF RIGHT KNEE: Primary | ICD-10-CM

## 2023-12-07 DIAGNOSIS — M17.11 PRIMARY OSTEOARTHRITIS OF RIGHT KNEE: ICD-10-CM

## 2023-12-07 DIAGNOSIS — G89.29 CHRONIC PAIN OF RIGHT KNEE: Primary | ICD-10-CM

## 2023-12-07 PROCEDURE — 25010000002 SODIUM HYALURONATE 60 MG/3ML PREFILLED SYRINGE: Performed by: PHYSICAL MEDICINE & REHABILITATION

## 2023-12-07 RX ORDER — LIDOCAINE HYDROCHLORIDE 10 MG/ML
5 INJECTION, SOLUTION EPIDURAL; INFILTRATION; INTRACAUDAL; PERINEURAL ONCE
Status: COMPLETED | OUTPATIENT
Start: 2023-12-07 | End: 2023-12-07

## 2023-12-07 RX ADMIN — Medication 60 MG: at 15:09

## 2023-12-07 RX ADMIN — LIDOCAINE HYDROCHLORIDE 5 ML: 10 INJECTION, SOLUTION EPIDURAL; INFILTRATION; INTRACAUDAL; PERINEURAL at 15:09

## 2023-12-07 NOTE — DISCHARGE INSTRUCTIONS
Knee Injection  A knee injection is a procedure to get medicine into your knee joint to relieve the pain, swelling, and stiffness of arthritis. Your health care provider uses a needle to inject medicine, which may also help to lubricate and cushion your knee joint. You may need more than one injection.  Tell a health care provider about:  Any allergies you have.  All medicines you are taking, including vitamins, herbs, eye drops, creams, and over-the-counter medicines.  Any problems you or family members have had with anesthetic medicines.  Any blood disorders you have.  Any surgeries you have had.  Any medical conditions you have.  Whether you are pregnant or may be pregnant.  What are the risks?  Generally, this is a safe procedure. However, problems may occur, including:  Infection.  Bleeding.  Symptoms that get worse.  Damage to the area around your knee.  Allergic reaction to any of the medicines.  Skin reactions from repeated injections.  What happens before the procedure?  Ask your health care provider about:  Changing or stopping your regular medicines. This is especially important if you are taking diabetes medicines or blood thinners.  Taking over-the-counter medicines, vitamins, herbs, and supplements.    What happens during the procedure?    You will sit or lie down in a position for your knee to be treated.  The skin over your kneecap will be cleaned with a germ-killing soap.  You will be given a medicine that numbs the area (local anesthetic). You may feel some stinging.  The medicine will be injected into your knee. The needle is carefully placed between your kneecap and your knee. The medicine is injected into the joint space.  The needle will be removed at the end of the procedure.  A band-aid may be placed over the injection site.  The procedure may vary among health care providers and hospitals.  What can I expect after the procedure?  Your blood pressure, heart rate, breathing rate, and blood  oxygen level will be monitored until you leave the hospital or clinic.  You may have to move your knee through its full range of motion. This helps to get all the medicine into your joint space.  You will be watched to make sure that you do not have a reaction to the injected medicine.  You may feel more pain, swelling, and warmth than you did before the injection. This reaction may last about 1-2 days.  Follow these instructions at home:  Medicines  Take over-the-counter and prescription medicines only as told by your health care provider.  Ask your health care provider if the medicine prescribed to you requires you to avoid driving or using machinery.  Injection site care  Follow these instructions:  Keep your puncture site clean and dry for 24 hours.  After 24 hours, you may remove your band-aid.    Check your injection area every day for signs of infection. Check for:  More redness, swelling, or pain after 2 days.  Fluid or blood.  Pus or a bad smell.  Warmth.  Managing pain, stiffness, and swelling    If directed, put ice on the injection area. To do this:  Put ice in a plastic bag.  Place a towel between your skin and the bag.  Leave the ice on for 20 minutes, 2-3 times per day.  Remove the ice if your skin turns bright red. This is very important. If you cannot feel pain, heat, or cold, you have a greater risk of damage to the area.  Do not apply heat to your knee for 24-48 hours  Raise (elevate) the injection area above the level of your heart while you are sitting or lying down.     General instructions  Avoid strenuous activities for 24 hours. You can return to your normal activities in 24 hours unless otherwise directed.  Keep all follow-up visits. This is important. You may need more injections.  Contact a health care provider if you have:  A fever.  Warmth in your injection area.  Fluid, blood, or pus coming from your injection site.  Symptoms at your injection site that last longer than 2 days after  your procedure.  Get help right away if:  Your knee turns very red.  Your knee becomes very swollen.  Your knee is in severe pain.  Summary  A knee injection is a procedure to get medicine into your knee joint to relieve the pain, swelling, and stiffness of arthritis.  A needle is carefully placed between your kneecap and your knee to inject medicine into the joint space.  Before the procedure, ask your health care provider about changing or stopping your regular medicines, especially if you are taking diabetes medicines or blood thinners.  Contact your health care provider if you have any problems or questions after your procedure.  This information is not intended to replace advice given to you by your health care provider. Make sure you discuss any questions you have with your health care provider.  Document Revised: 06/02/2021 Document Reviewed: 06/02/2021  Elsevier Patient Education © 2021 [employer]

## 2023-12-07 NOTE — PROCEDURES
"Procedures    Chronic R knee pain, here for Durolane injection.    Perform R knee Durolane injections.  No change to meds today.  RTC for f/u.       KNEE SUPARTZ INJECTION    PREOPERATIVE DIAGNOSIS:  Knee pain    POSTOPERATIVE DIAGNOSIS:  Knee pain    PROCEDURE PERFORMED:  RIGHT DUROLANE KNEE INJECTION     The patient understands the risks and benefits of the procedure and wishes to proceed. The patient was seen in the preoperative area. Patient's consent was obtained and updated. Vitals were taken. Patient was then placed in a seated position for the injection. Site marked for an inferior lateral approach, and a 22 gauge 1.5\" needle was passed through skin anesthetized with 1% Lidocaine without epinephrine. The needle tip was advanced to the joint space, the syringes were exchanged, and Durolane was injected after negative aspiration. The patient tolerated with no parth-procedural complications.  A sterile dressing was placed over the puncture site.    "

## 2023-12-08 ENCOUNTER — TELEPHONE (OUTPATIENT)
Dept: PAIN MEDICINE | Facility: HOSPITAL | Age: 68
End: 2023-12-08
Payer: MEDICARE

## 2023-12-08 NOTE — TELEPHONE ENCOUNTER
Post procedure phone call completed.  Pt states they are doing good and denies questions or concerns. Pt states pain level #2 today.

## 2023-12-19 ENCOUNTER — HOSPITAL ENCOUNTER (OUTPATIENT)
Dept: PAIN MEDICINE | Facility: HOSPITAL | Age: 68
Discharge: HOME | End: 2023-12-19
Payer: MEDICARE

## 2023-12-19 VITALS
WEIGHT: 131 LBS | DIASTOLIC BLOOD PRESSURE: 95 MMHG | BODY MASS INDEX: 22.36 KG/M2 | HEART RATE: 83 BPM | HEIGHT: 64 IN | TEMPERATURE: 96.9 F | SYSTOLIC BLOOD PRESSURE: 127 MMHG | RESPIRATION RATE: 12 BRPM | OXYGEN SATURATION: 95 %

## 2023-12-19 DIAGNOSIS — M25.511 CHRONIC PAIN OF BOTH SHOULDERS: Primary | ICD-10-CM

## 2023-12-19 DIAGNOSIS — R52 PAIN: ICD-10-CM

## 2023-12-19 DIAGNOSIS — M75.51 SUBACROMIAL BURSITIS OF BOTH SHOULDERS: ICD-10-CM

## 2023-12-19 DIAGNOSIS — M75.52 SUBACROMIAL BURSITIS OF BOTH SHOULDERS: ICD-10-CM

## 2023-12-19 DIAGNOSIS — G89.29 CHRONIC PAIN OF BOTH SHOULDERS: Primary | ICD-10-CM

## 2023-12-19 DIAGNOSIS — M19.011 OSTEOARTHRITIS OF BILATERAL GLENOHUMERAL JOINTS: ICD-10-CM

## 2023-12-19 DIAGNOSIS — M25.512 CHRONIC PAIN OF BOTH SHOULDERS: Primary | ICD-10-CM

## 2023-12-19 DIAGNOSIS — M19.012 OSTEOARTHRITIS OF BILATERAL GLENOHUMERAL JOINTS: ICD-10-CM

## 2023-12-19 PROCEDURE — 25510000001 IOPAMIDOL 41 % SOLUTION: Performed by: PHYSICAL MEDICINE & REHABILITATION

## 2023-12-19 PROCEDURE — 25010000002 BUPIVACAINE (PF) 0.25 % SOLUTION: Performed by: PHYSICAL MEDICINE & REHABILITATION

## 2023-12-19 PROCEDURE — 25010000002 METHYLPREDNISOLONE PER 40 MG: Performed by: PHYSICAL MEDICINE & REHABILITATION

## 2023-12-19 PROCEDURE — 77003 FLUOROGUIDE FOR SPINE INJECT: CPT

## 2023-12-19 RX ORDER — IOPAMIDOL 408 MG/ML
10 INJECTION, SOLUTION INTRATHECAL
Status: COMPLETED | OUTPATIENT
Start: 2023-12-19 | End: 2023-12-19

## 2023-12-19 RX ORDER — METHYLPREDNISOLONE ACETATE 40 MG/ML
40 INJECTION, SUSPENSION INTRA-ARTICULAR; INTRALESIONAL; INTRAMUSCULAR; SOFT TISSUE ONCE
Status: COMPLETED | OUTPATIENT
Start: 2023-12-19 | End: 2023-12-19

## 2023-12-19 RX ORDER — BUPIVACAINE HYDROCHLORIDE 2.5 MG/ML
5 INJECTION, SOLUTION EPIDURAL; INFILTRATION; INTRACAUDAL ONCE
Status: COMPLETED | OUTPATIENT
Start: 2023-12-19 | End: 2023-12-19

## 2023-12-19 RX ADMIN — METHYLPREDNISOLONE ACETATE 40 MG: 40 INJECTION, SUSPENSION INTRA-ARTICULAR; INTRALESIONAL; INTRAMUSCULAR; INTRASYNOVIAL; SOFT TISSUE at 10:05

## 2023-12-19 RX ADMIN — IOPAMIDOL 10 ML: 408 INJECTION, SOLUTION INTRATHECAL at 10:05

## 2023-12-19 RX ADMIN — BUPIVACAINE HYDROCHLORIDE 5 ML: 2.5 INJECTION, SOLUTION EPIDURAL; INFILTRATION; INTRACAUDAL; PERINEURAL at 10:05

## 2023-12-19 NOTE — PROCEDURES
"Procedures    Left shoulder pain. Performed left subacromial injection with only brief relief,  perform glenohumeral injection today.     Perform left shoulder glenohumeral injection.  No change to meds today.  RTC for f/u.      Shoulder Glenohumeral Injection    PREOPERATIVE DIAGNOSIS: Shoulder pain    POSTOPERATIVE DIAGNOSIS: Shoulder pain    PROCEDURE PERFORMED: LEFT GLENOHUMERAL SHOULDER INJECTION    The patient understands the risks and benefits of the procedure and wishes to proceed. The patient was seen in the preoperative area. Patient's consent was obtained and updated. Vitals were taken. Patient was then taken to the procedure room and placed in a supine position for the injection. Site marked for an anterior approach and prepped with chloraprep and sterile drape. A 25 gauge 3.5\"  needle was introduced to the humeral head under fluoroscopic guidance. 1ml of preservative free contrast demonstrated intracapsular spread. 4mL of steroid solution containing 3mL 0.25% bupivacaine, and 1mL 40mg Depomedrol was injected after negative aspiration without resistance. The patient tolerated with no parth-procedural complications. A sterile dressing was placed over the puncture sites.      "

## 2023-12-20 ENCOUNTER — TELEPHONE (OUTPATIENT)
Dept: PAIN MEDICINE | Facility: HOSPITAL | Age: 68
End: 2023-12-20
Payer: MEDICARE

## 2023-12-20 ENCOUNTER — TELEPHONE (OUTPATIENT)
Dept: PAIN MEDICINE | Facility: CLINIC | Age: 68
End: 2023-12-20

## 2023-12-20 NOTE — TELEPHONE ENCOUNTER
Attempted post procedure phone call but no answer.  No message left on answering machine . Pt's mailbox not set up for messges.

## 2023-12-20 NOTE — TELEPHONE ENCOUNTER
Provider: DR DOE     Caller: PATIENT     Phone Number: [phone]    Reason for Call: PATIENT IS RETURNING A CALL FROM THE OFFICE MOST LIKELY REGARDING HER INJECTION YESTERDAY. SHE STATES THE INJECTION SITE ITCHED FOR ABOUT 2 HOURS AFTER HER PROCEDURE AND SHE IS NOW DOING JUST FINE. PLEASE CALL IF NEEDED (BEFORE 12:30) DOES NOT HAVE VOICEMAIL.

## 2024-03-12 ENCOUNTER — TELEPHONE (OUTPATIENT)
Dept: PAIN MEDICINE | Facility: CLINIC | Age: 69
End: 2024-03-12
Payer: MEDICARE

## 2024-03-12 NOTE — TELEPHONE ENCOUNTER
Provider: DR DOE     Caller: DANNY LEO     Relationship to Patient: PATIENT     Phone Number: [phone]    Reason for Call: PATIENT STATES PREV KNEE INJECTIONS DIDN'T SEEN TO TAKE. PATIENT WOULD LIKE TO KNOW IF DR DOE CAN GIVE HER GEL INJECTIONS IN THE KNEE?    When was the patient last seen: 12/07/2023 KNEE

## 2024-03-12 NOTE — TELEPHONE ENCOUNTER
She had gel injections last time, with Durolane. If that didn't work, she probably just needs a knee replacement. Would she like a referral to a surgeon?

## 2024-03-15 NOTE — TELEPHONE ENCOUNTER
I was going to send her to Dr. Reynolds. Unfortunately I didn't while still at 2125. Can you do it for me?

## 2024-03-18 ENCOUNTER — TELEPHONE (OUTPATIENT)
Dept: PAIN MEDICINE | Facility: CLINIC | Age: 69
End: 2024-03-18
Payer: MEDICARE

## 2024-03-18 NOTE — TELEPHONE ENCOUNTER
Caller: Divya Lomeli    Relationship: Self    Best call back number:    Requested Prescriptions:   HYDROcodone-acetaminophen (Norco) 7.5-325 MG per tablet     Pharmacy where request should be sent:  [address] - [phone] [place] [phone] [facility] [phone]     Last office visit with prescribing clinician: 12/5/2023   Last telemedicine visit with prescribing clinician: Visit date not found   Next office visit with prescribing clinician: Visit date not found     Additional details provided by patient: HAS 5 PILLS LEFT.  PATIENT RECENTLY HAD SHOULDER SURGERY AND WASN'T GIVEN ANY MEDICINE WAS TOLD TO TAKE WHAT SHE HAD    Does the patient have less than a 3 day supply:  [x] Yes  [] No    Would you like a call back once the refill request has been completed: [] Yes [] No    If the office needs to give you a call back, can they leave a voicemail: [] Yes [] No    Rebekah Armstrong Rep   03/18/24 15:46 EDT

## 2024-03-19 NOTE — TELEPHONE ENCOUNTER
Patient notified that she should still have 1 at the pharmacy that was sent over on 12/5 that she could have filled in February.   negative...

## 2024-03-20 ENCOUNTER — LAB (OUTPATIENT)
Dept: LAB | Facility: HOSPITAL | Age: 69
End: 2024-03-20
Payer: MEDICARE

## 2024-03-20 ENCOUNTER — HOSPITAL ENCOUNTER (OUTPATIENT)
Dept: CARDIOLOGY | Facility: HOSPITAL | Age: 69
Discharge: HOME | End: 2024-03-20
Payer: MEDICARE

## 2024-03-20 ENCOUNTER — TRANSCRIBE ORDERS (OUTPATIENT)
Dept: ADMINISTRATIVE | Facility: HOSPITAL | Age: 69
End: 2024-03-20
Payer: MEDICARE

## 2024-03-20 DIAGNOSIS — D49.4 BLADDER TUMOR: ICD-10-CM

## 2024-03-20 DIAGNOSIS — D49.4 BLADDER TUMOR: Primary | ICD-10-CM

## 2024-03-20 LAB
ANION GAP SERPL CALCULATED.3IONS-SCNC: 15.8 MMOL/L (ref 5–15)
BASOPHILS # BLD AUTO: 0.07 10*3/MM3 (ref 0–0.2)
BASOPHILS NFR BLD AUTO: 1 % (ref 0–1.5)
BUN SERPL-MCNC: 21 MG/DL (ref 8–23)
BUN/CREAT SERPL: 30.9 (ref 7–25)
CALCIUM SPEC-SCNC: 9.3 MG/DL (ref 8.6–10.5)
CHLORIDE SERPL-SCNC: 101 MMOL/L (ref 98–107)
CO2 SERPL-SCNC: 23.2 MMOL/L (ref 22–29)
CREAT SERPL-MCNC: 0.68 MG/DL (ref 0.57–1)
DEPRECATED RDW RBC AUTO: 42.8 FL (ref 37–54)
EGFRCR SERPLBLD CKD-EPI 2021: 93.8 ML/MIN/1.73
EOSINOPHIL # BLD AUTO: 0.26 10*3/MM3 (ref 0–0.4)
EOSINOPHIL NFR BLD AUTO: 3.8 % (ref 0.3–6.2)
ERYTHROCYTE [DISTWIDTH] IN BLOOD BY AUTOMATED COUNT: 12.6 % (ref 12.3–15.4)
GLUCOSE SERPL-MCNC: 164 MG/DL (ref 65–99)
HCT VFR BLD AUTO: 40.6 % (ref 34–46.6)
HGB BLD-MCNC: 12.9 G/DL (ref 12–15.9)
IMM GRANULOCYTES # BLD AUTO: 0.04 10*3/MM3 (ref 0–0.05)
IMM GRANULOCYTES NFR BLD AUTO: 0.6 % (ref 0–0.5)
LYMPHOCYTES # BLD AUTO: 2.59 10*3/MM3 (ref 0.7–3.1)
LYMPHOCYTES NFR BLD AUTO: 38.3 % (ref 19.6–45.3)
MCH RBC QN AUTO: 29.5 PG (ref 26.6–33)
MCHC RBC AUTO-ENTMCNC: 31.8 G/DL (ref 31.5–35.7)
MCV RBC AUTO: 92.9 FL (ref 79–97)
MONOCYTES # BLD AUTO: 0.67 10*3/MM3 (ref 0.1–0.9)
MONOCYTES NFR BLD AUTO: 9.9 % (ref 5–12)
NEUTROPHILS NFR BLD AUTO: 3.14 10*3/MM3 (ref 1.7–7)
NEUTROPHILS NFR BLD AUTO: 46.4 % (ref 42.7–76)
NRBC BLD AUTO-RTO: 0 /100 WBC (ref 0–0.2)
PLATELET # BLD AUTO: 347 10*3/MM3 (ref 140–450)
PMV BLD AUTO: 11.8 FL (ref 6–12)
POTASSIUM SERPL-SCNC: 4 MMOL/L (ref 3.5–5.2)
RBC # BLD AUTO: 4.37 10*6/MM3 (ref 3.77–5.28)
SODIUM SERPL-SCNC: 140 MMOL/L (ref 136–145)
WBC NRBC COR # BLD AUTO: 6.77 10*3/MM3 (ref 3.4–10.8)

## 2024-03-20 PROCEDURE — 85025 COMPLETE CBC W/AUTO DIFF WBC: CPT

## 2024-03-20 PROCEDURE — 36415 COLL VENOUS BLD VENIPUNCTURE: CPT

## 2024-03-20 PROCEDURE — 93005 ELECTROCARDIOGRAM TRACING: CPT | Performed by: UROLOGY

## 2024-03-20 PROCEDURE — 80048 BASIC METABOLIC PNL TOTAL CA: CPT

## 2024-03-22 LAB
QT INTERVAL: 353 MS
QTC INTERVAL: 423 MS

## 2024-03-26 PROCEDURE — 88305 TISSUE EXAM BY PATHOLOGIST: CPT | Performed by: UROLOGY

## 2024-03-27 ENCOUNTER — LAB REQUISITION (OUTPATIENT)
Dept: LAB | Facility: HOSPITAL | Age: 69
End: 2024-03-27
Payer: MEDICARE

## 2024-03-27 DIAGNOSIS — N32.9 BLADDER DISORDER, UNSPECIFIED: ICD-10-CM

## 2024-03-28 LAB
LAB AP CASE REPORT: NORMAL
PATH REPORT.FINAL DX SPEC: NORMAL
PATH REPORT.GROSS SPEC: NORMAL

## 2024-04-22 ENCOUNTER — TRANSCRIBE ORDERS (OUTPATIENT)
Dept: ADMINISTRATIVE | Facility: HOSPITAL | Age: 69
End: 2024-04-22
Payer: MEDICARE

## 2024-04-22 DIAGNOSIS — Z12.2 ENCOUNTER FOR SCREENING FOR LUNG CANCER: Primary | ICD-10-CM

## 2024-05-06 ENCOUNTER — LAB (OUTPATIENT)
Dept: LAB | Facility: HOSPITAL | Age: 69
End: 2024-05-06
Payer: MEDICARE

## 2024-05-06 ENCOUNTER — TRANSCRIBE ORDERS (OUTPATIENT)
Dept: ADMINISTRATIVE | Facility: HOSPITAL | Age: 69
End: 2024-05-06
Payer: MEDICARE

## 2024-05-06 DIAGNOSIS — R22.32 MASS OF FINGER OF LEFT HAND: ICD-10-CM

## 2024-05-06 DIAGNOSIS — R22.32 MASS OF FINGER OF LEFT HAND: Primary | ICD-10-CM

## 2024-05-06 LAB
BASOPHILS # BLD AUTO: 0.06 10*3/MM3 (ref 0–0.2)
BASOPHILS NFR BLD AUTO: 0.7 % (ref 0–1.5)
CHROMATIN AB SERPL-ACNC: 11.1 IU/ML (ref 0–14)
CHROMATIN AB SERPL-ACNC: 12 IU/ML (ref 0–14)
CRP SERPL-MCNC: <0.3 MG/DL (ref 0–0.5)
DEPRECATED RDW RBC AUTO: 45.2 FL (ref 37–54)
EOSINOPHIL # BLD AUTO: 0.25 10*3/MM3 (ref 0–0.4)
EOSINOPHIL NFR BLD AUTO: 2.9 % (ref 0.3–6.2)
ERYTHROCYTE [DISTWIDTH] IN BLOOD BY AUTOMATED COUNT: 12.7 % (ref 12.3–15.4)
ERYTHROCYTE [SEDIMENTATION RATE] IN BLOOD: 12 MM/HR (ref 0–30)
HCT VFR BLD AUTO: 41.6 % (ref 34–46.6)
HGB BLD-MCNC: 13.1 G/DL (ref 12–15.9)
IMM GRANULOCYTES # BLD AUTO: 0.02 10*3/MM3 (ref 0–0.05)
IMM GRANULOCYTES NFR BLD AUTO: 0.2 % (ref 0–0.5)
LYMPHOCYTES # BLD AUTO: 2.9 10*3/MM3 (ref 0.7–3.1)
LYMPHOCYTES NFR BLD AUTO: 34.1 % (ref 19.6–45.3)
MCH RBC QN AUTO: 30.4 PG (ref 26.6–33)
MCHC RBC AUTO-ENTMCNC: 31.5 G/DL (ref 31.5–35.7)
MCV RBC AUTO: 96.5 FL (ref 79–97)
MONOCYTES # BLD AUTO: 0.65 10*3/MM3 (ref 0.1–0.9)
MONOCYTES NFR BLD AUTO: 7.6 % (ref 5–12)
NEUTROPHILS NFR BLD AUTO: 4.63 10*3/MM3 (ref 1.7–7)
NEUTROPHILS NFR BLD AUTO: 54.5 % (ref 42.7–76)
NRBC BLD AUTO-RTO: 0 /100 WBC (ref 0–0.2)
PLATELET # BLD AUTO: 308 10*3/MM3 (ref 140–450)
PMV BLD AUTO: 11.7 FL (ref 6–12)
RBC # BLD AUTO: 4.31 10*6/MM3 (ref 3.77–5.28)
URATE SERPL-MCNC: 3.4 MG/DL (ref 2.4–5.7)
WBC NRBC COR # BLD AUTO: 8.51 10*3/MM3 (ref 3.4–10.8)

## 2024-05-06 PROCEDURE — 86235 NUCLEAR ANTIGEN ANTIBODY: CPT

## 2024-05-06 PROCEDURE — 86431 RHEUMATOID FACTOR QUANT: CPT

## 2024-05-06 PROCEDURE — 83516 IMMUNOASSAY NONANTIBODY: CPT

## 2024-05-06 PROCEDURE — 86225 DNA ANTIBODY NATIVE: CPT

## 2024-05-06 PROCEDURE — 84550 ASSAY OF BLOOD/URIC ACID: CPT

## 2024-05-06 PROCEDURE — 85652 RBC SED RATE AUTOMATED: CPT

## 2024-05-06 PROCEDURE — 86200 CCP ANTIBODY: CPT

## 2024-05-06 PROCEDURE — 36415 COLL VENOUS BLD VENIPUNCTURE: CPT

## 2024-05-06 PROCEDURE — 85025 COMPLETE CBC W/AUTO DIFF WBC: CPT

## 2024-05-06 PROCEDURE — 86038 ANTINUCLEAR ANTIBODIES: CPT

## 2024-05-06 PROCEDURE — 86037 ANCA TITER EACH ANTIBODY: CPT

## 2024-05-06 PROCEDURE — 86140 C-REACTIVE PROTEIN: CPT

## 2024-05-07 LAB — CCP IGA+IGG SERPL IA-ACNC: 5 UNITS (ref 0–19)

## 2024-05-08 LAB
ANA SER QL: ABNORMAL
C-ANCA TITR SER IF: NORMAL TITER
MYELOPEROXIDASE AB SER IA-ACNC: <0.2 UNITS (ref 0–0.9)
P-ANCA ATYPICAL TITR SER IF: NORMAL TITER
P-ANCA TITR SER IF: NORMAL TITER
PROTEINASE3 AB SER IA-ACNC: <0.2 UNITS (ref 0–0.9)

## 2024-05-09 LAB
CENTROMERE B AB SER-ACNC: <0.2 AI (ref 0–0.9)
CHROMATIN AB SERPL-ACNC: <0.2 AI (ref 0–0.9)
DSDNA AB SER-ACNC: <1 IU/ML (ref 0–9)
ENA JO1 AB SER-ACNC: <0.2 AI (ref 0–0.9)
ENA RNP AB SER-ACNC: <0.2 AI (ref 0–0.9)
ENA SCL70 AB SER-ACNC: <0.2 AI (ref 0–0.9)
ENA SM AB SER-ACNC: <0.2 AI (ref 0–0.9)
ENA SM+RNP AB SER-ACNC: <0.2 AI (ref 0–0.9)
ENA SS-A AB SER-ACNC: <0.2 AI (ref 0–0.9)
ENA SS-B AB SER-ACNC: <0.2 AI (ref 0–0.9)
Lab: NORMAL
RIBOSOMAL P AB SER-ACNC: <0.2 AI (ref 0–0.9)

## 2024-05-14 RX ORDER — GABAPENTIN 400 MG/1
400 CAPSULE ORAL 3 TIMES DAILY
Qty: 270 CAPSULE | Refills: 0 | OUTPATIENT
Start: 2024-05-14

## 2024-05-17 RX ORDER — GABAPENTIN 400 MG/1
400 CAPSULE ORAL 3 TIMES DAILY
Qty: 270 CAPSULE | Refills: 0 | OUTPATIENT
Start: 2024-05-17

## 2024-05-23 ENCOUNTER — HOSPITAL ENCOUNTER (OUTPATIENT)
Dept: CT IMAGING | Facility: HOSPITAL | Age: 69
Discharge: HOME | End: 2024-05-23
Payer: MEDICARE

## 2024-05-23 DIAGNOSIS — Z12.2 ENCOUNTER FOR SCREENING FOR LUNG CANCER: ICD-10-CM

## 2024-05-23 PROCEDURE — 71271 CT THORAX LUNG CANCER SCR C-: CPT

## 2024-05-24 ENCOUNTER — TELEPHONE (OUTPATIENT)
Dept: PAIN MEDICINE | Facility: CLINIC | Age: 69
End: 2024-05-24
Payer: MEDICARE

## 2024-05-27 RX ORDER — GABAPENTIN 400 MG/1
400 CAPSULE ORAL 3 TIMES DAILY
Qty: 270 CAPSULE | Refills: 1 | Status: SHIPPED | OUTPATIENT
Start: 2024-05-27

## 2024-06-21 ENCOUNTER — TELEPHONE (OUTPATIENT)
Dept: PAIN MEDICINE | Facility: CLINIC | Age: 69
End: 2024-06-21
Payer: MEDICARE

## 2024-06-21 DIAGNOSIS — G89.29 CHRONIC PAIN OF RIGHT KNEE: Primary | ICD-10-CM

## 2024-06-21 DIAGNOSIS — M25.561 CHRONIC PAIN OF RIGHT KNEE: Primary | ICD-10-CM

## 2024-06-21 NOTE — TELEPHONE ENCOUNTER
It looks like she failed the knee injection, so we used Durolane instead last time. I'll order that instead, thanks.

## 2024-06-21 NOTE — TELEPHONE ENCOUNTER
Caller: Divya Lomeli    Relationship to patient: Self    Best call back number: [phone]    Type of visit: R KNEE INJECTION WITH DR DOE    Requested date: ANY DAY IN THE AFTERNOON

## 2024-07-05 ENCOUNTER — TELEPHONE (OUTPATIENT)
Dept: PAIN MEDICINE | Facility: CLINIC | Age: 69
End: 2024-07-05
Payer: MEDICARE

## 2024-07-05 DIAGNOSIS — G89.29 CHRONIC PAIN OF BOTH SHOULDERS: Primary | ICD-10-CM

## 2024-07-05 DIAGNOSIS — M25.512 CHRONIC PAIN OF BOTH SHOULDERS: Primary | ICD-10-CM

## 2024-07-05 DIAGNOSIS — M25.511 CHRONIC PAIN OF BOTH SHOULDERS: Primary | ICD-10-CM

## 2024-07-05 DIAGNOSIS — M54.2 CERVICALGIA: ICD-10-CM

## 2024-07-05 RX ORDER — HYDROCODONE BITARTRATE AND ACETAMINOPHEN 7.5; 325 MG/1; MG/1
1 TABLET ORAL EVERY 8 HOURS PRN
Qty: 90 TABLET | Refills: 0 | Status: CANCELLED | OUTPATIENT
Start: 2024-07-05

## 2024-07-05 NOTE — TELEPHONE ENCOUNTER
Patient has a procedure scheduled with you on 7/18 but has not had a follow up for medication recently.

## 2024-07-05 NOTE — TELEPHONE ENCOUNTER
Caller: Divya Lomeli    Relationship: Self    Best call back number: [phone]    Requested Prescriptions:   Requested Prescriptions     Pending Prescriptions Disp Refills    HYDROcodone-acetaminophen (Norco) 7.5-325 MG per tablet 90 tablet 0     Sig: Take 1 tablet by mouth Every 8 (Eight) Hours As Needed for Moderate Pain.        Pharmacy where request should be sent: [address] [phone] [place] [phone]      Last office visit with prescribing clinician: 12/5/2023   Last telemedicine visit with prescribing clinician: Visit date not found   Next office visit with prescribing clinician: Visit date not found     Additional details provided by patient: PT STATES SHE HAS ALREADY RECEIVED A MESSAGE FROM [employer] STATING MEDICATION WAS APPROVED    Does the patient have less than a 3 day supply:  [x] Yes  [] No    Would you like a call back once the refill request has been completed: [x] Yes [] No    If the office needs to give you a call back, can they leave a voicemail: [] Yes [x] No    Rebekah Kee Rep   07/05/24 12:58 EDT

## 2024-07-05 NOTE — TELEPHONE ENCOUNTER
Provider: NADER    Caller: DANNY LOE    Relationship to Patient: SELF    Phone Number: [phone]    Reason for Call: PT REQUESTING CALL BACK REGARDING BILATERAL SHOULDER INJECTIONS AND BEING SCHEDULED

## 2024-07-08 NOTE — TELEPHONE ENCOUNTER
Yes, I can't refill until I see her since it has been over half a year. She will either need to be seen for a visit or wait until her procedure, thanks.

## 2024-07-15 ENCOUNTER — OFFICE VISIT (OUTPATIENT)
Dept: PULMONOLOGY | Facility: HOSPITAL | Age: 69
End: 2024-07-15
Payer: MEDICARE

## 2024-07-15 VITALS
WEIGHT: 122 LBS | BODY MASS INDEX: 20.83 KG/M2 | HEIGHT: 64 IN | OXYGEN SATURATION: 96 % | SYSTOLIC BLOOD PRESSURE: 109 MMHG | HEART RATE: 84 BPM | DIASTOLIC BLOOD PRESSURE: 47 MMHG

## 2024-07-15 DIAGNOSIS — Z87.891 FORMER SMOKER: ICD-10-CM

## 2024-07-15 DIAGNOSIS — J44.9 CHRONIC OBSTRUCTIVE PULMONARY DISEASE, UNSPECIFIED COPD TYPE: ICD-10-CM

## 2024-07-15 DIAGNOSIS — R05.3 CHRONIC COUGH: ICD-10-CM

## 2024-07-15 DIAGNOSIS — R91.8 MULTIPLE LUNG NODULES ON CT: Primary | ICD-10-CM

## 2024-07-15 PROCEDURE — G0463 HOSPITAL OUTPT CLINIC VISIT: HCPCS

## 2024-07-15 NOTE — PROGRESS NOTES
HPI:  New patient visit for lung nodules.  Patient used to be a heavy smoker but quit 1992 after 20 pack years.  She had a low-dose CT scan in May showing 2 nodules 6 mm but she has no symptoms.  She rarely gets shortness of breath.  She has chronic sinus congestion and postnasal drainage.    Past Medical History:   Diagnosis Date    Anxiety     Arm pain     rt upper arm muscle    Cataracts, bilateral     COPD (chronic obstructive pulmonary disease)     20 to 30 years ago. Does not take meds.    Diabetes mellitus     Type 2    Diabetic nephropathy     Encounter for immunization 05/24/2019    Hyperlipidemia     Hypertension         Current Outpatient Medications on File Prior to Visit   Medication Sig Dispense Refill    calcium carbonate (TUMS) 500 MG chewable tablet       celecoxib (CeleBREX) 200 MG capsule Take 1 capsule by mouth Daily.      Cobalamin Combinations (NEURIVA PLUS PO) Take 1 tablet/day by mouth.      estradiol (ESTRACE) 1 MG tablet Take 1/2 (one-half) tablet by mouth once daily 45 tablet 0    ezetimibe (ZETIA) 10 MG tablet       gabapentin (NEURONTIN) 400 MG capsule Take 1 capsule by mouth 3 (Three) Times a Day. 270 capsule 1    Gemtesa 75 MG tablet TAKE ONE TABLET BY MOUTH DAILY REFILLS / QUESTIONS CALL [phone]      glimepiride (AMARYL) 4 MG tablet Take 1 tablet by mouth once daily 90 tablet 0    HYDROcodone-acetaminophen (Norco) 7.5-325 MG per tablet Take 1 tablet by mouth Every 8 (Eight) Hours As Needed for Moderate Pain. 90 tablet 0    HYDROcodone-acetaminophen (Norco) 7.5-325 MG per tablet Take 1 tablet by mouth Every 8 (Eight) Hours As Needed for Moderate Pain. 90 tablet 0    HYDROcodone-acetaminophen (Norco) 7.5-325 MG per tablet Take 1 tablet by mouth Every 8 (Eight) Hours As Needed for Moderate Pain. 90 tablet 0    Jardiance 25 MG tablet Take 1 tablet by mouth once daily 90 tablet 0    methocarbamol (ROBAXIN) 500 MG tablet Take 1 tablet by mouth 3 (Three) Times a Day As Needed for  Muscle Spasms (during the day). 270 tablet 3    Misc. Devices (Roller Walker) misc Rolling walker 1 each 0    Multiple Vitamins-Minerals (PRESERVISION AREDS 2 PO)       Narcan 4 MG/0.1ML nasal spray       Omega-3 Fatty Acids (Fish Oil Burp-Less) 1200 MG capsule Take 1 capsule by mouth Daily. 90 capsule 3    polyethylene glycol (MIRALAX) 17 GM/SCOOP powder mix 1 capful (17 g) in liquid by mouth 2 (Two) Times a Day. 510 g 0    primidone (MYSOLINE) 250 MG tablet Take 1 tablet by mouth 3 (Three) Times a Day.      primidone (MYSOLINE) 50 MG tablet Take 3 tablets by mouth 3 (Three) Times a Day.      sitaGLIPtin-metFORMIN (Janumet)  MG per tablet Take  by mouth Every 12 (Twelve) Hours.      SITagliptin-metFORMIN HCl ER (Janumet XR)  MG tablet Take 2 tablets every day with a meal 180 tablet 1    tiZANidine (ZANAFLEX) 4 MG tablet TAKE 1 TABLET BY MOUTH AT NIGHT AS NEEDED FOR MUSCLE SPASM 90 tablet 0    topiramate (TOPAMAX) 50 MG tablet Take 1 tablet by mouth 2 (Two) Times a Day.      traZODone (DESYREL) 50 MG tablet Take 1 tablet by mouth every night at bedtime.      colestipol (COLESTID) 1 g tablet  (Patient not taking: Reported on 7/15/2024)      hydrOXYzine (ATARAX) 10 MG tablet TAKE 1 TO 2 TABLETS BY MOUTH EVERY 8 HOURS AS NEEDED FOR ANXIETY (Patient not taking: Reported on 7/15/2024) 40 tablet 0    ofloxacin (OCUFLOX) 0.3 % ophthalmic solution INSTILL 1 DROP BY OPHTHALMIC ROUTE 4 TIMES EVERY DAY IN OPERATIVE EYE BEGINNING 1 DAY PRIOR TO SURGERY (Patient not taking: Reported on 7/15/2024)      prednisoLONE acetate (PRED FORTE) 1 % ophthalmic suspension INSTILL 1 DROP 4 TIMES A DAY BEGINNING AFTER SURGERY ACCORDING TO DROPS SHEET (Patient not taking: Reported on 7/15/2024)      Semaglutide,0.25 or 0.5MG/DOS, (Ozempic, 0.25 or 0.5 MG/DOSE,) 2 MG/1.5ML solution pen-injector Inject 0.5 mg Subcutaneously weekly on tuesdays (Patient not taking: Reported on 7/15/2024) 3 pen 3    Semaglutide,0.25 or 0.5MG/DOS,  "(Ozempic, 0.25 or 0.5 MG/DOSE,) 2 MG/1.5ML solution pen-injector Inject  under the skin into the appropriate area as directed Every 7 (Seven) Days. (Patient not taking: Reported on 7/15/2024)       No current facility-administered medications on file prior to visit.        Social History     Tobacco Use    Smoking status: Former     Current packs/day: 0.00     Average packs/day: 4.5 packs/day for 21.0 years (94.5 ttl pk-yrs)     Types: Cigarettes     Start date:      Quit date:      Years since quittin.5    Smokeless tobacco: Never    Tobacco comments:     2nd Hand Smoke - boyfriend smokes.   Vaping Use    Vaping status: Never Used   Substance Use Topics    Alcohol use: Not Currently     Comment: occ    Drug use: Not Currently     Types: Marijuana     Comment: [facility]         Family History   Problem Relation Age of Onset    Diabetes Mother     Diabetes Father         Review of system:  Constitutional: Negative for chills, fever and malaise/fatigue.   HENT: Sinus congestion  Eyes: Negative.    Cardiovascular: Negative.    Respiratory: negative for cough and shortness of breath.    Skin: Negative.    Musculoskeletal: Negative.    Gastrointestinal: Negative.    Genitourinary: Negative.    Neurological: Negative.    Psychiatric/Behavioral: Negative.    Physical exam:  Blood pressure 109/47, pulse 84, height 162.6 cm (64\"), weight 55.3 kg (122 lb), SpO2 96%.    General Appearance:  Alert   HEENT:  Normocephalic, without obvious abnormality, Conjunctiva/corneas clear,.   Nares normal, no drainage     Neck:  Supple, symmetrical, trachea midline. No JVD.  Lungs /Chest wall:   good air entry Bilaterlly, respirations unlabored, symmetrical wall movement.     Heart:  Regular rate and rhythm, S1 S2 normal  Abdomen: Soft, non-tender, no masses, no organomegaly.    Extremities: No edema, no clubbing or cyanosis    CT Chest Low Dose Cancer Screening WO    Result Date: 2024  6 mm nodule in the right upper " lobe and 6 mm groundglass nodule in the right lower lobe Recommendation: 6 month follow up with LDCT Lung Rads Assessment: Lung-RADS L3 - Probably benign, 1-2% chance of malignancy. Electronically Signed: Santhosh Scott  5/24/2024 4:02 PM EDT  Workstation ID: [id number]          Assessment and plan:  The lung nodules are 6 mm in diameter and right upper lobe and right lower lobe, repeat CT scan in 6-month  COPD: Currently the patient is not needing inhalers but if she started to get more short of breath then will obtain PFTs and start inhalers  Former smoker: Quit 1992 after 20 pack years, encouraged the patient to stay away from exposure to secondhand smoking  DM and HLD: Continue medications as per PCP    I personally reviewed the latest radiological study  Patient is advised to stay up-to-date on immunizations for flu, pneumococcal and COVID-19

## 2024-07-15 NOTE — PROGRESS NOTES
EMG and Nerve Conduction Studies    Patient Name: Divya Lomeli    Date of Study:7/17/24    Referring Provider:DR. STEIN    History:    LUE- JAIRO, this patient is a 68-year-old female who complains of swelling and stiffness in the left hand.  She also has some pain in the left upper extremity.  She is a non-insulin-dependent diabetic.    Results:    The complete report includes the data sheets.     Prior to starting the procedure, the patient's identity was verified, pertinent available records were reviewed, the nature of the procedure was explained, the appropriate site of the exam were confirmed directly with the patient, and a pre-procedure pause was performed for final verification of all the above.    1.  The left median sensory distal latency is just slightly prolonged above the upper limit of normal.  The ulnar sensory latency is at the upper limit of normal.  There is no significant difference comparing the median to ulnar palmar latencies.  The left median motor distal latency is normal the amplitude of the compound muscle action potential was slightly reduced and the conduction velocity from elbow to wrist is mildly slow.    2.  The left ulnar motor distal latency is normal the amplitude of the compound muscle action potential was slightly reduced the conduction velocity below elbow to wrist is  mildly reduced but significantly reduced across elbow segment.    3.  Electromyography of the muscles examined in the left upper extremity and the C5-T1 myotomes were normal.  The muscles tested included deltoid, triceps, extensor digitorum, first dorsal interosseous, and APB muscle.    Impression:    This is  an abnormal study.  The sensory distal latencies are at the upper limit of normal or mildly prolonged and the motor conduction velocities are mildly reduced consistent with a mild generalized neuropathy.  Superimposed on the evidence of mild generalized neuropathy is significant slowing in  conduction velocity in the across elbow segment to raise the possibility of focal ulnar neuropathy at the elbow, however given the evidence of generalized neuropathy the presence or absence of a focal neuropathy cannot be stated with certainty        Electronically signed by :    Joseph Seipel, M.D.  July 17, 2024

## 2024-07-17 ENCOUNTER — PROCEDURE VISIT (OUTPATIENT)
Dept: NEUROLOGY | Facility: CLINIC | Age: 69
End: 2024-07-17
Payer: MEDICARE

## 2024-07-17 VITALS
DIASTOLIC BLOOD PRESSURE: 66 MMHG | WEIGHT: 122 LBS | HEART RATE: 91 BPM | SYSTOLIC BLOOD PRESSURE: 117 MMHG | HEIGHT: 64 IN | BODY MASS INDEX: 20.83 KG/M2

## 2024-07-17 DIAGNOSIS — M25.642 STIFFNESS OF LEFT HAND JOINT: ICD-10-CM

## 2024-07-17 DIAGNOSIS — R29.898 WEAKNESS OF LEFT HAND: Primary | ICD-10-CM

## 2024-07-17 PROBLEM — E11.9 DIABETES MELLITUS WITHOUT COMPLICATION: Status: ACTIVE | Noted: 2024-01-24

## 2024-07-18 ENCOUNTER — HOSPITAL ENCOUNTER (OUTPATIENT)
Dept: PAIN MEDICINE | Facility: HOSPITAL | Age: 69
Discharge: HOME | End: 2024-07-18
Payer: MEDICARE

## 2024-07-18 VITALS
DIASTOLIC BLOOD PRESSURE: 76 MMHG | RESPIRATION RATE: 16 BRPM | OXYGEN SATURATION: 98 % | HEART RATE: 77 BPM | TEMPERATURE: 97.3 F | SYSTOLIC BLOOD PRESSURE: 117 MMHG

## 2024-07-18 DIAGNOSIS — M25.561 CHRONIC PAIN OF RIGHT KNEE: Primary | ICD-10-CM

## 2024-07-18 DIAGNOSIS — M17.11 PRIMARY OSTEOARTHRITIS OF RIGHT KNEE: ICD-10-CM

## 2024-07-18 DIAGNOSIS — G89.29 CHRONIC PAIN OF RIGHT KNEE: Primary | ICD-10-CM

## 2024-07-18 PROCEDURE — 25010000002 SODIUM HYALURONATE 60 MG/3ML PREFILLED SYRINGE

## 2024-07-18 RX ORDER — LIDOCAINE HYDROCHLORIDE 10 MG/ML
5 INJECTION, SOLUTION EPIDURAL; INFILTRATION; INTRACAUDAL; PERINEURAL ONCE
Status: COMPLETED | OUTPATIENT
Start: 2024-07-18 | End: 2024-07-18

## 2024-07-18 RX ADMIN — LIDOCAINE HYDROCHLORIDE 5 ML: 10 INJECTION, SOLUTION EPIDURAL; INFILTRATION; INTRACAUDAL; PERINEURAL at 14:06

## 2024-07-18 RX ADMIN — Medication 60 MG: at 14:07

## 2024-07-22 ENCOUNTER — TELEPHONE (OUTPATIENT)
Dept: PAIN MEDICINE | Facility: CLINIC | Age: 69
End: 2024-07-22
Payer: MEDICARE

## 2024-07-22 ENCOUNTER — TRANSCRIBE ORDERS (OUTPATIENT)
Dept: ADMINISTRATIVE | Facility: HOSPITAL | Age: 69
End: 2024-07-22
Payer: MEDICARE

## 2024-07-22 DIAGNOSIS — Z12.31 VISIT FOR SCREENING MAMMOGRAM: Primary | ICD-10-CM

## 2024-07-22 NOTE — TELEPHONE ENCOUNTER
07/19/2024 @ 1:44 Call from Isaura Lawrence about refill pt Hyrocodone and she was willing to write if ok with .  Sw  he was ok with PCP filling Norco.until seen with him on 08/08/2024. At 2:55 informed PCP Isaura Lawrence that I SW Dr. Shields and he's ok with her writing for Norco until seen on 08/08/2024.

## 2024-07-29 ENCOUNTER — TRANSCRIBE ORDERS (OUTPATIENT)
Dept: ADMINISTRATIVE | Facility: HOSPITAL | Age: 69
End: 2024-07-29
Payer: MEDICARE

## 2024-07-29 DIAGNOSIS — Z78.0 POST-MENOPAUSAL: Primary | ICD-10-CM

## 2024-08-13 ENCOUNTER — HOSPITAL ENCOUNTER (OUTPATIENT)
Dept: BONE DENSITY | Facility: HOSPITAL | Age: 69
Discharge: HOME | End: 2024-08-13
Payer: MEDICARE

## 2024-08-13 ENCOUNTER — HOSPITAL ENCOUNTER (OUTPATIENT)
Dept: MAMMOGRAPHY | Facility: HOSPITAL | Age: 69
Discharge: HOME | End: 2024-08-13
Payer: MEDICARE

## 2024-08-13 DIAGNOSIS — Z12.31 VISIT FOR SCREENING MAMMOGRAM: ICD-10-CM

## 2024-08-13 DIAGNOSIS — Z78.0 POST-MENOPAUSAL: ICD-10-CM

## 2024-08-13 PROCEDURE — 77067 SCR MAMMO BI INCL CAD: CPT

## 2024-08-13 PROCEDURE — 77063 BREAST TOMOSYNTHESIS BI: CPT

## 2024-08-13 PROCEDURE — 77080 DXA BONE DENSITY AXIAL: CPT

## 2024-08-15 ENCOUNTER — HOSPITAL ENCOUNTER (OUTPATIENT)
Dept: PAIN MEDICINE | Facility: HOSPITAL | Age: 69
Discharge: HOME | End: 2024-08-15
Payer: MEDICARE

## 2024-08-15 VITALS
HEART RATE: 69 BPM | RESPIRATION RATE: 16 BRPM | BODY MASS INDEX: 21 KG/M2 | OXYGEN SATURATION: 97 % | DIASTOLIC BLOOD PRESSURE: 71 MMHG | SYSTOLIC BLOOD PRESSURE: 107 MMHG | WEIGHT: 123 LBS | HEIGHT: 64 IN | TEMPERATURE: 98.4 F

## 2024-08-15 DIAGNOSIS — M75.51 SUBACROMIAL BURSITIS OF BOTH SHOULDERS: ICD-10-CM

## 2024-08-15 DIAGNOSIS — M25.511 ACUTE PAIN OF BOTH SHOULDERS: ICD-10-CM

## 2024-08-15 DIAGNOSIS — M25.512 CHRONIC PAIN OF BOTH SHOULDERS: Primary | ICD-10-CM

## 2024-08-15 DIAGNOSIS — R52 PAIN: ICD-10-CM

## 2024-08-15 DIAGNOSIS — G89.29 CHRONIC PAIN OF BOTH SHOULDERS: Primary | ICD-10-CM

## 2024-08-15 DIAGNOSIS — M67.912 DISORDER OF ROTATOR CUFF OF BOTH SHOULDERS: ICD-10-CM

## 2024-08-15 DIAGNOSIS — M75.52 SUBACROMIAL BURSITIS OF BOTH SHOULDERS: ICD-10-CM

## 2024-08-15 DIAGNOSIS — G89.29 CHRONIC BILATERAL LOW BACK PAIN WITHOUT SCIATICA: ICD-10-CM

## 2024-08-15 DIAGNOSIS — M25.561 CHRONIC PAIN OF RIGHT KNEE: ICD-10-CM

## 2024-08-15 DIAGNOSIS — G89.29 CHRONIC PAIN OF RIGHT KNEE: ICD-10-CM

## 2024-08-15 DIAGNOSIS — M25.511 CHRONIC PAIN OF BOTH SHOULDERS: Primary | ICD-10-CM

## 2024-08-15 DIAGNOSIS — M54.50 CHRONIC BILATERAL LOW BACK PAIN WITHOUT SCIATICA: ICD-10-CM

## 2024-08-15 DIAGNOSIS — M25.512 ACUTE PAIN OF BOTH SHOULDERS: ICD-10-CM

## 2024-08-15 DIAGNOSIS — M67.911 DISORDER OF ROTATOR CUFF OF BOTH SHOULDERS: ICD-10-CM

## 2024-08-15 DIAGNOSIS — M54.2 CERVICALGIA: ICD-10-CM

## 2024-08-15 DIAGNOSIS — M17.11 PRIMARY OSTEOARTHRITIS OF RIGHT KNEE: ICD-10-CM

## 2024-08-15 PROCEDURE — 25010000002 METHYLPREDNISOLONE PER 40 MG: Performed by: PHYSICAL MEDICINE & REHABILITATION

## 2024-08-15 PROCEDURE — 25010000002 BUPIVACAINE (PF) 0.25 % SOLUTION: Performed by: PHYSICAL MEDICINE & REHABILITATION

## 2024-08-15 RX ORDER — HYDROCODONE BITARTRATE AND ACETAMINOPHEN 7.5; 325 MG/1; MG/1
1 TABLET ORAL EVERY 8 HOURS PRN
Qty: 90 TABLET | Refills: 0 | Status: SHIPPED | OUTPATIENT
Start: 2024-08-15

## 2024-08-15 RX ORDER — BUPIVACAINE HYDROCHLORIDE 2.5 MG/ML
5 INJECTION, SOLUTION EPIDURAL; INFILTRATION; INTRACAUDAL ONCE
Status: COMPLETED | OUTPATIENT
Start: 2024-08-15 | End: 2024-08-15

## 2024-08-15 RX ORDER — METHYLPREDNISOLONE ACETATE 40 MG/ML
40 INJECTION, SUSPENSION INTRA-ARTICULAR; INTRALESIONAL; INTRAMUSCULAR; SOFT TISSUE ONCE
Status: COMPLETED | OUTPATIENT
Start: 2024-08-15 | End: 2024-08-15

## 2024-08-15 RX ORDER — HYDROXYZINE PAMOATE 50 MG/1
CAPSULE ORAL
COMMUNITY
Start: 2024-07-31

## 2024-08-15 RX ORDER — METHOCARBAMOL 500 MG/1
500 TABLET, FILM COATED ORAL 3 TIMES DAILY PRN
Qty: 270 TABLET | Refills: 3 | Status: SHIPPED | OUTPATIENT
Start: 2024-08-15

## 2024-08-15 RX ADMIN — METHYLPREDNISOLONE ACETATE 40 MG: 40 INJECTION, SUSPENSION INTRA-ARTICULAR; INTRALESIONAL; INTRAMUSCULAR; INTRASYNOVIAL; SOFT TISSUE at 14:43

## 2024-08-15 RX ADMIN — METHYLPREDNISOLONE ACETATE 40 MG: 40 INJECTION, SUSPENSION INTRA-ARTICULAR; INTRALESIONAL; INTRAMUSCULAR; INTRASYNOVIAL; SOFT TISSUE at 14:44

## 2024-08-15 RX ADMIN — BUPIVACAINE HYDROCHLORIDE 5 ML: 2.5 INJECTION, SOLUTION EPIDURAL; INFILTRATION; INTRACAUDAL; PERINEURAL at 14:43

## 2024-08-15 NOTE — H&P
"Subjective   Divya Lomeli is a 68 y.o. female.     History of Present Illness  Right shoulder pain, began around 2017, aggravated with overhead reaching, 9/10 at worst, 0/10 at best, intermittent, varies, aching, stabbing, worse with lifting interferes with ADLs, work. Had PT with HEP. Subluxates, improves with reduction. X-ray C-spine with DDD and DJD. Notes reviewed, as above, also DM2, COPD. Has used Tramadol with some relief, insufficient at BID prn. Trouble getting to sleep. Saw Dr. Kaye, started on Ultracet and Tizanidine 4mg QID prn, but cannot drive on that combination. Saw Dr. Polk, given L shoulder GH injection using U/S, steroid made her \"loony\" for 1-2 days, some improvement, also referred for PT. Had b/l subacromial injections with 24h relief only. Dr. Polk ordered MRI C-spine. Had Norco 5mg which helped, but helped much better with 1.5 tabs. Dx with bladder cancer 2022. Worsening medial right knee pain.  Neck Pain   Pertinent negatives include no chest pain, fever, numbness, trouble swallowing or weakness.        The following portions of the patient's history were reviewed and updated as appropriate: allergies, current medications, past family history, past medical history, past social history, past surgical history and problem list.    Review of Systems   Constitutional:  Negative for chills, fatigue and fever.   HENT:  Negative for hearing loss and trouble swallowing.    Eyes:  Negative for visual disturbance.   Respiratory:  Negative for shortness of breath.    Cardiovascular:  Negative for chest pain.   Gastrointestinal:  Negative for abdominal pain, constipation, diarrhea, nausea and vomiting.   Genitourinary:  Negative for urinary incontinence.   Musculoskeletal:  Positive for arthralgias and neck pain. Negative for back pain, joint swelling and myalgias.   Neurological:  Negative for dizziness, weakness, numbness and headache.       Objective   Physical Exam   Constitutional: She is " oriented to person, place, and time. She appears well-developed and well-nourished.   HENT:   Head: Normocephalic and atraumatic.   Eyes: Pupils are equal, round, and reactive to light.   Cardiovascular: Normal rate, regular rhythm and normal heart sounds.   Pulmonary/Chest: Effort normal and breath sounds normal.   Abdominal: Soft. Normal appearance and bowel sounds are normal. She exhibits no distension. There is no abdominal tenderness.   Musculoskeletal:      Comments: B/l: shoulder limited ROM  R knee: (+) crepitus, (-) edema, warmth, erythema     Neurological: She is alert and oriented to person, place, and time. She has normal reflexes. She displays normal reflexes. No sensory deficit.   Psychiatric: Her behavior is normal. Mood, judgment and thought content normal.         Assessment & Plan   Diagnoses and all orders for this visit:    1. Chronic pain of both shoulders (Primary)    2. Subacromial bursitis of both shoulders    3. Chronic pain of right knee    4. Primary osteoarthritis of right knee    5. Chronic bilateral low back pain without sciatica    Other orders  -     bupivacaine (PF) (MARCAINE) 0.25 % injection 5 mL  -     methylPREDNISolone acetate (DEPO-medrol) injection 40 mg  -     methylPREDNISolone acetate (DEPO-medrol) injection 40 mg        Inspect reviewed, in order. UDS in order 12/5/23.  Reduced to to Tramadol 50mg TID prn. Began Norco 7.5mg TID prn. Filled by PCP with permission of this office.  Patient's symptoms are still adequately managed by current medication regimen, is doing well at this strength and dosage, therefore I will continue to prescribe unchanged as the most appropriate course of treatment.  Restarted Gabapentin 300mg TID. Increased to 400mg TID.  Began Tizanidine 4mg qHS prn instead of Flexeril. Refilled Tizanidine 4mg qHS only, began Robaxin 750mg TID prn during day. Spasms resolved, she will try titrating off.  Ordered RxAlt #1 cream, insurance would not approve, cont  "OTC \"Blue Stuff\" cream.  Performed R subacromial injection with excellent relief, returning, schedule repeat. May need subacromial injection in L shoulder to complement GH injection.   Performed R knee injection with limited relief, upcoming injection.  Perform b/l shoulder injection.  May need TPIs in cervical paraspinals and traps.  MRI C-spine with mod-severe stenosis and neuroforaminal stenosis. She declines ESIs.  RTC in 3 months for f/u.                "

## 2024-08-16 ENCOUNTER — TELEPHONE (OUTPATIENT)
Dept: PAIN MEDICINE | Facility: HOSPITAL | Age: 69
End: 2024-08-16
Payer: MEDICARE
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
## 2024-09-12 ENCOUNTER — HOSPITAL ENCOUNTER (OUTPATIENT)
Dept: CT IMAGING | Facility: HOSPITAL | Age: 69
Discharge: HOME | End: 2024-09-12
Admitting: INTERNAL MEDICINE
Payer: MEDICARE

## 2024-09-12 DIAGNOSIS — R91.8 MULTIPLE LUNG NODULES ON CT: ICD-10-CM

## 2024-09-12 PROCEDURE — 71250 CT THORAX DX C-: CPT

## 2024-09-13 ENCOUNTER — OFFICE VISIT (OUTPATIENT)
Dept: NEUROLOGY | Facility: CLINIC | Age: 69
End: 2024-09-13
Payer: MEDICARE

## 2024-09-13 VITALS
DIASTOLIC BLOOD PRESSURE: 68 MMHG | SYSTOLIC BLOOD PRESSURE: 111 MMHG | HEIGHT: 64 IN | WEIGHT: 125 LBS | HEART RATE: 87 BPM | BODY MASS INDEX: 21.34 KG/M2

## 2024-09-13 DIAGNOSIS — G56.22 ULNAR NEUROPATHY AT WRIST, LEFT: ICD-10-CM

## 2024-09-13 DIAGNOSIS — G25.0 ESSENTIAL TREMOR: Primary | ICD-10-CM

## 2024-09-13 DIAGNOSIS — M48.02 CERVICAL STENOSIS OF SPINAL CANAL: ICD-10-CM

## 2024-09-17 ENCOUNTER — TELEPHONE (OUTPATIENT)
Dept: NEUROLOGY | Facility: CLINIC | Age: 69
End: 2024-09-17
Payer: MEDICARE

## 2024-09-18 ENCOUNTER — LAB (OUTPATIENT)
Dept: FAMILY MEDICINE CLINIC | Facility: CLINIC | Age: 69
End: 2024-09-18
Payer: MEDICARE

## 2024-09-18 ENCOUNTER — OFFICE VISIT (OUTPATIENT)
Dept: FAMILY MEDICINE CLINIC | Facility: CLINIC | Age: 69
End: 2024-09-18
Payer: MEDICARE

## 2024-09-18 VITALS
HEIGHT: 64 IN | WEIGHT: 126.1 LBS | RESPIRATION RATE: 16 BRPM | BODY MASS INDEX: 21.53 KG/M2 | OXYGEN SATURATION: 98 % | DIASTOLIC BLOOD PRESSURE: 46 MMHG | HEART RATE: 74 BPM | SYSTOLIC BLOOD PRESSURE: 92 MMHG | TEMPERATURE: 98.7 F

## 2024-09-18 DIAGNOSIS — G25.0 ESSENTIAL TREMOR: Primary | ICD-10-CM

## 2024-09-18 DIAGNOSIS — R30.0 DYSURIA: ICD-10-CM

## 2024-09-18 DIAGNOSIS — E11.65 TYPE 2 DIABETES MELLITUS WITH HYPERGLYCEMIA, WITH LONG-TERM CURRENT USE OF INSULIN: ICD-10-CM

## 2024-09-18 DIAGNOSIS — Z76.89 ENCOUNTER TO ESTABLISH CARE: Primary | ICD-10-CM

## 2024-09-18 DIAGNOSIS — E78.5 HYPERLIPIDEMIA, UNSPECIFIED HYPERLIPIDEMIA TYPE: ICD-10-CM

## 2024-09-18 DIAGNOSIS — Z76.89 ENCOUNTER TO ESTABLISH CARE: ICD-10-CM

## 2024-09-18 DIAGNOSIS — Z79.4 TYPE 2 DIABETES MELLITUS WITH HYPERGLYCEMIA, WITH LONG-TERM CURRENT USE OF INSULIN: ICD-10-CM

## 2024-09-18 DIAGNOSIS — E08.44 DIABETES MELLITUS DUE TO UNDERLYING CONDITION WITH DIABETIC AMYOTROPHY, UNSPECIFIED WHETHER LONG TERM INSULIN USE: ICD-10-CM

## 2024-09-18 LAB
BILIRUB UR QL STRIP: NEGATIVE
CLARITY UR: CLEAR
COLOR UR: YELLOW
GLUCOSE UR STRIP-MCNC: ABNORMAL MG/DL
HGB UR QL STRIP.AUTO: NEGATIVE
HOLD SPECIMEN: NORMAL
KETONES UR QL STRIP: ABNORMAL
LEUKOCYTE ESTERASE UR QL STRIP.AUTO: NEGATIVE
NITRITE UR QL STRIP: NEGATIVE
PH UR STRIP.AUTO: 6 [PH] (ref 5–8)
PROT UR QL STRIP: NEGATIVE
SP GR UR STRIP: 1.02 (ref 1–1.03)
UROBILINOGEN UR QL STRIP: ABNORMAL

## 2024-09-18 PROCEDURE — 80053 COMPREHEN METABOLIC PANEL: CPT | Performed by: NURSE PRACTITIONER

## 2024-09-18 PROCEDURE — 82570 ASSAY OF URINE CREATININE: CPT | Performed by: NURSE PRACTITIONER

## 2024-09-18 PROCEDURE — 80061 LIPID PANEL: CPT | Performed by: NURSE PRACTITIONER

## 2024-09-18 PROCEDURE — 82043 UR ALBUMIN QUANTITATIVE: CPT | Performed by: NURSE PRACTITIONER

## 2024-09-18 PROCEDURE — 83036 HEMOGLOBIN GLYCOSYLATED A1C: CPT | Performed by: NURSE PRACTITIONER

## 2024-09-18 PROCEDURE — 85025 COMPLETE CBC W/AUTO DIFF WBC: CPT | Performed by: NURSE PRACTITIONER

## 2024-09-18 PROCEDURE — 81003 URINALYSIS AUTO W/O SCOPE: CPT | Performed by: NURSE PRACTITIONER

## 2024-09-18 RX ORDER — GLUCAGON INJECTION, SOLUTION 1 MG/.2ML
1 INJECTION, SOLUTION SUBCUTANEOUS ONCE AS NEEDED
Qty: 0.4 ML | Refills: 1 | Status: SHIPPED | OUTPATIENT
Start: 2024-09-18

## 2024-09-18 RX ORDER — PROPRANOLOL HYDROCHLORIDE 20 MG/1
TABLET ORAL
Qty: 111 TABLET | Refills: 0 | Status: SHIPPED | OUTPATIENT
Start: 2024-09-18 | End: 2024-11-01

## 2024-09-19 ENCOUNTER — TELEPHONE (OUTPATIENT)
Dept: NEUROLOGY | Facility: CLINIC | Age: 69
End: 2024-09-19
Payer: MEDICARE

## 2024-09-19 LAB
ALBUMIN SERPL-MCNC: 4.1 G/DL (ref 3.5–5.2)
ALBUMIN UR-MCNC: <1.2 MG/DL
ALBUMIN/GLOB SERPL: 1.5 G/DL
ALP SERPL-CCNC: 96 U/L (ref 39–117)
ALT SERPL W P-5'-P-CCNC: 51 U/L (ref 1–33)
ANION GAP SERPL CALCULATED.3IONS-SCNC: 13.4 MMOL/L (ref 5–15)
AST SERPL-CCNC: 46 U/L (ref 1–32)
BASOPHILS # BLD AUTO: 0.07 10*3/MM3 (ref 0–0.2)
BASOPHILS NFR BLD AUTO: 1.1 % (ref 0–1.5)
BILIRUB SERPL-MCNC: <0.2 MG/DL (ref 0–1.2)
BUN SERPL-MCNC: 23 MG/DL (ref 8–23)
BUN/CREAT SERPL: 35.4 (ref 7–25)
CALCIUM SPEC-SCNC: 8.9 MG/DL (ref 8.6–10.5)
CHLORIDE SERPL-SCNC: 99 MMOL/L (ref 98–107)
CHOLEST SERPL-MCNC: 177 MG/DL (ref 0–200)
CO2 SERPL-SCNC: 24.6 MMOL/L (ref 22–29)
CREAT SERPL-MCNC: 0.65 MG/DL (ref 0.57–1)
CREAT UR-MCNC: 48.8 MG/DL
DEPRECATED RDW RBC AUTO: 42 FL (ref 37–54)
EGFRCR SERPLBLD CKD-EPI 2021: 94.3 ML/MIN/1.73
EOSINOPHIL # BLD AUTO: 0.32 10*3/MM3 (ref 0–0.4)
EOSINOPHIL NFR BLD AUTO: 5 % (ref 0.3–6.2)
ERYTHROCYTE [DISTWIDTH] IN BLOOD BY AUTOMATED COUNT: 12.7 % (ref 12.3–15.4)
GLOBULIN UR ELPH-MCNC: 2.8 GM/DL
GLUCOSE SERPL-MCNC: 122 MG/DL (ref 65–99)
HBA1C MFR BLD: 5.9 % (ref 4.8–5.6)
HCT VFR BLD AUTO: 40.9 % (ref 34–46.6)
HDLC SERPL-MCNC: 61 MG/DL (ref 40–60)
HGB BLD-MCNC: 13.6 G/DL (ref 12–15.9)
IMM GRANULOCYTES # BLD AUTO: 0.03 10*3/MM3 (ref 0–0.05)
IMM GRANULOCYTES NFR BLD AUTO: 0.5 % (ref 0–0.5)
LDLC SERPL CALC-MCNC: 86 MG/DL (ref 0–100)
LDLC/HDLC SERPL: 1.32 {RATIO}
LYMPHOCYTES # BLD AUTO: 2.46 10*3/MM3 (ref 0.7–3.1)
LYMPHOCYTES NFR BLD AUTO: 38.1 % (ref 19.6–45.3)
MCH RBC QN AUTO: 30.6 PG (ref 26.6–33)
MCHC RBC AUTO-ENTMCNC: 33.3 G/DL (ref 31.5–35.7)
MCV RBC AUTO: 91.9 FL (ref 79–97)
MICROALBUMIN/CREAT UR: NORMAL MG/G{CREAT}
MONOCYTES # BLD AUTO: 0.54 10*3/MM3 (ref 0.1–0.9)
MONOCYTES NFR BLD AUTO: 8.4 % (ref 5–12)
NEUTROPHILS NFR BLD AUTO: 3.03 10*3/MM3 (ref 1.7–7)
NEUTROPHILS NFR BLD AUTO: 46.9 % (ref 42.7–76)
NRBC BLD AUTO-RTO: 0 /100 WBC (ref 0–0.2)
PLATELET # BLD AUTO: 235 10*3/MM3 (ref 140–450)
PMV BLD AUTO: 12.8 FL (ref 6–12)
POTASSIUM SERPL-SCNC: 4.3 MMOL/L (ref 3.5–5.2)
PROT SERPL-MCNC: 6.9 G/DL (ref 6–8.5)
RBC # BLD AUTO: 4.45 10*6/MM3 (ref 3.77–5.28)
SODIUM SERPL-SCNC: 137 MMOL/L (ref 136–145)
TRIGL SERPL-MCNC: 177 MG/DL (ref 0–150)
VLDLC SERPL-MCNC: 30 MG/DL (ref 5–40)
WBC NRBC COR # BLD AUTO: 6.45 10*3/MM3 (ref 3.4–10.8)

## 2024-09-20 NOTE — TELEPHONE ENCOUNTER
Provider: DR DOE     Caller: PATIENT     Pharmacy: [facility]     Phone Number: [phone]    Reason for Call: PATIENT CALLING FOLLOW UP ON STATUS OF THE GABAPENTIN REFILLS SENT IN BY HER PHARMACY LAST WEEK AND TODAY FOR GABAPENTIN 400 MGS. PLEASE CALL AFTER 11:00 AM OR TEXT IF NEEDED. NO VOICEMAIL. PATIENT IS OUT OF MEDICATION AND HAS BEEN FOR A FEW WEEKS.     When was the patient last seen: 9-14-23        
Sent, thanks  
No

## 2024-10-01 PROCEDURE — 88305 TISSUE EXAM BY PATHOLOGIST: CPT | Performed by: UROLOGY

## 2024-10-02 ENCOUNTER — LAB REQUISITION (OUTPATIENT)
Dept: LAB | Facility: HOSPITAL | Age: 69
End: 2024-10-02
Payer: MEDICARE

## 2024-10-02 DIAGNOSIS — N32.9 BLADDER DISORDER, UNSPECIFIED: ICD-10-CM

## 2024-10-04 NOTE — TELEPHONE ENCOUNTER
Caller: Divya Lomeli    Relationship: Self    Best call back number: [phone]     Requested Prescriptions:   Requested Prescriptions     Pending Prescriptions Disp Refills    topiramate (TOPAMAX) 50 MG tablet       Sig: Take 1 tablet by mouth 2 (Two) Times a Day.        Pharmacy where request should be sent: [facility] [address] [place] [address] [phone] [place] [phone]      Last office visit with prescribing clinician: 9/18/2024   Last telemedicine visit with prescribing clinician: Visit date not found   Next office visit with prescribing clinician: 10/30/2024     Does the patient have less than a 3 day supply:  [x] Yes  [] No    Would you like a call back once the refill request has been completed: [] Yes [] No    If the office needs to give you a call back, can they leave a voicemail: [] Yes [] No    Rebekah Escamilla Rep   10/04/24 15:45 EDT

## 2024-10-07 RX ORDER — TOPIRAMATE 50 MG/1
50 TABLET, FILM COATED ORAL 2 TIMES DAILY
OUTPATIENT
Start: 2024-10-07

## 2024-10-22 ENCOUNTER — HOSPITAL ENCOUNTER (OUTPATIENT)
Facility: HOSPITAL | Age: 69
Discharge: HOME | End: 2024-10-22
Attending: EMERGENCY MEDICINE | Admitting: EMERGENCY MEDICINE
Payer: MEDICARE

## 2024-10-22 VITALS
HEIGHT: 64 IN | OXYGEN SATURATION: 97 % | BODY MASS INDEX: 21.17 KG/M2 | TEMPERATURE: 98.4 F | SYSTOLIC BLOOD PRESSURE: 157 MMHG | DIASTOLIC BLOOD PRESSURE: 65 MMHG | RESPIRATION RATE: 16 BRPM | HEART RATE: 70 BPM | WEIGHT: 124 LBS

## 2024-10-22 DIAGNOSIS — G89.29 CHRONIC PAIN OF RIGHT HAND: Primary | ICD-10-CM

## 2024-10-22 DIAGNOSIS — M79.641 CHRONIC PAIN OF RIGHT HAND: Primary | ICD-10-CM

## 2024-10-22 PROCEDURE — G0463 HOSPITAL OUTPT CLINIC VISIT: HCPCS | Performed by: NURSE PRACTITIONER

## 2024-10-22 NOTE — FSED PROVIDER NOTE
Subjective   History of Present Illness  The patient is 69-year-old female who presents to the ER with right hand and wrist pain.  Patient reports she was diagnosed with CRPS in the left and thinks she now has it in the right.  Patient reports she has 3 visits left with her hand therapist.    History provided by:  Patient   used: No        Review of Systems   Musculoskeletal:         Right hand and wrist pain, no hx of new injury or trauma.        Past Medical History:   Diagnosis Date    Anxiety     Arm pain     rt upper arm muscle    Cataracts, bilateral     COPD (chronic obstructive pulmonary disease)     20 to 30 years ago. Does not take meds.    CRPS (complex regional pain syndrome type I)     Diabetes mellitus     Type 2    Diabetic nephropathy     Encounter for immunization 05/24/2019    Hyperlipidemia     Hypertension     Tremors of nervous system        Allergies   Allergen Reactions    Ether Nausea And Vomiting    Other Swelling     wasp       Past Surgical History:   Procedure Laterality Date    APPENDECTOMY  1958    BACK SURGERY      CATARACT EXTRACTION      CHOLECYSTECTOMY WITH INTRAOPERATIVE CHOLANGIOGRAM N/A 10/11/2022    Procedure: CHOLECYSTECTOMY LAPAROSCOPIC;  Surgeon: Jose Angel Wan MD;  Location: [facility] MAIN OR;  Service: General;  Laterality: N/A;    CYSTOSCOPY WITH CLOT EVACUATION N/A 09/28/2021    Procedure: CYSTOSCOPY WITH CLOT EVACUATION;  Surgeon: Jay Torres MD;  Location: [facility] OR;  Service: Urology;  Laterality: N/A;    INTERVENTIONAL RADIOLOGY PROCEDURE N/A 09/28/2021    Procedure: STENT PLACEMENT;  Surgeon: Jay Torres MD;  Location: [facility] MAIN OR;  Service: Urology;  Laterality: N/A;    LUMBAR SPINE SURGERY  1959    L5 removed-Abstracted from Cent    OOPHORECTOMY      TONSILLECTOMY      TOTAL ABDOMINAL HYSTERECTOMY      TRANSURETHRAL RESECTION OF BLADDER TUMOR N/A 09/28/2021    Procedure: CYSTOSCOPY TRANSURETHRAL RESECTION OF BLADDER  TUMOR;  Surgeon: Jay Torres MD;  Location: [facility] MAIN OR;  Service: Urology;  Laterality: N/A;       Family History   Problem Relation Age of Onset    Diabetes Mother     Diabetes Father        Social History     Socioeconomic History    Marital status:    Tobacco Use    Smoking status: Former     Current packs/day: 0.00     Average packs/day: 4.5 packs/day for 21.0 years (94.5 ttl pk-yrs)     Types: Cigarettes     Start date:      Quit date:      Years since quittin.8     Passive exposure: Past    Smokeless tobacco: Never    Tobacco comments:     2nd Hand Smoke - boyfriend smokes.   Vaping Use    Vaping status: Never Used   Substance and Sexual Activity    Alcohol use: Not Currently     Comment: occ    Drug use: Not Currently     Types: Marijuana     Comment: 1971    Sexual activity: Defer           Objective   Physical Exam  Vitals and nursing note reviewed.   Constitutional:       Appearance: Normal appearance.   HENT:      Head: Normocephalic.   Musculoskeletal:         General: Tenderness present.      Comments: Patient with right wrist/hand pain. Denies any new injury or trauma to the area. Patient with positive radial pulses noted, cap refill less than 3 seconds. Patient has full ROM of the wrist/hand/fingers.    Skin:     General: Skin is warm and dry.   Neurological:      General: No focal deficit present.      Mental Status: She is alert and oriented to person, place, and time.   Psychiatric:         Mood and Affect: Mood normal.         Behavior: Behavior normal. Behavior is cooperative.         Procedures           ED Course                                           Medical Decision Making  Patient with right wrist/hand pain that has been ongoing for the past 2-3 months.  Denies any new injury or trauma to the area. Patient with positive radial pulses noted, cap refill less than 3 seconds. Patient has full ROM of the wrist/hand/fingers.     Differential diagnosis  includes but not limited to carpal tunnel syndrome, CRPs, neuropathy.     Advised patient needs to follow-up with primary care for further evaluation and treatment, and possible referral back to her hand therapist.  Advised patient that therapy and any type of medications and treatment needs to go through her primary care/specialist.  Advised patient that she needs to continue doing her therapy at home as instructed by her hand therapist      Problems Addressed:  Chronic pain of right hand: acute illness or injury    Risk  OTC drugs.        Final diagnoses:   Chronic pain of right hand       ED Disposition  ED Disposition       ED Disposition   Discharge    Condition   Stable    Comment   --               PATIENT CONNECTION - [facility] [id number]  [phone]  In 1 week  As needed, If symptoms worsen, if you call this number they will help you find a primary care physician if needed.         Medication List      No changes were made to your prescriptions during this visit.

## 2024-10-22 NOTE — DISCHARGE INSTRUCTIONS
Call today for a follow up appointment with your primary care for further evaluation and treatment.  They would be the one that can send you to physical therapy for further treatment.    Continue your exercises and TENS unit for your hand therapist.     Tylenol/Motrin as needed for pain/fevers    Make sure patient is drinking plenty of fluids.    Return for any new or worsening symptoms.

## 2024-10-28 RX ORDER — GABAPENTIN 400 MG/1
400 CAPSULE ORAL 3 TIMES DAILY
Qty: 270 CAPSULE | Refills: 1 | Status: CANCELLED | OUTPATIENT
Start: 2024-10-28

## 2024-10-28 NOTE — TELEPHONE ENCOUNTER
Caller: DANNY LEO    Relationship: PATIENT    Best call back number: [phone]    Requested Prescriptions:   Requested Prescriptions     Pending Prescriptions Disp Refills    gabapentin (NEURONTIN) 400 MG capsule 270 capsule 1     Sig: Take 1 capsule by mouth 3 (Three) Times a Day.        Pharmacy where request should be sent:  [facility] [phone]    Last office visit with prescribing clinician: 12/5/2023   Last telemedicine visit with prescribing clinician: Visit date not found   Next office visit with prescribing clinician: 11/12/2024     Additional details provided by patient:  PATIENT HAS #2 TABS LEFT    Does the patient have less than a 3 day supply:  [x] Yes  [] No    Would you like a call back once the refill request has been completed: [] Yes [] No    If the office needs to give you a call back, can they leave a voicemail: [] Yes [] No    Jessica Bruce   10/28/24 14:24 EDT

## 2024-10-28 NOTE — TELEPHONE ENCOUNTER
Spoke to pt and informed that she has a refill on file at [facility], she just needs to call her pharmacy and request a refill.  Pt voiced understanding

## 2024-10-30 ENCOUNTER — LAB (OUTPATIENT)
Dept: FAMILY MEDICINE CLINIC | Facility: CLINIC | Age: 69
End: 2024-10-30
Payer: MEDICARE

## 2024-10-30 ENCOUNTER — OFFICE VISIT (OUTPATIENT)
Dept: FAMILY MEDICINE CLINIC | Facility: CLINIC | Age: 69
End: 2024-10-30
Payer: MEDICARE

## 2024-10-30 VITALS
HEART RATE: 98 BPM | OXYGEN SATURATION: 97 % | TEMPERATURE: 98.6 F | DIASTOLIC BLOOD PRESSURE: 64 MMHG | SYSTOLIC BLOOD PRESSURE: 110 MMHG

## 2024-10-30 DIAGNOSIS — G72.89 OTHER SPECIFIED MYOPATHIES: ICD-10-CM

## 2024-10-30 DIAGNOSIS — R53.83 OTHER FATIGUE: Primary | ICD-10-CM

## 2024-10-30 PROBLEM — M25.531 BILATERAL WRIST PAIN: Status: ACTIVE | Noted: 2024-10-30

## 2024-10-30 PROBLEM — M25.532 BILATERAL WRIST PAIN: Status: ACTIVE | Noted: 2024-10-30

## 2024-10-30 LAB
BASOPHILS # BLD AUTO: 0.07 10*3/MM3 (ref 0–0.2)
BASOPHILS NFR BLD AUTO: 1 % (ref 0–1.5)
BILIRUB UR QL STRIP: NEGATIVE
CLARITY UR: CLEAR
COLOR UR: YELLOW
DEPRECATED RDW RBC AUTO: 39.1 FL (ref 37–54)
EOSINOPHIL # BLD AUTO: 0.22 10*3/MM3 (ref 0–0.4)
EOSINOPHIL NFR BLD AUTO: 3.3 % (ref 0.3–6.2)
ERYTHROCYTE [DISTWIDTH] IN BLOOD BY AUTOMATED COUNT: 11.7 % (ref 12.3–15.4)
EXPIRATION DATE: NORMAL
FLUAV AG UPPER RESP QL IA.RAPID: NOT DETECTED
FLUBV AG UPPER RESP QL IA.RAPID: NOT DETECTED
GLUCOSE UR STRIP-MCNC: ABNORMAL MG/DL
HCT VFR BLD AUTO: 38.1 % (ref 34–46.6)
HGB BLD-MCNC: 12.4 G/DL (ref 12–15.9)
HGB UR QL STRIP.AUTO: NEGATIVE
HOLD SPECIMEN: NORMAL
IMM GRANULOCYTES # BLD AUTO: 0.04 10*3/MM3 (ref 0–0.05)
IMM GRANULOCYTES NFR BLD AUTO: 0.6 % (ref 0–0.5)
INTERNAL CONTROL: NORMAL
KETONES UR QL STRIP: NEGATIVE
LEUKOCYTE ESTERASE UR QL STRIP.AUTO: NEGATIVE
LYMPHOCYTES # BLD AUTO: 2.53 10*3/MM3 (ref 0.7–3.1)
LYMPHOCYTES NFR BLD AUTO: 37.8 % (ref 19.6–45.3)
Lab: NORMAL
MCH RBC QN AUTO: 30.2 PG (ref 26.6–33)
MCHC RBC AUTO-ENTMCNC: 32.5 G/DL (ref 31.5–35.7)
MCV RBC AUTO: 92.9 FL (ref 79–97)
MONOCYTES # BLD AUTO: 0.54 10*3/MM3 (ref 0.1–0.9)
MONOCYTES NFR BLD AUTO: 8.1 % (ref 5–12)
NEUTROPHILS NFR BLD AUTO: 3.3 10*3/MM3 (ref 1.7–7)
NEUTROPHILS NFR BLD AUTO: 49.2 % (ref 42.7–76)
NITRITE UR QL STRIP: NEGATIVE
NRBC BLD AUTO-RTO: 0 /100 WBC (ref 0–0.2)
PH UR STRIP.AUTO: 5.5 [PH] (ref 5–8)
PLATELET # BLD AUTO: 225 10*3/MM3 (ref 140–450)
PMV BLD AUTO: 12.4 FL (ref 6–12)
PROT UR QL STRIP: NEGATIVE
RBC # BLD AUTO: 4.1 10*6/MM3 (ref 3.77–5.28)
SARS-COV-2 AG UPPER RESP QL IA.RAPID: NOT DETECTED
SP GR UR STRIP: >1.03 (ref 1–1.03)
UROBILINOGEN UR QL STRIP: ABNORMAL
WBC NRBC COR # BLD AUTO: 6.7 10*3/MM3 (ref 3.4–10.8)

## 2024-10-30 PROCEDURE — 81003 URINALYSIS AUTO W/O SCOPE: CPT | Performed by: NURSE PRACTITIONER

## 2024-10-30 PROCEDURE — 80053 COMPREHEN METABOLIC PANEL: CPT | Performed by: NURSE PRACTITIONER

## 2024-10-30 PROCEDURE — 85025 COMPLETE CBC W/AUTO DIFF WBC: CPT | Performed by: NURSE PRACTITIONER

## 2024-10-30 NOTE — ASSESSMENT & PLAN NOTE
Patient reports bilateral wrist pain, but no known injury.    EMG nerve conduction test ordered.    May consider TENS.  May refer to PT.    Patient reports taking Hydrocodone, as needed prescribed by Dr Shields - Pain Management.

## 2024-10-30 NOTE — ASSESSMENT & PLAN NOTE
Patient reports nausea and fatigue.  She reports taking Norco, only as needed and states that she hasn't taken any medications today.  She states he last dose of Norco was yesterday.    CBC, CMP, POC glucose and U/a with culture ordered and obtained.    POC Covid, Flu ordered and obtained and was NEG.

## 2024-10-30 NOTE — PROGRESS NOTES
"Chief Complaint  Follow-up (6 WEEK F/U)    Subjective        Divya Lomeli presents to [facility]  History of Present Illness  68 y/o Divya who has established care with this  office and author of this note back in September of this year.  Patient reports feeling fatigue and weakness today.  Patient reports that she hasn't eaten or drank anything today, but \"1/2 of a Dr Pepper\".  Patient reports some slight nausea.  Patient denies fever, cough or congestion.  Patient denies abdominal pain, diarrhea or vomiting.  Follow-upMedication compliance: fair. Side effects of treatment: dry mouth, fatigue, joint pain and nausea. Treatment barriers: poor diet and lack of exercise. Exercises: never.       Objective   Vital Signs:  /64 (BP Location: Left arm, Patient Position: Sitting, Cuff Size: Adult)   Pulse 98   Temp 98.6 °F (37 °C) (Oral)   SpO2 97%   Estimated body mass index is 21.28 kg/m² as calculated from the following:    Height as of 10/22/24: 162.6 cm (64\").    Weight as of 10/22/24: 56.2 kg (124 lb).    BMI is within normal parameters. No other follow-up for BMI required.      Physical Exam  Constitutional:       General: She is not in acute distress.     Appearance: She is ill-appearing. She is not diaphoretic.   HENT:      Head: Normocephalic and atraumatic.      Right Ear: Tympanic membrane, ear canal and external ear normal. There is no impacted cerumen.      Left Ear: Tympanic membrane, ear canal and external ear normal. There is no impacted cerumen.      Nose: No congestion or rhinorrhea.      Mouth/Throat:      Mouth: Mucous membranes are dry.   Eyes:      Extraocular Movements: Extraocular movements intact.      Conjunctiva/sclera: Conjunctivae normal.      Pupils: Pupils are equal, round, and reactive to light.   Cardiovascular:      Rate and Rhythm: Normal rate.   Pulmonary:      Effort: Pulmonary effort is normal.      Breath sounds: No wheezing, rhonchi or " rales.   Abdominal:      Palpations: Abdomen is soft.      Tenderness: There is no abdominal tenderness. There is no guarding or rebound.   Skin:     General: Skin is warm and dry.      Capillary Refill: Capillary refill takes 2 to 3 seconds.      Coloration: Skin is pale.      Findings: No erythema or rash.   Neurological:      General: No focal deficit present.      Mental Status: She is alert and oriented to person, place, and time.      Motor: Weakness present.      Gait: Gait abnormal.   Psychiatric:         Mood and Affect: Mood normal.         Behavior: Behavior normal.         Thought Content: Thought content normal.         Judgment: Judgment normal.        Result Review :  The following data was reviewed by: WYATT Gregg on 10/30/2024:  CMP          3/20/2024    10:27 9/18/2024    13:25 10/30/2024    15:52   CMP   Glucose 164  122  183    BUN 21  23  23    Creatinine 0.68  0.65  0.68    EGFR 93.8  94.3  93.2    Sodium 140  137  135    Potassium 4.0  4.3  4.2    Chloride 101  99  99    Calcium 9.3  8.9  8.9    Total Protein  6.9  6.5    Albumin  4.1  3.8    Globulin  2.8  2.7    Total Bilirubin  <0.2  <0.2    Alkaline Phosphatase  96  100    AST (SGOT)  46  23    ALT (SGPT)  51  40    Albumin/Globulin Ratio  1.5  1.4    BUN/Creatinine Ratio 30.9  35.4  33.8    Anion Gap 15.8  13.4  11.2      CBC w/diff          3/20/2024    10:27 5/6/2024    15:41 9/18/2024    13:25   CBC w/Diff   WBC 6.77  8.51  6.45    RBC 4.37  4.31  4.45    Hemoglobin 12.9  13.1  13.6    Hematocrit 40.6  41.6  40.9    MCV 92.9  96.5  91.9    MCH 29.5  30.4  30.6    MCHC 31.8  31.5  33.3    RDW 12.6  12.7  12.7    Platelets 347  308  235    Neutrophil Rel % 46.4  54.5  46.9    Immature Granulocyte Rel % 0.6  0.2  0.5    Lymphocyte Rel % 38.3  34.1  38.1    Monocyte Rel % 9.9  7.6  8.4    Eosinophil Rel % 3.8  2.9  5.0    Basophil Rel % 1.0  0.7  1.1      Lipid Panel          9/18/2024    13:25   Lipid Panel   Total Cholesterol  177    Triglycerides 177    HDL Cholesterol 61    VLDL Cholesterol 30    LDL Cholesterol  86    LDL/HDL Ratio 1.32        A1C Last 3 Results          9/18/2024    13:25   HGBA1C Last 3 Results   Hemoglobin A1C 5.90      UA          9/18/2024    13:25 10/30/2024    15:52   Urinalysis   Specific Gravity, UA 1.024  >1.030    Ketones, UA Trace  Negative    Blood, UA Negative  Negative    Leukocytes, UA Negative  Negative    Nitrite, UA Negative  Negative              Assessment and Plan   Diagnoses and all orders for this visit:    1. Other fatigue (Primary)  Assessment & Plan:  Patient reports nausea and fatigue.  She reports taking Norco, only as needed and states that she hasn't taken any medications today.  She states he last dose of Norco was yesterday.    CBC, CMP, POC glucose and U/a with culture ordered and obtained.    POC Covid, Flu ordered and obtained and was NEG.    Orders:  -     Urinalysis With Culture If Indicated - Urine, Clean Catch  -     CBC & Differential  -     Comprehensive Metabolic Panel  -     POCT SARS-CoV-2 + Flu Antigen LOTTIE  -     Cancel: POCT Glucose  -     [place] Urine Culture Tube - Urine, Clean Catch    2. Other specified myopathies  Assessment & Plan:  Patient reports bilateral wrist pain, but no known injury.    EMG nerve conduction test ordered.    May consider TENS.  May refer to PT.    Patient reports taking Hydrocodone, as needed prescribed by Dr Shields - Pain Management.      Orders:  -     EMG & Nerve Conduction Test; Future      CBC, CMP obtained from this visit are WNL.  Glucose is elevated, but patient reports only drinking a Dr Pepper right before coming into appointment.  Specific gravity is elevated on U/a - most likely dehydration.     I spent 45 minutes caring for Divya on this date of service. This time includes time spent by me in the following activities:preparing for the visit, reviewing tests, obtaining and/or reviewing a separately obtained history, performing a  medically appropriate examination and/or evaluation , counseling and educating the patient/family/caregiver, ordering medications, tests, or procedures, referring and communicating with other health care professionals , documenting information in the medical record, and independently interpreting results and communicating that information with the patient/family/caregiver  Follow Up   Return in about 2 weeks (around 11/13/2024).  Patient was given instructions and counseling regarding her condition or for health maintenance advice. Please see specific information pulled into the AVS if appropriate.

## 2024-10-31 LAB
ALBUMIN SERPL-MCNC: 3.8 G/DL (ref 3.5–5.2)
ALBUMIN/GLOB SERPL: 1.4 G/DL
ALP SERPL-CCNC: 100 U/L (ref 39–117)
ALT SERPL W P-5'-P-CCNC: 40 U/L (ref 1–33)
ANION GAP SERPL CALCULATED.3IONS-SCNC: 11.2 MMOL/L (ref 5–15)
AST SERPL-CCNC: 23 U/L (ref 1–32)
BILIRUB SERPL-MCNC: <0.2 MG/DL (ref 0–1.2)
BUN SERPL-MCNC: 23 MG/DL (ref 8–23)
BUN/CREAT SERPL: 33.8 (ref 7–25)
CALCIUM SPEC-SCNC: 8.9 MG/DL (ref 8.6–10.5)
CHLORIDE SERPL-SCNC: 99 MMOL/L (ref 98–107)
CO2 SERPL-SCNC: 24.8 MMOL/L (ref 22–29)
CREAT SERPL-MCNC: 0.68 MG/DL (ref 0.57–1)
EGFRCR SERPLBLD CKD-EPI 2021: 93.2 ML/MIN/1.73
GLOBULIN UR ELPH-MCNC: 2.7 GM/DL
GLUCOSE SERPL-MCNC: 183 MG/DL (ref 65–99)
POTASSIUM SERPL-SCNC: 4.2 MMOL/L (ref 3.5–5.2)
PROT SERPL-MCNC: 6.5 G/DL (ref 6–8.5)
SODIUM SERPL-SCNC: 135 MMOL/L (ref 136–145)

## 2024-11-06 ENCOUNTER — PATIENT ROUNDING (BHMG ONLY) (OUTPATIENT)
Dept: FAMILY MEDICINE CLINIC | Facility: CLINIC | Age: 69
End: 2024-11-06
Payer: MEDICARE

## 2024-11-12 ENCOUNTER — OFFICE VISIT (OUTPATIENT)
Dept: PAIN MEDICINE | Facility: CLINIC | Age: 69
End: 2024-11-12
Payer: MEDICARE

## 2024-11-12 VITALS
OXYGEN SATURATION: 95 % | DIASTOLIC BLOOD PRESSURE: 76 MMHG | WEIGHT: 124 LBS | RESPIRATION RATE: 16 BRPM | HEART RATE: 77 BPM | BODY MASS INDEX: 21.28 KG/M2 | SYSTOLIC BLOOD PRESSURE: 136 MMHG

## 2024-11-12 DIAGNOSIS — G89.29 CHRONIC PAIN OF RIGHT KNEE: ICD-10-CM

## 2024-11-12 DIAGNOSIS — M54.2 CERVICALGIA: ICD-10-CM

## 2024-11-12 DIAGNOSIS — M19.011 OSTEOARTHRITIS OF BILATERAL GLENOHUMERAL JOINTS: ICD-10-CM

## 2024-11-12 DIAGNOSIS — M75.52 SUBACROMIAL BURSITIS OF BOTH SHOULDERS: ICD-10-CM

## 2024-11-12 DIAGNOSIS — M54.50 CHRONIC BILATERAL LOW BACK PAIN WITHOUT SCIATICA: ICD-10-CM

## 2024-11-12 DIAGNOSIS — G89.29 CHRONIC BILATERAL LOW BACK PAIN WITHOUT SCIATICA: ICD-10-CM

## 2024-11-12 DIAGNOSIS — M19.012 OSTEOARTHRITIS OF BILATERAL GLENOHUMERAL JOINTS: ICD-10-CM

## 2024-11-12 DIAGNOSIS — M25.532 BILATERAL WRIST PAIN: ICD-10-CM

## 2024-11-12 DIAGNOSIS — M25.561 CHRONIC PAIN OF RIGHT KNEE: ICD-10-CM

## 2024-11-12 DIAGNOSIS — M25.512 CHRONIC PAIN OF BOTH SHOULDERS: ICD-10-CM

## 2024-11-12 DIAGNOSIS — M25.511 CHRONIC PAIN OF BOTH SHOULDERS: ICD-10-CM

## 2024-11-12 DIAGNOSIS — M17.11 PRIMARY OSTEOARTHRITIS OF RIGHT KNEE: ICD-10-CM

## 2024-11-12 DIAGNOSIS — G89.29 CHRONIC PAIN OF BOTH SHOULDERS: ICD-10-CM

## 2024-11-12 DIAGNOSIS — Z79.899 HIGH RISK MEDICATION USE: Primary | ICD-10-CM

## 2024-11-12 DIAGNOSIS — M25.531 BILATERAL WRIST PAIN: ICD-10-CM

## 2024-11-12 DIAGNOSIS — M75.51 SUBACROMIAL BURSITIS OF BOTH SHOULDERS: ICD-10-CM

## 2024-11-12 RX ORDER — GABAPENTIN 400 MG/1
400 CAPSULE ORAL 3 TIMES DAILY
Qty: 270 CAPSULE | Refills: 1 | Status: SHIPPED | OUTPATIENT
Start: 2024-11-12

## 2024-11-12 RX ORDER — HYDROCODONE BITARTRATE AND ACETAMINOPHEN 7.5; 325 MG/1; MG/1
1 TABLET ORAL EVERY 8 HOURS PRN
Qty: 90 TABLET | Refills: 0 | Status: SHIPPED | OUTPATIENT
Start: 2024-11-12

## 2024-11-12 NOTE — PROGRESS NOTES
"Subjective   Divya Lomeli is a 69 y.o. female.     History of Present Illness  Right shoulder pain, began around 2017, aggravated with overhead reaching, 9/10 at worst, 0/10 at best, intermittent, varies, aching, stabbing, worse with lifting interferes with ADLs, work. Had PT with HEP. Subluxates, improves with reduction. X-ray C-spine with DDD and DJD. Notes reviewed, as above, also DM2, COPD. Has used Tramadol with some relief, insufficient at BID prn. Trouble getting to sleep. Saw Dr. Kaye, started on Ultracet and Tizanidine 4mg QID prn, but cannot drive on that combination. Saw Dr. Polk, given L shoulder GH injection using U/S, steroid made her \"loony\" for 1-2 days, some improvement, also referred for PT. Had b/l subacromial injections with 24h relief only. Dr. Polk ordered MRI C-spine. Had Norco 5mg which helped, but helped much better with 1.5 tabs. Dx with bladder cancer 2022. Worsening medial right knee pain. Upcoming NCS/EMG of BUE.   Neck Pain   Pertinent negatives include no chest pain, fever, numbness, trouble swallowing or weakness.   Back Pain  Pertinent negatives include no abdominal pain, bladder incontinence, chest pain, fever, numbness or weakness.   Wrist Pain   Pertinent negatives include no fever or numbness.        The following portions of the patient's history were reviewed and updated as appropriate: allergies, current medications, past family history, past medical history, past social history, past surgical history and problem list.    Review of Systems   Constitutional:  Negative for chills, fatigue and fever.   HENT:  Negative for hearing loss and trouble swallowing.    Eyes:  Negative for visual disturbance.   Respiratory:  Negative for shortness of breath.    Cardiovascular:  Negative for chest pain.   Gastrointestinal:  Negative for abdominal pain, constipation, diarrhea, nausea and vomiting.   Genitourinary:  Negative for urinary incontinence.   Musculoskeletal:  Positive for " arthralgias, back pain and neck pain. Negative for joint swelling and myalgias.   Neurological:  Negative for dizziness, weakness, numbness and headache.       Objective   Physical Exam   Constitutional: She is oriented to person, place, and time. She appears well-developed and well-nourished.   HENT:   Head: Normocephalic and atraumatic.   Eyes: Pupils are equal, round, and reactive to light.   Cardiovascular: Normal rate, regular rhythm and normal heart sounds.   Pulmonary/Chest: Effort normal and breath sounds normal.   Abdominal: Soft. Normal appearance and bowel sounds are normal. She exhibits no distension. There is no abdominal tenderness.   Musculoskeletal:      Comments: B/l: shoulder limited ROM  R knee: (+) crepitus, (-) edema, warmth, erythema     Neurological: She is alert and oriented to person, place, and time. She has normal reflexes. She displays normal reflexes. No sensory deficit.   Psychiatric: Her behavior is normal. Mood, judgment and thought content normal.         Assessment & Plan   Diagnoses and all orders for this visit:    1. High risk medication use (Primary)  -     Urine Drug Screen - Urine, Clean Catch; Future    2. Chronic bilateral low back pain without sciatica    3. Cervicalgia    4. Bilateral wrist pain    5. Chronic pain of both shoulders    6. Chronic pain of right knee    7. Osteoarthritis of bilateral glenohumeral joints    8. Primary osteoarthritis of right knee    9. Subacromial bursitis of both shoulders        Inspect reviewed, in order. UDS in order 12/5/23.  Reduced to to Tramadol 50mg TID prn. Began Norco 7.5mg TID prn. Filled by PCP with permission of this office. Filled 8/19/24.   Patient's symptoms are still adequately managed by current medication regimen, is doing well at this strength and dosage, therefore I will continue to prescribe unchanged as the most appropriate course of treatment.  Restarted Gabapentin 300mg TID. Increased to 400mg TID.  Began Tizanidine  "4mg qHS prn instead of Flexeril. Refilled Tizanidine 4mg qHS only, began Robaxin 750mg TID prn during day. Spasms resolved, she will try titrating off.  Ordered RxAlt #1 cream, insurance would not approve, cont OTC \"Blue Stuff\" cream.  Performed R subacromial injection with excellent relief, returning, performed repeat b/l. May need subacromial injection in L shoulder to complement GH injection.   Performed R knee injection with limited relief, upcoming injection.  May need TPIs in cervical paraspinals and traps.  MRI C-spine with mod-severe stenosis and neuroforaminal stenosis. She declines ESIs.  RTC in 3 months for f/u.                "

## 2024-11-18 ENCOUNTER — OFFICE VISIT (OUTPATIENT)
Dept: FAMILY MEDICINE CLINIC | Facility: CLINIC | Age: 69
End: 2024-11-18
Payer: MEDICARE

## 2024-11-18 ENCOUNTER — TELEPHONE (OUTPATIENT)
Dept: FAMILY MEDICINE CLINIC | Facility: CLINIC | Age: 69
End: 2024-11-18

## 2024-11-18 VITALS
OXYGEN SATURATION: 98 % | SYSTOLIC BLOOD PRESSURE: 98 MMHG | TEMPERATURE: 97.7 F | HEART RATE: 76 BPM | DIASTOLIC BLOOD PRESSURE: 54 MMHG | WEIGHT: 130.4 LBS | RESPIRATION RATE: 16 BRPM | BODY MASS INDEX: 22.38 KG/M2

## 2024-11-18 DIAGNOSIS — M25.531 CHRONIC PAIN OF RIGHT WRIST: Primary | ICD-10-CM

## 2024-11-18 DIAGNOSIS — Z23 ENCOUNTER FOR IMMUNIZATION: ICD-10-CM

## 2024-11-18 DIAGNOSIS — G89.29 CHRONIC PAIN OF RIGHT WRIST: Primary | ICD-10-CM

## 2024-11-18 DIAGNOSIS — Z00.00 HEALTH CARE MAINTENANCE: ICD-10-CM

## 2024-11-18 PROCEDURE — 90662 IIV NO PRSV INCREASED AG IM: CPT | Performed by: NURSE PRACTITIONER

## 2024-11-18 PROCEDURE — 3078F DIAST BP <80 MM HG: CPT | Performed by: NURSE PRACTITIONER

## 2024-11-18 PROCEDURE — G0008 ADMIN INFLUENZA VIRUS VAC: HCPCS | Performed by: NURSE PRACTITIONER

## 2024-11-18 PROCEDURE — 3044F HG A1C LEVEL LT 7.0%: CPT | Performed by: NURSE PRACTITIONER

## 2024-11-18 PROCEDURE — 1159F MED LIST DOCD IN RCRD: CPT | Performed by: NURSE PRACTITIONER

## 2024-11-18 PROCEDURE — 1160F RVW MEDS BY RX/DR IN RCRD: CPT | Performed by: NURSE PRACTITIONER

## 2024-11-18 PROCEDURE — 99214 OFFICE O/P EST MOD 30 MIN: CPT | Performed by: NURSE PRACTITIONER

## 2024-11-18 PROCEDURE — 1125F AMNT PAIN NOTED PAIN PRSNT: CPT | Performed by: NURSE PRACTITIONER

## 2024-11-18 PROCEDURE — 3074F SYST BP LT 130 MM HG: CPT | Performed by: NURSE PRACTITIONER

## 2024-11-18 NOTE — PROGRESS NOTES
Injection  Injection performed in LD by Yuly Fall MA. Patient tolerated the procedure well without complications.  11/18/24   Yuly Fall MA

## 2024-11-18 NOTE — ASSESSMENT & PLAN NOTE
Continue to see Hand Therapist with [facility] - performs TENS at home.  Katy at [facility] for f/u.

## 2024-11-18 NOTE — TELEPHONE ENCOUNTER
From: Juaquin Parker  To: Arline Bowedn  Sent: 10/12/2022 5:18 PM CDT  Subject: Refill     Mr Parker needs a refill for metoprolol 25mg . Please send to [facility] pharmacy. Thank you    ROUTING TO CORRECT PRACTICE.   No

## 2024-11-18 NOTE — PROGRESS NOTES
"Chief Complaint  Follow-up    Subjective        Divya Lomeli presents to [facility]  History of Present Illness  68 y/o  female presents to  clinic for follow up from 2 weeks ago.  At her visit 2 weeks ago, she was feeling weak and needed assistance with ambulation, use of wheelchair.  Follow-up  Conditions present:  Other chronic condition  Medication compliance: fair. Side effects of treatment: fatigue. Treatment compliance: most of the time. Treatment barriers: poor diet and lack of exercise. Exercises: rarely.       Objective   Vital Signs:  BP 98/54 (BP Location: Left arm, Patient Position: Sitting, Cuff Size: Adult)   Pulse 76   Temp 97.7 °F (36.5 °C) (Oral)   Resp 16   Wt 59.1 kg (130 lb 6.4 oz)   SpO2 98%   BMI 22.38 kg/m²   Estimated body mass index is 22.38 kg/m² as calculated from the following:    Height as of 10/22/24: 162.6 cm (64\").    Weight as of this encounter: 59.1 kg (130 lb 6.4 oz).    BMI is within normal parameters. No other follow-up for BMI required.      Physical Exam  Vitals reviewed.   Constitutional:       General: She is not in acute distress.     Appearance: Normal appearance. She is not ill-appearing, toxic-appearing or diaphoretic.   HENT:      Head: Normocephalic and atraumatic.   Cardiovascular:      Rate and Rhythm: Normal rate.   Skin:     General: Skin is dry.      Coloration: Skin is pale.   Neurological:      General: No focal deficit present.      Mental Status: She is alert and oriented to person, place, and time.      Motor: No weakness.      Gait: Gait normal.   Psychiatric:         Mood and Affect: Mood normal.         Behavior: Behavior normal.         Thought Content: Thought content normal.         Judgment: Judgment normal.        Result Review :  The following data was reviewed by: WYATT Gregg on 11/18/2024:  CMP          3/20/2024    10:27 9/18/2024    13:25 10/30/2024    15:52   CMP   Glucose 164  122  " 183    BUN 21  23  23    Creatinine 0.68  0.65  0.68    EGFR 93.8  94.3  93.2    Sodium 140  137  135    Potassium 4.0  4.3  4.2    Chloride 101  99  99    Calcium 9.3  8.9  8.9    Total Protein  6.9  6.5    Albumin  4.1  3.8    Globulin  2.8  2.7    Total Bilirubin  <0.2  <0.2    Alkaline Phosphatase  96  100    AST (SGOT)  46  23    ALT (SGPT)  51  40    Albumin/Globulin Ratio  1.5  1.4    BUN/Creatinine Ratio 30.9  35.4  33.8    Anion Gap 15.8  13.4  11.2      CBC w/diff          5/6/2024    15:41 9/18/2024    13:25 10/30/2024    15:52   CBC w/Diff   WBC 8.51  6.45  6.70    RBC 4.31  4.45  4.10    Hemoglobin 13.1  13.6  12.4    Hematocrit 41.6  40.9  38.1    MCV 96.5  91.9  92.9    MCH 30.4  30.6  30.2    MCHC 31.5  33.3  32.5    RDW 12.7  12.7  11.7    Platelets 308  235  225    Neutrophil Rel % 54.5  46.9  49.2    Immature Granulocyte Rel % 0.2  0.5  0.6    Lymphocyte Rel % 34.1  38.1  37.8    Monocyte Rel % 7.6  8.4  8.1    Eosinophil Rel % 2.9  5.0  3.3    Basophil Rel % 0.7  1.1  1.0      Lipid Panel          9/18/2024    13:25   Lipid Panel   Total Cholesterol 177    Triglycerides 177    HDL Cholesterol 61    VLDL Cholesterol 30    LDL Cholesterol  86    LDL/HDL Ratio 1.32        A1C Last 3 Results          9/18/2024    13:25   HGBA1C Last 3 Results   Hemoglobin A1C 5.90      UA          9/18/2024    13:25 10/30/2024    15:52   Urinalysis   Specific Gravity, UA 1.024  >1.030    Ketones, UA Trace  Negative    Blood, UA Negative  Negative    Leukocytes, UA Negative  Negative    Nitrite, UA Negative  Negative              Assessment and Plan   Diagnoses and all orders for this visit:    1. Chronic pain of right wrist (Primary)  Assessment & Plan:  Continue to see Hand Therapist with [facility] - performs TENS at home.  Katy at [facility] for f/u.      2. Health care maintenance  -     Fluzone High-Dose 65+yrs ([id number])    3. Encounter for immunization  -     Fluzone High-Dose 65+yrs  ([id number])           I spent 30 minutes caring for Divya on this date of service. This time includes time spent by me in the following activities:preparing for the visit, reviewing tests, counseling and educating the patient/family/caregiver, ordering medications, tests, or procedures, and documenting information in the medical record  Follow Up   Return for Medicare Wellness.  Patient was given instructions and counseling regarding her condition or for health maintenance advice. Please see specific information pulled into the AVS if appropriate.

## 2024-11-18 NOTE — ASSESSMENT & PLAN NOTE
Taking new arthritis medication heard about on [employer], taking everyday.  She will call back with med information.

## 2024-11-18 NOTE — TELEPHONE ENCOUNTER
Caller: Divya Lomeli    Relationship: Self    Best call back number: [phone]    PATIENT WANTED TO LET LIONEL PANIAGUA KNOW THE NAME OF THE GUMMIES SHE IS CURRENTLY TAKING:     [employer]   FULL SPECTRUM HEMP EXTRACT

## 2024-11-19 DIAGNOSIS — G25.0 ESSENTIAL TREMOR: ICD-10-CM

## 2024-11-20 RX ORDER — PROPRANOLOL HCL 20 MG
TABLET ORAL
Qty: 111 TABLET | Refills: 0 | Status: SHIPPED | OUTPATIENT
Start: 2024-11-20

## 2024-11-22 RX ORDER — PRIMIDONE 250 MG/1
250 TABLET ORAL NIGHTLY
COMMUNITY
Start: 2024-11-18

## 2024-12-06 NOTE — ED NOTES
Pt drove herself to the ER today and presents with c/o neck pain and spasms that radiate to bilat UE x 6 months. Pt has seen 2 doctors for this and been given steroid shots that help for approximately 24 hours. Pt has an OP MRI ordered through Dr. Polk, states she does not know when it is.  
0 = swallows foods/liquids without difficulty

## 2024-12-13 ENCOUNTER — TELEPHONE (OUTPATIENT)
Dept: PAIN MEDICINE | Facility: CLINIC | Age: 69
End: 2024-12-13
Payer: MEDICARE

## 2024-12-13 NOTE — TELEPHONE ENCOUNTER
Provider: NADER    Caller: Divya Lomeli A    Relationship to Patient: Self    Pharmacy:     Phone Number: [phone]    Reason for Call: P CALLED TO GET AN INJ IN HER HAND THAT PT STATES WAS MENTIONED IN THE LAST VISIT, PLEASE CALL BACK  - PT STATES THAT HAD A FALL AND FELL ON THAT HAND AND IS IN A LOT OF PAIN AND SHE IS TAKING MORE MEDICINE BUT IS BEING REALLY CARE ABOUT IT SHE SAYS

## 2024-12-13 NOTE — TELEPHONE ENCOUNTER
I think that we were waiting for her NCS to see if she had carpal tunnel in her hands. Which hand, and did she get the study yet?

## 2024-12-16 NOTE — TELEPHONE ENCOUNTER
Caller: Divya Lomeli A    Relationship to patient: Self    Best call back number: [phone]    Patient is needing: WOULD LIKE MRI OF NECK 5.16.23 AND XRAY 1.23.23 OF SHOULDERS  REPORT FAXED TO [facility] IN [place]   FAX# [phone]    
Faxed requested documents  
cardiologist @ [facility] Dr. Bernie Leonard  [phone]

## 2025-01-02 ENCOUNTER — TELEPHONE (OUTPATIENT)
Dept: FAMILY MEDICINE CLINIC | Facility: CLINIC | Age: 70
End: 2025-01-02
Payer: MEDICARE

## 2025-01-02 ENCOUNTER — TELEPHONE (OUTPATIENT)
Dept: FAMILY MEDICINE CLINIC | Facility: CLINIC | Age: 70
End: 2025-01-02

## 2025-01-02 DIAGNOSIS — R51.9 NONINTRACTABLE HEADACHE, UNSPECIFIED CHRONICITY PATTERN, UNSPECIFIED HEADACHE TYPE: ICD-10-CM

## 2025-01-02 NOTE — TELEPHONE ENCOUNTER
Caller: Divya Lomeli A    Relationship to patient: Self    Best call back number: [phone]      Patient is needing:   PATIENT WANTED TO LET LIONEL PANIAGUA KNOW SHE HAS RECEIVED HER RSV VACCINE.     PLEASE ADVISE.

## 2025-01-02 NOTE — TELEPHONE ENCOUNTER
Caller: Divya Lomeli    Relationship: Self    Best call back number: [phone]      Requested Prescriptions:      PRIMIDONE(MYSOLINE) 250 MG TABLET 3 TIMES DAILY.     TOPIRAMATE (TOPAMAX) 50 MG 3 TIMES DAILY     Pharmacy where request should be sent: [address] [phone] [facility] [phone]      Last office visit with prescribing clinician: 11/18/2024   Last telemedicine visit with prescribing clinician: Visit date not found   Next office visit with prescribing clinician: 1/15/2025     Additional details provided by patient:   OUT OF PRIMIDONE AND HAVE A COUPLE OF TOPIRAMATE.   Does the patient have less than a 3 day supply:  [x] Yes  [] No    Would you like a call back once the refill request has been completed: [x] Yes [] No    If the office needs to give you a call back, can they leave a voicemail: [x] Yes [] No    Rebekah Vega Rep   01/02/25 14:15 EST

## 2025-01-03 RX ORDER — TOPIRAMATE 50 MG/1
50 TABLET, FILM COATED ORAL 2 TIMES DAILY
Qty: 30 TABLET | Refills: 2 | Status: SHIPPED | OUTPATIENT
Start: 2025-01-03

## 2025-01-03 RX ORDER — PRIMIDONE 250 MG/1
250 TABLET ORAL NIGHTLY
Qty: 90 TABLET | Refills: 0 | Status: SHIPPED | OUTPATIENT
Start: 2025-01-03 | End: 2025-04-03

## 2025-01-03 NOTE — TELEPHONE ENCOUNTER
Not pulling over from immunization registry, [facility] pharmacy at [address], in

## 2025-01-03 NOTE — TELEPHONE ENCOUNTER
Rx Refill Note  Requested Prescriptions     Pending Prescriptions Disp Refills    primidone (MYSOLINE) 250 MG tablet       Sig: Take 1 tablet by mouth Every Night.     Signed Prescriptions Disp Refills    topiramate (TOPAMAX) 50 MG tablet 30 tablet 2     Sig: Take 1 tablet by mouth 2 (Two) Times a Day.     Authorizing Provider: LIONEL PANIAGUA     Ordering User: YULY FALL      Last office visit with prescribing clinician: 11/18/2024   Last telemedicine visit with prescribing clinician: Visit date not found   Next office visit with prescribing clinician: 1/2/2025                         Would you like a call back once the refill request has been completed: [] Yes [] No    If the office needs to give you a call back, can they leave a voicemail: [] Yes [] No    Yuly Fall MA  01/03/25, 07:37 EST

## 2025-01-07 ENCOUNTER — TELEPHONE (OUTPATIENT)
Dept: FAMILY MEDICINE CLINIC | Facility: CLINIC | Age: 70
End: 2025-01-07

## 2025-01-07 NOTE — TELEPHONE ENCOUNTER
Caller: [facility]    Best call back number: [phone]     Patient is needing:   CALLING TO CHECK THE STATUS OF A LAB ORDER REQUEST THAT WAS FAXED ON 1.3.25.     STATES IT IS A LAB TEST FOR A HEREDITARY CANCER SCREENING.

## 2025-01-13 NOTE — TELEPHONE ENCOUNTER
This patient will need an appointment to discuss this, as we have no medical necessity that would dictate this testing. Pt contacted for appt, provider WILL NOT SIGN OFF until appt.     Thank you    HUB TO RELAY

## 2025-01-13 NOTE — TELEPHONE ENCOUNTER
Hub staff attempted to follow warm transfer process and was unsuccessful     Caller: [facility]    Relationship to patient: OTHER       Patient is needing: [facility] IS REQUESTING A CALLBACK WITH THE STATUS OF THE FAX THEY SENT 1/3/25.  HE STATES HE WAS TOLD BY BRIAN THAT IT WAS RECEIVED AND GIVEN TO LIONEL PANIAGUA.   PLEASE ACALL BACK  [phone]

## 2025-01-20 DIAGNOSIS — G25.0 ESSENTIAL TREMOR: ICD-10-CM

## 2025-01-22 RX ORDER — PROPRANOLOL HCL 20 MG
20 TABLET ORAL 3 TIMES DAILY
Qty: 90 TABLET | Refills: 5 | Status: SHIPPED | OUTPATIENT
Start: 2025-01-22

## 2025-02-03 ENCOUNTER — TELEPHONE (OUTPATIENT)
Dept: PAIN MEDICINE | Facility: CLINIC | Age: 70
End: 2025-02-03
Payer: MEDICARE

## 2025-02-03 DIAGNOSIS — M54.2 CERVICALGIA: ICD-10-CM

## 2025-02-03 RX ORDER — HYDROCODONE BITARTRATE AND ACETAMINOPHEN 7.5; 325 MG/1; MG/1
1 TABLET ORAL EVERY 8 HOURS PRN
Qty: 90 TABLET | Refills: 0 | Status: SHIPPED | OUTPATIENT
Start: 2025-02-03

## 2025-02-03 NOTE — TELEPHONE ENCOUNTER
Caller: Divya Lomeli    Relationship: Self    Best call back number: [phone]    Requested Prescriptions: NORCO 7.5-325 MG     Pharmacy where request should be sent:  [facility] [phone]    Last office visit with prescribing clinician: 11/12/2024     Next office visit with prescribing clinician: 2/11/2025     Additional details provided by patient: PATIENT HAS 3 PILLS LEFT    Does the patient have less than a 3 day supply:  [x] Yes  [] No

## 2025-02-13 ENCOUNTER — TELEPHONE (OUTPATIENT)
Dept: PAIN MEDICINE | Facility: CLINIC | Age: 70
End: 2025-02-13

## 2025-02-13 NOTE — TELEPHONE ENCOUNTER
Caller: Divya Lomeli    Relationship: Self    Best call back number: [phone]    Requested Prescriptions:   HYDROCODONE     Pharmacy where request should be sent: [facility] [address] [place] [address] [phone] [facility] [phone]      Last office visit with prescribing clinician: 11/12/2024   Last telemedicine visit with prescribing clinician: Visit date not found   Next office visit with prescribing clinician: 2/25/2025       Does the patient have less than a 3 day supply:  [x] Yes  [] No    Would you like a call back once the refill request has been completed: [x] Yes [] No    If the office needs to give you a call back, can they leave a voicemail: [x] Yes [] No    Kirill Patel   02/13/25 11:20 EST

## 2025-02-24 ENCOUNTER — OFFICE VISIT (OUTPATIENT)
Dept: FAMILY MEDICINE CLINIC | Facility: CLINIC | Age: 70
End: 2025-02-24
Payer: MEDICARE

## 2025-02-24 VITALS
RESPIRATION RATE: 16 BRPM | HEIGHT: 64 IN | OXYGEN SATURATION: 98 % | TEMPERATURE: 97.1 F | BODY MASS INDEX: 22.47 KG/M2 | WEIGHT: 131.6 LBS | HEART RATE: 78 BPM

## 2025-02-24 DIAGNOSIS — H61.23 BILATERAL IMPACTED CERUMEN: Primary | ICD-10-CM

## 2025-02-24 PROBLEM — R45.851 SUICIDAL IDEATIONS: Status: ACTIVE | Noted: 2025-02-24

## 2025-02-24 PROBLEM — M32.9 LUPUS (SYSTEMIC LUPUS ERYTHEMATOSUS): Status: RESOLVED | Noted: 2025-02-24 | Resolved: 2025-02-24

## 2025-02-24 PROBLEM — I10 HIGH BLOOD PRESSURE: Status: RESOLVED | Noted: 2025-02-24 | Resolved: 2025-02-24

## 2025-02-24 PROCEDURE — 1159F MED LIST DOCD IN RCRD: CPT | Performed by: NURSE PRACTITIONER

## 2025-02-24 PROCEDURE — 1160F RVW MEDS BY RX/DR IN RCRD: CPT | Performed by: NURSE PRACTITIONER

## 2025-02-24 PROCEDURE — G0438 PPPS, INITIAL VISIT: HCPCS | Performed by: NURSE PRACTITIONER

## 2025-02-24 RX ORDER — HYDROCHLOROTHIAZIDE 12.5 MG/1
25 TABLET ORAL DAILY
Qty: 60 TABLET | Refills: 0 | Status: SHIPPED | OUTPATIENT
Start: 2025-02-24 | End: 2025-03-26

## 2025-02-24 RX ORDER — GAUZE BANDAGE 2" X 2"
2 BANDAGE TOPICAL NIGHTLY PRN
Qty: 7 ML | Refills: 1 | Status: SHIPPED | OUTPATIENT
Start: 2025-02-24 | End: 2025-03-10

## 2025-02-24 RX ORDER — HYDROCHLOROTHIAZIDE 12.5 MG/1
12.5 TABLET ORAL AS NEEDED
COMMUNITY

## 2025-02-24 RX ORDER — HYDROXYZINE HYDROCHLORIDE 10 MG/1
10 TABLET, FILM COATED ORAL NIGHTLY PRN
COMMUNITY
Start: 2024-11-29

## 2025-02-24 RX ORDER — HYDROXYCHLOROQUINE SULFATE 200 MG/1
1.5 TABLET, FILM COATED ORAL DAILY
COMMUNITY
Start: 2025-02-04

## 2025-02-24 RX ORDER — ESCITALOPRAM OXALATE 10 MG/1
10 TABLET ORAL DAILY
COMMUNITY
Start: 2024-09-25

## 2025-02-24 NOTE — PROGRESS NOTES
Subjective   The ABCs of the Annual Wellness Visit  Medicare Wellness Visit      Divya Lomeli is a 69 y.o. patient who presents for a Medicare Wellness Visit.    The following portions of the patient's history were reviewed and   updated as appropriate: allergies, current medications, past family history, past medical history, past social history, past surgical history, and problem list.    Compared to one year ago, the patient's physical   health is worse.  Compared to one year ago, the patient's mental   health is the same.    Recent Hospitalizations:  This patient has had a [facility] admission record on file within the last 365 days.  Current Medical Providers:  Patient Care Team:  Samantha Wang APRN as PCP - General (Family Medicine)  Jose Angel Wan MD as Surgeon (General Surgery)  Pete Polk II, DO as Consulting Physician (Sports Medicine)    Outpatient Medications Prior to Visit   Medication Sig Dispense Refill    calcium carbonate (TUMS) 500 MG chewable tablet       CBD (cannabidiol) oral oil Take 2 drops by mouth Daily. Patient reports taking [employer] CBD gummies Full Spectrum Hemp Extract.      celecoxib (CeleBREX) 200 MG capsule Take 1 capsule by mouth Daily.      Cobalamin Combinations (NEURIVA PLUS PO) Take 1 tablet/day by mouth.      escitalopram (LEXAPRO) 10 MG tablet Take 1 tablet by mouth Daily.      estradiol (ESTRACE) 1 MG tablet Take 1/2 (one-half) tablet by mouth once daily 45 tablet 0    ezetimibe (ZETIA) 10 MG tablet       gabapentin (NEURONTIN) 400 MG capsule Take 1 capsule by mouth 3 (Three) Times a Day. 270 capsule 1    glimepiride (AMARYL) 4 MG tablet Take 1 tablet by mouth once daily 90 tablet 0    Glucagon (Gvoke HypoPen 2-Pack) 1 MG/0.2ML solution auto-injector Inject 1 mg under the skin into the appropriate area as directed 1 (One) Time As Needed (hypoglycemia) for up to 1 dose. 0.4 mL 1    HYDROcodone-acetaminophen (Norco) 7.5-325 MG per tablet  Take 1 tablet by mouth Every 8 (Eight) Hours As Needed for Moderate Pain. 90 tablet 0    hydroxychloroquine (PLAQUENIL) 200 MG tablet Take 1.5 tablets by mouth Daily.      hydrOXYzine (ATARAX) 10 MG tablet Take 1 tablet by mouth At Night As Needed.      hydrOXYzine pamoate (VISTARIL) 50 MG capsule TAKE 1 CAPSULE BY MOUTH 4 TIMES DAILY AS NEEDED FOR ANXIETY      Jardiance 25 MG tablet Take 1 tablet by mouth once daily 90 tablet 0    methocarbamol (ROBAXIN) 500 MG tablet Take 1 tablet by mouth 3 (Three) Times a Day As Needed for Muscle Spasms (during the day). 270 tablet 3    Misc. Devices (Roller Walker) misc Rolling walker 1 each 0    Multiple Vitamins-Minerals (PRESERVISION AREDS 2 PO)       Omega-3 Fatty Acids (Fish Oil Burp-Less) 1200 MG capsule Take 1 capsule by mouth Daily. 90 capsule 3    polyethylene glycol (MIRALAX) 17 GM/SCOOP powder mix 1 capful (17 g) in liquid by mouth 2 (Two) Times a Day. 510 g 0    primidone (MYSOLINE) 250 MG tablet Take 1 tablet by mouth Every Night for 90 days. 90 tablet 0    propranolol (INDERAL) 20 MG tablet Take 1 tablet by mouth 3 (Three) Times a Day. 90 tablet 5    SITagliptin-metFORMIN HCl ER (Janumet XR)  MG tablet Take 2 tablets every day with a meal 180 tablet 1    tiZANidine (ZANAFLEX) 4 MG tablet TAKE 1 TABLET BY MOUTH AT NIGHT AS NEEDED FOR MUSCLE SPASM 90 tablet 0    topiramate (TOPAMAX) 50 MG tablet Take 1 tablet by mouth 2 (Two) Times a Day. 30 tablet 2    traZODone (DESYREL) 50 MG tablet Take 1 tablet by mouth every night at bedtime.      HYDROcodone-acetaminophen (Norco) 7.5-325 MG per tablet Take 1 tablet by mouth Every 8 (Eight) Hours As Needed for Moderate Pain. 90 tablet 0    hydroCHLOROthiazide 12.5 MG tablet Take 1 tablet by mouth As Needed.      Narcan 4 MG/0.1ML nasal spray  (Patient not taking: Reported on 2/24/2025)       No facility-administered medications prior to visit.     Vitals:    02/24/25 1548   Pulse: 78   Resp: 16   Temp: 97.1 °F (36.2 °C)  "  TempSrc: Infrared   SpO2: 98%   Weight: 59.7 kg (131 lb 9.6 oz)   Height: 162.6 cm (64\")      Opioid medication/s are on active medication list.  and I have evaluated her active treatment plan and pain score trends (see table).  There were no vitals filed for this visit.  I have reviewed the chart for potential of high risk medication and harmful drug interactions in the elderly.        Aspirin is not on active medication list.  Aspirin use is not indicated based on review of current medical condition/s. Risk of harm outweighs potential benefits.  .    Patient Active Problem List   Diagnosis    Cervicalgia    Chronic pain of right wrist    Subacromial bursitis of both shoulders    Generalized arthritis    Preventative health care    Encounter for screening mammogram for malignant neoplasm of breast    Gastroenteritis, acute    Cellulitis of lower extremity    Chronic obstructive pulmonary disease    Cough    Dermatitis    Elevated blood pressure reading without diagnosis of hypertension    Family history of diabetes mellitus    Hyperlipidemia    Hypertension    Insomnia    Luetscher's syndrome    Postmenopausal    Rectal hemorrhage    Sinusitis, acute maxillary    Type 2 diabetes mellitus with hyperglycemia    Upper respiratory tract infection    Encounter for immunization    Gross hematuria    Generalized weakness    Chronic pain of right knee    Primary osteoarthritis of right knee    Osteoarthritis of bilateral glenohumeral joints    Diabetes mellitus without complication    Chronic bilateral low back pain without sciatica    Other fatigue    Bilateral wrist pain    Other specified myopathies    Suicidal ideations     Advance Care Planning Advance Directive is not on file.  ACP discussion was held with the patient during this visit. Patient does not have an advance directive, declines further assistance.            Objective   Vitals:    02/24/25 1548   Pulse: 78   Resp: 16   Temp: 97.1 °F (36.2 °C)   TempSrc: " "Infrared   SpO2: 98%   Weight: 59.7 kg (131 lb 9.6 oz)   Height: 162.6 cm (64\")       Estimated body mass index is 22.59 kg/m² as calculated from the following:    Height as of this encounter: 162.6 cm (64\").    Weight as of this encounter: 59.7 kg (131 lb 9.6 oz).    BMI is within normal parameters. No other follow-up for BMI required.    Gait and Balance Evaluation:  Loss of Balance, Slow Tentative Pace, Shuffle, Little or No Arm Swing, and Walker        Does the patient have evidence of cognitive impairment? No                                                                                                Health  Risk Assessment    Smoking Status:  Social History     Tobacco Use   Smoking Status Former    Current packs/day: 0.00    Average packs/day: 4.5 packs/day for 21.0 years (94.5 ttl pk-yrs)    Types: Cigarettes    Start date:     Quit date:     Years since quittin.1    Passive exposure: Past   Smokeless Tobacco Never   Tobacco Comments    2nd Hand Smoke - boyfriend smokes.     Alcohol Consumption:  Social History     Substance and Sexual Activity   Alcohol Use Not Currently    Comment: occ       Fall Risk Screen  STEADI Fall Risk Assessment has not been completed.    Depression Screening   The PHQ has not been completed during this encounter.     Health Habits and Functional and Cognitive Screening:      10/23/2020     2:00 PM   Functional & Cognitive Status   Do you have difficulty preparing food and eating? No   Do you have difficulty bathing yourself, getting dressed or grooming yourself? No   Do you have difficulty using the toilet? No   Do you have difficulty moving around from place to place? No   Do you have trouble with steps or getting out of a bed or a chair? No   Do you need help using the phone?  No   Are you deaf or do you have serious difficulty hearing?  No   Do you need help to go to places out of walking distance? No   Do you need help shopping? No   Do you need help preparing " meals?  No   Do you need help with housework?  No   Do you need help with laundry? No   Do you need help taking your medications? No   Do you need help managing money? No   Do you ever drive or ride in a car without wearing a seat belt? No   Have you felt unusual stress, anger or loneliness in the last month? Yes   Who do you live with? Child   If you need help, do you have trouble finding someone available to you? No   Have you been bothered in the last four weeks by sexual problems? No   Do you have difficulty concentrating, remembering or making decisions? Yes           Age-appropriate Screening Schedule:  Refer to the list below for future screening recommendations based on patient's age, sex and/or medical conditions. Orders for these recommended tests are listed in the plan section. The patient has been provided with a written plan.    Health Maintenance List  Health Maintenance   Topic Date Due    URINE MICROALBUMIN-CREATININE RATIO (uACR)  Never done    ANNUAL WELLNESS VISIT  10/23/2021    HEMOGLOBIN A1C  03/18/2025    DIABETIC EYE EXAM  06/20/2025    LIPID PANEL  09/18/2025    MAMMOGRAM  08/13/2026    DXA SCAN  08/13/2026    TDAP/TD VACCINES (2 - Td or Tdap) 09/05/2029    COLORECTAL CANCER SCREENING  07/13/2032    HEPATITIS C SCREENING  Completed    COVID-19 Vaccine  Completed    INFLUENZA VACCINE  Completed    Pneumococcal Vaccine 50+  Completed    ZOSTER VACCINE  Completed                                                                                                                                                CMS Preventative Services Quick Reference  Risk Factors Identified During Encounter  Chronic Pain: Natural history and expected course discussed. Questions answered.  Home exercise plan outlined.  OTC analgesics as needed. Proper dosing schedule discussed.   NSAIDs per medication orders.  Depression/Dysphoria: Elevated PHQ score reflective of other stress or illness, not depression  Fall  "Risk-High or Moderate: Discussed Fall Prevention in the home and Information on Fall Prevention Shared in After Visit Summary  Immunizations Discussed/Encouraged: Influenza  Polypharmacy: Medication List reviewed  Vision Screening Recommended    The above risks/problems have been discussed with the patient.  Pertinent information has been shared with the patient in the After Visit Summary.  An After Visit Summary and PPPS were made available to the patient.    Follow Up:   Next Medicare Wellness visit to be scheduled in 1 year.         Additional E&M Note during same encounter follows:  Patient has additional, significant, and separately identifiable condition(s)/problem(s) that require work above and beyond the Medicare Wellness Visit     Chief Complaint  Annual Exam    Subjective   HPI  Divya is also being seen today for an annual adult preventative physical exam.  and Divya is also being seen today for additional medical problem/s.                Objective   Vital Signs:  Pulse 78   Temp 97.1 °F (36.2 °C) (Infrared)   Resp 16   Ht 162.6 cm (64\")   Wt 59.7 kg (131 lb 9.6 oz)   SpO2 98%   BMI 22.59 kg/m²   Physical Exam    The following data was reviewed by: WYATT Gregg on 02/24/2025:            Assessment and Plan Additional age appropriate preventative wellness advice topics were discussed during today's preventative wellness exam(some topics already addressed during AWV portion of the note above):    Physical Activity: Advised cardiovascular activity 150 minutes per week as tolerated. (example brisk walk for 30 minutes, 5 days a week).     Nutrition: Discussed nutrition plan with patient. Information shared in after visit summary. Goal is for a well balanced diet to enhance overall health.     Gun Safety Awareness Discussion: Information Shared in after visit summary.     Motor Vehicle Safety Discussion:  Wearing Seatbelt While in Motor Vehicle recommendation. Adhering to posted speed limit " recommendation.     Injury Prevention Discussion:  Information shared in after visit summary.                 Bilateral impacted cerumen    Orders:    Olive Oil (sweet oil) oil; Administer 2 drops into both ears At Night As Needed (dry cerumen) for up to 14 days.          I spent 30 minutes caring for Divya on this date of service. This time includes time spent by me in the following activities:preparing for the visit, obtaining and/or reviewing a separately obtained history, performing a medically appropriate examination and/or evaluation , counseling and educating the patient/family/caregiver, and documenting information in the medical record  Follow Up   Return if symptoms worsen or fail to improve, for Medicare Wellness in 1 year.  Patient was given instructions and counseling regarding her condition or for health maintenance advice. Please see specific information pulled into the AVS if appropriate.

## 2025-02-25 ENCOUNTER — OFFICE VISIT (OUTPATIENT)
Dept: PAIN MEDICINE | Facility: CLINIC | Age: 70
End: 2025-02-25
Payer: MEDICARE

## 2025-02-25 VITALS
DIASTOLIC BLOOD PRESSURE: 67 MMHG | OXYGEN SATURATION: 96 % | RESPIRATION RATE: 16 BRPM | HEART RATE: 84 BPM | WEIGHT: 293 LBS | SYSTOLIC BLOOD PRESSURE: 141 MMHG | BODY MASS INDEX: 225.89 KG/M2

## 2025-02-25 DIAGNOSIS — M17.11 PRIMARY OSTEOARTHRITIS OF RIGHT KNEE: ICD-10-CM

## 2025-02-25 DIAGNOSIS — G89.29 CHRONIC BILATERAL LOW BACK PAIN WITHOUT SCIATICA: ICD-10-CM

## 2025-02-25 DIAGNOSIS — G89.29 CHRONIC PAIN OF BOTH SHOULDERS: Primary | ICD-10-CM

## 2025-02-25 DIAGNOSIS — M54.50 CHRONIC BILATERAL LOW BACK PAIN WITHOUT SCIATICA: ICD-10-CM

## 2025-02-25 DIAGNOSIS — M25.561 CHRONIC PAIN OF BOTH KNEES: ICD-10-CM

## 2025-02-25 DIAGNOSIS — M25.531 CHRONIC PAIN OF RIGHT WRIST: ICD-10-CM

## 2025-02-25 DIAGNOSIS — M19.012 OSTEOARTHRITIS OF BILATERAL GLENOHUMERAL JOINTS: ICD-10-CM

## 2025-02-25 DIAGNOSIS — M25.511 CHRONIC PAIN OF BOTH SHOULDERS: Primary | ICD-10-CM

## 2025-02-25 DIAGNOSIS — G89.29 CHRONIC PAIN OF RIGHT WRIST: ICD-10-CM

## 2025-02-25 DIAGNOSIS — M25.531 BILATERAL WRIST PAIN: ICD-10-CM

## 2025-02-25 DIAGNOSIS — M54.2 CERVICALGIA: ICD-10-CM

## 2025-02-25 DIAGNOSIS — G89.29 CHRONIC PAIN OF BOTH KNEES: ICD-10-CM

## 2025-02-25 DIAGNOSIS — M25.562 CHRONIC PAIN OF BOTH KNEES: ICD-10-CM

## 2025-02-25 DIAGNOSIS — M25.512 CHRONIC PAIN OF BOTH SHOULDERS: Primary | ICD-10-CM

## 2025-02-25 DIAGNOSIS — M25.532 BILATERAL WRIST PAIN: ICD-10-CM

## 2025-02-25 DIAGNOSIS — M19.011 OSTEOARTHRITIS OF BILATERAL GLENOHUMERAL JOINTS: ICD-10-CM

## 2025-02-25 PROCEDURE — 1159F MED LIST DOCD IN RCRD: CPT | Performed by: PHYSICAL MEDICINE & REHABILITATION

## 2025-02-25 PROCEDURE — 1125F AMNT PAIN NOTED PAIN PRSNT: CPT | Performed by: PHYSICAL MEDICINE & REHABILITATION

## 2025-02-25 PROCEDURE — 1160F RVW MEDS BY RX/DR IN RCRD: CPT | Performed by: PHYSICAL MEDICINE & REHABILITATION

## 2025-02-25 PROCEDURE — 3078F DIAST BP <80 MM HG: CPT | Performed by: PHYSICAL MEDICINE & REHABILITATION

## 2025-02-25 PROCEDURE — 99214 OFFICE O/P EST MOD 30 MIN: CPT | Performed by: PHYSICAL MEDICINE & REHABILITATION

## 2025-02-25 PROCEDURE — G2211 COMPLEX E/M VISIT ADD ON: HCPCS | Performed by: PHYSICAL MEDICINE & REHABILITATION

## 2025-02-25 PROCEDURE — 3077F SYST BP >= 140 MM HG: CPT | Performed by: PHYSICAL MEDICINE & REHABILITATION

## 2025-02-25 RX ORDER — HYDROCODONE BITARTRATE AND ACETAMINOPHEN 10; 325 MG/1; MG/1
1 TABLET ORAL EVERY 8 HOURS PRN
Qty: 90 TABLET | Refills: 0 | Status: SHIPPED | OUTPATIENT
Start: 2025-02-25

## 2025-02-25 RX ORDER — GABAPENTIN 400 MG/1
400 CAPSULE ORAL 3 TIMES DAILY
Qty: 270 CAPSULE | Refills: 1 | Status: SHIPPED | OUTPATIENT
Start: 2025-02-25

## 2025-02-25 NOTE — PROGRESS NOTES
"Subjective   Divya Lomeli is a 69 y.o. female.     History of Present Illness  CC: right shoulder pain    Right shoulder pain, began around 2017, treated by me since 7/1/19, aggravated with overhead reaching, 9/10 at worst, 0/10 at best, intermittent, varies, aching, stabbing, worse with lifting interferes with ADLs, work. Had PT with HEP. Subluxates, improves with reduction. Also c/o neck and LBP, joint pain. X-ray C-spine with DDD and DJD. Notes reviewed, as above, also DM2, COPD. Has used Tramadol with some relief, insufficient at BID prn. Trouble getting to sleep. Saw Dr. Kaye, started on Ultracet and Tizanidine 4mg QID prn, but cannot drive on that combination. Saw Dr. Polk, given L shoulder GH injection using U/S, steroid made her \"loony\" for 1-2 days, some improvement, also referred for PT. Had b/l subacromial injections with 24h relief only. Dr. Polk ordered MRI C-spine. Had Norco 5mg which helped, but helped much better with 1.5 tabs. Dx with bladder cancer 2022. Worsening medial right knee pain. Upcoming NCS/EMG of BUE.   Neck Pain   Pertinent negatives include no chest pain, fever, numbness, trouble swallowing or weakness.   Back Pain  Pertinent negatives include no abdominal pain, bladder incontinence, chest pain, fever, numbness or weakness.   Wrist Pain   Pertinent negatives include no fever or numbness.        The following portions of the patient's history were reviewed and updated as appropriate: allergies, current medications, past family history, past medical history, past social history, past surgical history and problem list.    Review of Systems   Constitutional:  Negative for chills, fatigue and fever.   HENT:  Negative for hearing loss and trouble swallowing.    Eyes:  Negative for visual disturbance.   Respiratory:  Negative for shortness of breath.    Cardiovascular:  Negative for chest pain.   Gastrointestinal:  Negative for abdominal pain, constipation, diarrhea, nausea and " vomiting.   Genitourinary:  Negative for urinary incontinence.   Musculoskeletal:  Positive for arthralgias, back pain and neck pain. Negative for joint swelling and myalgias.   Neurological:  Negative for dizziness, weakness, numbness and headache.       Objective   Physical Exam   Constitutional: She is oriented to person, place, and time. She appears well-developed and well-nourished.   HENT:   Head: Normocephalic and atraumatic.   Eyes: Pupils are equal, round, and reactive to light.   Cardiovascular: Normal rate, regular rhythm and normal heart sounds.   Pulmonary/Chest: Effort normal and breath sounds normal.   Abdominal: Soft. Normal appearance and bowel sounds are normal. She exhibits no distension. There is no abdominal tenderness.   Musculoskeletal:      Comments: B/l: shoulder limited ROM  R knee: (+) crepitus, (-) edema, warmth, erythema     Neurological: She is alert and oriented to person, place, and time. She has normal reflexes. She displays normal reflexes. No sensory deficit.   Psychiatric: Her behavior is normal. Mood, judgment and thought content normal.         Assessment & Plan   Diagnoses and all orders for this visit:    1. Chronic bilateral low back pain without sciatica (Primary)    2. Cervicalgia    3. Bilateral wrist pain    4. Chronic pain of right knee    5. Chronic pain of right wrist    6. Osteoarthritis of bilateral glenohumeral joints    7. Primary osteoarthritis of right knee        Inspect reviewed, in order. UDS in order 11/12/24.  Reduced to to Tramadol 50mg TID prn. Began Norco 7.5mg TID prn, increase to 10mg TID prn. Filled 2/14/25.   Patient's symptoms are still adequately managed by current medication regimen, is doing well at this strength and dosage, therefore I will continue to prescribe unchanged as the most appropriate course of treatment.  Restarted Gabapentin 300mg TID. Increased to 400mg TID.  Began Tizanidine 4mg qHS prn instead of Flexeril. Refilled Tizanidine 4mg  "qHS only, began Robaxin 750mg TID prn during day. Spasms resolved, she will try titrating off.  Ordered RxAlt #1 cream, insurance would not approve, cont OTC \"Blue Stuff\" cream.  Performed R subacromial injection with excellent relief, returning, performed repeat b/l. May need subacromial injection in L shoulder to complement GH injection. Plan R vs b/l shoulder SA injections per symptoms in future.  Performed R knee injection with limited relief, > 50% with HA, schedule b/l Synvisc One injections.  May need TPIs in cervical paraspinals and traps.  Discussed R GTB injection.  MRI C-spine with mod-severe stenosis and neuroforaminal stenosis. She declines ESIs.  RTC for b/l knee SynviscOne inj then possible shoulder inj then in 3 months for f/u.                  "

## 2025-03-03 ENCOUNTER — TELEPHONE (OUTPATIENT)
Dept: PAIN MEDICINE | Facility: CLINIC | Age: 70
End: 2025-03-03
Payer: MEDICARE

## 2025-03-03 NOTE — TELEPHONE ENCOUNTER
Caller: Divya Lomeli    Relationship to patient: Self    Best call back number: [phone]    Chief complaint: BILATERAL KNEES     Type of visit: INJECTIONS    Requested date: N/A      If rescheduling, when is the original appointment: 3-20-25     Additional notes:PLEASE CALL TO RESCHEDULE

## 2025-03-11 ENCOUNTER — HOSPITAL ENCOUNTER (EMERGENCY)
Facility: HOSPITAL | Age: 70
Discharge: HOME | End: 2025-03-11
Admitting: EMERGENCY MEDICINE
Payer: MEDICARE

## 2025-03-11 ENCOUNTER — APPOINTMENT (OUTPATIENT)
Dept: GENERAL RADIOLOGY | Facility: HOSPITAL | Age: 70
End: 2025-03-11
Payer: MEDICARE

## 2025-03-11 ENCOUNTER — OFFICE VISIT (OUTPATIENT)
Dept: FAMILY MEDICINE CLINIC | Facility: CLINIC | Age: 70
End: 2025-03-11
Payer: MEDICARE

## 2025-03-11 VITALS
WEIGHT: 134.5 LBS | TEMPERATURE: 96.5 F | HEART RATE: 79 BPM | RESPIRATION RATE: 18 BRPM | BODY MASS INDEX: 22.96 KG/M2 | SYSTOLIC BLOOD PRESSURE: 120 MMHG | DIASTOLIC BLOOD PRESSURE: 60 MMHG | OXYGEN SATURATION: 97 % | HEIGHT: 64 IN

## 2025-03-11 VITALS
RESPIRATION RATE: 18 BRPM | OXYGEN SATURATION: 96 % | SYSTOLIC BLOOD PRESSURE: 147 MMHG | BODY MASS INDEX: 22.77 KG/M2 | DIASTOLIC BLOOD PRESSURE: 67 MMHG | TEMPERATURE: 98.7 F | HEART RATE: 80 BPM | HEIGHT: 64 IN | WEIGHT: 133.38 LBS

## 2025-03-11 DIAGNOSIS — J06.9 VIRAL UPPER RESPIRATORY INFECTION: Primary | ICD-10-CM

## 2025-03-11 DIAGNOSIS — H61.23 IMPACTED CERUMEN OF BOTH EARS: ICD-10-CM

## 2025-03-11 DIAGNOSIS — R53.1 GENERALIZED WEAKNESS: ICD-10-CM

## 2025-03-11 DIAGNOSIS — R53.83 OTHER FATIGUE: ICD-10-CM

## 2025-03-11 DIAGNOSIS — R19.7 DIARRHEA, UNSPECIFIED TYPE: Primary | ICD-10-CM

## 2025-03-11 LAB
ALBUMIN SERPL-MCNC: 4.1 G/DL (ref 3.5–5.2)
ALBUMIN/GLOB SERPL: 1.7 G/DL
ALP SERPL-CCNC: 72 U/L (ref 39–117)
ALT SERPL W P-5'-P-CCNC: 51 U/L (ref 1–33)
ANION GAP SERPL CALCULATED.3IONS-SCNC: 17.8 MMOL/L (ref 5–15)
AST SERPL-CCNC: 38 U/L (ref 1–32)
B PARAPERT DNA SPEC QL NAA+PROBE: NOT DETECTED
B PERT DNA SPEC QL NAA+PROBE: NOT DETECTED
BASOPHILS # BLD AUTO: 0.06 10*3/MM3 (ref 0–0.2)
BASOPHILS NFR BLD AUTO: 0.8 % (ref 0–1.5)
BILIRUB SERPL-MCNC: <0.2 MG/DL (ref 0–1.2)
BUN SERPL-MCNC: 29 MG/DL (ref 8–23)
BUN/CREAT SERPL: 24.8 (ref 7–25)
C PNEUM DNA NPH QL NAA+NON-PROBE: NOT DETECTED
CALCIUM SPEC-SCNC: 9.2 MG/DL (ref 8.6–10.5)
CHLORIDE SERPL-SCNC: 99 MMOL/L (ref 98–107)
CK SERPL-CCNC: 50 U/L (ref 20–180)
CO2 SERPL-SCNC: 24.2 MMOL/L (ref 22–29)
CREAT SERPL-MCNC: 1.17 MG/DL (ref 0.57–1)
CRP SERPL-MCNC: <0.3 MG/DL (ref 0–0.5)
DEPRECATED RDW RBC AUTO: 48.6 FL (ref 37–54)
EGFRCR SERPLBLD CKD-EPI 2021: 50 ML/MIN/1.73
EOSINOPHIL # BLD AUTO: 0.36 10*3/MM3 (ref 0–0.4)
EOSINOPHIL NFR BLD AUTO: 5 % (ref 0.3–6.2)
ERYTHROCYTE [DISTWIDTH] IN BLOOD BY AUTOMATED COUNT: 13.6 % (ref 12.3–15.4)
ERYTHROCYTE [SEDIMENTATION RATE] IN BLOOD: 2 MM/HR (ref 0–30)
EXPIRATION DATE: NORMAL
FLUAV AG UPPER RESP QL IA.RAPID: NOT DETECTED
FLUAV SUBTYP SPEC NAA+PROBE: NOT DETECTED
FLUBV AG UPPER RESP QL IA.RAPID: NOT DETECTED
FLUBV RNA ISLT QL NAA+PROBE: NOT DETECTED
GLOBULIN UR ELPH-MCNC: 2.4 GM/DL
GLUCOSE SERPL-MCNC: 85 MG/DL (ref 65–99)
HADV DNA SPEC NAA+PROBE: NOT DETECTED
HCOV 229E RNA SPEC QL NAA+PROBE: NOT DETECTED
HCOV HKU1 RNA SPEC QL NAA+PROBE: NOT DETECTED
HCOV NL63 RNA SPEC QL NAA+PROBE: NOT DETECTED
HCOV OC43 RNA SPEC QL NAA+PROBE: NOT DETECTED
HCT VFR BLD AUTO: 41 % (ref 34–46.6)
HGB BLD-MCNC: 13 G/DL (ref 12–15.9)
HMPV RNA NPH QL NAA+NON-PROBE: NOT DETECTED
HPIV1 RNA ISLT QL NAA+PROBE: NOT DETECTED
HPIV2 RNA SPEC QL NAA+PROBE: NOT DETECTED
HPIV3 RNA NPH QL NAA+PROBE: NOT DETECTED
HPIV4 P GENE NPH QL NAA+PROBE: NOT DETECTED
IMM GRANULOCYTES # BLD AUTO: 0.03 10*3/MM3 (ref 0–0.05)
IMM GRANULOCYTES NFR BLD AUTO: 0.4 % (ref 0–0.5)
INTERNAL CONTROL: NORMAL
LIPASE SERPL-CCNC: 50 U/L (ref 13–60)
LYMPHOCYTES # BLD AUTO: 2.92 10*3/MM3 (ref 0.7–3.1)
LYMPHOCYTES NFR BLD AUTO: 40.5 % (ref 19.6–45.3)
Lab: NORMAL
M PNEUMO IGG SER IA-ACNC: NOT DETECTED
MAGNESIUM SERPL-MCNC: 1.7 MG/DL (ref 1.6–2.4)
MCH RBC QN AUTO: 30.5 PG (ref 26.6–33)
MCHC RBC AUTO-ENTMCNC: 31.7 G/DL (ref 31.5–35.7)
MCV RBC AUTO: 96.2 FL (ref 79–97)
MONOCYTES # BLD AUTO: 0.67 10*3/MM3 (ref 0.1–0.9)
MONOCYTES NFR BLD AUTO: 9.3 % (ref 5–12)
NEUTROPHILS NFR BLD AUTO: 3.17 10*3/MM3 (ref 1.7–7)
NEUTROPHILS NFR BLD AUTO: 44 % (ref 42.7–76)
NRBC BLD AUTO-RTO: 0 /100 WBC (ref 0–0.2)
PLATELET # BLD AUTO: 258 10*3/MM3 (ref 140–450)
PMV BLD AUTO: 11 FL (ref 6–12)
POTASSIUM SERPL-SCNC: 4.2 MMOL/L (ref 3.5–5.2)
PROT SERPL-MCNC: 6.5 G/DL (ref 6–8.5)
RBC # BLD AUTO: 4.26 10*6/MM3 (ref 3.77–5.28)
RHINOVIRUS RNA SPEC NAA+PROBE: NOT DETECTED
RSV RNA NPH QL NAA+NON-PROBE: NOT DETECTED
SARS-COV-2 AG UPPER RESP QL IA.RAPID: NOT DETECTED
SARS-COV-2 RNA RESP QL NAA+PROBE: NOT DETECTED
SODIUM SERPL-SCNC: 141 MMOL/L (ref 136–145)
WBC NRBC COR # BLD AUTO: 7.21 10*3/MM3 (ref 3.4–10.8)

## 2025-03-11 PROCEDURE — 83690 ASSAY OF LIPASE: CPT | Performed by: NURSE PRACTITIONER

## 2025-03-11 PROCEDURE — 3074F SYST BP LT 130 MM HG: CPT | Performed by: NURSE PRACTITIONER

## 2025-03-11 PROCEDURE — 3078F DIAST BP <80 MM HG: CPT | Performed by: NURSE PRACTITIONER

## 2025-03-11 PROCEDURE — 1126F AMNT PAIN NOTED NONE PRSNT: CPT | Performed by: NURSE PRACTITIONER

## 2025-03-11 PROCEDURE — 1159F MED LIST DOCD IN RCRD: CPT | Performed by: NURSE PRACTITIONER

## 2025-03-11 PROCEDURE — 86140 C-REACTIVE PROTEIN: CPT | Performed by: NURSE PRACTITIONER

## 2025-03-11 PROCEDURE — 82550 ASSAY OF CK (CPK): CPT | Performed by: NURSE PRACTITIONER

## 2025-03-11 PROCEDURE — 1160F RVW MEDS BY RX/DR IN RCRD: CPT | Performed by: NURSE PRACTITIONER

## 2025-03-11 PROCEDURE — 83735 ASSAY OF MAGNESIUM: CPT | Performed by: NURSE PRACTITIONER

## 2025-03-11 PROCEDURE — 87428 SARSCOV & INF VIR A&B AG IA: CPT | Performed by: NURSE PRACTITIONER

## 2025-03-11 PROCEDURE — 99283 EMERGENCY DEPT VISIT LOW MDM: CPT

## 2025-03-11 PROCEDURE — 80053 COMPREHEN METABOLIC PANEL: CPT | Performed by: NURSE PRACTITIONER

## 2025-03-11 PROCEDURE — 0202U NFCT DS 22 TRGT SARS-COV-2: CPT | Performed by: NURSE PRACTITIONER

## 2025-03-11 PROCEDURE — 99213 OFFICE O/P EST LOW 20 MIN: CPT | Performed by: NURSE PRACTITIONER

## 2025-03-11 PROCEDURE — 71046 X-RAY EXAM CHEST 2 VIEWS: CPT

## 2025-03-11 PROCEDURE — 85652 RBC SED RATE AUTOMATED: CPT | Performed by: NURSE PRACTITIONER

## 2025-03-11 PROCEDURE — 85025 COMPLETE CBC W/AUTO DIFF WBC: CPT | Performed by: NURSE PRACTITIONER

## 2025-03-11 RX ORDER — HYDROCODONE BITARTRATE AND ACETAMINOPHEN 7.5; 325 MG/1; MG/1
1 TABLET ORAL EVERY 6 HOURS PRN
Refills: 0 | Status: DISCONTINUED | OUTPATIENT
Start: 2025-03-11 | End: 2025-03-12 | Stop reason: HOSPADM

## 2025-03-11 RX ORDER — SODIUM CHLORIDE 0.9 % (FLUSH) 0.9 %
10 SYRINGE (ML) INJECTION AS NEEDED
Status: DISCONTINUED | OUTPATIENT
Start: 2025-03-11 | End: 2025-03-12 | Stop reason: HOSPADM

## 2025-03-11 RX ADMIN — HYDROCODONE BITARTRATE AND ACETAMINOPHEN 1 TABLET: 7.5; 325 TABLET ORAL at 20:47

## 2025-03-11 NOTE — PROGRESS NOTES
"Chief Complaint  Fatigue (Runny nose, body aches.)    Subjective        Divya Lomeli presents to [facility]  History of Present Illness    Upper respiratory symptoms:   Onset of symptoms 1-2 weeks ago. Severity of symptoms are mild/moderate. Status is no change. Frequency is persistent. Context includes significant other currently hospitalized with with coronavirus. Symptoms are aggravated by lying down.  Symptoms are relieved by throat lozenges.  Associated symptoms include chills, runny nose, nasal congestion, myalgia, fatigue, post nasal drainage, cough, headache, scratchy throat, ear pressure/fullness, and diarrhea.  Pertinent negatives include dyspnea, facial pain, fever, hemoptysis, otalgia, pharyngitis, rash, sinus pressure, sputum, tooth pain, wheezing, pleuritic pain, nausea, vomiting        Objective   Vital Signs:  /60 (BP Location: Left arm, Patient Position: Sitting, Cuff Size: Adult)   Pulse 79   Temp 96.5 °F (35.8 °C) (Temporal)   Resp 18   Ht 162.6 cm (64\")   Wt 61 kg (134 lb 8 oz)   SpO2 97%   BMI 23.09 kg/m²   Estimated body mass index is 23.09 kg/m² as calculated from the following:    Height as of this encounter: 162.6 cm (64\").    Weight as of this encounter: 61 kg (134 lb 8 oz).    BMI is within normal parameters. No other follow-up for BMI required.      Physical Exam  Constitutional:       General: She is not in acute distress.     Appearance: She is well-groomed. She is ill-appearing.   HENT:      Head: Normocephalic and atraumatic.      Right Ear: External ear normal. There is impacted cerumen.      Left Ear: External ear normal. There is impacted cerumen.      Ears:      Comments: Unable to visualize bilateral TM.     Nose: Nose normal. No congestion or rhinorrhea.      Right Sinus: No maxillary sinus tenderness or frontal sinus tenderness.      Left Sinus: No maxillary sinus tenderness or frontal sinus tenderness.      Mouth/Throat:      " Mouth: Mucous membranes are moist.      Pharynx: Postnasal drip present. No oropharyngeal exudate or posterior oropharyngeal erythema.   Eyes:      Conjunctiva/sclera: Conjunctivae normal.   Cardiovascular:      Rate and Rhythm: Normal rate.      Heart sounds: S1 normal and S2 normal.   Pulmonary:      Effort: Pulmonary effort is normal.      Breath sounds: Normal breath sounds.      Comments: Cough absent.   Abdominal:      General: Bowel sounds are normal.      Palpations: Abdomen is soft.      Tenderness: There is no abdominal tenderness.   Musculoskeletal:      Cervical back: Neck supple.   Lymphadenopathy:      Cervical: No cervical adenopathy.   Skin:     General: Skin is warm and dry.      Findings: No rash.   Neurological:      Mental Status: She is alert and oriented to person, place, and time.      Gait: Gait is intact.   Psychiatric:         Mood and Affect: Mood normal.         Thought Content: Thought content normal.        Result Review :    CMP          3/20/2024    10:27 9/18/2024    13:25 10/30/2024    15:52   CMP   Glucose 164  122  183    BUN 21  23  23    Creatinine 0.68  0.65  0.68    EGFR 93.8  94.3  93.2    Sodium 140  137  135    Potassium 4.0  4.3  4.2    Chloride 101  99  99    Calcium 9.3  8.9  8.9    Total Protein  6.9  6.5    Albumin  4.1  3.8    Globulin  2.8  2.7    Total Bilirubin  <0.2  <0.2    Alkaline Phosphatase  96  100    AST (SGOT)  46  23    ALT (SGPT)  51  40    Albumin/Globulin Ratio  1.5  1.4    BUN/Creatinine Ratio 30.9  35.4  33.8    Anion Gap 15.8  13.4  11.2      CBC          5/6/2024    15:41 9/18/2024    13:25 10/30/2024    15:52   CBC   WBC 8.51  6.45  6.70    RBC 4.31  4.45  4.10    Hemoglobin 13.1  13.6  12.4    Hematocrit 41.6  40.9  38.1    MCV 96.5  91.9  92.9    MCH 30.4  30.6  30.2    MCHC 31.5  33.3  32.5    RDW 12.7  12.7  11.7    Platelets 308  235  225                Assessment and Plan   Diagnoses and all orders for this visit:    1. Viral upper  respiratory infection (Primary)  -     POCT SARS-CoV-2 Antigen LOTTIE + Flu    2. Impacted cerumen of both ears  -     Ambulatory Referral to ENT (Otolaryngology)    Rapid flu negative.  Rapid COVID-negative.   Recommended Tylenol over-the-counter, increase fluids, and rest.         Follow Up   Return if symptoms worsen or fail to improve.  Patient was given instructions and counseling regarding her condition or for health maintenance advice. Please see specific information pulled into the AVS if appropriate.

## 2025-03-12 NOTE — PROGRESS NOTES
Chief Complaint  Tremors    Subjective          Divya AUTUMN Lomeli presents to [facility] for tremors  History of Present Illness    Divya Lomeli is a 69-year-old female who presents today in follow-up for bilateral arm tremor.  Symptoms started over 2 years ago and had seen a neurologist in the past (Dr. Morrsi).  Patient is taking primidone 300 mg twice a day, topiramate 50 mg twice a day, propranolol 20 mg twice a day, and gabapentin 400 mg twice a day.      Patient states that as long as she is consistent with taking her medication and takes it on time, tremor is fairly well-controlled.  She did not take her medication this morning  because she got up at noon and her tremor is a causing issues with her handwriting.  As long as she stays consistent she has less trouble with eating with a spoon.  He denies having any side effects from her current medication regimen.      Patient states that she has had fluctuating diarrhea and constipation.  It was advised by her PCP that she see a GI specialist.      She is also having more weakness and discomfort in her right hand.  She has seen a hand specialist in the past but they did not recommend surgery.  It was recommended she try a compressive glove, but the cost was unaffordable.           =============================================================================  9- Joseline SCHNEIDER office Visit  Assessment and Plan    Diagnoses and all orders for this visit:     1. Ulnar neuropathy at wrist, left (Primary)     2. Cervical stenosis of spinal canal     3. Essential tremor     Divya presents today for essential tremor that is present in both arms for the last 2+ years.  She had seen a neurologist in the past but we do not have records to review.  She is currently taking primidone 300 mg twice daily, topiramate 50 mg twice daily, and gabapentin 400 mg twice daily.     At this point the patient is not interested in increasing the  medication because she is able to deal with the tremor.  We did discuss nonpharmacologic treatments or assistive devices to improve functionality at home, including weighted utensils.     If she wanted to adjust medications, I would first increased topiramate to 100 mg twice daily.  Alternatively gabapentin can be increased or propranolol started.  She is unsure if she has taken propranolol in the past.  I will like to access records from Dr. Morris.     We talked about referral to movement disorder specialist at [facility] since she has tried several medications.  They may be able to discuss alternative treatments including DBS.     Regarding CRPS, continue physical therapy neurology typically does not treat CRPS as this is a pain syndrome.  She may want to look for a tertiary facility that specializes in this.     EMG/NCV reveals left ulnar neuropathy.  This is likely in the right hand as well given her complaints of paresthesias along the ulnar nerve distribution bilaterally.  We discussed the importance of keeping her arm straight at night to avoid compression of the ulnar nerve.  Consider a splint to keep arm straight while sleeping.     I believe a lot of her arm and neck pain and tricep weakness is likely due to moderate to severe central canal stenosis and bilateral foraminal narrowing.  Consider physical therapy for neck and arm pain.  Continue PT for CRPS.       Current Outpatient Medications:     calcium carbonate (TUMS) 500 MG chewable tablet, , Disp: , Rfl:     CBD (cannabidiol) oral oil, Take 2 drops by mouth Daily. Patient reports taking [employer] CBD gummies Full Spectrum Hemp Extract., Disp: , Rfl:     celecoxib (CeleBREX) 200 MG capsule, Take 1 capsule by mouth Daily., Disp: , Rfl:     Cobalamin Combinations (NEURIVA PLUS PO), Take 1 tablet/day by mouth., Disp: , Rfl:     escitalopram (LEXAPRO) 10 MG tablet, Take 1 tablet by mouth Daily., Disp: , Rfl:     estradiol (ESTRACE) 1 MG tablet, Take 1/2  (one-half) tablet by mouth once daily, Disp: 45 tablet, Rfl: 0    ezetimibe (ZETIA) 10 MG tablet, , Disp: , Rfl:     gabapentin (NEURONTIN) 400 MG capsule, Take 1 capsule by mouth 3 (Three) Times a Day., Disp: 270 capsule, Rfl: 1    glimepiride (AMARYL) 4 MG tablet, Take 1 tablet by mouth once daily, Disp: 90 tablet, Rfl: 0    Glucagon (Gvoke HypoPen 2-Pack) 1 MG/0.2ML solution auto-injector, Inject 1 mg under the skin into the appropriate area as directed 1 (One) Time As Needed (hypoglycemia) for up to 1 dose., Disp: 0.4 mL, Rfl: 1    hydroCHLOROthiazide 12.5 MG tablet, Take 1 tablet by mouth As Needed., Disp: , Rfl:     hydroCHLOROthiazide 12.5 MG tablet, Take 2 tablets by mouth Daily for 30 days., Disp: 60 tablet, Rfl: 0    HYDROcodone-acetaminophen (NORCO)  MG per tablet, Take 1 tablet by mouth Every 8 (Eight) Hours As Needed for Moderate Pain., Disp: 90 tablet, Rfl: 0    HYDROcodone-acetaminophen (NORCO)  MG per tablet, Take 1 tablet by mouth Every 8 (Eight) Hours As Needed for Moderate Pain., Disp: 90 tablet, Rfl: 0    HYDROcodone-acetaminophen (NORCO)  MG per tablet, Take 1 tablet by mouth Every 8 (Eight) Hours As Needed for Moderate Pain., Disp: 90 tablet, Rfl: 0    HYDROcodone-acetaminophen (Norco) 7.5-325 MG per tablet, Take 1 tablet by mouth Every 8 (Eight) Hours As Needed for Moderate Pain., Disp: 90 tablet, Rfl: 0    hydroxychloroquine (PLAQUENIL) 200 MG tablet, Take 1.5 tablets by mouth Daily., Disp: , Rfl:     hydrOXYzine (ATARAX) 10 MG tablet, Take 1 tablet by mouth At Night As Needed., Disp: , Rfl:     hydrOXYzine pamoate (VISTARIL) 50 MG capsule, TAKE 1 CAPSULE BY MOUTH 4 TIMES DAILY AS NEEDED FOR ANXIETY, Disp: , Rfl:     Jardiance 25 MG tablet, Take 1 tablet by mouth once daily, Disp: 90 tablet, Rfl: 0    methocarbamol (ROBAXIN) 500 MG tablet, Take 1 tablet by mouth 3 (Three) Times a Day As Needed for Muscle Spasms (during the day)., Disp: 270 tablet, Rfl: 3    Misc. Devices  "(Roller Walker) misc, Rolling walker, Disp: 1 each, Rfl: 0    Multiple Vitamins-Minerals (PRESERVISION AREDS 2 PO), , Disp: , Rfl:     Narcan 4 MG/0.1ML nasal spray, , Disp: , Rfl:     Omega-3 Fatty Acids (Fish Oil Burp-Less) 1200 MG capsule, Take 1 capsule by mouth Daily., Disp: 90 capsule, Rfl: 3    polyethylene glycol (MIRALAX) 17 GM/SCOOP powder, mix 1 capful (17 g) in liquid by mouth 2 (Two) Times a Day., Disp: 510 g, Rfl: 0    primidone (MYSOLINE) 250 MG tablet, Take 1 tablet by mouth Every Night for 90 days., Disp: 90 tablet, Rfl: 0    propranolol (INDERAL) 20 MG tablet, Take 1 tablet by mouth 3 (Three) Times a Day., Disp: 90 tablet, Rfl: 5    SITagliptin-metFORMIN HCl ER (Janumet XR)  MG tablet, Take 2 tablets every day with a meal, Disp: 180 tablet, Rfl: 1    tiZANidine (ZANAFLEX) 4 MG tablet, TAKE 1 TABLET BY MOUTH AT NIGHT AS NEEDED FOR MUSCLE SPASM, Disp: 90 tablet, Rfl: 0    topiramate (TOPAMAX) 50 MG tablet, Take 1 tablet by mouth 2 (Two) Times a Day., Disp: 30 tablet, Rfl: 2    traZODone (DESYREL) 50 MG tablet, Take 1 tablet by mouth every night at bedtime., Disp: , Rfl:     Review of Systems   Musculoskeletal:  Positive for arthralgias and gait problem.   Neurological:  Positive for tremors and weakness.   All other systems reviewed and are negative.         Objective:    Vital Signs:   /73   Pulse 87   Ht 162.6 cm (64\")   Wt 58.5 kg (129 lb)   BMI 22.14 kg/m²     Physical Exam  Vitals reviewed.   Constitutional:       Appearance: She is well-developed.   HENT:      Head: Normocephalic and atraumatic.   Eyes:      General: Lids are normal.      Extraocular Movements: Extraocular movements intact.      Pupils: Pupils are equal, round, and reactive to light.   Cardiovascular:      Rate and Rhythm: Normal rate.   Pulmonary:      Effort: Pulmonary effort is normal.   Musculoskeletal:      Cervical back: Normal range of motion and neck supple.   Skin:     General: Skin is warm and dry. "   Neurological:      Mental Status: She is oriented to person, place, and time.      Cranial Nerves: Cranial nerves 2-12 are intact. No cranial nerve deficit.      Motor: Motor function is intact. Weakness and tremor present.      Coordination: Finger-Nose-Finger Test normal. Rapid alternating movements normal.      Gait: Gait abnormal.   Psychiatric:         Attention and Perception: Attention normal.         Mood and Affect: Mood normal.         Speech: Speech normal.         Behavior: Behavior normal.         Cognition and Memory: Cognition and memory normal.         Judgment: Judgment normal.        Result Review :                Neurological Exam  Mental Status  Awake, alert and oriented to person, place and time. Oriented to person, place and time. Oriented to person, place, and time. Memory is normal. Recent and remote memory are intact. Speech is normal. Language is fluent with no aphasia. Attention and concentration are normal. Fund of knowledge is appropriate for level of education.    Cranial Nerves  CN II: Visual acuity is normal. Visual fields full to confrontation.  CN III, IV, VI: Extraocular movements intact bilaterally. Normal lids and orbits bilaterally. Pupils equal round and reactive to light bilaterally.  CN V: Facial sensation is normal.  CN VII: Full and symmetric facial movement.  CN IX, X: Palate elevates symmetrically. Normal gag reflex.  CN XI: Shoulder shrug strength is normal.  CN XII: Tongue midline without atrophy or fasciculations.    Motor  Decreased muscle bulk throughout. The following abnormal movements were seen: Frequency, moderate amplitude bilateral hand tremor worse with intention and posture.   No pronator drift.  General weakness throughout 4/5, antalgic weakness.    Sensory  Light touch is normal in upper and lower extremities.     Reflexes  Left Plantar: downgoing    Coordination   Tremor.Right: Finger-to-nose abnormality: Rapid alternating movement abnormality:Left:  Finger-to-nose abnormality: Rapid alternating movement normal.  SLow LOWELL on right due to pain and weakness.    Gait   Abnormal gait.Casual gait: Narrow stance. Hesitant gait. Able to rise from chair without using arms.         Assessment and Plan    Diagnoses and all orders for this visit:    1. Essential tremor (Primary)    2. Cervical stenosis of spinal canal    3. Bilateral wrist pain    4. Diarrhea, unspecified type      Divya Lomeli is seen today for tremor.  She is currently taking primidone, propranolol, and gabapentin.  States that as long as she takes her medication and takes it on time, her symptoms are fairly well-controlled.  She did not take her medication today and has having trouble with handwriting.  She denies having any side effects from these medications.  She also complains of intermittent constipation/diarrhea and right hand pain and weakness.    Continue propranolol 20 mg twice a day and primidone 250 milligrams nightly for tremor.    Continue gabapentin 400 mg 2-3 times a day for nerve pain and cervical spinal stenosis and foraminal narrowing.    If tremor worsens, consider switching to propranolol LA 60 mg daily.    She mentioned her hand specialist recommended EMG/NCV of the right hand.  She can reschedule this would Dr. Seipel today or contact the hospital if she would like to proceed with this.    Recommend she see a GI specialist for diarrhea/constipation.    Follow-up 6 months       I spent 34 minutes caring for Divya on this date of service. This time includes time spent by me in the following activities:preparing for the visit, performing a medically appropriate examination and/or evaluation , counseling and educating the patient/family/caregiver, and documenting information in the medical record  Follow Up   Return in about 6 months (around 9/13/2025).  Patient was given instructions and counseling regarding her condition or for health maintenance advice. Please see specific  information pulled into the AVS if appropriate.     This document has been electronically signed by WYATT Daly  on March 13, 2025 14:49 EDT

## 2025-03-12 NOTE — ED PROVIDER NOTES
"Subjective     Provider in Triage Note  69 y.o. female with history of Lupus who complains of fatigue, watery diarrhea, arthralgias for 2-3 months. She denies abdominal pain. Denies melena hematochezia. She denies a history of fevers and admits to a history of COPD. Patient does not smoke cigarettes, quit in 1992. She reports, \"My partner is upstairs with Corona virus.\" She was seen by her PCP today and reports she was told to come to the ER to determine which virus she has.     Due to significant overcrowding in the emergency department patient was initially seen and evaluated in triage.  Provider in triage recommended patient placement in the treatment area to initiate therapy and movement to an ER bed as soon as possible.         2-3  History of Present Illness  Reviewed Pit note and agree with above.    Patient reports diarrhea, fatigue, and muscle aches for the past 3 months.  She states today she noticed increased weakness and slower with walking.  She reports intermittent chills feeling hot and cold as well as congestion with slight cough.  Patient states she will get diarrhea and then take Imodium which caused constipation and will take medication for constipation that will then cause diarrhea.  She denies any nausea, vomiting, abdominal pain, dyspnea, chest pain, dizziness, or changes in vision.        Review of Systems   Constitutional:  Positive for chills. Negative for fever.   HENT:  Positive for congestion.    Eyes:  Negative for visual disturbance.   Respiratory:  Positive for cough. Negative for shortness of breath.    Cardiovascular:  Negative for chest pain.   Gastrointestinal:  Positive for constipation, diarrhea and nausea. Negative for abdominal pain and vomiting.   Genitourinary:  Negative for dysuria.   Neurological:  Positive for weakness. Negative for dizziness.       Past Medical History:   Diagnosis Date    Anxiety     Arm pain     rt upper arm muscle    Cataracts, bilateral     COPD " "(chronic obstructive pulmonary disease)     20 to 30 years ago. Does not take meds.    CRPS (complex regional pain syndrome type I)     Diabetes mellitus     Type 2    Diabetic nephropathy     Encounter for immunization 05/24/2019    High blood pressure 02/24/2025    Hyperlipidemia     Hypertension     Lupus (systemic lupus erythematosus) 02/24/2025    Tremors of nervous system        Allergies   Allergen Reactions    Ether Nausea And Vomiting and Anaphylaxis     Other Reaction(s): \"sick to my stomach\"      Other reaction(s): \"sick to my stomach\"    Other Swelling     wasp       Past Surgical History:   Procedure Laterality Date    APPENDECTOMY  1958    BACK SURGERY      CATARACT EXTRACTION      CHOLECYSTECTOMY WITH INTRAOPERATIVE CHOLANGIOGRAM N/A 10/11/2022    Procedure: CHOLECYSTECTOMY LAPAROSCOPIC;  Surgeon: Jose Angel Wan MD;  Location: [facility] MAIN OR;  Service: General;  Laterality: N/A;    CYSTOSCOPY WITH CLOT EVACUATION N/A 09/28/2021    Procedure: CYSTOSCOPY WITH CLOT EVACUATION;  Surgeon: Jay Torres MD;  Location: [facility] MAIN OR;  Service: Urology;  Laterality: N/A;    INTERVENTIONAL RADIOLOGY PROCEDURE N/A 09/28/2021    Procedure: STENT PLACEMENT;  Surgeon: Jay Torres MD;  Location: [facility] MAIN OR;  Service: Urology;  Laterality: N/A;    LUMBAR SPINE SURGERY  1959    L5 removed-Abstracted from [facility]    OOPHORECTOMY      TONSILLECTOMY      TOTAL ABDOMINAL HYSTERECTOMY      TRANSURETHRAL RESECTION OF BLADDER TUMOR N/A 09/28/2021    Procedure: CYSTOSCOPY TRANSURETHRAL RESECTION OF BLADDER TUMOR;  Surgeon: Jay Torres MD;  Location: [facility] MAIN OR;  Service: Urology;  Laterality: N/A;       Family History   Problem Relation Age of Onset    Diabetes Mother     Diabetes Father        Social History     Socioeconomic History    Marital status:    Tobacco Use    Smoking status: Former     Current packs/day: 0.00     Average packs/day: 4.5 packs/day for 21.0 years (94.5 ttl " "pk-yrs)     Types: Cigarettes     Start date:      Quit date:      Years since quittin.2     Passive exposure: Past    Smokeless tobacco: Never    Tobacco comments:     2nd Hand Smoke - boyfriend smokes.   Vaping Use    Vaping status: Never Used   Substance and Sexual Activity    Alcohol use: Not Currently     Comment: occ    Drug use: Not Currently     Types: Marijuana     Comment: [facility]     Sexual activity: Defer           Objective   Physical Exam  Constitutional:       Appearance: Normal appearance.   HENT:      Head: Normocephalic and atraumatic.      Mouth/Throat:      Mouth: Mucous membranes are moist.   Eyes:      Extraocular Movements: Extraocular movements intact.   Cardiovascular:      Rate and Rhythm: Normal rate and regular rhythm.      Pulses: Normal pulses.      Heart sounds: Normal heart sounds.   Pulmonary:      Effort: Pulmonary effort is normal.      Breath sounds: Normal breath sounds.   Abdominal:      General: Abdomen is flat. Bowel sounds are normal.      Palpations: Abdomen is soft.      Tenderness: There is no abdominal tenderness.   Musculoskeletal:         General: Normal range of motion.      Cervical back: Normal range of motion.   Skin:     General: Skin is warm and dry.      Capillary Refill: Capillary refill takes less than 2 seconds.   Neurological:      General: No focal deficit present.      Mental Status: She is alert and oriented to person, place, and time.   Psychiatric:         Mood and Affect: Mood normal.         Behavior: Behavior normal.         Procedures           ED Course      /67   Pulse 80   Temp 98.7 °F (37.1 °C) (Oral)   Resp 18   Ht 162.6 cm (64\")   Wt 60.5 kg (133 lb 6.1 oz)   SpO2 96%   BMI 22.89 kg/m²   Labs Reviewed   COMPREHENSIVE METABOLIC PANEL - Abnormal; Notable for the following components:       Result Value    BUN 29 (*)     Creatinine 1.17 (*)     ALT (SGPT) 51 (*)     AST (SGOT) 38 (*)     Anion Gap 17.8 (*)     eGFR " 50.0 (*)     All other components within normal limits    Narrative:     GFR Categories in Chronic Kidney Disease (CKD)      GFR Category          GFR (mL/min/1.73)    Interpretation  G1                     90 or greater         Normal or high (1)  G2                      60-89                Mild decrease (1)  G3a                   45-59                Mild to moderate decrease  G3b                   30-44                Moderate to severe decrease  G4                    15-29                Severe decrease  G5                    14 or less           Kidney failure          (1)In the absence of evidence of kidney disease, neither GFR category G1 or G2 fulfill the criteria for CKD.    eGFR calculation 2021 CKD-EPI creatinine equation, which does not include race as a factor   RESPIRATORY PANEL PCR W/ COVID-19 (SARS-COV-2), NP SWAB IN UTM/VTP, 2 HR TAT - Normal    Narrative:     In the setting of a positive respiratory panel with a viral infection PLUS a negative procalcitonin without other underlying concern for bacterial infection, consider observing off antibiotics or discontinuation of antibiotics and continue supportive care. If the respiratory panel is positive for atypical bacterial infection (Bordetella pertussis, Chlamydophila pneumoniae, or Mycoplasma pneumoniae), consider antibiotic de-escalation to target atypical bacterial infection.   LIPASE - Normal   C-REACTIVE PROTEIN - Normal   SEDIMENTATION RATE - Normal   MAGNESIUM - Normal   CK - Normal   CBC WITH AUTO DIFFERENTIAL - Normal   CBC AND DIFFERENTIAL    Narrative:     The following orders were created for panel order CBC & Differential.  Procedure                               Abnormality         Status                     ---------                               -----------         ------                     CBC Auto Differential[[id number]]        Normal              Final result                 Please view results for these tests on the individual  orders.     Medications - No data to display    XR Chest 2 View  Result Date: 3/11/2025  Impression: No radiographic evidence of acute cardiopulmonary disease. Electronically Signed: Magan García  3/11/2025 10:12 PM EDT  Workstation ID: [id number]                                                     Medical Decision Making  Chart review: 3/11/25 Alondra Pickard APRN: Onset of symptoms 1-2 weeks ago. Severity of symptoms are mild/moderate. Status is no change. Frequency is persistent. Context includes significant other currently hospitalized with with coronavirus. Symptoms are aggravated by lying down.  Symptoms are relieved by throat lozenges.  Associated symptoms include chills, runny nose, nasal congestion, myalgia, fatigue, post nasal drainage, cough, headache, scratchy throat, ear pressure/fullness, and diarrhea.  Pertinent negatives include dyspnea, facial pain, fever, hemoptysis, otalgia, pharyngitis, rash, sinus pressure, sputum, tooth pain, wheezing, pleuritic pain, nausea, vomiting.  Rapid flu negative.  Rapid COVID-negative.   Recommended Tylenol over-the-counter, increase fluids, and rest.    Patient presented to the ED for the above complaint.    Patient underwent the above exam and evaluation.    While in ED patient was placed in gown and IV was established.  Chest x-ray, blood work was obtained to assess for infection, electrolyte abnormality, dehydration, rhabdomyolysis.  Patient was given p.o. home dose of pain medication.  Upon reevaluation patient is resting comfortably, vital stable on room air, no episodes of diarrhea while in the ED.  Discussed findings with patient.  Educated patient to increase fluid intake, continue home pain management regimen previously prescribed, follow closely PCP, follow-up with GI, and strict return precautions were discussed.  Patient voiced understanding, agreeable dispo plan.  Patient ambulating independently without difficulty at time of discharge.    Labs were  independently interpreted by myself and deemed remarkable for the following: WBC 7.21, glucose 85, creatinine 1.17, lipase 50, mag 1.7, CK 50, CRP negative, sed rate 2, respiratory panel negative.  Chest x-ray independently interpreted by Dr. Pritchard and reviewed by myself showing: No acute findings.    Appropriate PPE was worn during each patient encounter.    This document is intended for medical expert use only. Reading of this document by patients and/or patient's family without participating medical staff guidance may result in misinterpretation and unintended morbidity. Any interpretation of such data is the responsibility of the patient and/or family member responsible for the patient in concert with their primary or specialist providers, not to be left for sources of online searches such as [employer], [employer] or similar queries. Relying on these approaches to knowledge may result in misinterpretation, misguided goals of care and even death should patients or family members try recommendations outside of the realm of professional medical care in a supervised inpatient environment.    Discussed this patient with Dr. Pritchard who agrees with plan.    Problems Addressed:  Diarrhea, unspecified type: complicated acute illness or injury  Generalized weakness: complicated acute illness or injury  Other fatigue: complicated acute illness or injury    Amount and/or Complexity of Data Reviewed  Labs: ordered.  Radiology: ordered.    Risk  Prescription drug management.        Final diagnoses:   Diarrhea, unspecified type   Other fatigue   Generalized weakness       ED Disposition  ED Disposition       ED Disposition   Discharge    Condition   Stable    Comment   --               Samantha Wang S, APRN  [address]  [address]  [place] [address] [phone]    Schedule an appointment as soon as possible for a visit       [facility]  [address]  [id number] [phone]  Schedule an appointment as soon as possible for a visit            Medication List        Stop      sweet oil oil                 Osiris Amaro PA-C  03/12/25 0437

## 2025-03-12 NOTE — DISCHARGE INSTRUCTIONS
Increase fluid intake.  Continue pain medication as previously directed.    Follow-up closely with PCP.    Follow-up with GI for further evaluation.    Return to the ED for any new or worsening symptoms.

## 2025-03-13 ENCOUNTER — OFFICE VISIT (OUTPATIENT)
Dept: NEUROLOGY | Facility: CLINIC | Age: 70
End: 2025-03-13
Payer: MEDICARE

## 2025-03-13 VITALS
SYSTOLIC BLOOD PRESSURE: 120 MMHG | WEIGHT: 129 LBS | HEART RATE: 87 BPM | BODY MASS INDEX: 22.02 KG/M2 | DIASTOLIC BLOOD PRESSURE: 73 MMHG | HEIGHT: 64 IN

## 2025-03-13 DIAGNOSIS — R19.7 DIARRHEA, UNSPECIFIED TYPE: ICD-10-CM

## 2025-03-13 DIAGNOSIS — G25.0 ESSENTIAL TREMOR: Primary | ICD-10-CM

## 2025-03-13 DIAGNOSIS — M25.531 BILATERAL WRIST PAIN: ICD-10-CM

## 2025-03-13 DIAGNOSIS — M25.532 BILATERAL WRIST PAIN: ICD-10-CM

## 2025-03-13 DIAGNOSIS — M48.02 CERVICAL STENOSIS OF SPINAL CANAL: ICD-10-CM

## 2025-03-24 DIAGNOSIS — M25.561 CHRONIC PAIN OF BOTH KNEES: Primary | ICD-10-CM

## 2025-03-24 DIAGNOSIS — M25.562 CHRONIC PAIN OF BOTH KNEES: Primary | ICD-10-CM

## 2025-03-24 DIAGNOSIS — G89.29 CHRONIC PAIN OF BOTH KNEES: Primary | ICD-10-CM

## 2025-03-27 ENCOUNTER — HOSPITAL ENCOUNTER (OUTPATIENT)
Dept: GENERAL RADIOLOGY | Facility: HOSPITAL | Age: 70
Discharge: HOME | End: 2025-03-27
Admitting: PHYSICAL MEDICINE & REHABILITATION
Payer: MEDICARE

## 2025-03-27 DIAGNOSIS — G89.29 CHRONIC PAIN OF BOTH KNEES: ICD-10-CM

## 2025-03-27 DIAGNOSIS — M25.561 CHRONIC PAIN OF BOTH KNEES: ICD-10-CM

## 2025-03-27 DIAGNOSIS — M25.562 CHRONIC PAIN OF BOTH KNEES: ICD-10-CM

## 2025-03-27 PROCEDURE — 73562 X-RAY EXAM OF KNEE 3: CPT

## 2025-03-28 ENCOUNTER — APPOINTMENT (OUTPATIENT)
Dept: GENERAL RADIOLOGY | Facility: HOSPITAL | Age: 70
End: 2025-03-28
Payer: MEDICARE

## 2025-03-28 ENCOUNTER — HOSPITAL ENCOUNTER (EMERGENCY)
Facility: HOSPITAL | Age: 70
Discharge: HOME | End: 2025-03-28
Payer: MEDICARE

## 2025-03-28 VITALS
OXYGEN SATURATION: 98 % | SYSTOLIC BLOOD PRESSURE: 115 MMHG | RESPIRATION RATE: 16 BRPM | TEMPERATURE: 98.2 F | BODY MASS INDEX: 22.21 KG/M2 | WEIGHT: 130.07 LBS | DIASTOLIC BLOOD PRESSURE: 42 MMHG | HEART RATE: 73 BPM | HEIGHT: 64 IN

## 2025-03-28 DIAGNOSIS — M25.562 ACUTE PAIN OF BOTH KNEES: ICD-10-CM

## 2025-03-28 DIAGNOSIS — M25.552 BILATERAL HIP PAIN: ICD-10-CM
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
## 2025-03-28 DIAGNOSIS — M54.50 ACUTE BILATERAL LOW BACK PAIN WITHOUT SCIATICA: ICD-10-CM

## 2025-03-28 DIAGNOSIS — M25.551 BILATERAL HIP PAIN: ICD-10-CM

## 2025-03-28 DIAGNOSIS — M25.561 ACUTE PAIN OF BOTH KNEES: ICD-10-CM

## 2025-03-28 PROCEDURE — 73562 X-RAY EXAM OF KNEE 3: CPT

## 2025-03-28 PROCEDURE — 72110 X-RAY EXAM L-2 SPINE 4/>VWS: CPT

## 2025-03-28 PROCEDURE — 73521 X-RAY EXAM HIPS BI 2 VIEWS: CPT

## 2025-03-28 PROCEDURE — 99283 EMERGENCY DEPT VISIT LOW MDM: CPT

## 2025-03-28 RX ORDER — HYDROCODONE BITARTRATE AND ACETAMINOPHEN 7.5; 325 MG/1; MG/1
1 TABLET ORAL ONCE
Refills: 0 | Status: COMPLETED | OUTPATIENT
Start: 2025-03-28 | End: 2025-03-28

## 2025-03-28 RX ORDER — LIDOCAINE 4 G/G
1 PATCH TOPICAL
Status: DISCONTINUED | OUTPATIENT
Start: 2025-03-28 | End: 2025-03-28 | Stop reason: HOSPADM

## 2025-03-28 RX ADMIN — LIDOCAINE 1 PATCH: 4 PATCH TOPICAL at 18:30

## 2025-03-28 RX ADMIN — HYDROCODONE BITARTRATE AND ACETAMINOPHEN 1 TABLET: 7.5; 325 TABLET ORAL at 18:30

## 2025-03-28 NOTE — ED PROVIDER NOTES
"Subjective   Chief Complaint   Patient presents with    Fall       History of Present Illness  Patient is a 69-year-old female presents to the emergency department for evaluation of a fall.  Patient reports that she attempted to get out of her bed, got her legs tingling and a bedside table, no complaints of bilateral knee pain because her knees were flexed for extended period of time.  She also reports bilateral hip pain, lumbar back pain.  Denies any new numbness, tingling, weakness, no urinary retention, bowel or bladder incontinence, no IVDA.    Review of Systems    Past Medical History:   Diagnosis Date    Anxiety     Arm pain     rt upper arm muscle    Cataracts, bilateral     COPD (chronic obstructive pulmonary disease)     20 to 30 years ago. Does not take meds.    CRPS (complex regional pain syndrome type I)     Diabetes mellitus     Type 2    Diabetic nephropathy     Encounter for immunization 05/24/2019    High blood pressure 02/24/2025    Hyperlipidemia     Hypertension     Lupus (systemic lupus erythematosus) 02/24/2025    Tremors of nervous system        Allergies   Allergen Reactions    Ether Nausea And Vomiting and Anaphylaxis     Other Reaction(s): \"sick to my stomach\"      Other reaction(s): \"sick to my stomach\"    Other Swelling     wasp       Past Surgical History:   Procedure Laterality Date    APPENDECTOMY  1958    BACK SURGERY      CATARACT EXTRACTION      CHOLECYSTECTOMY WITH INTRAOPERATIVE CHOLANGIOGRAM N/A 10/11/2022    Procedure: CHOLECYSTECTOMY LAPAROSCOPIC;  Surgeon: Jose Angel Wan MD;  Location: [facility] OR;  Service: General;  Laterality: N/A;    CYSTOSCOPY WITH CLOT EVACUATION N/A 09/28/2021    Procedure: CYSTOSCOPY WITH CLOT EVACUATION;  Surgeon: Jay Torres MD;  Location: [facility] OR;  Service: Urology;  Laterality: N/A;    INTERVENTIONAL RADIOLOGY PROCEDURE N/A 09/28/2021    Procedure: STENT PLACEMENT;  Surgeon: Jay Torres MD;  Location: [facility] MAIN OR;  " Service: Urology;  Laterality: N/A;    LUMBAR SPINE SURGERY      L5 removed-Abstracted from [facility]    OOPHORECTOMY      TONSILLECTOMY      TOTAL ABDOMINAL HYSTERECTOMY      TRANSURETHRAL RESECTION OF BLADDER TUMOR N/A 2021    Procedure: CYSTOSCOPY TRANSURETHRAL RESECTION OF BLADDER TUMOR;  Surgeon: Jay Torres MD;  Location: [facility] MAIN OR;  Service: Urology;  Laterality: N/A;       Family History   Problem Relation Age of Onset    Diabetes Mother     Diabetes Father        Social History     Socioeconomic History    Marital status:    Tobacco Use    Smoking status: Former     Current packs/day: 0.00     Average packs/day: 4.5 packs/day for 21.0 years (94.5 ttl pk-yrs)     Types: Cigarettes     Start date:      Quit date:      Years since quittin.2     Passive exposure: Past    Smokeless tobacco: Never    Tobacco comments:     2nd Hand Smoke - boyfriend smokes.   Vaping Use    Vaping status: Never Used   Substance and Sexual Activity    Alcohol use: Not Currently     Comment: occ    Drug use: Not Currently     Types: Marijuana     Comment: [facility]     Sexual activity: Defer           Objective   Physical Exam  Vitals and nursing note reviewed.   Constitutional:       Appearance: Normal appearance.   HENT:      Head: Normocephalic and atraumatic.      Mouth/Throat:      Mouth: Mucous membranes are moist.      Pharynx: Oropharynx is clear.   Eyes:      Extraocular Movements: Extraocular movements intact.      Conjunctiva/sclera: Conjunctivae normal.      Pupils: Pupils are equal, round, and reactive to light.   Cardiovascular:      Rate and Rhythm: Normal rate and regular rhythm.      Heart sounds: No murmur heard.     No friction rub. No gallop.   Pulmonary:      Effort: Pulmonary effort is normal.      Breath sounds: Normal breath sounds.   Abdominal:      General: Bowel sounds are normal.      Palpations: Abdomen is soft.   Musculoskeletal:         General: No signs of injury.       Cervical back: Normal range of motion and neck supple.        Back:       Right hip: Tenderness present. No deformity, bony tenderness or crepitus. Normal range of motion.      Left hip: Tenderness present. No deformity, bony tenderness or crepitus. Normal range of motion.      Right knee: No swelling, deformity, effusion or erythema. Tenderness present. Normal pulse.      Left knee: No swelling, deformity, effusion or erythema. Tenderness present. Normal pulse.      Right lower leg: No edema.      Left lower leg: No edema.      Comments: No vertebral point tenderness noted to the C-spine T-spine or L-spine.  No palpable step-offs.  No skin abnormalities are appreciated.  No saddle anesthesia.  Mild lumbar paraspinal tissue tenderness  Patient's lower extremities are pink warm and dry, no skin changes or injuries are appreciated.  Compartments are soft.  DP pulse intact bilaterally with brisk cap refill.     Skin:     General: Skin is warm and dry.      Capillary Refill: Capillary refill takes less than 2 seconds.      Findings: No bruising, erythema or rash.   Neurological:      General: No focal deficit present.      Mental Status: She is alert and oriented to person, place, and time.         Procedures           ED Course      XR Spine Lumbar Complete 4+VW  Result Date: 3/28/2025  Impression: 1.No evidence of acute fracture or osseous malalignment. 2.Severe degenerative disc space loss at L3-L4 through L5-S1. Electronically Signed: Fabrice Her  3/28/2025 7:47 PM EDT  Workstation ID: [id number]    XR Hip 1 View Bilateral  Result Date: 3/28/2025  Impression: 1.No acute osseous abnormality of the bilateral hips. 2.Degenerative disc disease of the lumbar spine. Electronically Signed: Fabrice Her  3/28/2025 7:46 PM EDT  Workstation ID: [id number]    XR Knee 3 View Bilateral  Result Date: 3/28/2025  Impression: 1.No acute osseous abnormality of the right or left knee. No acute change from prior radiographs  performed yesterday. 2.Mild medial compartment joint space narrowing bilaterally. Electronically Signed: Fabrice Her  3/28/2025 7:45 PM EDT  Workstation ID: [id number]                                                     Medical Decision Making  Problems Addressed:  Acute bilateral low back pain without sciatica: complicated acute illness or injury  Acute pain of both knees: complicated acute illness or injury  Bilateral hip pain: complicated acute illness or injury  Fall, initial encounter: complicated acute illness or injury    Amount and/or Complexity of Data Reviewed  Radiology: ordered.    Risk  Prescription drug management.    Appropriate PPE worn during patient interactions.    Chart Review: Progress note neurology for tremors.  On 3/13/2020    Differential diagnoses considered for patient chief complaint and presentation, this list is not all inclusive of diagnoses considered: Contusion, strain, fracture patient was given hydrocodone for pain.  She does take this at home.    Patient with the above exam and workup with the above complaint.  Patient is awake alert oriented, no acute distress.  Exam is reassuring.  Patient can ambulate with a setting of ataxic gait.  No fractures noted on x-ray imaging.  She reports she feels better with her pain medication.  She is ready be discharged home.  Considertion was given for admission, however patient has reassuring exam and workup in the ed and appears appropriate for discharge home and continued outpatient care.     We discussed my findings, plan of care, discharge instructions, the importance of follow up with their PCP/ and or specialist for repeat evaluation and to discuss any abnormal findings in labs or imaging that warrant further outpatient evaluation. We discussed that although a definitive diagnosis is not always found in the ED, it is believed emergent conditions have been ruled out, and patient is safe for discharge at this time.  We discussed return  precautions for the emergency department.  Patient verbalizes understandings, and agrees with current plan of care.  .    Note Disclaimer: At [facility], we believe that sharing information builds trust and better relationships. You are receiving this note because you recently visited [facility]. It is possible you will see health information before a provider has talked with you about it. This kind of information can be easy to misunderstand. To help you fully understand what it means for your health, we urge you to discuss this note with your provider  Note dictated utilizing Dragon Dictation.          Final diagnoses:   Fall, initial encounter   Acute pain of both knees   Bilateral hip pain   Acute bilateral low back pain without sciatica       ED Disposition  ED Disposition       ED Disposition   Discharge    Condition   Stable    Comment   --               Samantha Wang, APRN  [address]  [address]  [facility] [address] [phone]          [facility] EMERGENCY DEPARTMENT  [address] [address] [phone]             Medication List      No changes were made to your prescriptions during this visit.            Tammi Terry, APRN  03/29/25 [id number]

## 2025-03-29 NOTE — DISCHARGE INSTRUCTIONS
Please continue her home pain medication  Return to the ED for new or worsening symptoms  Follow-up with your primary care provider, call for

## 2025-03-30 DIAGNOSIS — R51.9 NONINTRACTABLE HEADACHE, UNSPECIFIED CHRONICITY PATTERN, UNSPECIFIED HEADACHE TYPE: ICD-10-CM

## 2025-03-31 RX ORDER — TOPIRAMATE 50 MG/1
50 TABLET, FILM COATED ORAL 2 TIMES DAILY
Qty: 30 TABLET | Refills: 0 | Status: SHIPPED | OUTPATIENT
Start: 2025-03-31

## 2025-04-02 ENCOUNTER — LAB (OUTPATIENT)
Dept: FAMILY MEDICINE CLINIC | Facility: CLINIC | Age: 70
End: 2025-04-02
Payer: MEDICARE

## 2025-04-02 ENCOUNTER — REFERRAL TRIAGE (OUTPATIENT)
Dept: CASE MANAGEMENT | Facility: OTHER | Age: 70
End: 2025-04-02
Payer: MEDICARE

## 2025-04-02 ENCOUNTER — TELEPHONE (OUTPATIENT)
Dept: PAIN MEDICINE | Facility: CLINIC | Age: 70
End: 2025-04-02
Payer: MEDICARE

## 2025-04-02 ENCOUNTER — OFFICE VISIT (OUTPATIENT)
Dept: FAMILY MEDICINE CLINIC | Facility: CLINIC | Age: 70
End: 2025-04-02
Payer: MEDICARE

## 2025-04-02 VITALS
HEART RATE: 88 BPM | SYSTOLIC BLOOD PRESSURE: 128 MMHG | BODY MASS INDEX: 22.31 KG/M2 | RESPIRATION RATE: 16 BRPM | DIASTOLIC BLOOD PRESSURE: 64 MMHG | WEIGHT: 130.7 LBS | TEMPERATURE: 98.2 F | OXYGEN SATURATION: 97 % | HEIGHT: 64 IN

## 2025-04-02 DIAGNOSIS — E11.65 TYPE 2 DIABETES MELLITUS WITH HYPERGLYCEMIA, WITH LONG-TERM CURRENT USE OF INSULIN: ICD-10-CM

## 2025-04-02 DIAGNOSIS — Z00.00 HEALTH CARE MAINTENANCE: ICD-10-CM

## 2025-04-02 DIAGNOSIS — Z12.11 SCREEN FOR COLON CANCER: ICD-10-CM

## 2025-04-02 DIAGNOSIS — E11.65 TYPE 2 DIABETES MELLITUS WITH HYPERGLYCEMIA, WITH LONG-TERM CURRENT USE OF INSULIN: Primary | ICD-10-CM

## 2025-04-02 DIAGNOSIS — Z79.4 TYPE 2 DIABETES MELLITUS WITH HYPERGLYCEMIA, WITH LONG-TERM CURRENT USE OF INSULIN: ICD-10-CM

## 2025-04-02 DIAGNOSIS — G89.29 CHRONIC PAIN OF BOTH KNEES: Primary | ICD-10-CM

## 2025-04-02 DIAGNOSIS — M25.561 CHRONIC PAIN OF BOTH KNEES: Primary | ICD-10-CM

## 2025-04-02 DIAGNOSIS — Z79.4 TYPE 2 DIABETES MELLITUS WITH HYPERGLYCEMIA, WITH LONG-TERM CURRENT USE OF INSULIN: Primary | ICD-10-CM

## 2025-04-02 DIAGNOSIS — M25.562 CHRONIC PAIN OF BOTH KNEES: Primary | ICD-10-CM

## 2025-04-02 LAB — HBA1C MFR BLD: 5.7 % (ref 4.8–5.6)

## 2025-04-02 PROCEDURE — 36415 COLL VENOUS BLD VENIPUNCTURE: CPT

## 2025-04-02 PROCEDURE — 82043 UR ALBUMIN QUANTITATIVE: CPT | Performed by: NURSE PRACTITIONER

## 2025-04-02 PROCEDURE — 83036 HEMOGLOBIN GLYCOSYLATED A1C: CPT | Performed by: NURSE PRACTITIONER

## 2025-04-02 PROCEDURE — 82570 ASSAY OF URINE CREATININE: CPT | Performed by: NURSE PRACTITIONER

## 2025-04-02 PROCEDURE — 80053 COMPREHEN METABOLIC PANEL: CPT | Performed by: NURSE PRACTITIONER

## 2025-04-02 NOTE — TELEPHONE ENCOUNTER
Provider: NADER    Caller: Divya Lomeli A    Relationship to Patient: Self    Phone Number: [phone]    Reason for Call: PATIENT STATES THAT SHE JUST HAD 2 SETS OF XRAYS DONE ON HER KNEES WHEN SHE WENT TO THE ED. SHE STATE DR. DOE NEEDED TO SEE THESE SO SHE CAN GET HER KNEE INJECTIONS SCHEDULED. SHE WANTED HIM TO BE AWARE THEY ARE AVAILABLE.

## 2025-04-02 NOTE — ASSESSMENT & PLAN NOTE
Diabetes is stable.   Continue current treatment regimen.  Reminded to bring in blood sugar diary at next visit.  Recommended an ADA diet.  Regular aerobic exercise.  Discussed ways to avoid symptomatic hypoglycemia.  Diabetes will be reassessed in 6 months

## 2025-04-02 NOTE — PROGRESS NOTES
"Chief Complaint  Follow-up    Subjective        Divya Lomeli presents to [facility]  History of Present Illness  Divya, 70 y/o female presents to PC office, to f/u from recent ER visit.      Objective   Vital Signs:  /64 (BP Location: Right arm, Patient Position: Sitting, Cuff Size: Adult)   Pulse 88   Temp 98.2 °F (36.8 °C) (Infrared)   Resp 16   Ht 162.6 cm (64\")   Wt 59.3 kg (130 lb 11.2 oz)   SpO2 97%   BMI 22.43 kg/m²   Estimated body mass index is 22.43 kg/m² as calculated from the following:    Height as of this encounter: 162.6 cm (64\").    Weight as of this encounter: 59.3 kg (130 lb 11.2 oz).    BMI is within normal parameters. No other follow-up for BMI required.      Physical Exam   Result Review :  The following data was reviewed by: WYATT Gregg on 04/02/2025:  Common labs          9/18/2024    13:25 10/30/2024    15:52 3/11/2025    20:35   Common Labs   Glucose 122  183  85    BUN 23  23  29    Creatinine 0.65  0.68  1.17    Sodium 137  135  141    Potassium 4.3  4.2  4.2    Chloride 99  99  99    Calcium 8.9  8.9  9.2    Albumin 4.1  3.8  4.1    Total Bilirubin <0.2  <0.2  <0.2    Alkaline Phosphatase 96  100  72    AST (SGOT) 46  23  38    ALT (SGPT) 51  40  51    WBC 6.45  6.70  7.21    Hemoglobin 13.6  12.4  13.0    Hematocrit 40.9  38.1  41.0    Platelets 235  225  258    Total Cholesterol 177      Triglycerides 177      HDL Cholesterol 61      LDL Cholesterol  86      Hemoglobin A1C 5.90      Microalbumin, Urine <1.2        Data reviewed : Radiologic studies from ER visit on 3/28/25:  XR SPINE LUMBAR COMPLETE 4+VW     Date of Exam: 3/28/2025 7:00 PM EDT     Indication: Fall     Comparison: Lumbar spine radiographs dated 7/25/2023     Findings:  There are 5 nonrib-bearing lumbar-type vertebral bodies. The vertebral body heights are maintained without evidence of acute fracture. There is severe degenerative disc and loss at L3-L4, L4-L5, " and L5-S1. The facet joints are in normal alignment.   Spinous processes appear intact. Transverse processes are intact. The SI joints are normal alignment.     IMPRESSION:  Impression:  1.No evidence of acute fracture or osseous malalignment.  2.Severe degenerative disc space loss at L3-L4 through L5-S1.           Electronically Signed: Fabrice Her    3/28/2025 7:47 PM EDT    Workstation ID: [id number]    XR HIP 1 VW BILATERAL     Date of Exam: 3/28/2025 7:00 PM EDT     Indication: Hip pain after fall from bed     Comparison: Hip radiographs dated 7/25/2023     Findings:  There is no acute fracture or dislocation. The SI joints and pubic symphysis appear normally aligned and well maintained. There is no osseous erosion or chondrocalcinosis. There is degenerative disc disease of the lumbar spine.     IMPRESSION:  Impression:  1.No acute osseous abnormality of the bilateral hips.  2.Degenerative disc disease of the lumbar spine.           Electronically Signed: Fabrice Her    3/28/2025 7:46 PM EDT    Workstation ID: [id number]    XR KNEE 3 VW BILATERAL     Date of Exam: 3/28/2025 7:00 PM EDT     Indication: Knee pain after fall from bed today     Comparison: Bilateral knee radiographs dated 3/27/2025     Findings:  Right knee: There is no acute fracture or dislocation. There is mild medial compartment joint space narrowing. There is no joint effusion. Bone mineralization is normal. There is peripheral vascular atherosclerotic calcification.     Left knee: There is no acute fracture or dislocation. There is mild medial compartment joint space narrowing. There is no joint effusion. Bone mineralization is normal. There is mild distal quadriceps tendon enthesopathy.     IMPRESSION:  Impression:  1.No acute osseous abnormality of the right or left knee. No acute change from prior radiographs performed yesterday.  2.Mild medial compartment joint space narrowing bilaterally.           Electronically Signed: Fabrice  Taina    3/28/2025 7:45 PM EDT    Workstation ID: [id number]           Assessment and Plan   Diagnoses and all orders for this visit:    1. Type 2 diabetes mellitus with hyperglycemia, with long-term current use of insulin (Primary)  Assessment & Plan:  Diabetes is stable.   Continue current treatment regimen.  Reminded to bring in blood sugar diary at next visit.  Recommended an ADA diet.  Regular aerobic exercise.  Discussed ways to avoid symptomatic hypoglycemia.  Diabetes will be reassessed in 6 months    Orders:  -     Hemoglobin A1c; Future  -     Comprehensive metabolic panel; Future  -     Microalbumin / Creatinine Urine Ratio - Urine, Clean Catch    2. Health care maintenance  -     Hemoglobin A1c; Future  -     Comprehensive metabolic panel; Future  -     Microalbumin / Creatinine Urine Ratio - Urine, Clean Catch  -     Ambulatory Referral to Case Management [facility] Home Monitoring    3. Screen for colon cancer  -     Cologuard - Stool, Per Rectum; Future           I spent 30 minutes caring for iDvya on this date of service. This time includes time spent by me in the following activities:preparing for the visit, reviewing tests, obtaining and/or reviewing a separately obtained history, counseling and educating the patient/family/caregiver, ordering medications, tests, or procedures, documenting information in the medical record, and independently interpreting results and communicating that information with the patient/family/caregiver  Follow Up   Return in about 6 months (around 10/2/2025) for Next scheduled follow up.  Patient was given instructions and counseling regarding her condition or for health maintenance advice. Please see specific information pulled into the AVS if appropriate.

## 2025-04-03 LAB
ALBUMIN SERPL-MCNC: 3.8 G/DL (ref 3.5–5.2)
ALBUMIN UR-MCNC: <1.2 MG/DL
ALBUMIN/GLOB SERPL: 1.4 G/DL
ALP SERPL-CCNC: 81 U/L (ref 39–117)
ALT SERPL W P-5'-P-CCNC: 31 U/L (ref 1–33)
ANION GAP SERPL CALCULATED.3IONS-SCNC: 13 MMOL/L (ref 5–15)
AST SERPL-CCNC: 25 U/L (ref 1–32)
BILIRUB SERPL-MCNC: <0.2 MG/DL (ref 0–1.2)
BUN SERPL-MCNC: 16 MG/DL (ref 8–23)
BUN/CREAT SERPL: 19.8 (ref 7–25)
CALCIUM SPEC-SCNC: 9.3 MG/DL (ref 8.6–10.5)
CHLORIDE SERPL-SCNC: 102 MMOL/L (ref 98–107)
CO2 SERPL-SCNC: 25 MMOL/L (ref 22–29)
CREAT SERPL-MCNC: 0.81 MG/DL (ref 0.57–1)
CREAT UR-MCNC: 68.6 MG/DL
EGFRCR SERPLBLD CKD-EPI 2021: 77.7 ML/MIN/1.73
GLOBULIN UR ELPH-MCNC: 2.8 GM/DL
GLUCOSE SERPL-MCNC: 192 MG/DL (ref 65–99)
MICROALBUMIN/CREAT UR: NORMAL MG/G{CREAT}
POTASSIUM SERPL-SCNC: 4 MMOL/L (ref 3.5–5.2)
PROT SERPL-MCNC: 6.6 G/DL (ref 6–8.5)
SODIUM SERPL-SCNC: 140 MMOL/L (ref 136–145)

## 2025-04-09 ENCOUNTER — TELEPHONE (OUTPATIENT)
Dept: PAIN MEDICINE | Facility: CLINIC | Age: 70
End: 2025-04-09
Payer: MEDICARE

## 2025-04-09 NOTE — TELEPHONE ENCOUNTER
KNEE INJECTION HAS BEEN DENIED BY INSURANCE AND APPT HAS BEEN CANCELLED. WE HAVE TRIED TO REACH PATIENT BUT THERE IS NO ANSWER AND VM IS FULL. I DID LEAVE A MESSAGE ON BOTH EMERGENCY CONTACTS VM FOR THEM TO HAVE HER CALL US TO DISCUSS THIS. OK FOR HUB TO READ.

## 2025-04-10 ENCOUNTER — PATIENT OUTREACH (OUTPATIENT)
Dept: CASE MANAGEMENT | Facility: OTHER | Age: 70
End: 2025-04-10
Payer: MEDICARE

## 2025-04-10 ENCOUNTER — TELEPHONE (OUTPATIENT)
Dept: PAIN MEDICINE | Facility: CLINIC | Age: 70
End: 2025-04-10
Payer: MEDICARE

## 2025-04-10 NOTE — TELEPHONE ENCOUNTER
Hub staff attempted to follow warm transfer process and was unsuccessful     Caller: Divya Lomeli A    Relationship to patient: Self    Best call back number: [phone]    Patient is needing: PATIENT HAD PROCEDURE TODAY THAT WAS CANCELLED DUE TO INSURANCE BUT SHE WASN'T AWARE AND SHOWED UP ANYWAY.  PLEASE CONTACT PATIENT TO DISCUSS MOVING FORWARD.  PLEASE CALL AFTER NOON

## 2025-04-10 NOTE — OUTREACH NOTE
AMBULATORY CASE MANAGEMENT NOTE    Names and Relationships of Patient/Support Persons: Contact: Divya Lomeli; Relationship: Self -     Patient Outreach    Received provider referral, for pt [employer] monitoring assistance. RN-ACM outreach call made to pt, able to reach pt on 3rd attempt. Explained role of RN-ACM and reason for call. Discussed use of [employer]. Spent over 40 minutes with pt attempting to log into her [employer] account. Pt states she is not receiving username link in her email. Pt reports she has a friend that set up her account but friend is unavailable for assistance at this time. RN-ACM advised pt to call [employer], number provided, to have her account reactivated. Advised pt to call RN-ACM back if she was unable to receive assistance from [employer]. Otherwise, RN-ACM will follow up with pt next week to ensure she can log into her [employer] account and discuss/review how to navigate it. Pt thanks RN-ACM for the call and assistance. Follow up outreach scheduled for next week.       Yannick BRYAN  Ambulatory Case Management    4/10/2025, 11:02 EDT

## 2025-04-11 NOTE — TELEPHONE ENCOUNTER
Tried to call patient to give update but there was no answer and VM is still full. I called on the 9th of April to discuss and cancel appt however patient did not answer and VM was full. I did leave a message on the VM's of her emergency contacts to have her call us regarding her appt. Appt cancelled for now insurance had denied for synvisc gel. We reapplied for approval for Durolane gel instead still waiting for auth to come back. Once we get that we will reach out to patient to reschedule. Ok for HUB to read.

## 2025-04-15 ENCOUNTER — PATIENT OUTREACH (OUTPATIENT)
Dept: CASE MANAGEMENT | Facility: OTHER | Age: 70
End: 2025-04-15
Payer: MEDICARE

## 2025-04-15 NOTE — OUTREACH NOTE
AMBULATORY CASE MANAGEMENT NOTE    Names and Relationships of Patient/Support Persons: Contact: Divya Lomeli; Relationship: Self -     Patient Outreach    Follow up RN-ACM outreach call made to pt. Pt reports she has not contacted MyChart help desk yet to inquire about having her account reactivated. Pt states that her significant other has been sick and she has been caring for him. Pt requests RN-ACM check back with her next week. Follow up outreach scheduled.       Yannick BRYAN  Ambulatory Case Management    4/15/2025, 15:30 EDT

## 2025-04-16 ENCOUNTER — OFFICE VISIT (OUTPATIENT)
Dept: FAMILY MEDICINE CLINIC | Facility: CLINIC | Age: 70
End: 2025-04-16
Payer: MEDICARE

## 2025-04-16 VITALS
BODY MASS INDEX: 21.92 KG/M2 | WEIGHT: 128.4 LBS | HEART RATE: 77 BPM | OXYGEN SATURATION: 98 % | RESPIRATION RATE: 16 BRPM | SYSTOLIC BLOOD PRESSURE: 125 MMHG | HEIGHT: 64 IN | TEMPERATURE: 98.6 F | DIASTOLIC BLOOD PRESSURE: 65 MMHG

## 2025-04-16 DIAGNOSIS — R63.4 WEIGHT LOSS: ICD-10-CM

## 2025-04-16 DIAGNOSIS — Z79.899 MEDICATION MANAGEMENT: Primary | ICD-10-CM

## 2025-04-16 RX ORDER — PRIMIDONE 250 MG/1
250 TABLET ORAL NIGHTLY
Qty: 90 TABLET | Refills: 0 | Status: CANCELLED | OUTPATIENT
Start: 2025-04-16 | End: 2025-07-15

## 2025-04-16 NOTE — ASSESSMENT & PLAN NOTE
Long discussion with patient about proper medication regimen and taking medications as directed.  Instructed patient to take primidone nightly as ordered.  Patient states that she has been taking the primidone 3 times per day.  Advised patient to cease taking 3 times a day and in fact informed her that she needs to only be taking once per day.  Patient also states that she takes propranolol 3 times per day but it is recommended twice per day.  Patient also states that she takes topiramate 3 times a day.  Topiramate regimen in medical record is twice daily.  Notified patient of these appropriate regimens and patient verbalized understanding.  Encouraged patient to return to office in 1 to 3 weeks to further discuss appropriate medication regimen.  Highly encourage patient to bring all medication bottles and schedule to discuss at next visit.

## 2025-04-16 NOTE — ASSESSMENT & PLAN NOTE
Recommend low sugar and high-protein Boost/Glucerna type drinks in addition to meals.  Patient is down 2 pounds from previous visit on 4/2/25.

## 2025-04-16 NOTE — PROGRESS NOTES
"Chief Complaint  Follow-up (2 week f/u)    Subjective        Divya Lomeli presents to [facility]  History of Present Illness  68 y/o Dviya presents to PC office for f/u visit on lab results from previous visit.  Divya is very upset and stating that provider, author of this note is not prescribing her medication appropriately.  Patient states that she called and pharmacy advised her to call primary care office.  Patient reports that she takes primidone 250 mg 3 times a day.  After medication reconciliation with the patient informed patient that primidone is scheduled and prescribed only once per day at night.  Encourage patient to follow-up within the next week or 2 for a full and complete reconciliation of medications.  Highly encourage patient to reach return to office with all medication bottles currently taking.    Divya also is concerned about weight loss.  Patient states that she has lost 2 pounds since her last visit with me which was a few weeks ago.  Divya states that she was placed on Wegovy for weight loss sometime within the last 1 to 2 years and taken off because she lost too much weight and weighed 119 pounds.  Patient states that she has not taking any weight loss medications at this time.      Objective   Vital Signs:  /65 (BP Location: Left arm, Patient Position: Sitting, Cuff Size: Adult)   Pulse 77   Temp 98.6 °F (37 °C) (Infrared)   Resp 16   Ht 162.6 cm (64\")   Wt 58.2 kg (128 lb 6.4 oz)   SpO2 98%   BMI 22.04 kg/m²   Estimated body mass index is 22.04 kg/m² as calculated from the following:    Height as of this encounter: 162.6 cm (64\").    Weight as of this encounter: 58.2 kg (128 lb 6.4 oz).    BMI is within normal parameters. No other follow-up for BMI required.      Physical Exam  Constitutional:       General: She is not in acute distress.     Appearance: Normal appearance. She is not ill-appearing, toxic-appearing or diaphoretic.   HENT:    "   Head: Normocephalic and atraumatic.   Cardiovascular:      Rate and Rhythm: Normal rate.   Pulmonary:      Effort: Pulmonary effort is normal.   Skin:     General: Skin is dry.      Capillary Refill: Capillary refill takes less than 2 seconds.      Coloration: Skin is pale.   Neurological:      General: No focal deficit present.      Mental Status: She is alert and oriented to person, place, and time.      Motor: Weakness present.      Gait: Gait abnormal.   Psychiatric:         Mood and Affect: Affect is blunt and flat.         Speech: Speech normal.         Behavior: Behavior is not aggressive. Behavior is cooperative.        Result Review :  The following data was reviewed by: WYATT Gregg on 04/16/2025:  CMP          10/30/2024    15:52 3/11/2025    20:35 4/2/2025    13:43   CMP   Glucose 183  85  192    BUN 23  29  16    Creatinine 0.68  1.17  0.81    EGFR 93.2  50.0  77.7    Sodium 135  141  140    Potassium 4.2  4.2  4.0    Chloride 99  99  102    Calcium 8.9  9.2  9.3    Total Protein 6.5  6.5  6.6    Albumin 3.8  4.1  3.8    Globulin 2.7  2.4  2.8    Total Bilirubin <0.2  <0.2  <0.2    Alkaline Phosphatase 100  72  81    AST (SGOT) 23  38  25    ALT (SGPT) 40  51  31    Albumin/Globulin Ratio 1.4  1.7  1.4    BUN/Creatinine Ratio 33.8  24.8  19.8    Anion Gap 11.2  17.8  13.0      CBC w/diff          9/18/2024    13:25 10/30/2024    15:52 3/11/2025    20:35   CBC w/Diff   WBC 6.45  6.70  7.21    RBC 4.45  4.10  4.26    Hemoglobin 13.6  12.4  13.0    Hematocrit 40.9  38.1  41.0    MCV 91.9  92.9  96.2    MCH 30.6  30.2  30.5    MCHC 33.3  32.5  31.7    RDW 12.7  11.7  13.6    Platelets 235  225  258    Neutrophil Rel % 46.9  49.2  44.0    Immature Granulocyte Rel % 0.5  0.6  0.4    Lymphocyte Rel % 38.1  37.8  40.5    Monocyte Rel % 8.4  8.1  9.3    Eosinophil Rel % 5.0  3.3  5.0    Basophil Rel % 1.1  1.0  0.8      Lipid Panel          9/18/2024    13:25   Lipid Panel   Total Cholesterol 177     Triglycerides 177    HDL Cholesterol 61    VLDL Cholesterol 30    LDL Cholesterol  86    LDL/HDL Ratio 1.32        Most Recent A1C          4/2/2025    13:43   HGBA1C Most Recent   Hemoglobin A1C 5.70      A1C Last 3 Results          9/18/2024    13:25 4/2/2025    13:43   HGBA1C Last 3 Results   Hemoglobin A1C 5.90  5.70      Microalbumin          9/18/2024    13:25 4/2/2025    13:43   Microalbumin   Microalbumin, Urine <1.2  <1.2      UA          9/18/2024    13:25 10/30/2024    15:52   Urinalysis   Specific Gravity, UA 1.024  >1.030    Ketones, UA Trace  Negative    Blood, UA Negative  Negative    Leukocytes, UA Negative  Negative    Nitrite, UA Negative  Negative        Data reviewed : Consultant notes Joseline Guy NP with Neurology, 3/13/25; copied and pasted from her H&P;  Divya Lomeli is seen today for tremor.  She is currently taking primidone, propranolol, and gabapentin.  States that as long as she takes her medication and takes it on time, her symptoms are fairly well-controlled.  She did not take her medication today and has having trouble with handwriting.  She denies having any side effects from these medications.  She also complains of intermittent constipation/diarrhea and right hand pain and weakness.     Continue propranolol 20 mg twice a day and primidone 250 milligrams nightly for tremor.     Continue gabapentin 400 mg 2-3 times a day for nerve pain and cervical spinal stenosis and foraminal narrowing.     If tremor worsens, consider switching to propranolol LA 60 mg daily.         Assessment and Plan   Diagnoses and all orders for this visit:    1. Medication management (Primary)  Assessment & Plan:  Long discussion with patient about proper medication regimen and taking medications as directed.  Instructed patient to take primidone nightly as ordered.  Patient states that she has been taking the primidone 3 times per day.  Advised patient to cease taking 3 times a day and in fact informed  her that she needs to only be taking once per day.  Patient also states that she takes propranolol 3 times per day but it is recommended twice per day.  Patient also states that she takes topiramate 3 times a day.  Topiramate regimen in medical record is twice daily.  Notified patient of these appropriate regimens and patient verbalized understanding.  Encouraged patient to return to office in 1 to 3 weeks to further discuss appropriate medication regimen.  Highly encourage patient to bring all medication bottles and schedule to discuss at next visit.      2. Weight loss  Assessment & Plan:  Recommend low sugar and high-protein Boost/Glucerna type drinks in addition to meals.  Patient is down 2 pounds from previous visit on 4/2/25.             I spent 40 minutes caring for Divya on this date of service. This time includes time spent by me in the following activities:preparing for the visit, reviewing tests, obtaining and/or reviewing a separately obtained history, counseling and educating the patient/family/caregiver, documenting information in the medical record, and independently interpreting results and communicating that information with the patient/family/caregiver  Follow Up   Return in about 1 week (around 4/23/2025) for Next scheduled follow up, will need 45 minutes.  Patient was given instructions and counseling regarding her condition or for health maintenance advice. Please see specific information pulled into the AVS if appropriate.

## 2025-04-17 DIAGNOSIS — R51.9 NONINTRACTABLE HEADACHE, UNSPECIFIED CHRONICITY PATTERN, UNSPECIFIED HEADACHE TYPE: ICD-10-CM

## 2025-04-17 RX ORDER — TOPIRAMATE 50 MG/1
50 TABLET, FILM COATED ORAL 2 TIMES DAILY
Qty: 30 TABLET | Refills: 0 | Status: SHIPPED | OUTPATIENT
Start: 2025-04-17

## 2025-04-18 ENCOUNTER — TELEPHONE (OUTPATIENT)
Dept: PAIN MEDICINE | Facility: CLINIC | Age: 70
End: 2025-04-18

## 2025-04-18 ENCOUNTER — OFFICE VISIT (OUTPATIENT)
Dept: PAIN MEDICINE | Facility: CLINIC | Age: 70
End: 2025-04-18
Payer: MEDICARE

## 2025-04-18 VITALS
RESPIRATION RATE: 16 BRPM | HEART RATE: 61 BPM | DIASTOLIC BLOOD PRESSURE: 65 MMHG | OXYGEN SATURATION: 97 % | WEIGHT: 127 LBS | BODY MASS INDEX: 21.8 KG/M2 | SYSTOLIC BLOOD PRESSURE: 122 MMHG

## 2025-04-18 DIAGNOSIS — M25.562 CHRONIC PAIN OF BOTH KNEES: ICD-10-CM

## 2025-04-18 DIAGNOSIS — M75.51 SUBACROMIAL BURSITIS OF BOTH SHOULDERS: ICD-10-CM

## 2025-04-18 DIAGNOSIS — M54.2 CERVICALGIA: ICD-10-CM

## 2025-04-18 DIAGNOSIS — G89.29 CHRONIC PAIN OF BOTH SHOULDERS: ICD-10-CM

## 2025-04-18 DIAGNOSIS — G89.29 CHRONIC PAIN OF RIGHT WRIST: ICD-10-CM

## 2025-04-18 DIAGNOSIS — G89.29 CHRONIC PAIN OF BOTH KNEES: ICD-10-CM

## 2025-04-18 DIAGNOSIS — M75.52 SUBACROMIAL BURSITIS OF BOTH SHOULDERS: ICD-10-CM

## 2025-04-18 DIAGNOSIS — M70.62 GREATER TROCHANTERIC BURSITIS OF BOTH HIPS: ICD-10-CM

## 2025-04-18 DIAGNOSIS — M48.02 CERVICAL STENOSIS OF SPINAL CANAL: ICD-10-CM

## 2025-04-18 DIAGNOSIS — M19.011 OSTEOARTHRITIS OF BILATERAL GLENOHUMERAL JOINTS: ICD-10-CM

## 2025-04-18 DIAGNOSIS — M17.11 PRIMARY OSTEOARTHRITIS OF RIGHT KNEE: ICD-10-CM

## 2025-04-18 DIAGNOSIS — M19.012 OSTEOARTHRITIS OF BILATERAL GLENOHUMERAL JOINTS: ICD-10-CM

## 2025-04-18 DIAGNOSIS — G89.29 CHRONIC BILATERAL LOW BACK PAIN WITHOUT SCIATICA: Primary | ICD-10-CM

## 2025-04-18 DIAGNOSIS — M25.512 CHRONIC PAIN OF BOTH SHOULDERS: ICD-10-CM

## 2025-04-18 DIAGNOSIS — M54.50 CHRONIC BILATERAL LOW BACK PAIN WITHOUT SCIATICA: Primary | ICD-10-CM

## 2025-04-18 DIAGNOSIS — M25.511 CHRONIC PAIN OF BOTH SHOULDERS: ICD-10-CM

## 2025-04-18 DIAGNOSIS — M25.561 CHRONIC PAIN OF BOTH KNEES: ICD-10-CM

## 2025-04-18 DIAGNOSIS — M70.61 GREATER TROCHANTERIC BURSITIS OF BOTH HIPS: ICD-10-CM

## 2025-04-18 DIAGNOSIS — M25.531 CHRONIC PAIN OF RIGHT WRIST: ICD-10-CM

## 2025-04-18 NOTE — TELEPHONE ENCOUNTER
Caller: Divya Lomeli    Relationship to patient: Self    Best call back number: [phone]    Type of visit: PROCEDURE    Requested date: ASAP AFTER 05/29/25 IN THE AFTERNOON    If rescheduling, when is the original appointment: 05/29/25

## 2025-04-18 NOTE — PROGRESS NOTES
"Subjective   Divya Lomeli is a 69 y.o. female.     History of Present Illness  CC: right shoulder pain    Right shoulder pain, began around 2017, treated by me since 7/1/19, aggravated with overhead reaching, 9/10 at worst, 0/10 at best, intermittent, varies, aching, stabbing, worse with lifting interferes with ADLs, work. Had PT with HEP. Subluxates, improves with reduction. Also c/o neck and LBP, joint pain. X-ray C-spine with DDD and DJD. Notes reviewed, as above, also DM2, COPD. Has used Tramadol with some relief, insufficient at BID prn. Trouble getting to sleep. Saw Dr. Kaye, started on Ultracet and Tizanidine 4mg QID prn, but cannot drive on that combination. Saw Dr. Polk, given L shoulder GH injection using U/S, steroid made her \"loony\" for 1-2 days, some improvement, also referred for PT. Had b/l subacromial injections with 24h relief only. Dr. Polk ordered MRI C-spine. Had Norco 5mg which helped, but helped much better with 1.5 tabs, increased to 7.5mg TID prn, then 10mg TID prn.   Back Pain  Pertinent negatives include no abdominal pain, bladder incontinence, chest pain, fever, numbness or weakness.   Knee Pain   Pertinent negatives include no numbness.   Neck Pain   Pertinent negatives include no chest pain, fever, numbness, trouble swallowing or weakness.   Wrist Pain   Pertinent negatives include no fever or numbness.        The following portions of the patient's history were reviewed and updated as appropriate: allergies, current medications, past family history, past medical history, past social history, past surgical history and problem list.    Review of Systems   Constitutional:  Negative for chills, fatigue and fever.   HENT:  Negative for hearing loss and trouble swallowing.    Eyes:  Negative for visual disturbance.   Respiratory:  Negative for shortness of breath.    Cardiovascular:  Negative for chest pain.   Gastrointestinal:  Negative for abdominal pain, constipation, diarrhea, " nausea and vomiting.   Genitourinary:  Negative for urinary incontinence.   Musculoskeletal:  Positive for arthralgias, back pain and neck pain. Negative for joint swelling and myalgias.   Neurological:  Negative for dizziness, weakness, numbness and headache.       Objective   Physical Exam   Constitutional: She is oriented to person, place, and time. She appears well-developed and well-nourished.   HENT:   Head: Normocephalic and atraumatic.   Eyes: Pupils are equal, round, and reactive to light.   Cardiovascular: Normal rate, regular rhythm and normal heart sounds.   Pulmonary/Chest: Effort normal and breath sounds normal.   Abdominal: Soft. Normal appearance and bowel sounds are normal. She exhibits no distension. There is no abdominal tenderness.   Musculoskeletal:      Comments: B/l: shoulder limited ROM  R knee: (+) crepitus, (-) edema, warmth, erythema     Neurological: She is alert and oriented to person, place, and time. She has normal reflexes. She displays normal reflexes. No sensory deficit.   Psychiatric: Her behavior is normal. Mood, judgment and thought content normal.         Assessment & Plan   Diagnoses and all orders for this visit:    1. Chronic bilateral low back pain without sciatica (Primary)    2. Chronic pain of both knees    3. Chronic pain of both shoulders    4. Chronic pain of right wrist    5. Cervicalgia    6. Cervical stenosis of spinal canal    7. Osteoarthritis of bilateral glenohumeral joints    8. Primary osteoarthritis of right knee    9. Subacromial bursitis of both shoulders        Inspect reviewed, in order. UDS in order 11/12/24.  Reduced to to Tramadol 50mg TID prn. Began Norco 7.5mg TID prn, increased to 10mg TID prn. No refill today. Filled 4/16/25.   Patient's symptoms are still adequately managed by current medication regimen, is doing well at this strength and dosage, therefore I will continue to prescribe unchanged as the most appropriate course of  "treatment.  Restarted Gabapentin 300mg TID. Increased to 400mg TID.  Began Tizanidine 4mg qHS prn instead of Flexeril. Refilled Tizanidine 4mg qHS only, began Robaxin 750mg TID prn during day. Spasms resolved, she will try titrating off.  Ordered RxAlt #1 cream, insurance would not approve, cont OTC \"Blue Stuff\" cream.  Performed R subacromial injection with excellent relief, returning, performed repeat b/l. May need subacromial injection in L shoulder to complement GH injection. Schedule b/l shoulder SA injections per symptoms.  Performed R knee injection with limited relief, > 50% with HA, schedule b/l Synvisc One injections.  May need TPIs in cervical paraspinals and traps.  Discussed R GTB injection, schedule b/l.   MRI C-spine with mod-severe stenosis and neuroforaminal stenosis. She declines ESIs.  RTC for b/l knee Durolane inj then b/l shoulder SA inj then b/l GTB inj then in 3 months for f/u.                  Physical Exam  Constitutional:       Appearance: Normal appearance. She is well-developed.   HENT:      Head: Normocephalic and atraumatic.   Eyes:      Pupils: Pupils are equal, round, and reactive to light.   Cardiovascular:      Rate and Rhythm: Normal rate and regular rhythm.      Heart sounds: Normal heart sounds.   Pulmonary:      Effort: Pulmonary effort is normal.      Breath sounds: Normal breath sounds.   Abdominal:      General: Bowel sounds are normal. There is no distension.      Palpations: Abdomen is soft.      Tenderness: There is no abdominal tenderness.   Musculoskeletal:      Comments: B/l: shoulder limited ROM  R knee: (+) crepitus, (-) edema, warmth, erythema  TTP b/l GTB       Neurological:      Mental Status: She is alert and oriented to person, place, and time.      Sensory: No sensory deficit.      Deep Tendon Reflexes: Reflexes are normal and symmetric. Reflexes normal.   Psychiatric:         Mood and Affect: Mood normal.         Behavior: Behavior normal.         Thought " Content: Thought content normal.         Judgment: Judgment normal.

## 2025-04-22 RX ORDER — HYDROCHLOROTHIAZIDE 12.5 MG/1
12.5 TABLET ORAL AS NEEDED
Status: CANCELLED | OUTPATIENT
Start: 2025-04-22

## 2025-04-22 NOTE — TELEPHONE ENCOUNTER
Caller: Divya Lomeli    Relationship: Self    Best call back number: [phone]    Requested Prescriptions:   Requested Prescriptions     Pending Prescriptions Disp Refills    hydroCHLOROthiazide 12.5 MG tablet       Sig: Take 1 tablet by mouth As Needed.        Pharmacy where request should be sent: [facility] [address] [place], [place] [phone] [place] [phone]      Last office visit with prescribing clinician: 4/16/2025   Last telemedicine visit with prescribing clinician: Visit date not found   Next office visit with prescribing clinician: 5/12/2025     FEET TOES AND ANKLES ARE SWELLING - PATIENT STATES ITS PAINFUL WHEN IT SWELLS   PATIENT HAS TRIED GETTING INTO THE OFFICE BUT THERES NO EARLIER APPOINTMENTS     Does the patient have less than a 3 day supply:  [x] Yes  [] No    Would you like a call back once the refill request has been completed: [] Yes [x] No    If the office needs to give you a call back, can they leave a voicemail: [] Yes [x] No    Rebekah Cunha Rep   04/22/25 16:16 EDT

## 2025-04-23 RX ORDER — HYDROCHLOROTHIAZIDE 12.5 MG/1
25 TABLET ORAL DAILY
Qty: 60 TABLET | Refills: 0 | Status: SHIPPED | OUTPATIENT
Start: 2025-04-23 | End: 2025-05-23

## 2025-04-24 ENCOUNTER — HOSPITAL ENCOUNTER (OUTPATIENT)
Dept: PAIN MEDICINE | Facility: HOSPITAL | Age: 70
Discharge: HOME | End: 2025-04-24
Payer: MEDICARE

## 2025-04-24 VITALS
SYSTOLIC BLOOD PRESSURE: 131 MMHG | OXYGEN SATURATION: 95 % | HEIGHT: 64 IN | TEMPERATURE: 97.3 F | RESPIRATION RATE: 16 BRPM | HEART RATE: 75 BPM | WEIGHT: 127 LBS | DIASTOLIC BLOOD PRESSURE: 86 MMHG | BODY MASS INDEX: 21.68 KG/M2

## 2025-04-24 DIAGNOSIS — G89.29 CHRONIC PAIN OF BOTH KNEES: Primary | ICD-10-CM

## 2025-04-24 DIAGNOSIS — M70.62 GREATER TROCHANTERIC BURSITIS OF BOTH HIPS: ICD-10-CM

## 2025-04-24 DIAGNOSIS — M25.562 CHRONIC PAIN OF BOTH KNEES: Primary | ICD-10-CM

## 2025-04-24 DIAGNOSIS — M70.61 GREATER TROCHANTERIC BURSITIS OF BOTH HIPS: ICD-10-CM

## 2025-04-24 DIAGNOSIS — M25.561 CHRONIC PAIN OF BOTH KNEES: Primary | ICD-10-CM

## 2025-04-24 PROCEDURE — 25010000002 METHYLPREDNISOLONE PER 40 MG: Performed by: PHYSICAL MEDICINE & REHABILITATION

## 2025-04-24 PROCEDURE — 25010000002 LIDOCAINE PF 1% 1 % SOLUTION: Performed by: PHYSICAL MEDICINE & REHABILITATION

## 2025-04-24 RX ORDER — LIDOCAINE HYDROCHLORIDE 10 MG/ML
10 INJECTION, SOLUTION EPIDURAL; INFILTRATION; INTRACAUDAL; PERINEURAL ONCE
Status: COMPLETED | OUTPATIENT
Start: 2025-04-24 | End: 2025-04-24

## 2025-04-24 RX ORDER — METHYLPREDNISOLONE ACETATE 40 MG/ML
40 INJECTION, SUSPENSION INTRA-ARTICULAR; INTRALESIONAL; INTRAMUSCULAR; SOFT TISSUE ONCE
Status: COMPLETED | OUTPATIENT
Start: 2025-04-24 | End: 2025-04-24

## 2025-04-24 RX ADMIN — LIDOCAINE HYDROCHLORIDE 10 ML: 10 INJECTION, SOLUTION EPIDURAL; INFILTRATION; INTRACAUDAL; PERINEURAL at 14:53

## 2025-04-24 RX ADMIN — METHYLPREDNISOLONE ACETATE 40 MG: 40 INJECTION, SUSPENSION INTRA-ARTICULAR; INTRALESIONAL; INTRAMUSCULAR; INTRASYNOVIAL; SOFT TISSUE at 14:52

## 2025-04-24 RX ADMIN — METHYLPREDNISOLONE ACETATE 40 MG: 40 INJECTION, SUSPENSION INTRA-ARTICULAR; INTRALESIONAL; INTRAMUSCULAR; INTRASYNOVIAL; SOFT TISSUE at 14:50

## 2025-04-24 NOTE — PROCEDURES
Procedures    Here for b/l GTB injections.      Greater Trochanteric Bursa Injections    PREOPERATIVE DIAGNOSIS: Bursitis    POSTOPERATIVE DIAGNOSIS: Bursitis    PROCEDURE PERFORMED:  Greater Trochanteric Bursa injection, bilateral    After informed consent was obtained the patient was placed in lateral decubitus position. The area of maximal tenderness was identified and marked. The skin overlying the hip region was prepped with alcohol swabs, srict aseptic technique was observed throughout the procedure. The skin was anesthetized 1% lidocaine without epinephrine. A 22 gauge spinal needle was passed to the area of the greater trochanteric bursa. 5 mL  of a solution containing 1% Lidocaine and 40 mg of Depo-Medrol were injected into the bursa in a stellate fashion after negative aspiration. The procedure was repeated in all respects on the opposite side. The patient tolerated this well without complication. A sterile bandage was applied to the injection site.

## 2025-04-25 ENCOUNTER — TELEPHONE (OUTPATIENT)
Dept: PAIN MEDICINE | Facility: HOSPITAL | Age: 70
End: 2025-04-25
Payer: MEDICARE

## 2025-04-29 ENCOUNTER — PATIENT OUTREACH (OUTPATIENT)
Dept: CASE MANAGEMENT | Facility: OTHER | Age: 70
End: 2025-04-29
Payer: MEDICARE

## 2025-04-29 NOTE — OUTREACH NOTE
AMBULATORY CASE MANAGEMENT NOTE    Names and Relationships of Patient/Support Persons: Contact: Divya Lomeli; Relationship: Self -     Patient Outreach    Follow up RN-ACM outreach call made to pt. Pt reports she still has not contacted MyChart help desk yet to inquire about having her account reactivated. Advised pt to call RN-ACM once she speaks with [employer] desk and RN-ACM will assist her with [employer] navigation. Pt agreeable with plan. Advised her to call with any other needs. Will await call from pt, CR scheduled.       Yannick BRYAN  Ambulatory Case Management    4/29/2025, 12:13 EDT

## 2025-05-05 ENCOUNTER — HOSPITAL ENCOUNTER (OUTPATIENT)
Dept: CARDIOLOGY | Facility: HOSPITAL | Age: 70
Discharge: HOME | End: 2025-05-05
Payer: MEDICARE

## 2025-05-05 ENCOUNTER — TRANSCRIBE ORDERS (OUTPATIENT)
Dept: ADMINISTRATIVE | Facility: HOSPITAL | Age: 70
End: 2025-05-05
Payer: MEDICARE

## 2025-05-05 ENCOUNTER — LAB (OUTPATIENT)
Dept: LAB | Facility: HOSPITAL | Age: 70
End: 2025-05-05
Payer: MEDICARE

## 2025-05-05 DIAGNOSIS — N32.9 DISEASE OF BLADDER: ICD-10-CM

## 2025-05-05 DIAGNOSIS — N32.9 DISEASE OF BLADDER: Primary | ICD-10-CM

## 2025-05-05 LAB
ANION GAP SERPL CALCULATED.3IONS-SCNC: 10.4 MMOL/L (ref 5–15)
BASOPHILS # BLD AUTO: 0.05 10*3/MM3 (ref 0–0.2)
BASOPHILS NFR BLD AUTO: 0.8 % (ref 0–1.5)
BUN SERPL-MCNC: 17 MG/DL (ref 8–23)
BUN/CREAT SERPL: 26.6 (ref 7–25)
CALCIUM SPEC-SCNC: 9 MG/DL (ref 8.6–10.5)
CHLORIDE SERPL-SCNC: 101 MMOL/L (ref 98–107)
CO2 SERPL-SCNC: 24.6 MMOL/L (ref 22–29)
CREAT SERPL-MCNC: 0.64 MG/DL (ref 0.57–1)
DEPRECATED RDW RBC AUTO: 40.9 FL (ref 37–54)
EGFRCR SERPLBLD CKD-EPI 2021: 94.6 ML/MIN/1.73
EOSINOPHIL # BLD AUTO: 0.17 10*3/MM3 (ref 0–0.4)
EOSINOPHIL NFR BLD AUTO: 2.6 % (ref 0.3–6.2)
ERYTHROCYTE [DISTWIDTH] IN BLOOD BY AUTOMATED COUNT: 12 % (ref 12.3–15.4)
GLUCOSE SERPL-MCNC: 239 MG/DL (ref 65–99)
HCT VFR BLD AUTO: 39.7 % (ref 34–46.6)
HGB BLD-MCNC: 13.1 G/DL (ref 12–15.9)
IMM GRANULOCYTES # BLD AUTO: 0.03 10*3/MM3 (ref 0–0.05)
IMM GRANULOCYTES NFR BLD AUTO: 0.5 % (ref 0–0.5)
LYMPHOCYTES # BLD AUTO: 1.84 10*3/MM3 (ref 0.7–3.1)
LYMPHOCYTES NFR BLD AUTO: 27.8 % (ref 19.6–45.3)
MCH RBC QN AUTO: 30.8 PG (ref 26.6–33)
MCHC RBC AUTO-ENTMCNC: 33 G/DL (ref 31.5–35.7)
MCV RBC AUTO: 93.4 FL (ref 79–97)
MONOCYTES # BLD AUTO: 0.57 10*3/MM3 (ref 0.1–0.9)
MONOCYTES NFR BLD AUTO: 8.6 % (ref 5–12)
NEUTROPHILS NFR BLD AUTO: 3.97 10*3/MM3 (ref 1.7–7)
NEUTROPHILS NFR BLD AUTO: 59.7 % (ref 42.7–76)
NRBC BLD AUTO-RTO: 0 /100 WBC (ref 0–0.2)
PLATELET # BLD AUTO: 279 10*3/MM3 (ref 140–450)
PMV BLD AUTO: 12.1 FL (ref 6–12)
POTASSIUM SERPL-SCNC: 3.7 MMOL/L (ref 3.5–5.2)
RBC # BLD AUTO: 4.25 10*6/MM3 (ref 3.77–5.28)
SODIUM SERPL-SCNC: 136 MMOL/L (ref 136–145)
WBC NRBC COR # BLD AUTO: 6.63 10*3/MM3 (ref 3.4–10.8)

## 2025-05-05 PROCEDURE — 93005 ELECTROCARDIOGRAM TRACING: CPT | Performed by: UROLOGY

## 2025-05-05 PROCEDURE — 80048 BASIC METABOLIC PNL TOTAL CA: CPT

## 2025-05-05 PROCEDURE — 36415 COLL VENOUS BLD VENIPUNCTURE: CPT

## 2025-05-05 PROCEDURE — 85025 COMPLETE CBC W/AUTO DIFF WBC: CPT

## 2025-05-06 LAB
QT INTERVAL: 371 MS
QTC INTERVAL: 414 MS

## 2025-05-08 ENCOUNTER — HOSPITAL ENCOUNTER (OUTPATIENT)
Dept: PAIN MEDICINE | Facility: HOSPITAL | Age: 70
Discharge: HOME | End: 2025-05-08
Payer: MEDICARE

## 2025-05-08 VITALS
HEART RATE: 71 BPM | TEMPERATURE: 97.3 F | WEIGHT: 127 LBS | SYSTOLIC BLOOD PRESSURE: 153 MMHG | HEIGHT: 64 IN | DIASTOLIC BLOOD PRESSURE: 78 MMHG | OXYGEN SATURATION: 100 % | RESPIRATION RATE: 16 BRPM | BODY MASS INDEX: 21.68 KG/M2

## 2025-05-08 DIAGNOSIS — M70.62 GREATER TROCHANTERIC BURSITIS OF BOTH HIPS: Primary | ICD-10-CM

## 2025-05-08 DIAGNOSIS — M25.561 CHRONIC PAIN OF BOTH KNEES: ICD-10-CM

## 2025-05-08 DIAGNOSIS — M17.0 PRIMARY OSTEOARTHRITIS OF BOTH KNEES: ICD-10-CM

## 2025-05-08 DIAGNOSIS — M25.562 CHRONIC PAIN OF BOTH KNEES: ICD-10-CM

## 2025-05-08 DIAGNOSIS — R51.9 NONINTRACTABLE HEADACHE, UNSPECIFIED CHRONICITY PATTERN, UNSPECIFIED HEADACHE TYPE: ICD-10-CM

## 2025-05-08 DIAGNOSIS — M70.61 GREATER TROCHANTERIC BURSITIS OF BOTH HIPS: Primary | ICD-10-CM

## 2025-05-08 DIAGNOSIS — G89.29 CHRONIC PAIN OF BOTH KNEES: ICD-10-CM

## 2025-05-08 PROCEDURE — 25010000002 LIDOCAINE PF 1% 1 % SOLUTION: Performed by: PHYSICAL MEDICINE & REHABILITATION

## 2025-05-08 PROCEDURE — 25010000002 SODIUM HYALURONATE 60 MG/3ML PREFILLED SYRINGE: Performed by: PHYSICAL MEDICINE & REHABILITATION

## 2025-05-08 PROCEDURE — 25010000002 SODIUM HYALURONATE 60 MG/3ML PREFILLED SYRINGE

## 2025-05-08 RX ORDER — LIDOCAINE HYDROCHLORIDE 10 MG/ML
5 INJECTION, SOLUTION EPIDURAL; INFILTRATION; INTRACAUDAL; PERINEURAL ONCE
Status: COMPLETED | OUTPATIENT
Start: 2025-05-08 | End: 2025-05-08

## 2025-05-08 RX ADMIN — Medication: at 14:02

## 2025-05-08 RX ADMIN — Medication 60 MG: at 14:03

## 2025-05-08 RX ADMIN — LIDOCAINE HYDROCHLORIDE 5 ML: 10 INJECTION, SOLUTION EPIDURAL; INFILTRATION; INTRACAUDAL; PERINEURAL at 13:59

## 2025-05-08 NOTE — PROCEDURES
"Procedures    Chronic b/l knee pain, here for Durolane injection.    Perform b/l knee Durolane injections.  No change to meds today.  RTC for f/u.       KNEE DUROLANE INJECTION    PREOPERATIVE DIAGNOSIS:  Knee pain    POSTOPERATIVE DIAGNOSIS:  Knee pain    PROCEDURE PERFORMED:  BILATERAL DUROLANE KNEE INJECTION     The patient understands the risks and benefits of the procedure and wishes to proceed. The patient was seen in the preoperative area. Patient's consent was obtained and updated. Vitals were taken. Patient was then placed in a seated position for the injection. Site marked for an inferior lateral approach, and a 22 gauge 1.5\" needle was passed through skin anesthetized with 1% Lidocaine without epinephrine. The needle tip was advanced to the joint space, the syringes were exchanged, and Durolane was injected after negative aspiration. The procedure was repeated in all respects on the opposite side. The patient tolerated with no parth-procedural complications.  A sterile dressing was placed over the puncture site.    "

## 2025-05-09 ENCOUNTER — TELEPHONE (OUTPATIENT)
Dept: PAIN MEDICINE | Facility: CLINIC | Age: 70
End: 2025-05-09
Payer: MEDICARE

## 2025-05-09 RX ORDER — TOPIRAMATE 50 MG/1
50 TABLET, FILM COATED ORAL 2 TIMES DAILY
Qty: 30 TABLET | Refills: 0 | Status: SHIPPED | OUTPATIENT
Start: 2025-05-09

## 2025-05-09 NOTE — TELEPHONE ENCOUNTER
Patient received 25% relief from injection on left knee. Patient states left knee is swollen, warm to touch and red. No oozing. Used a heating pad and pain went to her hip. She is using a cold compress which is helping the pain some but not for long. Pain medication did not help. She would like to know what else she can do. She is having to use a walker to get around. Right knee has no issues. She would like a call back if possible

## 2025-05-09 NOTE — TELEPHONE ENCOUNTER
Hub staff attempted to follow warm transfer process and was unsuccessful     Caller: Divya Lomeli A    Relationship to patient: Self    Best call back number: [phone]    Patient is needing: PT CALLING TO RELAY THAT HER L KNEE IS NOW SWOLLEN AND HAS SOME REDNESS AFTER INJECTION WITH DR DOE YESTERDAY. PT WOULD LIKE TO DISCUSS WITH SOMEONE ON DR DOE'S CLINICAL TEAM REGARDING THIS. PT STATES SHE HAS NOT GOTTEN A POSTOP CALL YET.

## 2025-05-12 ENCOUNTER — OFFICE VISIT (OUTPATIENT)
Dept: FAMILY MEDICINE CLINIC | Facility: CLINIC | Age: 70
End: 2025-05-12
Payer: MEDICARE

## 2025-05-12 VITALS
DIASTOLIC BLOOD PRESSURE: 66 MMHG | OXYGEN SATURATION: 98 % | SYSTOLIC BLOOD PRESSURE: 110 MMHG | HEART RATE: 66 BPM | TEMPERATURE: 97 F | BODY MASS INDEX: 20.11 KG/M2 | HEIGHT: 67 IN | RESPIRATION RATE: 18 BRPM | WEIGHT: 128.1 LBS

## 2025-05-12 DIAGNOSIS — Z00.00 HEALTHCARE MAINTENANCE: ICD-10-CM

## 2025-05-12 DIAGNOSIS — Z71.89 MEDICATION CARE PLAN DISCUSSED WITH PATIENT: Primary | ICD-10-CM

## 2025-05-12 PROCEDURE — 1126F AMNT PAIN NOTED NONE PRSNT: CPT | Performed by: NURSE PRACTITIONER

## 2025-05-12 PROCEDURE — 1159F MED LIST DOCD IN RCRD: CPT | Performed by: NURSE PRACTITIONER

## 2025-05-12 PROCEDURE — 3074F SYST BP LT 130 MM HG: CPT | Performed by: NURSE PRACTITIONER

## 2025-05-12 PROCEDURE — 3078F DIAST BP <80 MM HG: CPT | Performed by: NURSE PRACTITIONER

## 2025-05-12 PROCEDURE — 3044F HG A1C LEVEL LT 7.0%: CPT | Performed by: NURSE PRACTITIONER

## 2025-05-12 PROCEDURE — 99397 PER PM REEVAL EST PAT 65+ YR: CPT | Performed by: NURSE PRACTITIONER

## 2025-05-12 PROCEDURE — 1160F RVW MEDS BY RX/DR IN RCRD: CPT | Performed by: NURSE PRACTITIONER

## 2025-05-12 RX ORDER — ACETAMINOPHEN 500 MG
1000 TABLET ORAL 2 TIMES DAILY
COMMUNITY

## 2025-05-12 RX ORDER — PRIMIDONE 250 MG/1
250 TABLET ORAL NIGHTLY
Qty: 90 TABLET | Refills: 0 | Status: SHIPPED | OUTPATIENT
Start: 2025-05-12 | End: 2025-08-10

## 2025-05-12 RX ORDER — HYDROCHLOROTHIAZIDE 12.5 MG/1
12.5 TABLET ORAL DAILY
Qty: 90 TABLET | Refills: 0 | Status: SHIPPED | OUTPATIENT
Start: 2025-05-12 | End: 2025-08-10

## 2025-05-12 RX ORDER — OMEPRAZOLE 20 MG/1
20 CAPSULE, DELAYED RELEASE ORAL DAILY
COMMUNITY

## 2025-05-12 RX ORDER — PROPRANOLOL HCL 20 MG
20 TABLET ORAL 3 TIMES DAILY
Qty: 90 TABLET | Refills: 5 | Status: SHIPPED | OUTPATIENT
Start: 2025-05-12 | End: 2025-11-08

## 2025-05-12 NOTE — ASSESSMENT & PLAN NOTE
Labs UTD.    Lengthy discussion about colonoscopy.  Patient reports previous was normal except for hemorrhoids present.  She states that she can't remember when it was done.  Cologuard ordered at previous visit and patient reports she has not returned stool sample back to lab.  Encouraged patient to call GI specialist and schedule screening colonoscopy, but perform Cologuard in meantime, while awaiting colonoscopy.

## 2025-05-12 NOTE — PROGRESS NOTES
"Chief Complaint  Primary Care Follow-Up (Go over meds )    Subjective        Divya Lomeli presents to [facility]  History of Present Illness  68 y/o Divya presents to PC office for f/u and medication reconciliation.      Patient reports increased left knee pain s/p injection performed by Dr Shields.  She presents to office with walker to help ambulate.  Patient reports pain is improving daily, but still having to use walker.  Primary Care Follow-Up  Conditions present:  Other chronic condition  Medication compliance: fair. Side effects of treatment: fatigue, dry mouth and joint pain. Treatment compliance: most of the time. Treatment barriers: poor diet and lack of exercise. Exercises: rarely.       Objective   Vital Signs:  /66 (BP Location: Right arm, Patient Position: Sitting, Cuff Size: Adult)   Pulse 66   Temp 97 °F (36.1 °C) (Temporal)   Resp 18   Ht 170.2 cm (67\")   Wt 58.1 kg (128 lb 1.6 oz)   SpO2 98%   BMI 20.06 kg/m²   Estimated body mass index is 20.06 kg/m² as calculated from the following:    Height as of this encounter: 170.2 cm (67\").    Weight as of this encounter: 58.1 kg (128 lb 1.6 oz).    BMI is within normal parameters. No other follow-up for BMI required.      Physical Exam  Constitutional:       General: She is not in acute distress.     Appearance: Normal appearance. She is not ill-appearing, toxic-appearing or diaphoretic.   HENT:      Head: Normocephalic and atraumatic.   Eyes:      Extraocular Movements: Extraocular movements intact.      Conjunctiva/sclera: Conjunctivae normal.   Cardiovascular:      Rate and Rhythm: Normal rate.   Pulmonary:      Effort: Pulmonary effort is normal.   Skin:     Coloration: Skin is pale.   Neurological:      General: No focal deficit present.      Mental Status: She is alert and oriented to person, place, and time. Mental status is at baseline.      Motor: Weakness present.      Gait: Gait abnormal. "   Psychiatric:         Mood and Affect: Mood normal.         Behavior: Behavior normal.         Thought Content: Thought content normal.         Judgment: Judgment normal.        Result Review :           Assessment and Plan   Diagnoses and all orders for this visit:    1. Medication care plan discussed with patient (Primary)  Assessment & Plan:  Reconciled all prescribed medications on med list/medication management list.    Orders:  -     primidone (MYSOLINE) 250 MG tablet; Take 1 tablet by mouth Every Night for 90 days.  Dispense: 90 tablet; Refill: 0  -     hydroCHLOROthiazide 12.5 MG tablet; Take 1 tablet by mouth Daily for 90 days.  Dispense: 90 tablet; Refill: 0  -     propranolol (INDERAL) 20 MG tablet; Take 1 tablet by mouth 3 (Three) Times a Day for 180 days.  Dispense: 90 tablet; Refill: 5    2. Healthcare maintenance  Assessment & Plan:  Reconciled all prescribed medications on med list/medication management list.        Current Outpatient Medications on File Prior to Visit   Medication Sig Dispense Refill    acetaminophen (TYLENOL) 500 MG tablet Take 2 tablets by mouth 2 (Two) Times a Day.      Apoaequorin 10 MG capsule Take 1 each by mouth Daily.      calcium carbonate (TUMS) 500 MG chewable tablet       diphenhydrAMINE-acetaminophen (TYLENOL PM)  MG tablet per tablet Take 1 tablet by mouth At Night As Needed for Sleep.      estradiol (ESTRACE) 1 MG tablet Take 1/2 (one-half) tablet by mouth once daily 45 tablet 0    ezetimibe (ZETIA) 10 MG tablet       gabapentin (NEURONTIN) 400 MG capsule Take 1 capsule by mouth 3 (Three) Times a Day. 270 capsule 1    glimepiride (AMARYL) 4 MG tablet Take 1 tablet by mouth once daily 90 tablet 0    Glucagon (Gvoke HypoPen 2-Pack) 1 MG/0.2ML solution auto-injector Inject 1 mg under the skin into the appropriate area as directed 1 (One) Time As Needed (hypoglycemia) for up to 1 dose. 0.4 mL 1    HYDROcodone-acetaminophen (NORCO)  MG per tablet Take 1 tablet  by mouth Every 8 (Eight) Hours As Needed for Moderate Pain. 90 tablet 0    hydroxychloroquine (PLAQUENIL) 200 MG tablet Take 1.5 tablets by mouth Daily.      hydrOXYzine (ATARAX) 10 MG tablet Take 1 tablet by mouth At Night As Needed.      hydrOXYzine pamoate (VISTARIL) 50 MG capsule Take 1 capsule by mouth Every Night.      Jardiance 25 MG tablet Take 1 tablet by mouth once daily 90 tablet 0    methocarbamol (ROBAXIN) 500 MG tablet Take 1 tablet by mouth 3 (Three) Times a Day As Needed for Muscle Spasms (during the day). 270 tablet 3    Misc. Devices (Roller Walker) misc Rolling walker 1 each 0    multivitamin with minerals (ONE-A-DAY 50 PLUS PO) Take 1 tablet by mouth Daily.      Narcan 4 MG/0.1ML nasal spray       Omega-3 Fatty Acids (Fish Oil Burp-Less) 1200 MG capsule Take 1 capsule by mouth Daily. 90 capsule 3    omeprazole (priLOSEC) 20 MG capsule Take 1 capsule by mouth Daily.      polyethylene glycol (MIRALAX) 17 GM/SCOOP powder mix 1 capful (17 g) in liquid by mouth 2 (Two) Times a Day. 510 g 0    SITagliptin-metFORMIN HCl ER (Janumet XR)  MG tablet Take 2 tablets every day with a meal 180 tablet 1    topiramate (TOPAMAX) 50 MG tablet Take 1 tablet by mouth twice daily 30 tablet 0    traZODone (DESYREL) 50 MG tablet Take 1 tablet by mouth every night at bedtime.      [DISCONTINUED] CBD (cannabidiol) oral oil Take 2 drops by mouth Daily. Patient reports taking [employer] [place] CBD gummies Full Spectrum Hemp Extract.      [DISCONTINUED] hydroCHLOROthiazide 12.5 MG tablet Take 2 tablets by mouth Daily for 30 days. 60 tablet 0    [DISCONTINUED] HYDROcodone-acetaminophen (NORCO)  MG per tablet Take 1 tablet by mouth Every 8 (Eight) Hours As Needed for Moderate Pain. 90 tablet 0    [DISCONTINUED] HYDROcodone-acetaminophen (NORCO)  MG per tablet Take 1 tablet by mouth Every 8 (Eight) Hours As Needed for Moderate Pain. 90 tablet 0    [DISCONTINUED] HYDROcodone-acetaminophen (Norco) 7.5-325 MG per  tablet Take 1 tablet by mouth Every 8 (Eight) Hours As Needed for Moderate Pain. 90 tablet 0    [DISCONTINUED] Multiple Vitamins-Minerals (PRESERVISION AREDS 2 PO)       [DISCONTINUED] propranolol (INDERAL) 20 MG tablet Take 1 tablet by mouth 3 (Three) Times a Day. 90 tablet 5    [DISCONTINUED] tiZANidine (ZANAFLEX) 4 MG tablet TAKE 1 TABLET BY MOUTH AT NIGHT AS NEEDED FOR MUSCLE SPASM 90 tablet 0    [DISCONTINUED] primidone (MYSOLINE) 250 MG tablet Take 1 tablet by mouth Every Night for 90 days. 90 tablet 0     No current facility-administered medications on file prior to visit.           I spent 30 minutes caring for Divya on this date of service. This time includes time spent by me in the following activities:obtaining and/or reviewing a separately obtained history, performing a medically appropriate examination and/or evaluation , counseling and educating the patient/family/caregiver, ordering medications, tests, or procedures, and documenting information in the medical record  Follow Up   Return if symptoms worsen or fail to improve.  Patient was given instructions and counseling regarding her condition or for health maintenance advice. Please see specific information pulled into the AVS if appropriate.

## 2025-05-13 PROCEDURE — 88305 TISSUE EXAM BY PATHOLOGIST: CPT | Performed by: UROLOGY

## 2025-05-14 ENCOUNTER — LAB REQUISITION (OUTPATIENT)
Dept: LAB | Facility: HOSPITAL | Age: 70
End: 2025-05-14
Payer: MEDICARE

## 2025-05-14 DIAGNOSIS — N32.9 BLADDER DISORDER, UNSPECIFIED: ICD-10-CM

## 2025-05-16 ENCOUNTER — TELEPHONE (OUTPATIENT)
Dept: FAMILY MEDICINE CLINIC | Facility: CLINIC | Age: 70
End: 2025-05-16
Payer: MEDICARE

## 2025-05-16 LAB
LAB AP CASE REPORT: NORMAL
PATH REPORT.FINAL DX SPEC: NORMAL
PATH REPORT.GROSS SPEC: NORMAL

## 2025-05-16 NOTE — TELEPHONE ENCOUNTER
Divya called back to speak with Samantha stating she had a missed call from her today. She would like to receive a call back on her cell phone at [phone].

## 2025-05-24 DIAGNOSIS — R51.9 NONINTRACTABLE HEADACHE, UNSPECIFIED CHRONICITY PATTERN, UNSPECIFIED HEADACHE TYPE: ICD-10-CM

## 2025-05-27 DIAGNOSIS — R51.9 NONINTRACTABLE HEADACHE, UNSPECIFIED CHRONICITY PATTERN, UNSPECIFIED HEADACHE TYPE: ICD-10-CM

## 2025-05-27 RX ORDER — TOPIRAMATE 50 MG/1
50 TABLET, FILM COATED ORAL 2 TIMES DAILY
Qty: 30 TABLET | Refills: 0 | OUTPATIENT
Start: 2025-05-27

## 2025-05-28 RX ORDER — TOPIRAMATE 50 MG/1
50 TABLET, FILM COATED ORAL 2 TIMES DAILY
Qty: 30 TABLET | Refills: 0 | OUTPATIENT
Start: 2025-05-28

## 2025-05-29 ENCOUNTER — APPOINTMENT (OUTPATIENT)
Dept: PAIN MEDICINE | Facility: HOSPITAL | Age: 70
End: 2025-05-29
Payer: MEDICARE

## 2025-05-29 ENCOUNTER — OFFICE VISIT (OUTPATIENT)
Dept: PULMONOLOGY | Facility: HOSPITAL | Age: 70
End: 2025-05-29
Payer: MEDICARE

## 2025-05-29 VITALS
DIASTOLIC BLOOD PRESSURE: 57 MMHG | RESPIRATION RATE: 14 BRPM | WEIGHT: 128.6 LBS | OXYGEN SATURATION: 97 % | BODY MASS INDEX: 20.18 KG/M2 | SYSTOLIC BLOOD PRESSURE: 115 MMHG | HEART RATE: 71 BPM | HEIGHT: 67 IN

## 2025-05-29 DIAGNOSIS — Z87.891 FORMER SMOKER: ICD-10-CM

## 2025-05-29 DIAGNOSIS — J44.9 CHRONIC OBSTRUCTIVE PULMONARY DISEASE, UNSPECIFIED COPD TYPE: ICD-10-CM

## 2025-05-29 DIAGNOSIS — R91.8 MULTIPLE LUNG NODULES ON CT: Primary | ICD-10-CM

## 2025-05-29 PROCEDURE — G0463 HOSPITAL OUTPT CLINIC VISIT: HCPCS

## 2025-05-29 RX ORDER — OXYCODONE AND ACETAMINOPHEN 5; 325 MG/1; MG/1
1 TABLET ORAL
COMMUNITY
Start: 2025-05-13

## 2025-05-29 NOTE — PROGRESS NOTES
HPI:  69 y.o.  Patient is here for follow-up visit for lung nodules.  Patient used to be a heavy smoker but quit 1992 after 20 pack years.  She had a low-dose CT scan in May showing 2 nodules 6 mm but she has no symptoms.  She rarely gets shortness of breath.  She has chronic sinus congestion and postnasal drainage.    Past Medical History:   Diagnosis Date    Anxiety     Arm pain     rt upper arm muscle    Cataracts, bilateral     COPD (chronic obstructive pulmonary disease)     20 to 30 years ago. Does not take meds.    CRPS (complex regional pain syndrome type I)     Diabetes mellitus     Type 2    Diabetic nephropathy     Encounter for immunization 05/24/2019    High blood pressure 02/24/2025    Hip pain, bilateral     Hyperlipidemia     Hypertension     Lupus (systemic lupus erythematosus) 02/24/2025    Tremors of nervous system         Current Outpatient Medications on File Prior to Visit   Medication Sig Dispense Refill    acetaminophen (TYLENOL) 500 MG tablet Take 2 tablets by mouth 2 (Two) Times a Day.      calcium carbonate (TUMS) 500 MG chewable tablet       diphenhydrAMINE-acetaminophen (TYLENOL PM)  MG tablet per tablet Take 1 tablet by mouth At Night As Needed for Sleep.      estradiol (ESTRACE) 1 MG tablet Take 1/2 (one-half) tablet by mouth once daily 45 tablet 0    ezetimibe (ZETIA) 10 MG tablet       gabapentin (NEURONTIN) 400 MG capsule Take 1 capsule by mouth 3 (Three) Times a Day. 270 capsule 1    glimepiride (AMARYL) 4 MG tablet Take 1 tablet by mouth once daily 90 tablet 0    Glucagon (Gvoke HypoPen 2-Pack) 1 MG/0.2ML solution auto-injector Inject 1 mg under the skin into the appropriate area as directed 1 (One) Time As Needed (hypoglycemia) for up to 1 dose. 0.4 mL 1    hydroCHLOROthiazide 12.5 MG tablet Take 1 tablet by mouth Daily for 90 days. 90 tablet 0    HYDROcodone-acetaminophen (NORCO)  MG per tablet Take 1 tablet by mouth Every 8 (Eight) Hours As Needed for Moderate  Pain. 90 tablet 0    hydroxychloroquine (PLAQUENIL) 200 MG tablet Take 1.5 tablets by mouth Daily.      hydrOXYzine (ATARAX) 10 MG tablet Take 1 tablet by mouth At Night As Needed.      hydrOXYzine pamoate (VISTARIL) 50 MG capsule Take 1 capsule by mouth Every Night.      Jardiance 25 MG tablet Take 1 tablet by mouth once daily 90 tablet 0    methocarbamol (ROBAXIN) 500 MG tablet Take 1 tablet by mouth 3 (Three) Times a Day As Needed for Muscle Spasms (during the day). 270 tablet 3    multivitamin with minerals (ONE-A-DAY 50 PLUS PO) Take 1 tablet by mouth Daily.      Narcan 4 MG/0.1ML nasal spray       Omega-3 Fatty Acids (Fish Oil Burp-Less) 1200 MG capsule Take 1 capsule by mouth Daily. 90 capsule 3    omeprazole (priLOSEC) 20 MG capsule Take 1 capsule by mouth Daily.      oxyCODONE-acetaminophen (PERCOCET) 5-325 MG per tablet Take 1 tablet by mouth every 6 (six) to 8 (eight) hours as needed for Pain.      polyethylene glycol (MIRALAX) 17 GM/SCOOP powder mix 1 capful (17 g) in liquid by mouth 2 (Two) Times a Day. 510 g 0    primidone (MYSOLINE) 250 MG tablet Take 1 tablet by mouth Every Night for 90 days. 90 tablet 0    propranolol (INDERAL) 20 MG tablet Take 1 tablet by mouth 3 (Three) Times a Day for 180 days. 90 tablet 5    SITagliptin-metFORMIN HCl ER (Janumet XR)  MG tablet Take 2 tablets every day with a meal 180 tablet 1    topiramate (TOPAMAX) 50 MG tablet Take 1 tablet by mouth twice daily 30 tablet 0    traZODone (DESYREL) 50 MG tablet Take 1 tablet by mouth every night at bedtime.      [DISCONTINUED] Apoaequorin 10 MG capsule Take 1 each by mouth Daily.      [DISCONTINUED] Misc. Devices (Roller Walker) misc Rolling walker 1 each 0     No current facility-administered medications on file prior to visit.        Social History     Tobacco Use    Smoking status: Former     Current packs/day: 0.00     Average packs/day: 4.5 packs/day for 21.0 years (94.5 ttl pk-yrs)     Types: Cigarettes     Start  "date:      Quit date:      Years since quittin.4     Passive exposure: Past    Smokeless tobacco: Never    Tobacco comments:     2nd Hand Smoke - boyfriend smokes.   Vaping Use    Vaping status: Never Used   Substance Use Topics    Alcohol use: Not Currently     Comment: occ    Drug use: Not Currently     Types: Marijuana     Comment: [facility]         Family History   Problem Relation Age of Onset    Diabetes Mother     Diabetes Father         Review of system:  Constitutional: Negative for chills, fever and malaise/fatigue.   HENT: Negative.    Eyes: Negative.    Cardiovascular: Negative.    Respiratory: negative for cough and shortness of breath.    Skin: Negative.    Musculoskeletal: Negative.    Gastrointestinal: Negative.    Genitourinary: Negative.    Neurological: Negative.    Psychiatric/Behavioral: Negative.    Physical exam:  Blood pressure 115/57, pulse 71, resp. rate 14, height 170.2 cm (67\"), weight 58.3 kg (128 lb 9.6 oz), SpO2 97%.    General Appearance:  Alert   HEENT:  Normocephalic, without obvious abnormality, Conjunctiva/corneas clear,.   Nares normal, no drainage     Neck:  Supple, symmetrical, trachea midline. No JVD.  Lungs /Chest wall:   good air entry Bilaterlly, respirations unlabored, symmetrical wall movement.     Heart:  Regular rate and rhythm, S1 S2 normal  Abdomen: Soft, non-tender, no masses, no organomegaly.    Extremities: No edema, no clubbing or cyanosis    XR Knee 3 View Bilateral  Result Date: 2025  Impression: 1. Mild bilateral medial compartment joint space narrowing. No degenerative osteophyte formation. Electronically Signed: Fabrice Her  2025 11:47 AM EDT  Workstation ID: [id number]    XR Spine Lumbar Complete 4+VW  Result Date: 3/28/2025  Impression: 1.No evidence of acute fracture or osseous malalignment. 2.Severe degenerative disc space loss at L3-L4 through L5-S1. Electronically Signed: aFbrice Her  3/28/2025 7:47 PM EDT  Workstation ID: " [id number]    XR Hip 1 View Bilateral  Result Date: 3/28/2025  Impression: 1.No acute osseous abnormality of the bilateral hips. 2.Degenerative disc disease of the lumbar spine. Electronically Signed: Fabrice Her  3/28/2025 7:46 PM EDT  Workstation ID: [id number]    XR Knee 3 View Bilateral  Result Date: 3/28/2025  Impression: 1.No acute osseous abnormality of the right or left knee. No acute change from prior radiographs performed yesterday. 2.Mild medial compartment joint space narrowing bilaterally. Electronically Signed: Fabricevaleria Her  3/28/2025 7:45 PM EDT  Workstation ID: [id number]    XR Chest 2 View  Result Date: 3/11/2025  Impression: No radiographic evidence of acute cardiopulmonary disease. Electronically Signed: Magan García  3/11/2025 10:12 PM EDT  Workstation ID: [id number]           Assessment and plan:  The lung nodules on 5/2025 are 6 mm in diameter and right upper lobe and right lower lobe,   CT scan September 2024: Stable 6 mm or less noncalcified right lung nodule since 5/23/2024. Following the Fleischner Society criteria for pulmonary nodule management, additional CT chest follow-up in 18 to 24 months is recommended.  Repeat CT scan in 1 year    COPD: Currently the patient is not needing inhalers but if she started to get more short of breath then will obtain PFTs and start inhalers  Former smoker: Quit 1992 after 20 pack years, encouraged the patient to stay away from exposure to secondhand smoking    DM and HLD: Continue medications as per PCP     Follow-up in 1 year    I personally reviewed the latest radiological study  Patient is advised to stay up-to-date on immunizations for flu, pneumococcal and COVID-19

## 2025-06-03 ENCOUNTER — TELEPHONE (OUTPATIENT)
Dept: FAMILY MEDICINE CLINIC | Facility: CLINIC | Age: 70
End: 2025-06-03
Payer: MEDICARE

## 2025-06-03 ENCOUNTER — OFFICE VISIT (OUTPATIENT)
Dept: PAIN MEDICINE | Facility: CLINIC | Age: 70
End: 2025-06-03
Payer: MEDICARE

## 2025-06-03 VITALS
DIASTOLIC BLOOD PRESSURE: 66 MMHG | SYSTOLIC BLOOD PRESSURE: 131 MMHG | HEART RATE: 77 BPM | BODY MASS INDEX: 20.63 KG/M2 | WEIGHT: 131.7 LBS | RESPIRATION RATE: 16 BRPM | OXYGEN SATURATION: 97 %

## 2025-06-03 DIAGNOSIS — M70.62 GREATER TROCHANTERIC BURSITIS OF BOTH HIPS: ICD-10-CM

## 2025-06-03 DIAGNOSIS — M75.51 SUBACROMIAL BURSITIS OF BOTH SHOULDERS: ICD-10-CM

## 2025-06-03 DIAGNOSIS — M25.531 CHRONIC PAIN OF RIGHT WRIST: ICD-10-CM

## 2025-06-03 DIAGNOSIS — M19.012 OSTEOARTHRITIS OF BILATERAL GLENOHUMERAL JOINTS: ICD-10-CM

## 2025-06-03 DIAGNOSIS — M54.50 CHRONIC BILATERAL LOW BACK PAIN WITHOUT SCIATICA: Primary | ICD-10-CM

## 2025-06-03 DIAGNOSIS — M25.561 CHRONIC PAIN OF BOTH KNEES: ICD-10-CM

## 2025-06-03 DIAGNOSIS — G89.29 CHRONIC PAIN OF BOTH KNEES: ICD-10-CM

## 2025-06-03 DIAGNOSIS — M25.512 CHRONIC PAIN OF BOTH SHOULDERS: ICD-10-CM

## 2025-06-03 DIAGNOSIS — G89.29 CHRONIC BILATERAL LOW BACK PAIN WITHOUT SCIATICA: Primary | ICD-10-CM

## 2025-06-03 DIAGNOSIS — M25.562 CHRONIC PAIN OF BOTH KNEES: ICD-10-CM

## 2025-06-03 DIAGNOSIS — G89.29 CHRONIC PAIN OF BOTH SHOULDERS: ICD-10-CM

## 2025-06-03 DIAGNOSIS — M25.511 CHRONIC PAIN OF BOTH SHOULDERS: ICD-10-CM

## 2025-06-03 DIAGNOSIS — M54.2 CERVICALGIA: ICD-10-CM

## 2025-06-03 DIAGNOSIS — G89.29 CHRONIC PAIN OF RIGHT WRIST: ICD-10-CM

## 2025-06-03 DIAGNOSIS — M17.0 PRIMARY OSTEOARTHRITIS OF BOTH KNEES: ICD-10-CM

## 2025-06-03 DIAGNOSIS — M19.011 OSTEOARTHRITIS OF BILATERAL GLENOHUMERAL JOINTS: ICD-10-CM

## 2025-06-03 DIAGNOSIS — M75.52 SUBACROMIAL BURSITIS OF BOTH SHOULDERS: ICD-10-CM

## 2025-06-03 DIAGNOSIS — M70.61 GREATER TROCHANTERIC BURSITIS OF BOTH HIPS: ICD-10-CM

## 2025-06-03 DIAGNOSIS — M48.02 CERVICAL STENOSIS OF SPINAL CANAL: ICD-10-CM

## 2025-06-03 PROCEDURE — 1159F MED LIST DOCD IN RCRD: CPT | Performed by: PHYSICAL MEDICINE & REHABILITATION

## 2025-06-03 PROCEDURE — 3078F DIAST BP <80 MM HG: CPT | Performed by: PHYSICAL MEDICINE & REHABILITATION

## 2025-06-03 PROCEDURE — 1160F RVW MEDS BY RX/DR IN RCRD: CPT | Performed by: PHYSICAL MEDICINE & REHABILITATION

## 2025-06-03 PROCEDURE — 99214 OFFICE O/P EST MOD 30 MIN: CPT | Performed by: PHYSICAL MEDICINE & REHABILITATION

## 2025-06-03 PROCEDURE — 3075F SYST BP GE 130 - 139MM HG: CPT | Performed by: PHYSICAL MEDICINE & REHABILITATION

## 2025-06-03 PROCEDURE — 1125F AMNT PAIN NOTED PAIN PRSNT: CPT | Performed by: PHYSICAL MEDICINE & REHABILITATION

## 2025-06-03 PROCEDURE — G2211 COMPLEX E/M VISIT ADD ON: HCPCS | Performed by: PHYSICAL MEDICINE & REHABILITATION

## 2025-06-03 NOTE — PROGRESS NOTES
"Subjective   Divya Lomeli is a 69 y.o. female.     History of Present Illness  CC: right shoulder pain    Right shoulder pain, began around 2017, treated by me since 7/1/19, aggravated with overhead reaching, 9/10 at worst, 0/10 at best, intermittent, varies, aching, stabbing, worse with lifting interferes with ADLs, work. Had PT with HEP. Subluxates, improves with reduction. Also c/o neck and LBP, joint pain. X-ray C-spine with DDD and DJD. Notes reviewed, as above, also DM2, COPD. Has used Tramadol with some relief, insufficient at BID prn. Trouble getting to sleep. Saw Dr. Kaye, started on Ultracet and Tizanidine 4mg QID prn, but cannot drive on that combination. Saw Dr. Polk, given L shoulder GH injection using U/S, steroid made her \"loony\" for 1-2 days, some improvement, also referred for PT. Had b/l subacromial injections with 24h relief only. Dr. Polk ordered MRI C-spine. Had Norco 5mg which helped, but helped much better with 1.5 tabs, increased to 7.5mg TID prn, then 10mg TID prn, doing well still. Good relief with b/l GTB injections, b/l knee HA injections.   Back Pain  Associated symptoms: no abdominal pain, no bladder incontinence, no chest pain, no fever, no numbness and no weakness    Knee Pain   Pertinent negatives include no numbness.   Neck Pain   Pertinent negatives include no chest pain, fever, numbness, trouble swallowing or weakness.   Wrist Pain   Pertinent negatives include no fever or numbness.        The following portions of the patient's history were reviewed and updated as appropriate: allergies, current medications, past family history, past medical history, past social history, past surgical history and problem list.    Review of Systems   Constitutional:  Negative for chills, fatigue and fever.   HENT:  Negative for hearing loss and trouble swallowing.    Eyes:  Negative for visual disturbance.   Respiratory:  Negative for shortness of breath.    Cardiovascular:  Negative for " chest pain.   Gastrointestinal:  Negative for abdominal pain, constipation, diarrhea, nausea and vomiting.   Genitourinary:  Negative for urinary incontinence.   Musculoskeletal:  Positive for arthralgias, back pain and neck pain. Negative for joint swelling and myalgias.   Neurological:  Negative for dizziness, weakness, numbness and headache.       Objective   Physical Exam   Constitutional: She is oriented to person, place, and time. She appears well-developed and well-nourished.   HENT:   Head: Normocephalic and atraumatic.   Eyes: Pupils are equal, round, and reactive to light.   Cardiovascular: Normal rate, regular rhythm and normal heart sounds.   Pulmonary/Chest: Effort normal and breath sounds normal.   Abdominal: Soft. Normal appearance and bowel sounds are normal. She exhibits no distension. There is no abdominal tenderness.   Musculoskeletal:      Comments: B/l: shoulder limited ROM  R knee: (+) crepitus, (-) edema, warmth, erythema     Neurological: She is alert and oriented to person, place, and time. She has normal reflexes. She displays normal reflexes. No sensory deficit.   Psychiatric: Her behavior is normal. Mood, judgment and thought content normal.         Assessment & Plan   Diagnoses and all orders for this visit:    1. Chronic bilateral low back pain without sciatica (Primary)    2. Cervicalgia    3. Cervical stenosis of spinal canal    4. Chronic pain of both knees    5. Chronic pain of both shoulders    6. Chronic pain of right wrist    7. Greater trochanteric bursitis of both hips    8. Osteoarthritis of bilateral glenohumeral joints    9. Primary osteoarthritis of both knees    10. Subacromial bursitis of both shoulders        Inspect reviewed, in order. UDS in order 11/12/24.  Reduced to to Tramadol 50mg TID prn. Began Norco 7.5mg TID prn, increased to 10mg TID prn. No refill today. Filled 4/16/25.   Patient's symptoms are still adequately managed by current medication regimen, is doing  "well at this strength and dosage, therefore I will continue to prescribe unchanged as the most appropriate course of treatment.  Restarted Gabapentin 300mg TID. Increased to 400mg TID.  Began Tizanidine 4mg qHS prn instead of Flexeril. Refilled Tizanidine 4mg qHS only, began Robaxin 750mg TID prn during day. Spasms resolved, she will try titrating off.  Ordered RxAlt #1 cream, insurance would not approve, cont OTC \"Blue Stuff\" cream.  Performed R subacromial injection with excellent relief, returning, performed repeat b/l. May need subacromial injection in L shoulder to complement GH injection. Schedule b/l shoulder SA injections per symptoms.  Performed R knee injection with limited relief, > 50% with HA, performed b/l Synvisc One injections, > 50% on right, temporarily increased pain on left, doing well now, joint space loss greater on the left, will plan series of 3 injections in future.  May need TPIs in cervical paraspinals and traps.  Discussed R GTB injection, performed b/l.   MRI C-spine with mod-severe stenosis and neuroforaminal stenosis. She declines ESIs.  RTC for R shoulder SA inj then in 3 months for f/u.                  Physical Exam  Constitutional:       Appearance: Normal appearance. She is well-developed.   HENT:      Head: Normocephalic and atraumatic.   Eyes:      Pupils: Pupils are equal, round, and reactive to light.   Cardiovascular:      Rate and Rhythm: Normal rate and regular rhythm.      Heart sounds: Normal heart sounds.   Pulmonary:      Effort: Pulmonary effort is normal.      Breath sounds: Normal breath sounds.   Abdominal:      General: Bowel sounds are normal. There is no distension.      Palpations: Abdomen is soft.      Tenderness: There is no abdominal tenderness.   Musculoskeletal:      Comments: B/l: shoulder limited ROM  R knee: (+) crepitus, (-) edema, warmth, erythema  TTP b/l GTB       Neurological:      Mental Status: She is alert and oriented to person, place, and time. "      Sensory: No sensory deficit.      Deep Tendon Reflexes: Reflexes are normal and symmetric. Reflexes normal.   Psychiatric:         Mood and Affect: Mood normal.         Behavior: Behavior normal.         Thought Content: Thought content normal.         Judgment: Judgment normal.

## 2025-06-03 NOTE — TELEPHONE ENCOUNTER
Caller: Divya Lomeli    Relationship: Self    Best call back number: [phone]     What medication are you requesting: FLUCONAZOLE TABLET    What are your current symptoms: ITCH IN VAGINAL AREA    How long have you been experiencing symptoms:  2 WEEKS     Have you had these symptoms before:    [x] Yes  [] No    Have you been treated for these symptoms before:   [x] Yes  [] No    If a prescription is needed, what is your preferred pharmacy and phone number: [facility] [address] [place] [address] [phone] [facility] [phone]      Additional notes: PATIENT STATES THAT SHE HAS A YEAST INFECTION AND WOULD LIKE TO HAVE THIS MEDICATION SENT TO HER PHARMACY. SHE HAS VAGINAL PROLAPSE AND IS HAVING SURGERY ON 06/10/25 AND WOULD LIKE TO HAVE THIS CLEARED UP BEFORE THEN.     PLEASE CALL TO DISCUSS AND ADVISE IF SHE NEEDS TO BE SEEN IN OFFICE FIRST.

## 2025-06-04 DIAGNOSIS — Z79.899 MEDICATION MANAGEMENT: Primary | ICD-10-CM

## 2025-06-04 RX ORDER — FLUCONAZOLE 200 MG/1
200 TABLET ORAL DAILY
Qty: 10 TABLET | Refills: 0 | Status: SHIPPED | OUTPATIENT
Start: 2025-06-04 | End: 2025-06-14

## 2025-06-05 ENCOUNTER — HOSPITAL ENCOUNTER (OUTPATIENT)
Dept: PAIN MEDICINE | Facility: HOSPITAL | Age: 70
Discharge: HOME | End: 2025-06-05
Payer: MEDICARE

## 2025-06-05 VITALS
DIASTOLIC BLOOD PRESSURE: 62 MMHG | WEIGHT: 131 LBS | BODY MASS INDEX: 20.56 KG/M2 | HEART RATE: 79 BPM | HEIGHT: 67 IN | OXYGEN SATURATION: 99 % | SYSTOLIC BLOOD PRESSURE: 119 MMHG | TEMPERATURE: 97.1 F | RESPIRATION RATE: 16 BRPM

## 2025-06-05 DIAGNOSIS — M75.51 SUBACROMIAL BURSITIS OF BOTH SHOULDERS: ICD-10-CM

## 2025-06-05 DIAGNOSIS — M75.52 SUBACROMIAL BURSITIS OF BOTH SHOULDERS: ICD-10-CM

## 2025-06-05 DIAGNOSIS — M25.512 CHRONIC PAIN OF BOTH SHOULDERS: Primary | ICD-10-CM

## 2025-06-05 DIAGNOSIS — M25.511 CHRONIC PAIN OF BOTH SHOULDERS: Primary | ICD-10-CM

## 2025-06-05 DIAGNOSIS — G89.29 CHRONIC PAIN OF BOTH SHOULDERS: Primary | ICD-10-CM

## 2025-06-05 PROCEDURE — 25010000002 METHYLPREDNISOLONE PER 40 MG: Performed by: PHYSICAL MEDICINE & REHABILITATION

## 2025-06-05 PROCEDURE — 25010000002 BUPIVACAINE (PF) 0.25 % SOLUTION: Performed by: PHYSICAL MEDICINE & REHABILITATION

## 2025-06-05 RX ORDER — BUPIVACAINE HYDROCHLORIDE 2.5 MG/ML
5 INJECTION, SOLUTION EPIDURAL; INFILTRATION; INTRACAUDAL; PERINEURAL ONCE
Status: COMPLETED | OUTPATIENT
Start: 2025-06-05 | End: 2025-06-05

## 2025-06-05 RX ORDER — METHYLPREDNISOLONE ACETATE 40 MG/ML
40 INJECTION, SUSPENSION INTRA-ARTICULAR; INTRALESIONAL; INTRAMUSCULAR; SOFT TISSUE ONCE
Status: COMPLETED | OUTPATIENT
Start: 2025-06-05 | End: 2025-06-05

## 2025-06-05 RX ADMIN — BUPIVACAINE HYDROCHLORIDE 5 ML: 2.5 INJECTION, SOLUTION EPIDURAL; INFILTRATION; INTRACAUDAL; PERINEURAL at 14:26

## 2025-06-05 RX ADMIN — METHYLPREDNISOLONE ACETATE 40 MG: 40 INJECTION, SUSPENSION INTRA-ARTICULAR; INTRALESIONAL; INTRAMUSCULAR; INTRASYNOVIAL; SOFT TISSUE at 14:26

## 2025-06-05 NOTE — PROCEDURES
"Procedures    Shoulder Subacromial Injection    PREOPERATIVE DIAGNOSIS: Shoulder pain    POSTOPERATIVE DIAGNOSIS: Shoulder pain    PROCEDURE PERFORMED: RIGHT SUBACROMIAL SHOULDER INJECTION    The patient understands the risks and benefits of the procedure and wishes to proceed. The patient was seen in the preoperative area. Patient's consent was obtained and updated. Vitals were taken. Patient was then placed in a seated position for the injection. Site marked for a lateral approach and prepped with alcohol swabs x 4. A 25 gauge 1.5\"  needle was introduced into the joint space and 3mL of steroid solution containing 2mL 0.25% bupivacaine, and 1mL 40mg Depomedrol was injected after negative aspiration without resistance. The patient tolerated with no parth-procedural complications.  A sterile dressing was placed over the puncture site.    "

## 2025-06-18 DIAGNOSIS — R51.9 NONINTRACTABLE HEADACHE, UNSPECIFIED CHRONICITY PATTERN, UNSPECIFIED HEADACHE TYPE: ICD-10-CM

## 2025-06-18 NOTE — TELEPHONE ENCOUNTER
SHE IS ALSO ASKING FOR     ezetimibe (ZETIA) 10 MG tablet       Caller: Divya Lomeli    Relationship: Self    Best call back number:     [phone] (Mobile)       Requested Prescriptions:   Requested Prescriptions     Pending Prescriptions Disp Refills    topiramate (TOPAMAX) 50 MG tablet 30 tablet 0     Sig: Take 1 tablet by mouth 2 (Two) Times a Day.        Pharmacy where request should be sent: [place] [phone] [place] [phone]      Last office visit with prescribing clinician: 5/12/2025   Last telemedicine visit with prescribing clinician: Visit date not found   Next office visit with prescribing clinician: 10/3/2025     Additional details provided by patient:     Does the patient have less than a 3 day supply:  [x] Yes  [] No    Would you like a call back once the refill request has been completed: [] Yes [] No    If the office needs to give you a call back, can they leave a voicemail: [] Yes [] No    Rebekah Hilario Rep   06/18/25 13:41 EDT

## 2025-06-19 RX ORDER — TOPIRAMATE 50 MG/1
50 TABLET, FILM COATED ORAL 2 TIMES DAILY
Qty: 30 TABLET | Refills: 0 | Status: SHIPPED | OUTPATIENT
Start: 2025-06-19

## 2025-07-09 DIAGNOSIS — R51.9 NONINTRACTABLE HEADACHE, UNSPECIFIED CHRONICITY PATTERN, UNSPECIFIED HEADACHE TYPE: ICD-10-CM

## 2025-07-11 RX ORDER — TOPIRAMATE 50 MG/1
50 TABLET, FILM COATED ORAL 2 TIMES DAILY
Qty: 30 TABLET | Refills: 0 | Status: SHIPPED | OUTPATIENT
Start: 2025-07-11

## 2025-07-25 DIAGNOSIS — M54.2 CERVICALGIA: ICD-10-CM

## 2025-07-25 RX ORDER — HYDROCODONE BITARTRATE AND ACETAMINOPHEN 10; 325 MG/1; MG/1
1 TABLET ORAL EVERY 8 HOURS PRN
Qty: 90 TABLET | Refills: 0 | Status: SHIPPED | OUTPATIENT
Start: 2025-07-25

## 2025-07-25 NOTE — TELEPHONE ENCOUNTER
Caller: Divya Lomeli    Relationship: Self    Best call back number:     Requested Prescriptions:   HYDROcodone-acetaminophen (NORCO)  MG per tablet     Pharmacy where request should be sent:    [facility] [address], [place] - [address] [facility] - [phone]  - [phone] FX  [address] [facility] [place] [address]  Phone: [phone]  Fax: [phone]       Last office visit with prescribing clinician: 6/3/2025   Last telemedicine visit with prescribing clinician: Visit date not found   Next office visit with prescribing clinician: 8/12/2025     Additional details provided by patient: LESS THAN 3 DAY SUPPLY     Does the patient have less than a 3 day supply:  [x] Yes  [] No    Would you like a call back once the refill request has been completed: [] Yes [] No    If the office needs to give you a call back, can they leave a voicemail: [] Yes [] No    Rebekah Armstrong Rep   07/25/25 13:33 EDT

## 2025-08-07 ENCOUNTER — EXTERNAL PBMM DATA (OUTPATIENT)
Dept: PHARMACY | Facility: OTHER | Age: 70
End: 2025-08-07
Payer: MEDICARE

## 2025-08-13 DIAGNOSIS — Z71.89 MEDICATION CARE PLAN DISCUSSED WITH PATIENT: ICD-10-CM

## 2025-08-13 DIAGNOSIS — R51.9 NONINTRACTABLE HEADACHE, UNSPECIFIED CHRONICITY PATTERN, UNSPECIFIED HEADACHE TYPE: ICD-10-CM

## 2025-08-19 DIAGNOSIS — E11.65 TYPE 2 DIABETES MELLITUS WITH HYPERGLYCEMIA, WITHOUT LONG-TERM CURRENT USE OF INSULIN: ICD-10-CM

## 2025-08-19 RX ORDER — HYDROCHLOROTHIAZIDE 12.5 MG/1
12.5 TABLET ORAL DAILY
Qty: 90 TABLET | Refills: 0 | Status: SHIPPED | OUTPATIENT
Start: 2025-08-19 | End: 2025-11-17

## 2025-08-19 RX ORDER — TOPIRAMATE 50 MG/1
50 TABLET, FILM COATED ORAL 2 TIMES DAILY
Qty: 30 TABLET | Refills: 0 | Status: SHIPPED | OUTPATIENT
Start: 2025-08-19

## 2025-08-19 RX ORDER — PRIMIDONE 250 MG/1
250 TABLET ORAL NIGHTLY
Qty: 90 TABLET | Refills: 0 | Status: SHIPPED | OUTPATIENT
Start: 2025-08-19 | End: 2025-11-17

## 2025-08-20 RX ORDER — HYDROXYZINE PAMOATE 50 MG/1
50 CAPSULE ORAL NIGHTLY
OUTPATIENT
Start: 2025-08-20

## 2025-08-20 RX ORDER — GLIMEPIRIDE 4 MG/1
4 TABLET ORAL DAILY
Qty: 90 TABLET | Refills: 0 | Status: SHIPPED | OUTPATIENT
Start: 2025-08-20

## 2025-08-20 RX ORDER — TRAZODONE HYDROCHLORIDE 50 MG/1
50 TABLET ORAL
Qty: 90 TABLET | Refills: 0 | Status: SHIPPED | OUTPATIENT
Start: 2025-08-20 | End: 2025-11-18

## 2025-08-20 RX ORDER — HYDROXYZINE HYDROCHLORIDE 10 MG/1
10 TABLET, FILM COATED ORAL NIGHTLY PRN
Qty: 90 TABLET | Refills: 1 | Status: SHIPPED | OUTPATIENT
Start: 2025-08-20 | End: 2026-02-16

## 2025-08-20 RX ORDER — ESTRADIOL 1 MG/1
0.5 TABLET ORAL DAILY
Qty: 45 TABLET | Refills: 0 | Status: SHIPPED | OUTPATIENT
Start: 2025-08-20

## 2025-08-21 ENCOUNTER — EXTERNAL PBMM DATA (OUTPATIENT)
Dept: PHARMACY | Facility: OTHER | Age: 70
End: 2025-08-21
Payer: MEDICARE

## 2025-08-25 ENCOUNTER — TELEPHONE (OUTPATIENT)
Dept: FAMILY MEDICINE CLINIC | Facility: CLINIC | Age: 70
End: 2025-08-25
Payer: MEDICARE

## 2025-08-25 DIAGNOSIS — E11.65 TYPE 2 DIABETES MELLITUS WITH HYPERGLYCEMIA, WITHOUT LONG-TERM CURRENT USE OF INSULIN: ICD-10-CM

## 2025-08-25 RX ORDER — SITAGLIPTIN AND METFORMIN HYDROCHLORIDE 1000; 50 MG/1; MG/1
TABLET, FILM COATED, EXTENDED RELEASE ORAL
Qty: 180 TABLET | Refills: 1 | Status: SHIPPED | OUTPATIENT
Start: 2025-08-25

## 2025-08-28 DIAGNOSIS — E11.65 TYPE 2 DIABETES MELLITUS WITH HYPERGLYCEMIA, WITHOUT LONG-TERM CURRENT USE OF INSULIN: ICD-10-CM

## 2025-08-29 RX ORDER — GLIMEPIRIDE 4 MG/1
4 TABLET ORAL DAILY
Qty: 90 TABLET | Refills: 0 | Status: SHIPPED | OUTPATIENT
Start: 2025-08-29

## (undated) DEVICE — 9165 UNIVERSAL PATIENT PLATE: Brand: 3M™

## (undated) DEVICE — CATHETER,FOLEY,3-WAY,24FR,30ML,100%SILI: Brand: MEDLINE

## (undated) DEVICE — ENDOPATH XCEL BLADELESS TROCARS WITH STABILITY SLEEVES: Brand: ENDOPATH XCEL

## (undated) DEVICE — SOL IRR NACL 0.9PCT ARTHROMATIC 3000ML

## (undated) DEVICE — CATH FOL SIMPLASTIC 3WY 22F 30CC

## (undated) DEVICE — ADAPT CYSTO FOR SYR TO SHEATH

## (undated) DEVICE — ENDOPATH XCEL WITH OPTIVIEW TECHNOLOGY BLADELESS TROCARS WITH STABILITY SLEEVES: Brand: ENDOPATH XCEL OPTIVIEW

## (undated) DEVICE — UNDERGLV SURG BIOGEL INDICAT PF 8 GRN

## (undated) DEVICE — GLV SURG BIOGEL LTX PF 7 1/2

## (undated) DEVICE — GENERAL LAPAROSCOPY CDS: Brand: MEDLINE INDUSTRIES, INC.

## (undated) DEVICE — SOL IRR NACL 0.9PCT 1000ML

## (undated) DEVICE — SYRINGE,TOOMEY,IRRIGATION,70CC,STERILE: Brand: MEDLINE

## (undated) DEVICE — SUT VIC 0/0 UR6 27IN DYED J603H

## (undated) DEVICE — ELECTRD LP CUT 24/26F YEL

## (undated) DEVICE — BG RETRV TISS SUPERBAG INTRO RIP/STOP NLY 10MM 240ML MD

## (undated) DEVICE — DRAPE SHEET ULTRAGARD: Brand: MEDLINE

## (undated) DEVICE — SOLUTION,WATER,IRRIGATION,1000ML,STERILE: Brand: MEDLINE

## (undated) DEVICE — NITINOL WIRE WITH HYDROPHILIC TIP: Brand: SENSOR

## (undated) DEVICE — PRESSURE MONITORING ACCESSORY: Brand: TRUWAVE

## (undated) DEVICE — SOL IRRG H2O BG 3000ML STRL

## (undated) DEVICE — ENDOPATH 5MM CURVED SCISSORS WITH MONOPOLAR CAUTERY: Brand: ENDOPATH

## (undated) DEVICE — KT SURG TURNOVER 050

## (undated) DEVICE — LAPAROSCOPIC GAS CONDITIONING DEVICE.: Brand: INSUFLOW

## (undated) DEVICE — PAD,ABDOMINAL,5"X9",STERILE,LF,1/PK: Brand: MEDLINE INDUSTRIES, INC.

## (undated) DEVICE — PK CYSTO 50

## (undated) DEVICE — UNDYED BRAIDED (POLYGLACTIN 910), SYNTHETIC ABSORBABLE SUTURE: Brand: COATED VICRYL

## (undated) DEVICE — CATH URETRL OPN/END 5F70CM

## (undated) DEVICE — TUBING, SUCTION, 1/4" X 12', STRAIGHT: Brand: MEDLINE

## (undated) DEVICE — GLV SURG SIGNATURE ESSENTIAL PF LTX SZ7

## (undated) DEVICE — 2, DISPOSABLE SUCTION/IRRIGATOR WITH DISPOSABLE TIP: Brand: STRYKEFLOW

## (undated) DEVICE — CUFF SCD HEMOFORCE SEQ CALF STD MD

## (undated) DEVICE — ENDOPOUCH RETRIEVER SPECIMEN RETRIEVAL BAGS: Brand: ENDOPOUCH RETRIEVER

## (undated) DEVICE — ADHS SKIN PREMIERPRO EXOFIN TOPICAL HI/VISC .5ML

## (undated) DEVICE — ENDOPATH XCEL WITH OPTIVIEW TECHNOLOGY UNIVERSAL TROCAR STABILITY SLEEVES: Brand: ENDOPATH XCEL OPTIVIEW

## (undated) DEVICE — SYR LUERLOK 30CC